# Patient Record
Sex: MALE | Race: WHITE | NOT HISPANIC OR LATINO | Employment: OTHER | URBAN - METROPOLITAN AREA
[De-identification: names, ages, dates, MRNs, and addresses within clinical notes are randomized per-mention and may not be internally consistent; named-entity substitution may affect disease eponyms.]

---

## 2019-01-17 ENCOUNTER — HOSPITAL ENCOUNTER (OUTPATIENT)
Dept: SURGERY | Facility: HOSPITAL | Age: 74
Setting detail: HOSPITAL OUTPATIENT SURGERY
Discharge: HOME | End: 2019-01-17
Attending: OPHTHALMOLOGY

## 2019-01-24 ENCOUNTER — HOSPITAL ENCOUNTER (OUTPATIENT)
Dept: SURGERY | Facility: HOSPITAL | Age: 74
Setting detail: HOSPITAL OUTPATIENT SURGERY
Discharge: HOME | End: 2019-01-24
Attending: OPHTHALMOLOGY

## 2019-03-13 ENCOUNTER — HOSPITAL ENCOUNTER (OUTPATIENT)
Dept: OTHER | Facility: HOSPITAL | Age: 74
Discharge: HOME | End: 2019-03-13

## 2019-03-14 ENCOUNTER — HOSPITAL ENCOUNTER (OUTPATIENT)
Dept: OTHER | Facility: HOSPITAL | Age: 74
Discharge: HOME | End: 2019-03-14
Attending: FAMILY MEDICINE

## 2019-03-14 LAB
CREAT BLD-MCNC: 1.2 MG/DL (ref 0.6–1.4)
GFR SERPLBLD BASED ON 1.73 SQ M-ARVRAT: >60 ML/MIN/{1.73_M2}

## 2019-03-20 ENCOUNTER — OFFICE VISIT CONVERTED (OUTPATIENT)
Dept: UROLOGY | Facility: CLINIC | Age: 74
End: 2019-03-20
Attending: UROLOGY

## 2019-03-22 ENCOUNTER — HOSPITAL ENCOUNTER (OUTPATIENT)
Dept: PERIOP | Facility: HOSPITAL | Age: 74
Setting detail: HOSPITAL OUTPATIENT SURGERY
Discharge: HOME | End: 2019-03-22
Attending: UROLOGY

## 2019-03-22 LAB
ANION GAP SERPL CALC-SCNC: 14 MMOL/L (ref 8–19)
BASOPHILS # BLD AUTO: 0.02 10*3/UL (ref 0–0.2)
BASOPHILS NFR BLD AUTO: 0.2 % (ref 0–3)
BUN SERPL-MCNC: 23 MG/DL (ref 5–25)
BUN/CREAT SERPL: 23 {RATIO} (ref 6–20)
CALCIUM SERPL-MCNC: 9.3 MG/DL (ref 8.7–10.4)
CHLORIDE SERPL-SCNC: 104 MMOL/L (ref 99–111)
CONV ABS IMM GRAN: 0.08 10*3/UL (ref 0–0.2)
CONV CO2: 26 MMOL/L (ref 22–32)
CONV IMMATURE GRAN: 0.6 % (ref 0–1.8)
CREAT UR-MCNC: 1 MG/DL (ref 0.7–1.2)
DEPRECATED RDW RBC AUTO: 42.9 FL (ref 35.1–43.9)
EOSINOPHIL # BLD AUTO: 0.21 10*3/UL (ref 0–0.7)
EOSINOPHIL # BLD AUTO: 1.6 % (ref 0–7)
ERYTHROCYTE [DISTWIDTH] IN BLOOD BY AUTOMATED COUNT: 13.7 % (ref 11.6–14.4)
GFR SERPLBLD BASED ON 1.73 SQ M-ARVRAT: >60 ML/MIN/{1.73_M2}
GLUCOSE SERPL-MCNC: 89 MG/DL (ref 70–99)
HBA1C MFR BLD: 13.8 G/DL (ref 14–18)
HCT VFR BLD AUTO: 43.6 % (ref 42–52)
LYMPHOCYTES # BLD AUTO: 2.47 10*3/UL (ref 1–5)
MCH RBC QN AUTO: 27.5 PG (ref 27–31)
MCHC RBC AUTO-ENTMCNC: 31.7 G/DL (ref 33–37)
MCV RBC AUTO: 86.9 FL (ref 80–96)
MONOCYTES # BLD AUTO: 1.14 10*3/UL (ref 0.2–1.2)
MONOCYTES NFR BLD AUTO: 8.6 % (ref 3–10)
NEUTROPHILS # BLD AUTO: 9.26 10*3/UL (ref 2–8)
NEUTROPHILS NFR BLD AUTO: 70.3 % (ref 30–85)
NRBC CBCN: 0 % (ref 0–0.7)
OSMOLALITY SERPL CALC.SUM OF ELEC: 293 MOSM/KG (ref 273–304)
PLATELET # BLD AUTO: 302 10*3/UL (ref 130–400)
PMV BLD AUTO: 10.6 FL (ref 9.4–12.4)
POTASSIUM SERPL-SCNC: 4 MMOL/L (ref 3.5–5.3)
RBC # BLD AUTO: 5.02 10*6/UL (ref 4.7–6.1)
SODIUM SERPL-SCNC: 140 MMOL/L (ref 135–147)
VARIANT LYMPHS NFR BLD MANUAL: 18.7 % (ref 20–45)
WBC # BLD AUTO: 13.18 10*3/UL (ref 4.8–10.8)

## 2019-03-24 LAB — BACTERIA UR CULT: NORMAL

## 2019-03-28 ENCOUNTER — OFFICE VISIT CONVERTED (OUTPATIENT)
Dept: SURGERY | Facility: CLINIC | Age: 74
End: 2019-03-28
Attending: PHYSICIAN ASSISTANT

## 2019-04-03 ENCOUNTER — HOSPITAL ENCOUNTER (OUTPATIENT)
Dept: PREADMISSION TESTING | Facility: HOSPITAL | Age: 74
Discharge: HOME | End: 2019-04-03
Attending: UROLOGY

## 2019-04-03 LAB
ANION GAP SERPL CALC-SCNC: 16 MMOL/L (ref 8–19)
BASOPHILS # BLD AUTO: 0.03 10*3/UL (ref 0–0.2)
BASOPHILS NFR BLD AUTO: 0.4 % (ref 0–3)
BUN SERPL-MCNC: 18 MG/DL (ref 5–25)
BUN/CREAT SERPL: 13 {RATIO} (ref 6–20)
CALCIUM SERPL-MCNC: 9.1 MG/DL (ref 8.7–10.4)
CHLORIDE SERPL-SCNC: 105 MMOL/L (ref 99–111)
CONV ABS IMM GRAN: 0.01 10*3/UL (ref 0–0.2)
CONV CO2: 25 MMOL/L (ref 22–32)
CONV IMMATURE GRAN: 0.1 % (ref 0–1.8)
CREAT UR-MCNC: 1.42 MG/DL (ref 0.7–1.2)
DEPRECATED RDW RBC AUTO: 44.1 FL (ref 35.1–43.9)
EOSINOPHIL # BLD AUTO: 0.35 10*3/UL (ref 0–0.7)
EOSINOPHIL # BLD AUTO: 4.8 % (ref 0–7)
ERYTHROCYTE [DISTWIDTH] IN BLOOD BY AUTOMATED COUNT: 13.5 % (ref 11.6–14.4)
GFR SERPLBLD BASED ON 1.73 SQ M-ARVRAT: 49 ML/MIN/{1.73_M2}
GLUCOSE SERPL-MCNC: 102 MG/DL (ref 70–99)
HBA1C MFR BLD: 12.4 G/DL (ref 14–18)
HCT VFR BLD AUTO: 39.9 % (ref 42–52)
INR PPP: 0.92 (ref 2–3)
LYMPHOCYTES # BLD AUTO: 1.35 10*3/UL (ref 1–5)
MCH RBC QN AUTO: 27.5 PG (ref 27–31)
MCHC RBC AUTO-ENTMCNC: 31.1 G/DL (ref 33–37)
MCV RBC AUTO: 88.5 FL (ref 80–96)
MONOCYTES # BLD AUTO: 0.76 10*3/UL (ref 0.2–1.2)
MONOCYTES NFR BLD AUTO: 10.5 % (ref 3–10)
NEUTROPHILS # BLD AUTO: 4.72 10*3/UL (ref 2–8)
NEUTROPHILS NFR BLD AUTO: 65.5 % (ref 30–85)
NRBC CBCN: 0 % (ref 0–0.7)
OSMOLALITY SERPL CALC.SUM OF ELEC: 296 MOSM/KG (ref 273–304)
PLATELET # BLD AUTO: 203 10*3/UL (ref 130–400)
PMV BLD AUTO: 11 FL (ref 9.4–12.4)
POTASSIUM SERPL-SCNC: 4.1 MMOL/L (ref 3.5–5.3)
PROTHROMBIN TIME: 9.8 S (ref 9.4–12)
RBC # BLD AUTO: 4.51 10*6/UL (ref 4.7–6.1)
SODIUM SERPL-SCNC: 142 MMOL/L (ref 135–147)
VARIANT LYMPHS NFR BLD MANUAL: 18.7 % (ref 20–45)
WBC # BLD AUTO: 7.22 10*3/UL (ref 4.8–10.8)

## 2019-04-17 ENCOUNTER — HOSPITAL ENCOUNTER (OUTPATIENT)
Dept: GENERAL RADIOLOGY | Facility: HOSPITAL | Age: 74
Discharge: HOME | End: 2019-04-17
Attending: UROLOGY

## 2019-04-17 ENCOUNTER — OFFICE VISIT CONVERTED (OUTPATIENT)
Dept: UROLOGY | Facility: CLINIC | Age: 74
End: 2019-04-17
Attending: UROLOGY

## 2019-04-17 ENCOUNTER — CONVERSION ENCOUNTER (OUTPATIENT)
Dept: SURGERY | Facility: CLINIC | Age: 74
End: 2019-04-17

## 2019-04-29 ENCOUNTER — CONVERSION ENCOUNTER (OUTPATIENT)
Dept: SURGERY | Facility: CLINIC | Age: 74
End: 2019-04-29

## 2019-04-29 ENCOUNTER — OFFICE VISIT CONVERTED (OUTPATIENT)
Dept: UROLOGY | Facility: CLINIC | Age: 74
End: 2019-04-29
Attending: UROLOGY

## 2019-06-24 ENCOUNTER — CONVERSION ENCOUNTER (OUTPATIENT)
Dept: SURGERY | Facility: CLINIC | Age: 74
End: 2019-06-24

## 2019-06-24 ENCOUNTER — PROCEDURE VISIT CONVERTED (OUTPATIENT)
Dept: UROLOGY | Facility: CLINIC | Age: 74
End: 2019-06-24
Attending: UROLOGY

## 2019-10-01 ENCOUNTER — HOSPITAL ENCOUNTER (OUTPATIENT)
Dept: PERIOP | Facility: HOSPITAL | Age: 74
Setting detail: HOSPITAL OUTPATIENT SURGERY
Discharge: HOME | End: 2019-10-01
Attending: UROLOGY

## 2019-10-03 ENCOUNTER — CONVERSION ENCOUNTER (OUTPATIENT)
Dept: SURGERY | Facility: CLINIC | Age: 74
End: 2019-10-03

## 2019-10-03 ENCOUNTER — OFFICE VISIT CONVERTED (OUTPATIENT)
Dept: UROLOGY | Facility: CLINIC | Age: 74
End: 2019-10-03
Attending: UROLOGY

## 2020-01-06 ENCOUNTER — PROCEDURE VISIT CONVERTED (OUTPATIENT)
Dept: UROLOGY | Facility: CLINIC | Age: 75
End: 2020-01-06
Attending: UROLOGY

## 2020-01-06 ENCOUNTER — CONVERSION ENCOUNTER (OUTPATIENT)
Dept: SURGERY | Facility: CLINIC | Age: 75
End: 2020-01-06

## 2020-01-21 ENCOUNTER — HOSPITAL ENCOUNTER (OUTPATIENT)
Dept: PERIOP | Facility: HOSPITAL | Age: 75
Setting detail: HOSPITAL OUTPATIENT SURGERY
Discharge: HOME | End: 2020-01-21
Attending: UROLOGY

## 2020-01-21 LAB
ANION GAP SERPL CALC-SCNC: 15 MMOL/L (ref 8–19)
BUN SERPL-MCNC: 22 MG/DL (ref 5–25)
BUN/CREAT SERPL: 19 {RATIO} (ref 6–20)
CALCIUM SERPL-MCNC: 9.6 MG/DL (ref 8.7–10.4)
CHLORIDE SERPL-SCNC: 102 MMOL/L (ref 99–111)
CONV CO2: 26 MMOL/L (ref 22–32)
CREAT UR-MCNC: 1.17 MG/DL (ref 0.7–1.2)
GFR SERPLBLD BASED ON 1.73 SQ M-ARVRAT: >60 ML/MIN/{1.73_M2}
GLUCOSE SERPL-MCNC: 99 MG/DL (ref 70–99)
OSMOLALITY SERPL CALC.SUM OF ELEC: 291 MOSM/KG (ref 273–304)
POTASSIUM SERPL-SCNC: 4.2 MMOL/L (ref 3.5–5.3)
SODIUM SERPL-SCNC: 139 MMOL/L (ref 135–147)

## 2020-01-22 ENCOUNTER — HOSPITAL ENCOUNTER (OUTPATIENT)
Dept: OTHER | Facility: HOSPITAL | Age: 75
Discharge: HOME | End: 2020-01-22
Attending: PHYSICIAN ASSISTANT

## 2020-01-22 LAB — PSA SERPL-MCNC: 0.88 NG/ML (ref 0–4)

## 2020-01-23 ENCOUNTER — OFFICE VISIT CONVERTED (OUTPATIENT)
Dept: SURGERY | Facility: CLINIC | Age: 75
End: 2020-01-23
Attending: PHYSICIAN ASSISTANT

## 2020-01-23 ENCOUNTER — CONVERSION ENCOUNTER (OUTPATIENT)
Dept: SURGERY | Facility: CLINIC | Age: 75
End: 2020-01-23

## 2020-01-29 ENCOUNTER — OFFICE VISIT CONVERTED (OUTPATIENT)
Dept: UROLOGY | Facility: CLINIC | Age: 75
End: 2020-01-29
Attending: UROLOGY

## 2020-02-07 ENCOUNTER — HOSPITAL ENCOUNTER (OUTPATIENT)
Dept: OTHER | Facility: HOSPITAL | Age: 75
Discharge: HOME | End: 2020-02-07
Attending: NURSE PRACTITIONER

## 2020-02-11 ENCOUNTER — OFFICE VISIT CONVERTED (OUTPATIENT)
Dept: SURGERY | Facility: CLINIC | Age: 75
End: 2020-02-11
Attending: NURSE PRACTITIONER

## 2020-03-02 ENCOUNTER — CONVERSION ENCOUNTER (OUTPATIENT)
Dept: SURGERY | Facility: CLINIC | Age: 75
End: 2020-03-02

## 2020-03-02 ENCOUNTER — OFFICE VISIT CONVERTED (OUTPATIENT)
Dept: UROLOGY | Facility: CLINIC | Age: 75
End: 2020-03-02
Attending: UROLOGY

## 2020-05-15 ENCOUNTER — PROCEDURE VISIT CONVERTED (OUTPATIENT)
Dept: UROLOGY | Facility: CLINIC | Age: 75
End: 2020-05-15
Attending: UROLOGY

## 2020-06-09 ENCOUNTER — HOSPITAL ENCOUNTER (OUTPATIENT)
Dept: PERIOP | Facility: HOSPITAL | Age: 75
Setting detail: HOSPITAL OUTPATIENT SURGERY
Discharge: HOME | End: 2020-06-09
Attending: UROLOGY

## 2020-06-17 ENCOUNTER — OFFICE VISIT CONVERTED (OUTPATIENT)
Dept: UROLOGY | Facility: CLINIC | Age: 75
End: 2020-06-17
Attending: UROLOGY

## 2020-06-17 ENCOUNTER — CONVERSION ENCOUNTER (OUTPATIENT)
Dept: SURGERY | Facility: CLINIC | Age: 75
End: 2020-06-17

## 2020-07-15 ENCOUNTER — OFFICE VISIT CONVERTED (OUTPATIENT)
Dept: UROLOGY | Facility: CLINIC | Age: 75
End: 2020-07-15
Attending: UROLOGY

## 2020-07-15 ENCOUNTER — CONVERSION ENCOUNTER (OUTPATIENT)
Dept: SURGERY | Facility: CLINIC | Age: 75
End: 2020-07-15

## 2020-07-22 ENCOUNTER — CONVERSION ENCOUNTER (OUTPATIENT)
Dept: SURGERY | Facility: CLINIC | Age: 75
End: 2020-07-22

## 2020-07-22 ENCOUNTER — OFFICE VISIT CONVERTED (OUTPATIENT)
Dept: UROLOGY | Facility: CLINIC | Age: 75
End: 2020-07-22
Attending: UROLOGY

## 2020-07-29 ENCOUNTER — OFFICE VISIT CONVERTED (OUTPATIENT)
Dept: UROLOGY | Facility: CLINIC | Age: 75
End: 2020-07-29
Attending: UROLOGY

## 2020-07-29 ENCOUNTER — CONVERSION ENCOUNTER (OUTPATIENT)
Dept: SURGERY | Facility: CLINIC | Age: 75
End: 2020-07-29

## 2020-08-05 ENCOUNTER — OFFICE VISIT CONVERTED (OUTPATIENT)
Dept: UROLOGY | Facility: CLINIC | Age: 75
End: 2020-08-05
Attending: UROLOGY

## 2020-08-12 ENCOUNTER — OFFICE VISIT CONVERTED (OUTPATIENT)
Dept: UROLOGY | Facility: CLINIC | Age: 75
End: 2020-08-12
Attending: UROLOGY

## 2020-08-12 ENCOUNTER — CONVERSION ENCOUNTER (OUTPATIENT)
Dept: SURGERY | Facility: CLINIC | Age: 75
End: 2020-08-12

## 2020-08-19 ENCOUNTER — CONVERSION ENCOUNTER (OUTPATIENT)
Dept: SURGERY | Facility: CLINIC | Age: 75
End: 2020-08-19

## 2020-08-19 ENCOUNTER — OFFICE VISIT CONVERTED (OUTPATIENT)
Dept: UROLOGY | Facility: CLINIC | Age: 75
End: 2020-08-19
Attending: UROLOGY

## 2020-09-16 ENCOUNTER — HOSPITAL ENCOUNTER (OUTPATIENT)
Dept: FAMILY MEDICINE CLINIC | Facility: CLINIC | Age: 75
Discharge: HOME | End: 2020-09-16
Attending: UROLOGY

## 2020-09-18 LAB — SARS-COV-2 RNA SPEC QL NAA+PROBE: NOT DETECTED

## 2020-09-22 ENCOUNTER — HOSPITAL ENCOUNTER (OUTPATIENT)
Dept: PERIOP | Facility: HOSPITAL | Age: 75
Setting detail: HOSPITAL OUTPATIENT SURGERY
Discharge: HOME | End: 2020-09-22
Attending: UROLOGY

## 2020-09-30 ENCOUNTER — TELEPHONE CONVERTED (OUTPATIENT)
Dept: UROLOGY | Facility: CLINIC | Age: 75
End: 2020-09-30
Attending: UROLOGY

## 2021-01-14 ENCOUNTER — PROCEDURE VISIT CONVERTED (OUTPATIENT)
Dept: UROLOGY | Facility: CLINIC | Age: 76
End: 2021-01-14
Attending: UROLOGY

## 2021-01-29 ENCOUNTER — HOSPITAL ENCOUNTER (OUTPATIENT)
Dept: FAMILY MEDICINE CLINIC | Facility: CLINIC | Age: 76
Discharge: HOME | End: 2021-01-29
Attending: UROLOGY

## 2021-01-31 LAB — SARS-COV-2 RNA SPEC QL NAA+PROBE: NOT DETECTED

## 2021-02-04 ENCOUNTER — HOSPITAL ENCOUNTER (OUTPATIENT)
Dept: PERIOP | Facility: HOSPITAL | Age: 76
Setting detail: HOSPITAL OUTPATIENT SURGERY
Discharge: HOME | End: 2021-02-04
Attending: UROLOGY

## 2021-02-26 ENCOUNTER — OFFICE VISIT CONVERTED (OUTPATIENT)
Dept: NEUROLOGY | Facility: CLINIC | Age: 76
End: 2021-02-26
Attending: PSYCHIATRY & NEUROLOGY

## 2021-03-01 ENCOUNTER — CONVERSION ENCOUNTER (OUTPATIENT)
Dept: SURGERY | Facility: CLINIC | Age: 76
End: 2021-03-01

## 2021-03-01 ENCOUNTER — OFFICE VISIT CONVERTED (OUTPATIENT)
Dept: UROLOGY | Facility: CLINIC | Age: 76
End: 2021-03-01
Attending: UROLOGY

## 2021-03-23 NOTE — PROGRESS NOTES
"Subjective   Patient ID: Curtis Richmond is a 75 y.o. male is being seen for consultation today at the request of Jeffrey Deal,Maisha for Cerebral aneurysm.    Today the patient reports he is doing well. He denies any headaches, vision changes or dizziness.    MRI Brain done at [facility] 2/9/2021 and CT Head done at [facility] 2/9/2021.    Patient, provider and MA are all wearing a mask in our office today.    75-year-old male who is a former smoker but quit 40 years ago with no family history of subarachnoid hemorrhage or intracranial aneurysms who had an acute stroke in February 2021 with imaging performed including an MRI of the brain and CTA of the head & neck demonstrating a large anterior communicating artery aneurysm plus acute infarcts in the right cerebral hemisphere including the right frontal and right parietal lobes.  Patient does have hypertension which is controlled with medication and is on Lipitor and Plavix as well.  Patient was having headaches prior to his CVA, but these have improved.  Patient still has some residual weakness in his left side, but this is improving over time.      The following portions of the patient's history were reviewed and updated as appropriate: allergies, current medications, past family history, past medical history, past social history, past surgical history and problem list.    Review of Systems   HENT: Negative.    Eyes: Negative for visual disturbance.   Respiratory: Negative.    Cardiovascular: Negative.    Gastrointestinal: Negative for nausea.   Endocrine: Negative.    Genitourinary: Negative.    Musculoskeletal: Negative.    Skin: Negative.    Allergic/Immunologic: Negative.    Neurological: Negative for dizziness and headaches.   Hematological: Negative.    Psychiatric/Behavioral: Negative.        Objective     Vitals:    03/30/21 0920   BP: 138/76   Pulse: 80   Temp: 98.1 °F (36.7 °C)   Weight: 109 kg (240 lb)   Height: 165.1 cm (65\")     Body mass index is " 39.94 kg/m².      Physical Exam  Vitals and nursing note reviewed.   Constitutional:       General: He is not in acute distress.     Appearance: He is obese.   HENT:      Head: Normocephalic and atraumatic.      Nose: Nose normal.      Mouth/Throat:      Mouth: Mucous membranes are moist.   Eyes:      Extraocular Movements: Extraocular movements intact.      Conjunctiva/sclera: Conjunctivae normal.      Pupils: Pupils are equal, round, and reactive to light.   Cardiovascular:      Rate and Rhythm: Normal rate.      Pulses: Normal pulses.   Pulmonary:      Effort: Pulmonary effort is normal.   Musculoskeletal:         General: Normal range of motion.      Cervical back: Normal range of motion.   Skin:     General: Skin is warm and dry.   Neurological:      Mental Status: He is alert and oriented to person, place, and time.      Cranial Nerves: No cranial nerve deficit.      Sensory: Sensory deficit present.      Motor: Weakness present.      Coordination: Coordination normal.   Psychiatric:         Mood and Affect: Mood normal.         Behavior: Behavior normal.         Thought Content: Thought content normal.         Judgment: Judgment normal.       Neurologic Exam     Mental Status   Oriented to person, place, and time.     Cranial Nerves     CN III, IV, VI   Pupils are equal, round, and reactive to light.          Assessment/Plan   Independent Review of Radiographic Studies:      I personally reviewed the images from the following studies.    The CTA of the head neck dated 2021+ the MRI of the brain dated 2021 were reviewed and show the anterior communicating artery aneurysm measuring 15 millimeters in size with partial thrombosis.    Medical Decision Makin-year-old male with previous CVA and work-up demonstrating a large anterior communicating artery aneurysm.  Patient now for angiographic evaluation and possible endovascular treatment under general anesthesia.  The risks, alternatives and  procedure were explained to the patient who expresses understanding and wishes to proceed.  Diagnoses and all orders for this visit:    1. Cerebral aneurysm, nonruptured (Primary)  -     Case Request; Standing  -     CBC & Differential; Future  -     Basic Metabolic Panel; Future  -     P2Y12 Platelet Inhibition; Future  -     Case Request  -     aspirin chewable tablet 81 mg    2. Essential hypertension    Other orders  -     Outpatient In A Bed; Standing  -     Follow Anesthesia Guidelines / Protocol; Future  -     Obtain informed consent  -     Provide NPO Instructions to Patient; Future  -     Chlorhexidine Skin Prep; Future  -     Follow Anesthesia Guidelines / Protocol; Standing  -     Verify / Perform Chlorhexidine Skin Prep; Standing      Return in about 2 weeks (around 4/13/2021) for Cerebral embolization under general anesthesia.

## 2021-03-30 ENCOUNTER — OFFICE VISIT (OUTPATIENT)
Dept: NEUROSURGERY | Facility: CLINIC | Age: 76
End: 2021-03-30

## 2021-03-30 VITALS
HEIGHT: 65 IN | WEIGHT: 240 LBS | BODY MASS INDEX: 39.99 KG/M2 | TEMPERATURE: 98.1 F | DIASTOLIC BLOOD PRESSURE: 76 MMHG | HEART RATE: 80 BPM | SYSTOLIC BLOOD PRESSURE: 138 MMHG

## 2021-03-30 DIAGNOSIS — I67.1 CEREBRAL ANEURYSM, NONRUPTURED: Primary | ICD-10-CM

## 2021-03-30 DIAGNOSIS — I10 ESSENTIAL HYPERTENSION: ICD-10-CM

## 2021-03-30 PROCEDURE — 99204 OFFICE O/P NEW MOD 45 MIN: CPT | Performed by: RADIOLOGY

## 2021-03-30 RX ORDER — CLOPIDOGREL BISULFATE 75 MG/1
75 TABLET ORAL EVERY EVENING
COMMUNITY
Start: 2021-03-26 | End: 2021-10-26

## 2021-03-30 RX ORDER — ASPIRIN 81 MG/1
81 TABLET, CHEWABLE ORAL DAILY
Status: DISCONTINUED | OUTPATIENT
Start: 2021-03-30 | End: 2021-04-12

## 2021-03-30 RX ORDER — ATORVASTATIN CALCIUM 40 MG/1
40 TABLET, FILM COATED ORAL NIGHTLY
COMMUNITY
Start: 2021-02-20

## 2021-03-30 RX ORDER — GABAPENTIN 400 MG/1
400 CAPSULE ORAL EVERY EVENING
COMMUNITY

## 2021-03-30 RX ORDER — SIMVASTATIN 20 MG
TABLET ORAL
COMMUNITY
End: 2021-03-30 | Stop reason: ALTCHOICE

## 2021-03-30 RX ORDER — CARVEDILOL 6.25 MG/1
6.25 TABLET ORAL 2 TIMES DAILY
COMMUNITY
Start: 2021-02-11

## 2021-03-30 RX ORDER — AMLODIPINE BESYLATE 2.5 MG/1
2.5 TABLET ORAL EVERY EVENING
COMMUNITY

## 2021-03-30 RX ORDER — LOSARTAN POTASSIUM AND HYDROCHLOROTHIAZIDE 12.5; 1 MG/1; MG/1
1 TABLET ORAL EVERY EVENING
COMMUNITY
End: 2022-03-18 | Stop reason: ALTCHOICE

## 2021-04-12 ENCOUNTER — PRE-ADMISSION TESTING (OUTPATIENT)
Dept: PREADMISSION TESTING | Facility: HOSPITAL | Age: 76
End: 2021-04-12

## 2021-04-12 VITALS
WEIGHT: 220 LBS | OXYGEN SATURATION: 97 % | BODY MASS INDEX: 37.56 KG/M2 | DIASTOLIC BLOOD PRESSURE: 76 MMHG | RESPIRATION RATE: 16 BRPM | HEART RATE: 52 BPM | SYSTOLIC BLOOD PRESSURE: 139 MMHG | TEMPERATURE: 98.9 F | HEIGHT: 64 IN

## 2021-04-12 DIAGNOSIS — I67.1 CEREBRAL ANEURYSM, NONRUPTURED: ICD-10-CM

## 2021-04-12 LAB
ANION GAP SERPL CALCULATED.3IONS-SCNC: 10.3 MMOL/L (ref 5–15)
BASOPHILS # BLD AUTO: 0.05 10*3/MM3 (ref 0–0.2)
BASOPHILS NFR BLD AUTO: 0.6 % (ref 0–1.5)
BUN SERPL-MCNC: 16 MG/DL (ref 8–23)
BUN/CREAT SERPL: 16.2 (ref 7–25)
CALCIUM SPEC-SCNC: 9 MG/DL (ref 8.6–10.5)
CHLORIDE SERPL-SCNC: 101 MMOL/L (ref 98–107)
CO2 SERPL-SCNC: 24.7 MMOL/L (ref 22–29)
CREAT SERPL-MCNC: 0.99 MG/DL (ref 0.76–1.27)
DEPRECATED RDW RBC AUTO: 40.3 FL (ref 37–54)
EOSINOPHIL # BLD AUTO: 0.5 10*3/MM3 (ref 0–0.4)
EOSINOPHIL NFR BLD AUTO: 6.3 % (ref 0.3–6.2)
ERYTHROCYTE [DISTWIDTH] IN BLOOD BY AUTOMATED COUNT: 13.2 % (ref 12.3–15.4)
GFR SERPL CREATININE-BSD FRML MDRD: 74 ML/MIN/1.73
GLUCOSE SERPL-MCNC: 90 MG/DL (ref 65–99)
HCT VFR BLD AUTO: 37.6 % (ref 37.5–51)
HGB BLD-MCNC: 11.8 G/DL (ref 13–17.7)
IMM GRANULOCYTES # BLD AUTO: 0.02 10*3/MM3 (ref 0–0.05)
IMM GRANULOCYTES NFR BLD AUTO: 0.3 % (ref 0–0.5)
LYMPHOCYTES # BLD AUTO: 1.51 10*3/MM3 (ref 0.7–3.1)
LYMPHOCYTES NFR BLD AUTO: 18.9 % (ref 19.6–45.3)
MCH RBC QN AUTO: 26.3 PG (ref 26.6–33)
MCHC RBC AUTO-ENTMCNC: 31.4 G/DL (ref 31.5–35.7)
MCV RBC AUTO: 83.9 FL (ref 79–97)
MONOCYTES # BLD AUTO: 0.88 10*3/MM3 (ref 0.1–0.9)
MONOCYTES NFR BLD AUTO: 11 % (ref 5–12)
NEUTROPHILS NFR BLD AUTO: 5.04 10*3/MM3 (ref 1.7–7)
NEUTROPHILS NFR BLD AUTO: 62.9 % (ref 42.7–76)
NRBC BLD AUTO-RTO: 0 /100 WBC (ref 0–0.2)
PA ADP PRP-ACNC: 144 PRU (ref 194–418)
PLATELET # BLD AUTO: 280 10*3/MM3 (ref 140–450)
PMV BLD AUTO: 10.7 FL (ref 6–12)
POTASSIUM SERPL-SCNC: 4.2 MMOL/L (ref 3.5–5.2)
QT INTERVAL: 360 MS
RBC # BLD AUTO: 4.48 10*6/MM3 (ref 4.14–5.8)
SODIUM SERPL-SCNC: 136 MMOL/L (ref 136–145)
WBC # BLD AUTO: 8 10*3/MM3 (ref 3.4–10.8)

## 2021-04-12 PROCEDURE — 85025 COMPLETE CBC W/AUTO DIFF WBC: CPT

## 2021-04-12 PROCEDURE — 93005 ELECTROCARDIOGRAM TRACING: CPT

## 2021-04-12 PROCEDURE — 93010 ELECTROCARDIOGRAM REPORT: CPT | Performed by: INTERNAL MEDICINE

## 2021-04-12 PROCEDURE — C9803 HOPD COVID-19 SPEC COLLECT: HCPCS | Performed by: NURSE PRACTITIONER

## 2021-04-12 PROCEDURE — 85576 BLOOD PLATELET AGGREGATION: CPT

## 2021-04-12 PROCEDURE — U0005 INFEC AGEN DETEC AMPLI PROBE: HCPCS | Performed by: NURSE PRACTITIONER

## 2021-04-12 PROCEDURE — U0004 COV-19 TEST NON-CDC HGH THRU: HCPCS | Performed by: NURSE PRACTITIONER

## 2021-04-12 PROCEDURE — 80048 BASIC METABOLIC PNL TOTAL CA: CPT

## 2021-04-12 PROCEDURE — 36415 COLL VENOUS BLD VENIPUNCTURE: CPT

## 2021-04-12 RX ORDER — ASPIRIN 81 MG/1
81 TABLET, CHEWABLE ORAL DAILY
COMMUNITY
End: 2021-05-04

## 2021-04-12 NOTE — DISCHARGE INSTRUCTIONS
Take the following medications the morning of surgery with a small sip of water: NONE      If you are on prescription narcotic pain medication to control your pain you may also take that medication the morning of surgery.    General Instructions:  • Do not eat or drink anything after midnight the night before surgery.   • Bring any papers given to you in the doctor’s office.  • Wear clean comfortable clothes.  • Do not wear contact lenses, false eyelashes or make-up.  Bring a case for your glasses.   • Remove all piercings.  Leave jewelry and any other valuables at home.  • The Pre-Admission Testing nurse will instruct you to bring medications if unable to obtain an accurate list in Pre-Admission Testing.            Preventing a Surgical Site Infection:  • For 2 to 3 days before surgery, avoid shaving with a razor because the razor can irritate skin and make it easier to develop an infection.    • Any areas of open skin can increase the risk of a post-operative wound infection by allowing bacteria to enter and travel throughout the body.  Notify your surgeon if you have any skin wounds / rashes even if it is not near the expected surgical site.  The area will need assessed to determine if surgery should be delayed until it is healed.  • The night prior to surgery shower using a fresh bar of anti-bacterial soap (such as Dial) and clean washcloth.  Sleep in a clean bed with clean clothing.  Do not allow pets to sleep with you.  • Shower on the morning of surgery using a fresh bar of anti-bacterial soap (such as Dial) and clean washcloth.  Dry with a clean towel and dress in clean clothing.  • Ask your surgeon if you will be receiving antibiotics prior to surgery.  • Make sure you, your family, and all healthcare providers clean their hands with soap and water or an alcohol based hand  before caring for you or your wound.    Day of surgery: 4/14/2021. MAIN OR. ARRIVAL TIME 6 AM  Your arrival time is  approximately two hours before your scheduled surgery time.  Upon arrival, a Pre-op nurse and Anesthesiologist will review your health history, obtain vital signs, and answer questions you may have.  The only belongings needed at this time will be your home medications and if applicable your C-PAP/BI-PAP machine.  A Pre-op nurse will start an IV and you may receive medication in preparation for surgery, including something to help you relax.      Please be aware that surgery does come with discomfort.  We want to make every effort to control your discomfort so please discuss any uncontrolled symptoms with your nurse.   Your doctor will most likely have prescribed pain medications.          If you are staying overnight following surgery, you will be transported to your hospital room following the recovery period.  [facility] has all private rooms.    If you have any questions please call Pre-Admission Testing at [phone].  Deductibles and co-payments are collected on the day of service. Please be prepared to pay the required co-pay, deductible or deposit on the day of service as defined by your plan.    CHLORHEXIDINE CLOTH INSTRUCTIONS  The morning of surgery follow these instructions using the Chlorhexidine cloths you've been given.  These steps reduce bacteria on the body.  Do not use the cloths near your eyes, ears mouth, genitalia or on open wounds.  Throw the cloths away after use but do not try to flush them down a toilet.      • Open and remove one cloth at a time from the package.    • Leave the cloth unfolded and begin the bathing.  • Massage the skin with the cloths using gentle pressure to remove bacteria.  Do not scrub harshly.   • Follow the steps below with one 2% CHG cloth per area (6 total cloths).  • One cloth for neck, shoulders and chest.  • One cloth for both arms, hands, fingers and underarms (do underarms last).  • One cloth for the abdomen followed by groin.  • One cloth for  right leg and foot including between the toes.  • One cloth for left leg and foot including between the toes.  • The last cloth is to be used for the back of the neck, back and buttocks.    Allow the CHG to air dry 3 minutes on the skin which will give it time to work and decrease the chance of irritation.  The skin may feel sticky until it is dry.  Do not rinse with water or any other liquid or you will lose the beneficial effects of the CHG.  If mild skin irritation occurs, do rinse the skin to remove the CHG.  Report this to the nurse at time of admission.  Do not apply lotions, creams, ointments, deodorants or perfumes after using the clothes. Dress in clean clothes before coming to the hospital.    Patient Education for Self-Quarantine Process    Following your COVID testing, we strongly recommend that you do not leave your home after you have been tested for COVID except to get medical care. This includes not going to work, school or to public areas.  If this is not possible for you to do please limit your activities to only required outings.  Be sure to wear a mask when you are with other people, practice social distancing and wash your hands frequently.      The following items provide additional details to keep you safe.  • Wash your hands with soap and water frequently for at least 20 seconds.   • Avoid touching your eyes, nose and mouth with unwashed hands.  • Do not share anything - utensils, towels, food from the same bowl.   • Have your own utensils, drinking glass, dishes, towels and bedding.   • Do not have visitors.   • Do use FaceTime to stay in touch with family and friends.  • You should stay in a specific room away from others if possible.   • Stay at least 6 feet away from others in the home if you cannot have a dedicated room to yourself.   • Do not snuggle with your pet. While the CDC says there is no evidence that pets can spread COVID-19 or be infected from humans, it is probably best to  avoid “petting, snuggling, being kissed or licked and sharing food (during self-quarantine)”, according to the CDC.   • Sanitize household surfaces daily. Include all high touch areas (door handles, light switches, phones, countertops, etc.)  • Do not share a bathroom with others, if possible.   • Wear a mask around others in your home if you are unable to stay in a separate room or 6 feet apart. If  you are unable to wear a mask, have your family member wear a mask if they must be within 6 feet of you.   Call your surgeon immediately if you experience any of the following symptoms:  • Sore Throat  • Shortness of Breath or difficulty breathing  • Cough  • Chills  • Body soreness or muscle pain  • Headache  • Fever  • New loss of taste or smell  • Do not arrive for your surgery ill.  Your procedure will need to be rescheduled to another time.  You will need to call your physician before the day of surgery to avoid any unnecessary exposure to hospital staff as well as other patients.

## 2021-04-13 LAB — SARS-COV-2 RNA RESP QL NAA+PROBE: NOT DETECTED

## 2021-04-14 ENCOUNTER — APPOINTMENT (OUTPATIENT)
Dept: GENERAL RADIOLOGY | Facility: HOSPITAL | Age: 76
End: 2021-04-14

## 2021-04-14 ENCOUNTER — HOSPITAL ENCOUNTER (OUTPATIENT)
Facility: HOSPITAL | Age: 76
Discharge: HOME | End: 2021-04-14
Attending: RADIOLOGY | Admitting: RADIOLOGY

## 2021-04-14 ENCOUNTER — ANESTHESIA EVENT (OUTPATIENT)
Dept: PERIOP | Facility: HOSPITAL | Age: 76
End: 2021-04-14

## 2021-04-14 ENCOUNTER — ANESTHESIA (OUTPATIENT)
Dept: PERIOP | Facility: HOSPITAL | Age: 76
End: 2021-04-14

## 2021-04-14 VITALS
OXYGEN SATURATION: 95 % | TEMPERATURE: 99.2 F | DIASTOLIC BLOOD PRESSURE: 73 MMHG | HEART RATE: 90 BPM | RESPIRATION RATE: 16 BRPM | SYSTOLIC BLOOD PRESSURE: 110 MMHG

## 2021-04-14 PROCEDURE — 25010000002 HEPARIN (PORCINE) PER 1000 UNITS: Performed by: RADIOLOGY

## 2021-04-14 PROCEDURE — C1769 GUIDE WIRE: HCPCS | Performed by: RADIOLOGY

## 2021-04-14 PROCEDURE — 0 IODIXANOL PER 1 ML: Performed by: RADIOLOGY

## 2021-04-14 PROCEDURE — 36226 PLACE CATH VERTEBRAL ART: CPT | Performed by: RADIOLOGY

## 2021-04-14 PROCEDURE — 36224 PLACE CATH CAROTD ART: CPT | Performed by: RADIOLOGY

## 2021-04-14 PROCEDURE — 25010000002 DEXAMETHASONE PER 1 MG: Performed by: NURSE ANESTHETIST, CERTIFIED REGISTERED

## 2021-04-14 PROCEDURE — 25010000002 FENTANYL CITRATE (PF) 100 MCG/2ML SOLUTION: Performed by: NURSE ANESTHETIST, CERTIFIED REGISTERED

## 2021-04-14 PROCEDURE — C1894 INTRO/SHEATH, NON-LASER: HCPCS | Performed by: RADIOLOGY

## 2021-04-14 PROCEDURE — 25010000002 PHENYLEPHRINE PER 1 ML: Performed by: NURSE ANESTHETIST, CERTIFIED REGISTERED

## 2021-04-14 PROCEDURE — 25010000002 ONDANSETRON PER 1 MG: Performed by: NURSE ANESTHETIST, CERTIFIED REGISTERED

## 2021-04-14 PROCEDURE — 25010000002 NEOSTIGMINE 5 MG/10ML SOLUTION: Performed by: NURSE ANESTHETIST, CERTIFIED REGISTERED

## 2021-04-14 PROCEDURE — 85347 COAGULATION TIME ACTIVATED: CPT

## 2021-04-14 PROCEDURE — 25010000002 PROPOFOL 10 MG/ML EMULSION: Performed by: NURSE ANESTHETIST, CERTIFIED REGISTERED

## 2021-04-14 PROCEDURE — C1760 CLOSURE DEV, VASC: HCPCS | Performed by: RADIOLOGY

## 2021-04-14 PROCEDURE — 25010000002 FENTANYL CITRATE (PF) 100 MCG/2ML SOLUTION: Performed by: ANESTHESIOLOGY

## 2021-04-14 RX ORDER — PROMETHAZINE HYDROCHLORIDE 25 MG/1
25 SUPPOSITORY RECTAL ONCE AS NEEDED
Status: DISCONTINUED | OUTPATIENT
Start: 2021-04-14 | End: 2021-04-14 | Stop reason: HOSPADM

## 2021-04-14 RX ORDER — FAMOTIDINE 10 MG/ML
20 INJECTION, SOLUTION INTRAVENOUS ONCE
Status: COMPLETED | OUTPATIENT
Start: 2021-04-14 | End: 2021-04-14

## 2021-04-14 RX ORDER — DEXAMETHASONE SODIUM PHOSPHATE 10 MG/ML
INJECTION INTRAMUSCULAR; INTRAVENOUS AS NEEDED
Status: DISCONTINUED | OUTPATIENT
Start: 2021-04-14 | End: 2021-04-14 | Stop reason: SURG

## 2021-04-14 RX ORDER — GLYCOPYRROLATE 0.2 MG/ML
INJECTION INTRAMUSCULAR; INTRAVENOUS AS NEEDED
Status: DISCONTINUED | OUTPATIENT
Start: 2021-04-14 | End: 2021-04-14 | Stop reason: SURG

## 2021-04-14 RX ORDER — DIPHENHYDRAMINE HYDROCHLORIDE 50 MG/ML
12.5 INJECTION INTRAMUSCULAR; INTRAVENOUS
Status: DISCONTINUED | OUTPATIENT
Start: 2021-04-14 | End: 2021-04-14 | Stop reason: HOSPADM

## 2021-04-14 RX ORDER — PROMETHAZINE HYDROCHLORIDE 25 MG/1
25 TABLET ORAL ONCE AS NEEDED
Status: DISCONTINUED | OUTPATIENT
Start: 2021-04-14 | End: 2021-04-14 | Stop reason: HOSPADM

## 2021-04-14 RX ORDER — SODIUM CHLORIDE 0.9 % (FLUSH) 0.9 %
3-10 SYRINGE (ML) INJECTION AS NEEDED
Status: DISCONTINUED | OUTPATIENT
Start: 2021-04-14 | End: 2021-04-14 | Stop reason: HOSPADM

## 2021-04-14 RX ORDER — MIDAZOLAM HYDROCHLORIDE 1 MG/ML
0.5 INJECTION INTRAMUSCULAR; INTRAVENOUS
Status: DISCONTINUED | OUTPATIENT
Start: 2021-04-14 | End: 2021-04-14 | Stop reason: HOSPADM

## 2021-04-14 RX ORDER — ROCURONIUM BROMIDE 10 MG/ML
INJECTION, SOLUTION INTRAVENOUS AS NEEDED
Status: DISCONTINUED | OUTPATIENT
Start: 2021-04-14 | End: 2021-04-14 | Stop reason: SURG

## 2021-04-14 RX ORDER — FENTANYL CITRATE 50 UG/ML
INJECTION, SOLUTION INTRAMUSCULAR; INTRAVENOUS AS NEEDED
Status: DISCONTINUED | OUTPATIENT
Start: 2021-04-14 | End: 2021-04-14 | Stop reason: SURG

## 2021-04-14 RX ORDER — SODIUM CHLORIDE 0.9 % (FLUSH) 0.9 %
3 SYRINGE (ML) INJECTION EVERY 12 HOURS SCHEDULED
Status: DISCONTINUED | OUTPATIENT
Start: 2021-04-14 | End: 2021-04-14 | Stop reason: HOSPADM

## 2021-04-14 RX ORDER — SODIUM CHLORIDE, SODIUM LACTATE, POTASSIUM CHLORIDE, CALCIUM CHLORIDE 600; 310; 30; 20 MG/100ML; MG/100ML; MG/100ML; MG/100ML
9 INJECTION, SOLUTION INTRAVENOUS CONTINUOUS
Status: DISCONTINUED | OUTPATIENT
Start: 2021-04-14 | End: 2021-04-14 | Stop reason: HOSPADM

## 2021-04-14 RX ORDER — EPHEDRINE SULFATE 50 MG/ML
INJECTION, SOLUTION INTRAVENOUS AS NEEDED
Status: DISCONTINUED | OUTPATIENT
Start: 2021-04-14 | End: 2021-04-14 | Stop reason: SURG

## 2021-04-14 RX ORDER — NALOXONE HCL 0.4 MG/ML
0.2 VIAL (ML) INJECTION AS NEEDED
Status: DISCONTINUED | OUTPATIENT
Start: 2021-04-14 | End: 2021-04-14 | Stop reason: HOSPADM

## 2021-04-14 RX ORDER — ONDANSETRON 2 MG/ML
4 INJECTION INTRAMUSCULAR; INTRAVENOUS ONCE AS NEEDED
Status: DISCONTINUED | OUTPATIENT
Start: 2021-04-14 | End: 2021-04-14 | Stop reason: HOSPADM

## 2021-04-14 RX ORDER — FENTANYL CITRATE 50 UG/ML
50 INJECTION, SOLUTION INTRAMUSCULAR; INTRAVENOUS
Status: DISCONTINUED | OUTPATIENT
Start: 2021-04-14 | End: 2021-04-14 | Stop reason: HOSPADM

## 2021-04-14 RX ORDER — MIDAZOLAM HYDROCHLORIDE 1 MG/ML
1 INJECTION INTRAMUSCULAR; INTRAVENOUS
Status: DISCONTINUED | OUTPATIENT
Start: 2021-04-14 | End: 2021-04-14 | Stop reason: HOSPADM

## 2021-04-14 RX ORDER — ONDANSETRON 2 MG/ML
INJECTION INTRAMUSCULAR; INTRAVENOUS AS NEEDED
Status: DISCONTINUED | OUTPATIENT
Start: 2021-04-14 | End: 2021-04-14 | Stop reason: SURG

## 2021-04-14 RX ORDER — HYDROCODONE BITARTRATE AND ACETAMINOPHEN 7.5; 325 MG/1; MG/1
1 TABLET ORAL ONCE AS NEEDED
Status: DISCONTINUED | OUTPATIENT
Start: 2021-04-14 | End: 2021-04-14 | Stop reason: HOSPADM

## 2021-04-14 RX ORDER — FLUMAZENIL 0.1 MG/ML
0.2 INJECTION INTRAVENOUS AS NEEDED
Status: DISCONTINUED | OUTPATIENT
Start: 2021-04-14 | End: 2021-04-14 | Stop reason: HOSPADM

## 2021-04-14 RX ORDER — OXYCODONE AND ACETAMINOPHEN 7.5; 325 MG/1; MG/1
1 TABLET ORAL ONCE AS NEEDED
Status: DISCONTINUED | OUTPATIENT
Start: 2021-04-14 | End: 2021-04-14 | Stop reason: HOSPADM

## 2021-04-14 RX ORDER — HYDROMORPHONE HYDROCHLORIDE 1 MG/ML
0.5 INJECTION, SOLUTION INTRAMUSCULAR; INTRAVENOUS; SUBCUTANEOUS
Status: DISCONTINUED | OUTPATIENT
Start: 2021-04-14 | End: 2021-04-14 | Stop reason: HOSPADM

## 2021-04-14 RX ORDER — DIPHENHYDRAMINE HCL 25 MG
25 CAPSULE ORAL
Status: DISCONTINUED | OUTPATIENT
Start: 2021-04-14 | End: 2021-04-14 | Stop reason: HOSPADM

## 2021-04-14 RX ORDER — SODIUM CHLORIDE 0.9 % (FLUSH) 0.9 %
10 SYRINGE (ML) INJECTION AS NEEDED
Status: DISCONTINUED | OUTPATIENT
Start: 2021-04-14 | End: 2021-04-14 | Stop reason: HOSPADM

## 2021-04-14 RX ORDER — HYDRALAZINE HYDROCHLORIDE 20 MG/ML
5 INJECTION INTRAMUSCULAR; INTRAVENOUS
Status: DISCONTINUED | OUTPATIENT
Start: 2021-04-14 | End: 2021-04-14 | Stop reason: HOSPADM

## 2021-04-14 RX ORDER — LIDOCAINE HYDROCHLORIDE 20 MG/ML
INJECTION, SOLUTION INFILTRATION; PERINEURAL AS NEEDED
Status: DISCONTINUED | OUTPATIENT
Start: 2021-04-14 | End: 2021-04-14 | Stop reason: SURG

## 2021-04-14 RX ORDER — SODIUM CHLORIDE 9 MG/ML
75 INJECTION, SOLUTION INTRAVENOUS CONTINUOUS
Status: DISCONTINUED | OUTPATIENT
Start: 2021-04-14 | End: 2021-04-14 | Stop reason: HOSPADM

## 2021-04-14 RX ORDER — KETAMINE HYDROCHLORIDE 10 MG/ML
INJECTION INTRAMUSCULAR; INTRAVENOUS AS NEEDED
Status: DISCONTINUED | OUTPATIENT
Start: 2021-04-14 | End: 2021-04-14 | Stop reason: SURG

## 2021-04-14 RX ORDER — LABETALOL HYDROCHLORIDE 5 MG/ML
5 INJECTION, SOLUTION INTRAVENOUS
Status: DISCONTINUED | OUTPATIENT
Start: 2021-04-14 | End: 2021-04-14 | Stop reason: HOSPADM

## 2021-04-14 RX ORDER — IODIXANOL 320 MG/ML
200 INJECTION, SOLUTION INTRAVASCULAR
Status: COMPLETED | OUTPATIENT
Start: 2021-04-14 | End: 2021-04-14

## 2021-04-14 RX ORDER — NEOSTIGMINE METHYLSULFATE 0.5 MG/ML
INJECTION, SOLUTION INTRAVENOUS AS NEEDED
Status: DISCONTINUED | OUTPATIENT
Start: 2021-04-14 | End: 2021-04-14 | Stop reason: SURG

## 2021-04-14 RX ORDER — EPHEDRINE SULFATE 50 MG/ML
5 INJECTION, SOLUTION INTRAVENOUS ONCE AS NEEDED
Status: DISCONTINUED | OUTPATIENT
Start: 2021-04-14 | End: 2021-04-14 | Stop reason: HOSPADM

## 2021-04-14 RX ORDER — PROPOFOL 10 MG/ML
VIAL (ML) INTRAVENOUS AS NEEDED
Status: DISCONTINUED | OUTPATIENT
Start: 2021-04-14 | End: 2021-04-14 | Stop reason: SURG

## 2021-04-14 RX ORDER — LIDOCAINE HYDROCHLORIDE 10 MG/ML
0.5 INJECTION, SOLUTION EPIDURAL; INFILTRATION; INTRACAUDAL; PERINEURAL ONCE AS NEEDED
Status: DISCONTINUED | OUTPATIENT
Start: 2021-04-14 | End: 2021-04-14 | Stop reason: HOSPADM

## 2021-04-14 RX ADMIN — SODIUM CHLORIDE, POTASSIUM CHLORIDE, SODIUM LACTATE AND CALCIUM CHLORIDE 9 ML/HR: 600; 310; 30; 20 INJECTION, SOLUTION INTRAVENOUS at 06:35

## 2021-04-14 RX ADMIN — PHENYLEPHRINE HYDROCHLORIDE 200 MCG: 10 INJECTION INTRAVENOUS at 08:24

## 2021-04-14 RX ADMIN — NEOSTIGMINE METHYLSULFATE 3 MG: 0.5 INJECTION INTRAVENOUS at 09:48

## 2021-04-14 RX ADMIN — KETAMINE HYDROCHLORIDE 30 MG: 10 INJECTION INTRAMUSCULAR; INTRAVENOUS at 08:12

## 2021-04-14 RX ADMIN — FAMOTIDINE 20 MG: 10 INJECTION INTRAVENOUS at 06:40

## 2021-04-14 RX ADMIN — PHENYLEPHRINE HYDROCHLORIDE 100 MCG: 10 INJECTION INTRAVENOUS at 08:39

## 2021-04-14 RX ADMIN — PROPOFOL 150 MG: 10 INJECTION, EMULSION INTRAVENOUS at 08:12

## 2021-04-14 RX ADMIN — ONDANSETRON 4 MG: 2 INJECTION INTRAMUSCULAR; INTRAVENOUS at 09:48

## 2021-04-14 RX ADMIN — LIDOCAINE HYDROCHLORIDE 75 MG: 20 INJECTION, SOLUTION INFILTRATION; PERINEURAL at 08:12

## 2021-04-14 RX ADMIN — FENTANYL CITRATE 25 MCG: 50 INJECTION, SOLUTION INTRAMUSCULAR; INTRAVENOUS at 07:24

## 2021-04-14 RX ADMIN — PHENYLEPHRINE HYDROCHLORIDE 0.5 MCG/KG/MIN: 10 INJECTION, SOLUTION INTRAMUSCULAR; INTRAVENOUS; SUBCUTANEOUS at 08:31

## 2021-04-14 RX ADMIN — EPHEDRINE SULFATE 10 MG: 50 INJECTION INTRAVENOUS at 08:53

## 2021-04-14 RX ADMIN — DEXAMETHASONE SODIUM PHOSPHATE 8 MG: 10 INJECTION INTRAMUSCULAR; INTRAVENOUS at 08:12

## 2021-04-14 RX ADMIN — PHENYLEPHRINE HYDROCHLORIDE 200 MCG: 10 INJECTION INTRAVENOUS at 08:29

## 2021-04-14 RX ADMIN — IODIXANOL 75 ML: 320 INJECTION, SOLUTION INTRAVASCULAR at 10:03

## 2021-04-14 RX ADMIN — GLYCOPYRROLATE 0.6 MG: 0.2 INJECTION INTRAMUSCULAR; INTRAVENOUS at 09:48

## 2021-04-14 RX ADMIN — PHENYLEPHRINE HYDROCHLORIDE 100 MCG: 10 INJECTION INTRAVENOUS at 08:18

## 2021-04-14 RX ADMIN — ROCURONIUM BROMIDE 50 MG: 50 INJECTION INTRAVENOUS at 08:12

## 2021-04-14 RX ADMIN — FENTANYL CITRATE 50 MCG: 50 INJECTION INTRAMUSCULAR; INTRAVENOUS at 08:12

## 2021-04-14 NOTE — DISCHARGE INSTRUCTIONS
[facility]  [address] [place] [address]    Discharge Instructions for Cerebral Angiogram (Femoral/Groin Approach)    Refer to this sheet in the next few weeks. These instructions provide you with information on caring for yourself after your procedure. Your health care provider may also give you more specific instructions. Your treatment has been planned according to current medical practices, but problems sometimes occur. Call your health care provider if you have any problems or questions after your procedure.    WHAT TO EXPECT AFTER THE PROCEDURE    After your procedure, it is typical to have the following:    · A mild headache  · Minor discomfort or tenderness or stinging and a small bump at the catheter insertion site.  The bump should usually decrease in size and tenderness within 1-2 weeks.  You may use an ice pack to groin if desired.  Ice should be applied for approximately 20 minutes and then removed for 30 minutes.   · Any bruising will usually fade within 2-4 weeks.     HOME CARE INSTRUCTIONS  · Rest at home for the first day after your procedure.   · You may remove your dressing tomorrow. Make sure you have washed your hands before removing the dressing.  Use alcohol based hand  or soap and water to clean your hands.    · You may shower after removing your dressing. Gently wash the site with soap and water and pat the site dry.  You may apply a Band-Aid if desired, but it is not necessary.     · Do not apply lotion or powder to the site.  · Do not take tub baths, swim, or use a hot tub until the site is completely healed.   · Check daily for signs of infection at the insertion site, such as redness, swelling, or discharge.  · Do not bend, squat or lift anything over 10 pounds for 1 week or as directed by your doctor.  As a reference, a gallon of milk weighs 8 pounds.  · Limit your activity for the first 48 hours and avoid strenuous activity for 1 week.    · Hold direct  pressure over the site when you laugh, cough, sneeze or change positions.  Do this for the next 2 days.   · Drink enough water to keep your urine clear or pale yellow. This will help flush out the contrast dye.  ·  Metformin or any medication containing Metformin should not be taken for 48 hours after your procedure.   · Keep all follow-up visits as directed by your health care provider. This is important.  Call your doctor if:  · You have chills and/or fever > 101 degrees.  · You have drainage other than a small amount of blood on the dressing.   · You are dizzy, develop a rash or are itchy  · You have difficulty urinating.  Call 911 if:  · You have weakness in the face, arms, or legs.    · You have difficulty talking or swallowing.    · You have vision problems.    · You have the worst headache of your life.  · You have sudden swelling or pain at the insertion site.  · You have difficulty using the arm or leg in which the catheter was inserted.    · You have chest pain or difficulty breathing.   · You have heavy bleeding from the site.  If this happens, lie down, hold direct pressure on the site and call 911.

## 2021-04-14 NOTE — ANESTHESIA PREPROCEDURE EVALUATION
Anesthesia Evaluation     NPO Solid Status: > 8 hours             Airway   Mallampati: II  Dental      Pulmonary    (+) a smoker Former,     ROS comment: Positive MONICA screen/Positive MONICA diagnosis and 2 or more mitigating factors used (recovery in non-supine position and multimodal analgesia)    Cardiovascular     (+) hypertension, hyperlipidemia,       Neuro/Psych  (+) CVA,     GI/Hepatic/Renal/Endo    (+) obesity,       Musculoskeletal     Abdominal    Substance History      OB/GYN          Other                        Anesthesia Plan    ASA 3     general       Anesthetic plan, all risks, benefits, and alternatives have been provided, discussed and informed consent has been obtained with: patient.

## 2021-04-14 NOTE — BRIEF OP NOTE
EMBOLIZATION CEREBRAL  Progress Note    Curtis Richmond  4/14/2021    Pre-op Diagnosis:   Cerebral aneurysm, nonruptured [I67.1]       Post-Op Diagnosis Codes:     * Cerebral aneurysm, nonruptured [I67.1]    Procedure/CPT® Codes:        Procedure(s):  CEREBRAL ANGIOGRAM    Surgeon(s):  Noah Bruno MD    Anesthesia: General    Staff:   Circulator: Liv Parker RN  Scrub Person: Honey Fitch  Orientee: Joe Emerson RN  Vascular Radiology Technician: Davi Lockhart         Estimated Blood Loss: minimal    Urine Voided: * No values recorded between 4/14/2021  7:58 AM and 4/14/2021  9:47 AM *    Specimens:                None          Drains:   Urethral Catheter Silicone 16 Fr. (Active)       Findings: Broad neck fusiform aneurysm at the Acom location with involvement of both A1 and A2 segments bilaterally. Aneurysm measures 4.5 x 2.5 mm. No embolization performed.    Complications: None encountered.          Noah Bruno MD     Date: 4/14/2021  Time: 09:50 EDT

## 2021-04-14 NOTE — ANESTHESIA PROCEDURE NOTES
Arterial Line      Patient reassessed immediately prior to procedure    Start time: 4/14/2021 7:22 AM  Stop Time:4/14/2021 7:29 AM       Line placed for hemodynamic monitoring.  Performed By   Anesthesiologist: Eros Pulido MD  Preanesthetic Checklist  Completed: patient identified and risks and benefits discussed  Arterial Line Prep   Sterile Tech: gloves  Prep: ChloraPrep  Patient monitoring: blood pressure monitoring, continuous pulse oximetry and EKG  Arterial Line Procedure   Laterality:left  Location:  radial artery  Catheter size: 20 G   Guidance: ultrasound guided  PROCEDURE NOTE/ULTRASOUND INTERPRETATION.  Using ultrasound guidance the potential vascular sites for insertion of the catheter were visualized to determine the patency of the vessel to be used for vascular access.  After selecting the appropriate site for insertion, the needle was visualized under ultrasound being inserted into the radial artery, followed by ultrasound confirmation of wire and catheter placement. There were no abnormalities seen on ultrasound; an image was taken; and the patient tolerated the procedure with no complications.   Successful placement: yes  Post Assessment   Dressing Type: occlusive dressing applied and secured with tape.   Complications no  Patient Tolerance: patient tolerated the procedure well with no apparent complications  Additional Notes  Using ultrasound guidance the potential vascular sites for insertion of the catheter were visualized to determine the patency of the vessel to be used for vascular access.  After selecting the appropriate site for insertion, the needle was visualized under ultrasound being inserted into the radial artery, followed by ultrasound confirmation of wire and catheter placement.  There were no abnormalities seen on ultrasound; an image was taken/ and the patient tolerated the procedure with no complications.

## 2021-04-14 NOTE — ANESTHESIA POSTPROCEDURE EVALUATION
Patient: Curtis Richmond    Procedure Summary     Date: 04/14/21 Room / Location: [facility]    Anesthesia Start: 0757 Anesthesia Stop: 1010    Procedure: CEREBRAL ANGIOGRAM (N/A ) Diagnosis:       Cerebral aneurysm, nonruptured      (Cerebral aneurysm, nonruptured [I67.1])    Surgeons: Noah Bruno MD Provider: Eros Pulido MD    Anesthesia Type: general ASA Status: 3          Anesthesia Type: general    Vitals  Vitals Value Taken Time   /68 04/14/21 1130   Temp 37.3 °C (99.2 °F) 04/14/21 1008   Pulse 88 04/14/21 1135   Resp 14 04/14/21 1130   SpO2 92 % 04/14/21 1135   Vitals shown include unvalidated device data.        Post Anesthesia Care and Evaluation    Patient location during evaluation: bedside  Pain management: adequate  Airway patency: patent  Anesthetic complications: No anesthetic complications    Cardiovascular status: acceptable  Respiratory status: acceptable  Hydration status: acceptable    Comments: /68 (BP Location: Right arm, Patient Position: Lying)   Pulse 88   Temp 37.3 °C (99.2 °F) (Oral)   Resp 14   SpO2 96%

## 2021-04-14 NOTE — ANESTHESIA PROCEDURE NOTES
Airway  Urgency: elective    Date/Time: 4/14/2021 8:16 AM  Airway not difficult    General Information and Staff    Patient location during procedure: OR  Anesthesiologist: Eros Pulido MD  CRNA: Garima Luke CRNA    Indications and Patient Condition  Indications for airway management: airway protection    Preoxygenated: yes  MILS maintained throughout  Mask difficulty assessment: 1 - vent by mask    Final Airway Details  Final airway type: endotracheal airway      Successful airway: ETT  Cuffed: yes   Successful intubation technique: direct laryngoscopy  Facilitating devices/methods: intubating stylet  Endotracheal tube insertion site: oral  Blade: Crystal  Blade size: 2  ETT size (mm): 8.0  Cormack-Lehane Classification: grade IIa - partial view of glottis  Placement verified by: chest auscultation and capnometry   Measured from: lips  ETT/EBT  to lips (cm): 21  Number of attempts at approach: 1  Assessment: lips, teeth, and gum same as pre-op and atraumatic intubation    Additional Comments  Atraumatic, Secured after verification of placement

## 2021-04-15 LAB — ACT BLD: 120 SECONDS (ref 82–152)

## 2021-05-04 ENCOUNTER — OFFICE VISIT (OUTPATIENT)
Dept: NEUROSURGERY | Facility: CLINIC | Age: 76
End: 2021-05-04

## 2021-05-04 VITALS
SYSTOLIC BLOOD PRESSURE: 136 MMHG | BODY MASS INDEX: 37.76 KG/M2 | DIASTOLIC BLOOD PRESSURE: 72 MMHG | TEMPERATURE: 98.1 F | HEIGHT: 64 IN

## 2021-05-04 DIAGNOSIS — I67.1 CEREBRAL ANEURYSM, NONRUPTURED: Primary | ICD-10-CM

## 2021-05-04 DIAGNOSIS — I10 ESSENTIAL HYPERTENSION: ICD-10-CM

## 2021-05-04 PROCEDURE — 99214 OFFICE O/P EST MOD 30 MIN: CPT | Performed by: RADIOLOGY

## 2021-05-04 RX ORDER — ASPIRIN 81 MG/1
81 TABLET ORAL DAILY
COMMUNITY
End: 2021-12-22 | Stop reason: ALTCHOICE

## 2021-05-10 NOTE — PROCEDURES
Procedure Note      Patient Name: Curtis Richmond   Patient ID: [id number]   Sex: Male   YOB: 1945    Primary Care Provider: Patricia CHACON   Referring Provider: Kat Donahue    Visit Date: January 14, 2021    Provider: Kaelyn Alvarado MD   Location: [facility]   Location Address: [address] [place] [address]  [id number]   Location Phone: [phone]          The patient presents for follow-up of left renal pelvis urothelial cancer.   The patient was last seen three months ago . The patient is scheduled for repeat cysto and u/a . Cystoscopy on the last visit showed recurrent tumor.   Pathology:  The original tumor pathology was papillary transitional cell carcinoma, Grade III/III, Stage I: T1 N0 M0 diagnosed in April 2019. The most recent tumor pathology was papillary transitional cell carcinoma, Grade I/III, Stage 0 is: Tis N0 M0 on 09/22/2020. He completed a course of BCG in late 2020.   Imaging:  Prior imaging studies have been done. These were done at diagnosis and include a CT scan of the abdomen and pelvis and significant findings were a left renal filling defect. He had a left robotic nephroureterectomy in April 2019.   Cystoscopy Procedure:  PROCEDURE: Flexible cystoscope was passed per urethra into the bladder without difficulty after proper consent. The bladder was inspected in a systematic meridian fashion. There were approximately 10 small papillary bladder tumors on the posterior wall, right latera and left lateral wall and a few near the dome. The right orifice was identified and were normal in appearance. The flexible cystoscope was removed. The patient tolerated the procedure well.           Assessment  · Cancer of overlapping sites of bladder     188.8/C67.8  · Urothelial carcinoma of kidney, left     189.0/C64.2      Plan  · Orders  o Cystoscopy (13022) - 189.0/C64.2 - 01/14/2021  · Instructions  o TIME OUT PROCEDURE: Correct patient and birth date;  Correct procedure; Correct Physician; Consent signed  o He will need TURBT in the operating room. I will do gemcitabine afterwards.             Electronically Signed by: Kaelyn Alvarado MD -Author on January 14, 2021 04:11:38 PM

## 2021-05-10 NOTE — H&P
History and Physical      Patient Name: Curtis Richmond   Patient ID: [id number]   Sex: Male   YOB: 1945    Primary Care Provider: Patricia CHACON   Referring Provider: Kat Donahue    Visit Date: February 26, 2021    Provider: Jeffrey Deal MD   Location: [facility]   Location Address: [address] [place] [address]  [id number]   Location Phone: [phone]          Chief Complaint     In patient follow up presented to us today for weakness       History Of Present Illness  Curtis Richmond is a 75 year old /White male who presents today to [facility] today referred from Kat Donahue.      75-year-old man here for follow-up for stroke.  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  He had left arm weakness at that time.  He had the patient had an MRI of the brain showing acute infarcts in the right cerebral hemisphere especially in the right frontal, right parietal lobes and to the least extent right occipital lobe.  There are predominantly cortical and subcortical in location and may be related to an embolic phenomenon.  A border zone watershed infarct is thought to be less likely.  He had a loop recorder placed.  He was discharged on aspirin 81 mg and clopidogrel 75 mg as well as atorvastatin 40 mg for stroke prevention.  The patient developed hematuria on February 21 requiring his Plavix as well as his aspirin to be discontinued.  He has an appointment to see his urologist.  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  There is ventriculomegaly suggestive of communicating hydrocephalus possibly related to intracranial hemorrhage in the past.  Normal pressure hydrocephalus is  possible but thought to be less likely.  CT angiogram of the head was read as an anterior communicating artery aneurysm which may be partially thrombosed according to the difference in size seen on the noncontrast CT and MRI versus the CTA head exam.  The brain MRI and the noncontrast CT suggest a larger aneurysm perhaps measuring 1.5 x 1 x 1 cm on the MRI.  The aneurysm appeared smaller on the CTA head exam.  This may be due to partial thrombosis.    According to the wife who is with the patient today the patient has had progressive gait abnormalities for the last 2 to 3 years.  This was blamed on his back.  1 day could not walk anymore.  Prior to that he was shuffling his steps and not picking up his steps.  He used a cane and then 2 canes and then a wheelchair.  He could not use a walker since he was unsteady.    He has had urinary incontinence for the last year.  His neurologic problem started 2 years ago.  He has also had progressive memory loss and easily gets confused according to the wife.       Past Medical History  Arthritis; Bladder cancer; Cancer of overlapping sites of bladder; Emphysema; Gross hematuria; Hypertension; Knee pain; Osteoarthritis; Renal mass; Tubular adenoma of colon; Urothelial carcinoma of kidney, left         Past Surgical History  Cardioversion; Colonoscopy; Cystoscopy; Gallbladder; Knee replacement, right; Nephroureterectomy, laparoscopic, using da Omero Si system; TURBT         Medication List  amlodipine oral; amlodipine 2.5 mg oral tablet; atorvastatin 40 mg oral tablet; carvedilol 6.25 mg oral tablet; clopidogrel 75 mg oral tablet; Eliquis 5 mg oral tablet; gabapentin 300 mg oral capsule; gabapentin 400 mg oral capsule; losartan-hydrochlorothiazide 100-12.5 mg oral tablet         Allergy List  NO KNOWN DRUG ALLERGIES         Family Medical History  *Non Contributory         Social History  Alcohol (Never); Caffeine (Unknown); Second hand smoke exposure (Never); Tobacco (Former)  "        Review of Systems  · Constitutional  o Denies  o : chills, excessive sweating, fatigue, fever, sycope/passing out, weight gain, weight loss  · Eyes  o Denies  o : changes in vision, blurred vision, double vision  · HENT  o Denies  o : hearing loss, ringing in the ears, ear aches, sore throat, nasal congestion, sinus pain, nose bleeds, seasonal allergies  · Cardiovascular  o Denies  o : blood clots, swollen legs, anemia, easy burising or bleeding, transfusions  · Respiratory  o Denies  o : shortness of breath, dry cough, productive cough, pneumonia, COPD  · Gastrointestinal  o Denies  o : dysphagia, reflux  · Genitourinary  o Denies  o : incontinence  · Neurologic  o Admits  o : headache, loss of balance, falls, difficulty with coordination, weakness  o Denies  o : seizure, stroke, tremor, dizziness/vertigo, difficulty with sleep, numbness/tingling/paresthesia , difficulty with dexterity  · Musculoskeletal  o Admits  o : weakness  o Denies  o : neck stiffness/pain, swollen lymph nodes, muscle aches, joint pain, spasms, sciatica, pain radiating in arm, pain radiating in leg, low back pain  · Endocrine  o Denies  o : diabetes, thyroid disorder  · Psychiatric  o Denies  o : anxiety, depression      Vitals  Date Time BP Position Site L\R Cuff Size HR RR TEMP (F) WT  HT  BMI kg/m2 BSA m2 O2 Sat FR L/min FiO2 HC       02/26/2021 10:31 /53 Sitting    59 - R   235lbs 0oz 5'  4\" 40.34 2.19             Physical Examination     He is alert, fluent, phasic, follows commands well.  His Mini-Mental Status score is 23 out of 30.  He missed 2 out of 3 recent memory, 3 out of 5 spelling world backwards, clock drawing was significantly impaired and that he put the numbers only on one side of the clock, he could not tell me the month the date.  There is mild left pronator drift.  Fine finger movements slightly impaired in the left hand.  There is no weakness of the lower extremity on the left leg on visual muscle testing. "  He is in a wheelchair.  He is unable to ambulate.           Assessment  · Normal pressure hydrocephalus     331.5/G91.2  I discussed with them that I believe he has normal pressure hydrocephalus producing gait abnormalities, his urinary incontinence, mental status deterioration. I will refer him to neurosurgery in [place] to evaluate for programmable shunt. I discussed with him that if they did not do the programmable shunt he will be unable to walk the rest of his life. I also discussed with him that even if he has the shunt it may not significant improve his condition since this is has been ongoing for over 2 years.    Total time spent with patient and coordinating patient care was 45 minutes.  · Stroke     434.91/I63.9  I discussed with him that I will leave treatment of his stroke prophylaxis to the cardiologist and the urologist since the urologist took him off antiplatelet agents because of bleeding. He has a follow-up appointment with a cardiologist to see if he has atrial fibrillation that would require anticoagulation instead of antiplatelet agents.  · Cerebral aneurysm     437.3/I67.1  I also discussed with them regarding cerebral aneurysm that needs to be followed up by neurosurgery and possibly embolization procedure will be performed.      Plan  · Orders  o NEUROSURGEON CONSULTATION ([facility]) - 331.5/G91.2, 434.91/I63.9, 437.3/I67.1 - 02/26/2021   [facility] neurosurgery  · Medications  o Medications have been Reconciled  o Transition of Care or Provider Policy  · Instructions  o Encouraged to follow-up with Primary Care Provider for preventative care.            Electronically Signed by: Jeffrey Deal MD -Author on February 26, 2021 11:14:46 AM

## 2021-05-10 NOTE — PROCEDURES
Procedure Note      Patient Name: Curtis Richmond   Patient ID: [id number]   Sex: Male   YOB: 1945    Primary Care Provider: Patricia CHACON   Referring Provider: Kat Donahue    Visit Date: July 22, 2020    Provider: Kaelyn Alvarado MD   Location[facility]   Location Address: [address] [place] [address]  [id number]   Location Phone: [phone]          Bladder Instillation (BCG)    Urethral catheterization was performed without difficulty, showing clear urine. 1/2 vial of Eric BCG was instilled via gravity into the bladder and the catheter was removed. There was no bleeding. This was treatment number 2. The patient was given instructions on side effects and instructed to call for any problems.   The patient tolerated the procedure well.           Assessment  · Bladder Carcinoma     188.9/C67.9  · Carcinoma in situ of Bladder     233.7/D09.0      Plan  · Orders  o Instillation Bladder Anticarcinogenic Agent (including retention time) (35157) - 188.9/C67.9 - 07/22/2020  o 25 - INJ: BCG Live Per Installation, 1 mg () - 188.9/C67.9 - 07/22/2020  · Medications  o Medications have been Reconciled  o Transition of Care or Provider Policy  · Instructions  o Patient was instructed to turn every 15 min. for two hours as instructed, after which patient is to void completely. Call for fever over 101 degrees or severe bladder symptoms.   o FOLLOW-UP:  o In 1 week  o TIME OUT PROCEDURE: Correct patient and birth date; Correct procedure; Correct Physician; Consent signed            Electronically Signed by: Kaelyn Alvarado MD -Author on July 22, 2020 12:47:01 PM

## 2021-05-10 NOTE — PROCEDURES
Procedure Note      Patient Name: Curtis Richmond   Patient ID: [id number]   Sex: Male   YOB: 1945    Primary Care Provider: Patricia CHACON   Referring Provider: Kat Donahue    Visit Date: May 15, 2020    Provider: Kaelyn Alvarado MD   Location[facility]   Location Address: [address] [place] [address]  [id number]   Location Phone: [phone]          The patient presents for follow-up of left renal pelvis urothelial cancer.   The patient was last seen three months ago . The patient is scheduled for repeat cysto and u/a . Cystoscopy on the last visit showed recurrent tumor.   Pathology:  The original tumor pathology was papillary transitional cell carcinoma, Grade III/III, Stage I: T1 N0 M0 diagnosed in April 2019. The most recent tumor pathology was papillary transitional cell carcinoma, Grade I/III, Stage 0 is: Tis N0 M0 on 10/01/2019.   Imaging:  Prior imaging studies have been done. These were done at diagnosis and include a CT scan of the abdomen and pelvis and significant findings were a left renal filling defect. He had a left robotic nephroureterectomy in April 2019.   Cystoscopy Procedure:  PROCEDURE: Flexible cystoscope was passed per urethra into the bladder without difficulty after proper consent. The bladder was inspected in a systematic meridian fashion. There were approximately 7-8 small papillary bladder tumors on the posterior wall, right latera and left lateral wall and a few near the dome. The right orifice was identified and were normal in appearance. The flexible cystoscope was removed. The patient tolerated the procedure well.           Assessment  · Cancer of overlapping sites of bladder     188.8/C67.8  · Urothelial carcinoma of kidney, left     189.0/C64.2      Plan  · Orders  o Cystoscopy (34731) - 189.0/C64.2 - 05/15/2020  · Medications  o Medications have been Reconciled  o Transition of Care or Provider Policy  · Instructions  o TIME OUT  PROCEDURE: Correct patient and birth date; Correct procedure; Correct Physician; Consent signed  o He will need TURBT in the operating room. I will do gemcitabine afterwards.             Electronically Signed by: Kaelyn Alvarado MD -Author on May 15, 2020 09:56:21 AM

## 2021-05-10 NOTE — PROCEDURES
Procedure Note      Patient Name: Curtis Richmond   Patient ID: [id number]   Sex: Male   YOB: 1945    Primary Care Provider: Patricia CHACON   Referring Provider: Kat Donahue    Visit Date: July 15, 2020    Provider: Kaelyn Alvarado MD   Location: [facility]   Location Address: [address] [place] [address]  [id number]   Location Phone: [phone]          Bladder Instillation (BCG)    Urethral catheterization was performed without difficulty, showing clear urine. 1/2 vial of Eric BCG was instilled via gravity into the bladder and the catheter was removed. There was no bleeding. This was treatment number WHICH TREATMENT. The patient was given instructions on side effects and instructed to call for any problems.   The patient tolerated the procedure well.   The patients urine was viewed under a microscope during his clinical visit: no RBC present, no WBC present, no Bacteria present.           Assessment  · Bladder Carcinoma     188.9/C67.9      Plan  · Orders  o Instillation Bladder Anticarcinogenic Agent (including retention time) (97315) - 188.9/C67.9 - 07/15/2020  o 25 - INJ: BCG Live Per Installation, 1 mg () - 188.9/C67.9 - 07/15/2020  · Medications  o Medications have been Reconciled  o Transition of Care or Provider Policy  · Instructions  o Patient was instructed to turn every 15 min. for two hours as instructed, after which patient is to void completely. Call for fever over 101 degrees or severe bladder symptoms.   o FOLLOW-UP:  o In 1 week  o TIME OUT PROCEDURE: Correct patient and birth date; Correct procedure; Correct Physician; Consent signed            Electronically Signed by: Kaelyn Alvarado MD -Author on July 15, 2020 01:54:31 PM

## 2021-05-10 NOTE — PROCEDURES
Procedure Note      Patient Name: Curtis Richmond   Patient ID: [id number]   Sex: Male   YOB: 1945    Primary Care Provider: Patricia CHACON   Referring Provider: Kat Donahue    Visit Date: August 5, 2020    Provider: Kaelyn Alvarado MD   Location: [facility]   Location Address: [address] [place] [address]  [id number]   Location Phone: [phone]          Bladder Instillation (BCG)    Urethral catheterization was performed without difficulty, showing clear urine. 1/2 vial of Eric BCG was instilled via gravity into the bladder and the catheter was removed. There was no bleeding. This was treatment number 4. The patient was given instructions on side effects and instructed to call for any problems.   The patient tolerated the procedure well.           Assessment  · Bladder Carcinoma     188.9/C67.9  · Carcinoma in situ of Bladder     233.7/D09.0      Plan  · Orders  o Instillation Bladder Anticarcinogenic Agent (including retention time) (17335) - 188.9/C67.9 - 08/05/2020  o 25 - INJ: BCG Live Per Installation, 1 mg () - 188.9/C67.9, 233.7/D09.0 - 08/05/2020   Injection - BCG 1mg; Dose: 25 mL; Site: Not Entered; Route: intravesical; Date: 08/05/2020 12:15:36; Exp: 12/08/2020; Lot: [id number]; Mfg: MERCK,SHARP,AME; TradeName: BCG live; Location: [facility]; Administered By: Kaelyn Alvarado MD; Comment:   · Instructions  o Patient was instructed to turn every 15 min. for two hours as instructed, after which patient is to void completely. Call for fever over 101 degrees or severe bladder symptoms.   o FOLLOW-UP:  o In 1 week  o TIME OUT PROCEDURE: Correct patient and birth date; Correct procedure; Correct Physician; Consent signed            Electronically Signed by: Kaelyn Alvarado MD -Author on August 5, 2020 12:19:37 PM

## 2021-05-10 NOTE — PROCEDURES
Procedure Note      Patient Name: Curtis Richmond   Patient ID: [id number]   Sex: Male   YOB: 1945    Primary Care Provider: Patricia CHACON   Referring Provider: Kat Donahue    Visit Date: July 29, 2020    Provider: Kaelyn Alvarado MD   Location[facility]   Location Address: [address] [place] [address]  [id number]   Location Phone: [phone]          Bladder Instillation (BCG)    Urethral catheterization was performed without difficulty, showing clear urine. 1/2 vial of Eric BCG was instilled via gravity into the bladder and the catheter was removed. There was no bleeding. This was treatment number 3. The patient was given instructions on side effects and instructed to call for any problems.   The patient tolerated the procedure well.           Assessment  · Bladder Carcinoma     188.9/C67.9  · Carcinoma in situ of Bladder     233.7/D09.0      Plan  · Orders  o Instillation Bladder Anticarcinogenic Agent (including retention time) (06280) - 188.9/C67.9 - 07/29/2020  o 25 - INJ: BCG Live Per Installation, 1 mg () - 188.9/C67.9 - 07/29/2020  · Medications  o Medications have been Reconciled  o Transition of Care or Provider Policy  · Instructions  o Patient was instructed to turn every 15 min. for two hours as instructed, after which patient is to void completely. Call for fever over 101 degrees or severe bladder symptoms.   o FOLLOW-UP:  o In 1 week  o TIME OUT PROCEDURE: Correct patient and birth date; Correct procedure; Correct Physician; Consent signed            Electronically Signed by: Kaelyn Alvarado MD -Author on July 29, 2020 01:00:01 PM

## 2021-05-10 NOTE — PROCEDURES
Procedure Note      Patient Name: Curtis Richmond   Patient ID: [id number]   Sex: Male   YOB: 1945    Primary Care Provider: Patricia CHACON   Referring Provider: Kat Donahue    Visit Date: August 12, 2020    Provider: Kaelyn Alvarado MD   Location[facility]   Location Address: [address] [place] [address]  [id number]   Location Phone: [phone]          Bladder Instillation (BCG)    Urethral catheterization was performed without difficulty, showing clear urine. 1/2 vial of [place] BCG was instilled via gravity into the bladder and the catheter was removed. There was no bleeding. This was treatment number 5. The patient was given instructions on side effects and instructed to call for any problems.   The patient tolerated the procedure well.           Assessment  · Bladder Carcinoma     188.9/C67.9  · Carcinoma in situ of Bladder     233.7/D09.0      Plan  · Orders  o Instillation Bladder Anticarcinogenic Agent (including retention time) (86483) - 188.9/C67.9 - 08/12/2020  o 25 - INJ: BCG Live Per Installation, 1 mg () - 188.9/C67.9 - 08/12/2020   Injection - BCG 1mg; Dose: 25 mL; Site: Not Entered; Route: intravesical; Date: 08/12/2020 13:23:21; Exp: 12/08/2020; Lot: [id number]; Mfg: MERCK,SHARP,AME; TradeName: BCG live; Location: [facility]; Administered By: Kaelyn Alvarado MD; Comment:   · Medications  o Medications have been Reconciled  o Transition of Care or Provider Policy  · Instructions  o Patient was instructed to turn every 15 min. for two hours as instructed, after which patient is to void completely. Call for fever over 101 degrees or severe bladder symptoms.   o FOLLOW-UP:  o In 1 week            Electronically Signed by: Kaelyn Alvarado MD -Author on August 12, 2020 03:24:38 PM

## 2021-05-10 NOTE — PROCEDURES
Procedure Note      Patient Name: Curtis Richmond   Patient ID: [id number]   Sex: Male   YOB: 1945    Primary Care Provider: Patricia CHACON   Referring Provider: Kat Donahue    Visit Date: August 19, 2020    Provider: Kaelyn Alvarado MD   Location[facility]   Location Address: [address] [place] [address]  [id number]   Location Phone: [phone]          Bladder Instillation (BCG)    Urethral catheterization was performed without difficulty, showing clear urine. 1/2 vial of [place] BCG was instilled via gravity into the bladder and the catheter was removed. There was no bleeding. This was treatment number 6. The patient was given instructions on side effects and instructed to call for any problems.   The patient tolerated the procedure well.           Assessment  · Bladder Carcinoma     188.9/C67.9  · Carcinoma in situ of Bladder     233.7/D09.0      Plan  · Orders  o Instillation Bladder Anticarcinogenic Agent (including retention time) (02678) - 188.9/C67.9 - 08/19/2020  o INJ: BCG Live Per Installation, 1 mg () - 188.9/C67.9, 233.7/D09.0 - 08/19/2020   Injection - BCG 1mg; Dose: 25 mL; Site: Not Entered; Route: intravesical; Date: 08/19/2020 13:47:58; Exp: 12/08/2020; Lot: [id number]; Mfg: MERCK,SHARP,AME; TradeName: BCG live; Location: [facility]; Administered By: Kaelyn Alvarado MD; Comment:   · Medications  o Medications have been Reconciled  o Transition of Care or Provider Policy  · Instructions  o Patient was instructed to turn every 15 min. for two hours as instructed, after which patient is to void completely. Call for fever over 101 degrees or severe bladder symptoms.             Electronically Signed by: Kaelyn Alvarado MD -Author on August 19, 2020 02:55:26 PM

## 2021-05-13 NOTE — PROGRESS NOTES
Progress Note      Patient Name: Curtis Richmond   Patient ID: [id number]   Sex: Male   YOB: 1945    Primary Care Provider: Patricia CHACON   Referring Provider: Kat Donahue    Visit Date: June 17, 2020    Provider: Kaelyn Alvarado MD   Location[facility]   Location Address: [address] [place] [address]  [id number]   Location Phone: [phone]          Chief Complaint  · pt here for urologic issues      History Of Present Illness  The patient presents for follow-up of left renal pelvis urothelial cancer.   The patient was last seen one week ago . The patient is scheduled to discuss treatment options . Cystoscopy on the last visit showed recurrent tumor.   Pathology:  The original tumor pathology was papillary transitional cell carcinoma, Grade III/III, Stage I: T1 N0 M0 diagnosed in April 2019. This was of the left renal pelvis and he had a left nephroureterectomy in April 2019. He had low grade superficial UC in the bladder in Oct 2019. The most recent tumor pathology was papillary transitional cell carcinoma, Grade I/III, Stage 0 is: Tis N0 M0 on 06/09/2020.   Imaging:  Prior imaging studies have been done. These were done at diagnosis and include a CT scan of the abdomen and pelvis and significant findings were a left renal filling defect. He had a left robotic nephroureterectomy in April 2019.       Past Medical History  Arthritis; Bladder Cancer; Bladder cancer; Cancer of overlapping sites of bladder; Emphysema; Gross hematuria; Hypertension; Knee pain; Osteoarthritis; Renal mass; Tubular adenoma of colon; Urothelial cancer; Urothelial carcinoma of kidney, left         Past Surgical History  Cardioversion; Colonoscopy; Cystoscopy; Gallbladder; Knee replacement, right; Nephroureterectomy, laparoscopic, using da Omero Si system         Medication List  amlodipine 2.5 mg oral tablet; gabapentin 300 mg oral capsule; losartan-hydrochlorothiazide 100-12.5 mg oral tablet;  "simvastatin 20 mg oral tablet         Allergy List  NO KNOWN DRUG ALLERGIES         Family Medical History  *Non Contributory         Social History  Alcohol (Never); Caffeine (Unknown); Second hand smoke exposure (Never); Tobacco (Former)         Review of Systems  · Constitutional  o Denies  o : chills, fever  · Gastrointestinal  o Denies  o : nausea, vomiting      Vitals  Date Time BP Position Site L\R Cuff Size HR RR TEMP (F) WT  HT  BMI kg/m2 BSA m2 O2 Sat        06/17/2020 10:13 /79 Sitting       241lbs 16oz 5'  4\" 41.54 2.23           Physical Examination  · Constitutional  o Appearance  o : well-nourished, well developed, alert, in no acute distress  · Respiratory  o Respiratory Effort  o : breathing unlabored  · Gastrointestinal  o Abdominal Examination  o : abdomen nontender to palpation, tone normal without rigidity or guarding, no masses present, abdominal obesity present   · Neurologic  o Gait and Station  o : normal gait, able to stand without difficulty  · Psychiatric  o Judgement and Insight  o : judgment and insight intact, judgement for everyday activities and social situations within normal limits, insight intact  o Mood and Affect  o : mood normal, affect appropriate              Assessment  · Bladder cancer     188.9/C67.9  · Cancer of overlapping sites of bladder     188.8/C67.8  · Urothelial carcinoma of kidney, left     189.0/C64.2      Plan  · Medications  o Medications have been Reconciled  o Transition of Care or Provider Policy  · Instructions  o Make a 6 week follow-up for a BCG in the office. We did discuss BCG therapy for his recurrent low grade UC.   o Electronically Identified Patient Education Materials Provided Electronically            Electronically Signed by: Kaelyn Alvarado MD -Author on June 17, 2020 10:44:34 AM  "

## 2021-05-13 NOTE — PROGRESS NOTES
"   Progress Note      Patient Name: Curtis Richmond   Patient ID: [id number]   Sex: Male   YOB: 1945    Primary Care Provider: Patricia CHACON   Referring Provider: Kat Donahue    Visit Date: May 15, 2020    Provider: Kaelyn Alvarado MD   [facility]   Location Address: [address] [place] [address]  [id number]   Location Phone: [phone]          Chief Complaint  · Outpatient History & Physical / Surgical Orders      History Of Present Illness  [facility]  Outpatient History and Physical Surgical Orders  Preadmission Location: Phone Preadmission Time: 12:30 PM   Which Facility: [facility] Surgery Date: 05/29/2020 Preadmission Testing Date: 05/27/2020   Patient's Name: Curtis Richmond YOB: 1945   Chief complaint/history present illness: Bladder Cancer, Bladder Tumors   Current Medication List: amlodipine 2.5 mg oral tablet, gabapentin 300 mg oral capsule, losartan-hydrochlorothiazide 100-12.5 mg oral tablet, and simvastatin 20 mg oral tablet   Allergies: NO KNOWN DRUG ALLERGIES   Significant past medical: Arthritis, Bladder Cancer, Bladder cancer, Cancer of overlapping sites of bladder, Emphysema, Gross hematuria, Hypertension, Knee pain, Osteoarthritis, Renal mass, Tubular adenoma of colon, Urothelial cancer, and Urothelial carcinoma of kidney, left   Past Surgical History: Cardioversion, Colonoscopy, Cystoscopy, Gallbladder, Knee replacement, right, and Nephroureterectomy, laparoscopic, using da Omero Si system   Examination of heart and lungs: Regular rate, rhythm, no murmur, gallop, rub, Breath sounds normal, no distress, and Abdomen soft, non-tender, BSx4 are positive         Vitals  Date Time BP Position Site L\R Cuff Size HR RR TEMP (F) WT  HT  BMI kg/m2 BSA m2 O2 Sat HC       05/15/2020 09:30 /75 Sitting       235lbs 0oz 5'  4\" 40.34 2.19               Assessment  · Encounter for preoperative examination for " general surgical procedure     V72.84/Z01.818  · Bladder cancer     188.9/C67.9  · Urothelial cancer     189.8/C68.9  · Lesion of bladder     596.9/N32.9      Plan  · Orders  o General Urology Surgery Order ([facility]) - V72.84/Z01.818, 188.9/C67.9, 596.9/N32.9, 189.8/C68.9 - 05/29/2020  · Medications  o Medications have been Reconciled  o Transition of Care or Provider Policy  · Instructions  o *****Surgical Orders******  o Pre-Operative Orders: Sign permit for Cystoscopy, TURBT, Instillation of Gemcitabine  o ****Patient Status****  o Outpatient   o ********************  o General Sedation  o IV Fluids: LR @ 100 cc/hour  o IV Antibiotics (on call to OR):  o Levaquin 500 mg IV OCTOR.  o RISK AND BENEFITS:  o Possible risks/complications, benefits and alternatives to surgical or invasive procedure have been explained to patient and/or legal guardian.  o Electronically Identified Patient Education Materials Provided Electronically            Electronically Signed by: Bhakti Walker, -Author on May 15, 2020 10:11:34 AM  Electronically Co-signed by: Kaelyn Alvarado MD -Reviewer on May 15, 2020 10:14:14 AM

## 2021-05-13 NOTE — PROGRESS NOTES
Progress Note      Patient Name: Curtis Richmond   Patient ID: [id number]   Sex: Male   YOB: 1945    Primary Care Provider: Patricia CHACON   Referring Provider: Kat Donahue    Visit Date: September 30, 2020    Provider: Kaelyn Alvarado MD   Location: [facility]   Location Address: [address] [place] [address]  [id number]   Location Phone: [phone]          History Of Present Illness  The patient presents for follow-up of left renal pelvis urothelial cancer.   The patient was last seen one week ago . The patient is scheduled to discuss treatment options . Cystoscopy on the last visit showed recurrent tumor.   Pathology:  The original tumor pathology was papillary transitional cell carcinoma, Grade III/III, Stage I: T1 N0 M0 diagnosed in April 2019. This was of the left renal pelvis and he had a left nephroureterectomy in April 2019. He had low grade superficial UC in the bladder in Oct 2019, January 2020 and June 2020. The most recent tumor pathology was papillary transitional cell carcinoma, Grade I/III, Stage 0 is: Tis N0 M0 on 09/22/2020.   Imaging:  Prior imaging studies have been done. These were done at diagnosis and include a CT scan of the abdomen and pelvis and significant findings were a left renal filling defect. He had a left robotic nephroureterectomy in April 2019. Right retrograde pyelogram was done in Jan 2020 and it was negative.   TELEHEALTH TELEPHONE VISIT  Curtis Richmond is a 75 year old /White male who is presenting for evaluation via telehealth telephone visit. Verbal consent obtained before beginning visit.   Provider spent 5 minutes with the patient during the telehealth visit.   The following staff were present during this visit: Bhakti Walker and Kaelyn Alvarado MD       Past Medical History  Arthritis; Bladder cancer; Cancer of overlapping sites of bladder; Emphysema; Gross hematuria; Hypertension; Knee pain; Osteoarthritis;  Renal Mass; Tubular adenoma of colon; Urothelial carcinoma of kidney, left         Past Surgical History  Cardioversion; Colonoscopy; Cystoscopy; Gallbladder; Knee replacement, right; Nephroureterectomy, laparoscopic, using da Omero Si system; TURBT         Medication List  amlodipine 2.5 mg oral tablet; gabapentin 300 mg oral capsule; losartan-hydrochlorothiazide 100-12.5 mg oral tablet; simvastatin 20 mg oral tablet         Allergy List  NO KNOWN DRUG ALLERGIES       Allergies Reconciled  Family Medical History  *Non Contributory         Social History  Alcohol (Never); Caffeine (Unknown); Second hand smoke exposure (Never); Tobacco (Former)                 Assessment  · Bladder cancer     188.9/C67.9  · Cancer of overlapping sites of bladder     188.8/C67.8  · Urothelial carcinoma of kidney, left     189.0/C64.2      Plan  · Orders  o Physican Telephone evaluation, 5-10 min ([id number]) - 189.0/C64.2, 188.8/C67.8, 188.9/C67.9 - 09/30/2020  · Medications  o Medications have been Reconciled  o Transition of Care or Provider Policy  · Instructions  o He will have a repeat cystoscopy in the office in 3 months and maintenance BCG in the office in 6 months.            Electronically Signed by: Martha Rivas-, -Author on October 12, 2020 09:59:03 AM  Electronically Co-signed by: Kaelyn Alvarado MD -Reviewer on October 12, 2020 06:14:20 PM

## 2021-05-14 VITALS
DIASTOLIC BLOOD PRESSURE: 53 MMHG | WEIGHT: 235 LBS | SYSTOLIC BLOOD PRESSURE: 118 MMHG | BODY MASS INDEX: 40.12 KG/M2 | HEIGHT: 64 IN | HEART RATE: 59 BPM

## 2021-05-14 VITALS
SYSTOLIC BLOOD PRESSURE: 127 MMHG | BODY MASS INDEX: 39.95 KG/M2 | WEIGHT: 234 LBS | HEIGHT: 64 IN | DIASTOLIC BLOOD PRESSURE: 67 MMHG

## 2021-05-14 NOTE — PROGRESS NOTES
Progress Note      Patient Name: Curtis Richmond   Patient ID: [id number]   Sex: Male   YOB: 1945    Primary Care Provider: Patricia CHACON   Referring Provider: Kat Donahue    Visit Date: March 1, 2021    Provider: Kaelyn Alvarado MD   Location: [facility]   Location Address: [address] [place] [address]  [id number]   Location Phone: [phone]          Chief Complaint  · pt is here for urological concerns      History Of Present Illness  The patient presents for follow-up of left renal pelvis urothelial cancer.   The patient was last seen one week ago . The patient is scheduled to discuss treatment options . Cystoscopy on the last visit showed recurrent tumor.   Pathology:  The original tumor pathology was papillary transitional cell carcinoma, Grade III/III, Stage I: T1 N0 M0 diagnosed in April 2019. This was of the left renal pelvis and he had a left nephroureterectomy in April 2019. He had low grade superficial UC in the bladder in Oct 2019, January 2020, June 2020, and Sept 2020. The most recent tumor pathology was papillary transitional cell carcinoma, Grade I/III, Stage 0 is: Tis N0 M0 on 02/04/2021.   Imaging:  Prior imaging studies have been done. These were done at diagnosis and include a CT scan of the abdomen and pelvis and significant findings were a left renal filling defect. He had a left robotic nephroureterectomy in April 2019. Right retrograde pyelogram was done in Jan 2020 and it was negative.      He had one small tumor at the dome of his bladder that I was unable to reach due to location and body habitus.  I have requested a long resectoscope to go back and remove that tumor in 6 weeks or so.      He did have some bleeding after his last TURBT after having a stroke and being placed on Plavix.  That has resolved.             Past Medical History  Aneurysm, cerebral, nonruptured; Arthritis; Bladder cancer; Cancer of overlapping sites of bladder;  "Emphysema; Gross hematuria; Hydrocephalus; Hypertension; Knee pain; Osteoarthritis; Renal mass; Tubular adenoma of colon; Urothelial carcinoma of kidney, left         Past Surgical History  Cardioversion; Colonoscopy; Cystoscopy; Gallbladder; Knee replacement, right; Nephroureterectomy, laparoscopic, using da Omero Si system; TURBT         Medication List  amlodipine 2.5 mg oral tablet; atorvastatin 40 mg oral tablet; carvedilol 6.25 mg oral tablet; gabapentin 400 mg oral capsule; losartan-hydrochlorothiazide 100-12.5 mg oral tablet         Allergy List  NO KNOWN DRUG ALLERGIES       Allergies Reconciled  Family Medical History  *Non Contributory         Social History  Alcohol (Never); Caffeine (Unknown); Second hand smoke exposure (Never); Tobacco (Former)         Review of Systems  · Constitutional  o Denies  o : chills, fever  · Gastrointestinal  o Denies  o : nausea, vomiting      Vitals  Date Time BP Position Site L\R Cuff Size HR RR TEMP (F) WT  HT  BMI kg/m2 BSA m2 O2 Sat FR L/min FiO2 HC       03/01/2021 11:06 /67 Sitting       233lbs 16oz 5'  4\" 40.17 2.19             Physical Examination  · Constitutional  o Appearance  o : well-nourished, well developed, alert, in no acute distress  · Psychiatric  o Judgement and Insight  o : judgment and insight intact, judgement for everyday activities and social situations within normal limits, insight intact  o Mood and Affect  o : mood normal, affect appropriate              Assessment  · Bladder cancer     188.9/C67.9  · Cancer of overlapping sites of bladder     188.8/C67.8  · Urothelial carcinoma of kidney, left     189.0/C64.2      Plan  · Medications  o Medications have been Reconciled  o Transition of Care or Provider Policy  · Instructions  o I will call him next week to check in. He will need to be scheduled for a repeat TURBT in about 5 weeks to follow up on his remaining tumor but I will need the long resectoscope. If he bleeds in the meantime, he " may need it done sooner.   o Electronically Identified Patient Education Materials Provided Electronically            Electronically Signed by: Kaelyn Alvarado MD -Author on March 1, 2021 11:46:59 AM

## 2021-05-15 VITALS
BODY MASS INDEX: 38.76 KG/M2 | HEIGHT: 64 IN | WEIGHT: 227 LBS | SYSTOLIC BLOOD PRESSURE: 134 MMHG | DIASTOLIC BLOOD PRESSURE: 86 MMHG

## 2021-05-15 VITALS
BODY MASS INDEX: 39.95 KG/M2 | HEIGHT: 64 IN | DIASTOLIC BLOOD PRESSURE: 106 MMHG | SYSTOLIC BLOOD PRESSURE: 151 MMHG | WEIGHT: 234 LBS

## 2021-05-15 VITALS
HEIGHT: 64 IN | DIASTOLIC BLOOD PRESSURE: 77 MMHG | BODY MASS INDEX: 40.12 KG/M2 | WEIGHT: 235 LBS | SYSTOLIC BLOOD PRESSURE: 155 MMHG

## 2021-05-15 VITALS
SYSTOLIC BLOOD PRESSURE: 150 MMHG | HEIGHT: 64 IN | DIASTOLIC BLOOD PRESSURE: 83 MMHG | BODY MASS INDEX: 39.95 KG/M2 | WEIGHT: 234 LBS

## 2021-05-15 VITALS — WEIGHT: 234.25 LBS | HEIGHT: 64 IN | RESPIRATION RATE: 18 BRPM | BODY MASS INDEX: 39.99 KG/M2

## 2021-05-15 VITALS
WEIGHT: 234.25 LBS | BODY MASS INDEX: 39.99 KG/M2 | SYSTOLIC BLOOD PRESSURE: 134 MMHG | DIASTOLIC BLOOD PRESSURE: 90 MMHG | HEIGHT: 64 IN

## 2021-05-15 VITALS
SYSTOLIC BLOOD PRESSURE: 136 MMHG | HEIGHT: 64 IN | DIASTOLIC BLOOD PRESSURE: 70 MMHG | BODY MASS INDEX: 39.16 KG/M2 | WEIGHT: 229.37 LBS

## 2021-05-15 VITALS
BODY MASS INDEX: 40.12 KG/M2 | SYSTOLIC BLOOD PRESSURE: 160 MMHG | HEIGHT: 64 IN | WEIGHT: 235 LBS | DIASTOLIC BLOOD PRESSURE: 75 MMHG

## 2021-05-15 VITALS
SYSTOLIC BLOOD PRESSURE: 142 MMHG | HEIGHT: 64 IN | DIASTOLIC BLOOD PRESSURE: 59 MMHG | BODY MASS INDEX: 41.32 KG/M2 | WEIGHT: 242 LBS

## 2021-05-15 VITALS — RESPIRATION RATE: 12 BRPM | OXYGEN SATURATION: 97 % | HEART RATE: 57 BPM

## 2021-05-15 VITALS
HEIGHT: 64 IN | BODY MASS INDEX: 39.97 KG/M2 | WEIGHT: 234.12 LBS | SYSTOLIC BLOOD PRESSURE: 160 MMHG | DIASTOLIC BLOOD PRESSURE: 76 MMHG

## 2021-05-15 VITALS
HEIGHT: 64 IN | DIASTOLIC BLOOD PRESSURE: 77 MMHG | BODY MASS INDEX: 40.12 KG/M2 | WEIGHT: 235 LBS | SYSTOLIC BLOOD PRESSURE: 150 MMHG

## 2021-05-15 VITALS
HEIGHT: 64 IN | BODY MASS INDEX: 41.32 KG/M2 | SYSTOLIC BLOOD PRESSURE: 143 MMHG | WEIGHT: 242 LBS | DIASTOLIC BLOOD PRESSURE: 83 MMHG

## 2021-05-15 VITALS
DIASTOLIC BLOOD PRESSURE: 83 MMHG | WEIGHT: 241 LBS | BODY MASS INDEX: 41.15 KG/M2 | SYSTOLIC BLOOD PRESSURE: 173 MMHG | HEIGHT: 64 IN

## 2021-05-15 VITALS
HEIGHT: 64 IN | BODY MASS INDEX: 37.9 KG/M2 | WEIGHT: 222 LBS | DIASTOLIC BLOOD PRESSURE: 72 MMHG | SYSTOLIC BLOOD PRESSURE: 140 MMHG

## 2021-05-15 VITALS — RESPIRATION RATE: 15 BRPM | BODY MASS INDEX: 40.31 KG/M2 | WEIGHT: 236.12 LBS | HEIGHT: 64 IN

## 2021-05-15 VITALS
SYSTOLIC BLOOD PRESSURE: 132 MMHG | BODY MASS INDEX: 39.09 KG/M2 | HEIGHT: 64 IN | WEIGHT: 229 LBS | DIASTOLIC BLOOD PRESSURE: 80 MMHG

## 2021-05-15 VITALS
DIASTOLIC BLOOD PRESSURE: 79 MMHG | WEIGHT: 242 LBS | BODY MASS INDEX: 41.32 KG/M2 | SYSTOLIC BLOOD PRESSURE: 139 MMHG | HEIGHT: 64 IN

## 2021-05-15 VITALS
SYSTOLIC BLOOD PRESSURE: 163 MMHG | DIASTOLIC BLOOD PRESSURE: 80 MMHG | BODY MASS INDEX: 40.29 KG/M2 | HEIGHT: 64 IN | WEIGHT: 236 LBS

## 2021-05-18 ENCOUNTER — HOSPITAL ENCOUNTER (OUTPATIENT)
Dept: PERIOP | Facility: HOSPITAL | Age: 76
Setting detail: HOSPITAL OUTPATIENT SURGERY
Discharge: HOME | End: 2021-05-18
Attending: UROLOGY

## 2021-06-03 NOTE — PROCEDURES
Patient: JUDY MAYA     Acct: [id number]     Report: #[id number]  MR #:  [id number]     DOS: 2021 1510     : 1945  DICTATING: Kaelyn Alvarado  ***Signed***  --------------------------------------------------------------------------------------------------------------------  Urology Post Procedure/Op Note      Date       21            Pre-Operative Diagnosis:      Bladder tumor            Post-Operative Diagnosis:      Same as pre-op diagnosis            Surgeon/Assistants      Surgeon:  Kaelyn Alvarado            Anesthesia      MAC            Procedure Performed/Technique      TURBT small            Specimen/Tissue Removed:            Bladder tumor            Findings:      None            Complications:      No            Estimated Blood Loss:      None            Procedure:      After proper consent was obtained, patient was taken to operating room and     general anesthesia was performed.  The patient was placed in the dorsal     lithotomy position and prepped and draped in the normal sterile fashion for     cystoscopy.              A 22 [place] rigid cystoscope was inserted into the bladder.  The bladder was     inspected in a systemic meridian fashion using a 30 degree lens.  There was a     small papillary area on the dome of the bladder.  It was papillary in nature.  A    gyrus resectoscope was inserted into the bladder with a medium loop.  The loop     was then used to resect the tumor in its entirety.  There was no perforation of     the bladder noted.  The area of resection was then fulgurated to stop any     bleeding.  Good muscle was sent with the specimen.  The entire area of resection    was approximately 1.5 cm in size.  Again, no peroration of the bladder was     noted.              The pieces of bladder tumor were then removed from the bladder and sent to     pathology labeled as bladder tumor.  There was no remaining bleeding.              The patient tolerated the  procedure well and was transferred to the PACU in     stable condition.            Kaelyn Alvarado                 May 18, 2021 15:10      Electronically signed by Kaelyn Alvarado  05/18/2021 15:10     Disclaimer: Converted hospital document may not contain table formatting or lab diagrams. Please see [employer] for authenticated document.

## 2021-07-06 ENCOUNTER — TELEPHONE (OUTPATIENT)
Dept: CARDIOLOGY | Facility: CLINIC | Age: 76
End: 2021-07-06

## 2021-07-06 NOTE — TELEPHONE ENCOUNTER
[facility] for holding Plavix for 7 days for epidural injection.     Faxed clearance back advising that patient is on plavix for stroke and clearance will need to come from neurology.

## 2021-07-23 ENCOUNTER — TELEPHONE (OUTPATIENT)
Dept: NEUROSURGERY | Facility: CLINIC | Age: 76
End: 2021-07-23

## 2021-07-23 NOTE — TELEPHONE ENCOUNTER
Sandy with [facility] called because they were referred by Dr. Carlos (cardiology) for clearance to hold his plavix 7 days prior to epidural. Can he do that from your perspective? Her number is [phone]. Their fax is [phone].

## 2021-08-13 ENCOUNTER — PREP FOR SURGERY (OUTPATIENT)
Dept: OTHER | Facility: HOSPITAL | Age: 76
End: 2021-08-13

## 2021-08-13 ENCOUNTER — OFFICE VISIT (OUTPATIENT)
Dept: UROLOGY | Facility: CLINIC | Age: 76
End: 2021-08-13

## 2021-08-13 VITALS
WEIGHT: 235.8 LBS | DIASTOLIC BLOOD PRESSURE: 80 MMHG | SYSTOLIC BLOOD PRESSURE: 159 MMHG | BODY MASS INDEX: 39.29 KG/M2 | HEIGHT: 65 IN

## 2021-08-13 DIAGNOSIS — Z85.53 HISTORY OF RENAL PELVIS CANCER: Primary | ICD-10-CM

## 2021-08-13 DIAGNOSIS — C67.9 BLADDER CANCER (HCC): ICD-10-CM

## 2021-08-13 LAB
BILIRUB BLD-MCNC: NEGATIVE MG/DL
CLARITY, POC: CLEAR
COLOR UR: YELLOW
GLUCOSE UR STRIP-MCNC: NEGATIVE MG/DL
KETONES UR QL: NEGATIVE
LEUKOCYTE EST, POC: NEGATIVE
NITRITE UR-MCNC: NEGATIVE MG/ML
PH UR: 6 [PH] (ref 5–8)
PROT UR STRIP-MCNC: NEGATIVE MG/DL
RBC # UR STRIP: NEGATIVE /UL
SP GR UR: 1.02 (ref 1–1.03)
UROBILINOGEN UR QL: NORMAL

## 2021-08-13 PROCEDURE — 52000 CYSTOURETHROSCOPY: CPT | Performed by: UROLOGY

## 2021-08-13 PROCEDURE — 81003 URINALYSIS AUTO W/O SCOPE: CPT | Performed by: UROLOGY

## 2021-08-13 RX ORDER — SODIUM CHLORIDE 9 MG/ML
100 INJECTION, SOLUTION INTRAVENOUS CONTINUOUS
Status: CANCELLED | OUTPATIENT
Start: 2021-08-13

## 2021-08-13 RX ORDER — LISINOPRIL 5 MG/1
TABLET ORAL
COMMUNITY
End: 2021-09-29 | Stop reason: SDUPTHER

## 2021-08-13 RX ORDER — LEVOFLOXACIN 5 MG/ML
500 INJECTION, SOLUTION INTRAVENOUS ONCE
Status: CANCELLED | OUTPATIENT
Start: 2021-08-13 | End: 2021-08-13

## 2021-08-13 RX ORDER — SODIUM CHLORIDE 0.9 % (FLUSH) 0.9 %
10 SYRINGE (ML) INJECTION EVERY 12 HOURS SCHEDULED
Status: CANCELLED | OUTPATIENT
Start: 2021-08-13

## 2021-08-13 RX ORDER — SODIUM CHLORIDE 0.9 % (FLUSH) 0.9 %
10 SYRINGE (ML) INJECTION AS NEEDED
Status: CANCELLED | OUTPATIENT
Start: 2021-08-13

## 2021-08-13 NOTE — PROGRESS NOTES
Cystoscopy    Date/Time: 8/13/2021 12:27 PM  Performed by: Kaelyn Alvarado MD  Authorized by: Kaelyn Avlarado MD   Preparation: Patient was prepped and draped in the usual sterile fashion.  Local anesthesia used: no    Anesthesia:  Local anesthesia used: no    Sedation:  Patient sedated: no    Patient tolerance: patient tolerated the procedure well with no immediate complications  Comments: Pathology:  The original tumor pathology was papillary transitional cell carcinoma, Grade III/III, Stage I: T1 N0 M0 diagnosed in April 2019. This was of the left renal pelvis and he had a left nephroureterectomy in April 2019. He had low grade superficial UC in the bladder in Oct 2019, January 2020, June 2020, and Sept 2020. The most recent tumor pathology was papillary transitional cell carcinoma, Grade I/III, Stage 0 is: Tis N0 M0 on 02/04/2021.   Imaging:  Prior imaging studies have been done. These were done at diagnosis and include a CT scan of the abdomen and pelvis and significant findings were a left renal filling defect. He had a left robotic nephroureterectomy in April 2019. Right retrograde pyelogram was done in Jan 2020 and it was negative.     Cytoscopy Procedure:     Procedure: Flexible cytoscope was passed per urethra into the bladder without difficulty after proper consent. The bladder was inspected in a systematic meridian fashion. He had a small papillary tumor on the anterior bladder neck at the twelve o'clock position.  Of note, there was no increased vascularity as well. Both ureteral orifices were identified and were normal in appearance. The flexible cytoscope was removed. The patient tolerated the procedure well.

## 2021-08-13 NOTE — H&P (VIEW-ONLY)
[facility]   Urology HISTORY AND PHYSICAL    Patient Name: Curtis Richmond  : 1945  MRN: [id number]  Primary Care Physician:  Kat Donahue APRN  Date of admission: (Not on file)    Subjective   Subjective     Chief Complaint: History of bladder cancer    Patient has a papillary tumor on the anterior bladder neck at the 12 o'clock position. He presents for CBF.      Personal History     Past Medical History:   Diagnosis Date   • Aneurysm, cerebral, nonruptured    • Arthritis     BACK.   • At risk for central sleep apnea     STOP BANG 6   • Balance problem     JUST UNABLE TO WALK. IN WHEELCHAIR.   • Bladder cancer (CMS/[facility])     DR DAGMAR GARCIA. HISTORY OF. BLADDER WASH, TURBP   • Cancer of overlapping sites of bladder (CMS/[facility]) 2020   • Cerebral aneurysm    • Chronic back pain    • Gross hematuria    • History of hematuria    • History of kidney cancer     LEFT NEPHRECTOMY   • Hx of emphysema    • Hydrocephalus (CMS/[facility])    • Hypertension    • Hypertension    • Knee pain 2014    RIGHT KNEE OSTEOARTHRITIS    • Osteoarthritis 2014    RIGHT KNEE   • Renal mass    • Stroke (CMS/HCC) 2021    NO RESIDUAL PROBLEMS   • Tubular adenoma of colon 2015   • Urothelial carcinoma of kidney, left (CMS/[facility]) 2019       Past Surgical History:   Procedure Laterality Date   • BACK SURGERY  1980   • CARDIAC CATHETERIZATION  2001   • CARDIOVERSION      AT [facility]   • CATARACT EXTRACTION WITH INTRAOCULAR LENS IMPLANT Bilateral    • CHOLECYSTECTOMY  2002   • COLONOSCOPY     • CYSTOSCOPY      MULTIPLE   • EMBOLIZATION CEREBRAL N/A 2021    Procedure: CEREBRAL ANGIOGRAM;  Surgeon: Noah Bruno MD;  Location: [facility] ;  Service: Interventional Radiology;  Laterality: N/A;   • EPIDURAL      LUMBAR   • GALLBLADDER SURGERY     • JOINT REPLACEMENT     • KNEE ARTHROPLASTY Right    • NEPHROURETERECTOMY Left 2019   • TRANSURETHRAL RESECTION OF  BLADDER TUMOR         Family History: family history includes Heart attack in his father; No Known Problems in an other family member. Otherwise pertinent FHx was reviewed and not pertinent to current issue.    Social History:  reports that he has quit smoking. He has never used smokeless tobacco. He reports previous alcohol use. He reports that he does not use drugs.    Home Medications:  Aspirin, amLODIPine, aspirin, atorvastatin, carvedilol, clopidogrel, gabapentin, lisinopril, and losartan-hydrochlorothiazide    Allergies:  No Known Allergies    Objective    Objective     Vitals:   BP: (159)/(80) 159/80    Physical Exam  Vitals and nursing note reviewed.   Constitutional:       Appearance: Normal appearance. He is well-developed.   Cardiovascular:      Rate and Rhythm: Normal rate and regular rhythm.   Pulmonary:      Effort: Pulmonary effort is normal.      Breath sounds: Normal breath sounds and air entry.   Neurological:      Mental Status: He is alert and oriented to person, place, and time. Mental status is at baseline.      Motor: Motor function is intact.   Psychiatric:         Mood and Affect: Mood and affect normal.         Speech: Speech normal.         Behavior: Behavior normal.         Judgment: Judgment normal.         Result Review    Result Review:  I have personally reviewed the results from the time of this admission to 8/13/2021 13:07 EDT and agree with these findings:  []  Laboratory  []  Microbiology  []  Radiology  []  EKG/Telemetry   []  Cardiology/Vascular   []  Pathology  []  Old records  []  Other:      Assessment/Plan   Assessment / Plan       Active Hospital Problems:  There are no active hospital problems to display for this patient.      Plan: Cystoscopy, bladder biopsy fulguration.    Risks and benefits discussed with patient and they are agreeable to proceed.    DVT prophylaxis:  No DVT prophylaxis order currently exists.    CODE STATUS:           Electronically signed by Kaelyn  CABRERA Alvarado MD, 08/13/21, 1:07 PM EDT.

## 2021-08-13 NOTE — H&P
[facility]   Urology HISTORY AND PHYSICAL    Patient Name: Curtis Richmond  : 1945  MRN: [id number]  Primary Care Physician:  Kat Donahue APRN  Date of admission: (Not on file)    Subjective   Subjective     Chief Complaint: History of bladder cancer    Patient has a papillary tumor on the anterior bladder neck at the 12 o'clock position. He presents for CBF.      Personal History     Past Medical History:   Diagnosis Date   • Aneurysm, cerebral, nonruptured    • Arthritis     BACK.   • At risk for central sleep apnea     STOP BANG 6   • Balance problem     JUST UNABLE TO WALK. IN WHEELCHAIR.   • Bladder cancer (CMS/[facility])     DR DAGMAR GARCIA. HISTORY OF. BLADDER WASH, TURBP   • Cancer of overlapping sites of bladder (CMS/[facility]) 2020   • Cerebral aneurysm    • Chronic back pain    • Gross hematuria    • History of hematuria    • History of kidney cancer     LEFT NEPHRECTOMY   • Hx of emphysema    • Hydrocephalus (CMS/[facility])    • Hypertension    • Hypertension    • Knee pain 2014    RIGHT KNEE OSTEOARTHRITIS    • Osteoarthritis 2014    RIGHT KNEE   • Renal mass    • Stroke (CMS/HCC) 2021    NO RESIDUAL PROBLEMS   • Tubular adenoma of colon 2015   • Urothelial carcinoma of kidney, left (CMS/[facility]) 2019       Past Surgical History:   Procedure Laterality Date   • BACK SURGERY  1980   • CARDIAC CATHETERIZATION  2001   • CARDIOVERSION      AT [facility]   • CATARACT EXTRACTION WITH INTRAOCULAR LENS IMPLANT Bilateral    • CHOLECYSTECTOMY  2002   • COLONOSCOPY     • CYSTOSCOPY      MULTIPLE   • EMBOLIZATION CEREBRAL N/A 2021    Procedure: CEREBRAL ANGIOGRAM;  Surgeon: Noah Bruno MD;  Location: [facility] ;  Service: Interventional Radiology;  Laterality: N/A;   • EPIDURAL      LUMBAR   • GALLBLADDER SURGERY     • JOINT REPLACEMENT     • KNEE ARTHROPLASTY Right    • NEPHROURETERECTOMY Left 2019   • TRANSURETHRAL RESECTION OF  BLADDER TUMOR         Family History: family history includes Heart attack in his father; No Known Problems in an other family member. Otherwise pertinent FHx was reviewed and not pertinent to current issue.    Social History:  reports that he has quit smoking. He has never used smokeless tobacco. He reports previous alcohol use. He reports that he does not use drugs.    Home Medications:  Aspirin, amLODIPine, aspirin, atorvastatin, carvedilol, clopidogrel, gabapentin, lisinopril, and losartan-hydrochlorothiazide    Allergies:  No Known Allergies    Objective    Objective     Vitals:   BP: (159)/(80) 159/80    Physical Exam  Vitals and nursing note reviewed.   Constitutional:       Appearance: Normal appearance. He is well-developed.   Cardiovascular:      Rate and Rhythm: Normal rate and regular rhythm.   Pulmonary:      Effort: Pulmonary effort is normal.      Breath sounds: Normal breath sounds and air entry.   Neurological:      Mental Status: He is alert and oriented to person, place, and time. Mental status is at baseline.      Motor: Motor function is intact.   Psychiatric:         Mood and Affect: Mood and affect normal.         Speech: Speech normal.         Behavior: Behavior normal.         Judgment: Judgment normal.         Result Review    Result Review:  I have personally reviewed the results from the time of this admission to 8/13/2021 13:07 EDT and agree with these findings:  []  Laboratory  []  Microbiology  []  Radiology  []  EKG/Telemetry   []  Cardiology/Vascular   []  Pathology  []  Old records  []  Other:      Assessment/Plan   Assessment / Plan       Active Hospital Problems:  There are no active hospital problems to display for this patient.      Plan: Cystoscopy, bladder biopsy fulguration.    Risks and benefits discussed with patient and they are agreeable to proceed.    DVT prophylaxis:  No DVT prophylaxis order currently exists.    CODE STATUS:           Electronically signed by Kaelyn  CABRERA Alvarado MD, 08/13/21, 1:07 PM EDT.

## 2021-08-18 NOTE — PRE-PROCEDURE INSTRUCTIONS
PATIENT INSTRUCTED TO BE:    - NPO AFTER MIDNIGHT EXCEPT CAN HAVE CLEAR LIQUIDS 2 HOURS PRIOR TO SURGERY ARRIVAL TIME     - TO HOLD ALL VITAMINS, SUPPLEMENTS, NSAIDS FOR ONE WEEK PRIOR TO THEIR SURGICAL PROCEDURE    - INSTRUCTED TO TAKE THE FOLLOWING MEDICATIONS THE DAY OF SURGERY:     COREG     PATIENT VERBALIZED UNDERSTANDING

## 2021-08-19 ENCOUNTER — ANESTHESIA EVENT (OUTPATIENT)
Dept: PERIOP | Facility: HOSPITAL | Age: 76
End: 2021-08-19

## 2021-08-24 ENCOUNTER — HOSPITAL ENCOUNTER (OUTPATIENT)
Facility: HOSPITAL | Age: 76
Setting detail: HOSPITAL OUTPATIENT SURGERY
Discharge: HOME | End: 2021-08-24
Attending: UROLOGY | Admitting: UROLOGY

## 2021-08-24 ENCOUNTER — ANESTHESIA (OUTPATIENT)
Dept: PERIOP | Facility: HOSPITAL | Age: 76
End: 2021-08-24

## 2021-08-24 VITALS
BODY MASS INDEX: 40.57 KG/M2 | WEIGHT: 237.66 LBS | TEMPERATURE: 97.3 F | SYSTOLIC BLOOD PRESSURE: 154 MMHG | RESPIRATION RATE: 22 BRPM | OXYGEN SATURATION: 98 % | HEIGHT: 64 IN | HEART RATE: 77 BPM | DIASTOLIC BLOOD PRESSURE: 73 MMHG

## 2021-08-24 DIAGNOSIS — C67.9 BLADDER CANCER (HCC): ICD-10-CM

## 2021-08-24 DIAGNOSIS — Z85.53 HISTORY OF RENAL PELVIS CANCER: ICD-10-CM

## 2021-08-24 PROCEDURE — 25010000002 MIDAZOLAM PER 1MG: Performed by: ANESTHESIOLOGY

## 2021-08-24 PROCEDURE — 25010000002 LEVOFLOXACIN PER 250 MG: Performed by: UROLOGY

## 2021-08-24 PROCEDURE — 88307 TISSUE EXAM BY PATHOLOGIST: CPT | Performed by: UROLOGY

## 2021-08-24 PROCEDURE — 52234 CYSTOSCOPY AND TREATMENT: CPT | Performed by: UROLOGY

## 2021-08-24 PROCEDURE — 25010000002 PROPOFOL 10 MG/ML EMULSION: Performed by: NURSE ANESTHETIST, CERTIFIED REGISTERED

## 2021-08-24 RX ORDER — SODIUM CHLORIDE 9 MG/ML
100 INJECTION, SOLUTION INTRAVENOUS CONTINUOUS
Status: DISCONTINUED | OUTPATIENT
Start: 2021-08-24 | End: 2021-08-24 | Stop reason: HOSPADM

## 2021-08-24 RX ORDER — SODIUM CHLORIDE, SODIUM LACTATE, POTASSIUM CHLORIDE, CALCIUM CHLORIDE 600; 310; 30; 20 MG/100ML; MG/100ML; MG/100ML; MG/100ML
9 INJECTION, SOLUTION INTRAVENOUS CONTINUOUS PRN
Status: DISCONTINUED | OUTPATIENT
Start: 2021-08-24 | End: 2021-08-24 | Stop reason: HOSPADM

## 2021-08-24 RX ORDER — IBUPROFEN 600 MG/1
600 TABLET ORAL EVERY 6 HOURS PRN
Status: DISCONTINUED | OUTPATIENT
Start: 2021-08-24 | End: 2021-08-24 | Stop reason: HOSPADM

## 2021-08-24 RX ORDER — SODIUM CHLORIDE 0.9 % (FLUSH) 0.9 %
10 SYRINGE (ML) INJECTION AS NEEDED
Status: DISCONTINUED | OUTPATIENT
Start: 2021-08-24 | End: 2021-08-24 | Stop reason: HOSPADM

## 2021-08-24 RX ORDER — LEVOFLOXACIN 5 MG/ML
500 INJECTION, SOLUTION INTRAVENOUS ONCE
Status: COMPLETED | OUTPATIENT
Start: 2021-08-24 | End: 2021-08-24

## 2021-08-24 RX ORDER — PROMETHAZINE HYDROCHLORIDE 12.5 MG/1
25 TABLET ORAL ONCE AS NEEDED
Status: DISCONTINUED | OUTPATIENT
Start: 2021-08-24 | End: 2021-08-24 | Stop reason: HOSPADM

## 2021-08-24 RX ORDER — ONDANSETRON 2 MG/ML
4 INJECTION INTRAMUSCULAR; INTRAVENOUS ONCE AS NEEDED
Status: DISCONTINUED | OUTPATIENT
Start: 2021-08-24 | End: 2021-08-24 | Stop reason: HOSPADM

## 2021-08-24 RX ORDER — ACETAMINOPHEN 500 MG
1000 TABLET ORAL ONCE
Status: COMPLETED | OUTPATIENT
Start: 2021-08-24 | End: 2021-08-24

## 2021-08-24 RX ORDER — ACETAMINOPHEN 325 MG/1
650 TABLET ORAL ONCE
Status: DISCONTINUED | OUTPATIENT
Start: 2021-08-24 | End: 2021-08-24 | Stop reason: HOSPADM

## 2021-08-24 RX ORDER — OXYCODONE HYDROCHLORIDE 5 MG/1
5 TABLET ORAL
Status: DISCONTINUED | OUTPATIENT
Start: 2021-08-24 | End: 2021-08-24 | Stop reason: HOSPADM

## 2021-08-24 RX ORDER — PROMETHAZINE HYDROCHLORIDE 12.5 MG/1
12.5 TABLET ORAL ONCE AS NEEDED
Status: DISCONTINUED | OUTPATIENT
Start: 2021-08-24 | End: 2021-08-24 | Stop reason: HOSPADM

## 2021-08-24 RX ORDER — LIDOCAINE HYDROCHLORIDE 20 MG/ML
INJECTION, SOLUTION INFILTRATION; PERINEURAL AS NEEDED
Status: DISCONTINUED | OUTPATIENT
Start: 2021-08-24 | End: 2021-08-24 | Stop reason: SURG

## 2021-08-24 RX ORDER — MEPERIDINE HYDROCHLORIDE 25 MG/ML
12.5 INJECTION INTRAMUSCULAR; INTRAVENOUS; SUBCUTANEOUS
Status: DISCONTINUED | OUTPATIENT
Start: 2021-08-24 | End: 2021-08-24 | Stop reason: HOSPADM

## 2021-08-24 RX ORDER — PROMETHAZINE HYDROCHLORIDE 25 MG/1
25 SUPPOSITORY RECTAL ONCE AS NEEDED
Status: DISCONTINUED | OUTPATIENT
Start: 2021-08-24 | End: 2021-08-24 | Stop reason: HOSPADM

## 2021-08-24 RX ORDER — MAGNESIUM HYDROXIDE 1200 MG/15ML
LIQUID ORAL AS NEEDED
Status: DISCONTINUED | OUTPATIENT
Start: 2021-08-24 | End: 2021-08-24 | Stop reason: HOSPADM

## 2021-08-24 RX ORDER — MIDAZOLAM HYDROCHLORIDE 2 MG/2ML
2 INJECTION, SOLUTION INTRAMUSCULAR; INTRAVENOUS ONCE
Status: COMPLETED | OUTPATIENT
Start: 2021-08-24 | End: 2021-08-24

## 2021-08-24 RX ORDER — ONDANSETRON 4 MG/1
4 TABLET, FILM COATED ORAL ONCE AS NEEDED
Status: DISCONTINUED | OUTPATIENT
Start: 2021-08-24 | End: 2021-08-24 | Stop reason: HOSPADM

## 2021-08-24 RX ORDER — SODIUM CHLORIDE 0.9 % (FLUSH) 0.9 %
10 SYRINGE (ML) INJECTION EVERY 12 HOURS SCHEDULED
Status: DISCONTINUED | OUTPATIENT
Start: 2021-08-24 | End: 2021-08-24 | Stop reason: HOSPADM

## 2021-08-24 RX ADMIN — SODIUM CHLORIDE, POTASSIUM CHLORIDE, SODIUM LACTATE AND CALCIUM CHLORIDE 9 ML/HR: 600; 310; 30; 20 INJECTION, SOLUTION INTRAVENOUS at 07:46

## 2021-08-24 RX ADMIN — PROPOFOL 50 MCG/KG/MIN: 10 INJECTION, EMULSION INTRAVENOUS at 08:36

## 2021-08-24 RX ADMIN — LIDOCAINE HYDROCHLORIDE 33 MG: 20 INJECTION, SOLUTION INFILTRATION; PERINEURAL at 08:37

## 2021-08-24 RX ADMIN — ACETAMINOPHEN 1000 MG: 500 TABLET ORAL at 07:47

## 2021-08-24 RX ADMIN — LEVOFLOXACIN 500 MG: 5 INJECTION, SOLUTION INTRAVENOUS at 08:31

## 2021-08-24 RX ADMIN — MIDAZOLAM HYDROCHLORIDE 2 MG: 1 INJECTION, SOLUTION INTRAMUSCULAR; INTRAVENOUS at 08:00

## 2021-08-24 RX ADMIN — SODIUM CHLORIDE, POTASSIUM CHLORIDE, SODIUM LACTATE AND CALCIUM CHLORIDE 9 ML/HR: 600; 310; 30; 20 INJECTION, SOLUTION INTRAVENOUS at 09:20

## 2021-08-24 NOTE — OP NOTE
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    A Urinary Bladder Tissue · TISSUE PATHOLOGY EXAM   Kaelyn Alvarado MD 8/24/21 0902 No    Description: BLADDER TUMOR                  Complications: None    Description of Procedure: After proper consent was obtained, patient was taken to operating room and general anesthesia was performed.  The patient was placed in the dorsal lithotomy position and prepped and draped in the normal sterile fashion for cystoscopy.      A 22 Cuban rigid cystoscope was inserted into the bladder.  The bladder was inspected in a systemic meridian fashion using a 30 degree lens.  There was a small bladder tumor at the 12 o'clock position on the anterior bladder wall at the bladder neck. The tumor was papillary in nature.  A gyrus resectoscope was inserted into the bladder with a medium loop.  The loop was then used to resect the tumor in its entirety.  There was no perforation of the bladder noted.  The area of resection was then fulgurated to stop any bleeding.  Good muscle was sent with the specimen.  The entire area of resection was approximately 1.5 cm in size.  Again, no peroration of the bladder was noted.      The pieces of bladder tumor were then removed from the bladder and sent to pathology labeled as bladder tumor.   There was no remaining bleeding.      The patient tolerated the procedure well and was transferred to the PACU in stable condition.      Kaelyn Alvarado MD     Date: 8/24/2021  Time: 09:19 EDT

## 2021-08-24 NOTE — ANESTHESIA PREPROCEDURE EVALUATION
Anesthesia Evaluation     Patient summary reviewed and Nursing notes reviewed   no history of anesthetic complications:  NPO Solid Status: > 8 hours  NPO Liquid Status: > 2 hours           Airway   Mallampati: II  TM distance: >3 FB  Neck ROM: full  No difficulty expected  Dental      Pulmonary - normal exam    breath sounds clear to auscultation  (+) COPD,   Cardiovascular - normal exam  Exercise tolerance: poor (<4 METS)    Patient on routine beta blocker and Beta blocker given within 24 hours of surgery  Rhythm: regular    (+) hypertension,       Neuro/Psych  (+) CVA,     GI/Hepatic/Renal/Endo    (+)   renal disease,     Musculoskeletal     Abdominal    Substance History - negative use     OB/GYN negative ob/gyn ROS         Other   arthritis,    history of cancer    ROS/Med Hx Other: Pt has loop recorder s/p stroke       Results for JUDY MAYA (MRN [id number]) as of 8/24/2021 07:29   Ref. Range 4/12/2021 12:54   Glucose Latest Ref Range: 65 - 99 mg/dL 90   Sodium Latest Ref Range: 136 - 145 mmol/L 136   Potassium Latest Ref Range: 3.5 - 5.2 mmol/L 4.2   CO2 Latest Ref Range: 22.0 - 29.0 mmol/L 24.7   Chloride Latest Ref Range: 98 - 107 mmol/L 101   Anion Gap Latest Ref Range: 5.0 - 15.0 mmol/L 10.3   Creatinine Latest Ref Range: 0.76 - 1.27 mg/dL 0.99   BUN Latest Ref Range: 8 - 23 mg/dL 16   BUN/Creatinine Ratio Latest Ref Range: 7.0 - 25.0  16.2   Calcium Latest Ref Range: 8.6 - 10.5 mg/dL 9.0   eGFR Non  Am Latest Ref Range: >60 mL/min/1.73 74     RESULTS:    1.  Technically difficult study.    2.  Normal left ventricular systolic function. Estimated ejection fraction is        60%.    3.  Normal right ventricular systolic function.    4.  Normal chamber sizes.    5.  Calcified aortic root and valve.    6.  Mild mitral annular calcification.    7.  Tricuspid valve is structurally normal.    8.  Pulmonic valve is not well seen.    9.  There is no evidence of pericardial effusion.      ekg nsr rate  72         Anesthesia Plan    ASA 4     general   (Patient understands anesthesia not responsible for dental damage.)  intravenous induction     Anesthetic plan, all risks, benefits, and alternatives have been provided, discussed and informed consent has been obtained with: patient.  Use of blood products discussed with patient .   Plan discussed with CRNA.

## 2021-08-24 NOTE — ANESTHESIA POSTPROCEDURE EVALUATION
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])      (Bladder cancer (CMS/HCC) [C67.9])    Surgeons: Kaelyn Alvarado MD Provider: Kory Winston MD    Anesthesia Type: general ASA Status: 4          Anesthesia Type: general    Vitals  Vitals Value Taken Time   /62 08/24/21 0958   Temp 36.6 °C (97.9 °F) 08/24/21 0958   Pulse 74 08/24/21 0958   Resp 16 08/24/21 0958   SpO2 96 % 08/24/21 0958           Post Anesthesia Care and Evaluation    Patient location during evaluation: bedside  Patient participation: complete - patient participated  Level of consciousness: awake  Pain score: 0  Pain management: adequate  Airway patency: patent  Anesthetic complications: No anesthetic complications  PONV Status: none  Cardiovascular status: acceptable and stable  Respiratory status: acceptable and room air  Hydration status: acceptable    Comments: An Anesthesiologist personally participated in the most demanding procedures (including induction and emergence if applicable) in the anesthesia plan, monitored the course of anesthesia administration at frequent intervals and remained physically present and available for immediate diagnosis and treatment of emergencies.

## 2021-08-25 LAB
CYTO UR: NORMAL
LAB AP CASE REPORT: NORMAL
LAB AP CLINICAL INFORMATION: NORMAL
PATH REPORT.FINAL DX SPEC: NORMAL
PATH REPORT.GROSS SPEC: NORMAL

## 2021-09-01 ENCOUNTER — OFFICE VISIT (OUTPATIENT)
Dept: UROLOGY | Facility: CLINIC | Age: 76
End: 2021-09-01

## 2021-09-01 VITALS
HEIGHT: 64 IN | WEIGHT: 238 LBS | SYSTOLIC BLOOD PRESSURE: 150 MMHG | BODY MASS INDEX: 40.63 KG/M2 | DIASTOLIC BLOOD PRESSURE: 56 MMHG

## 2021-09-01 DIAGNOSIS — C67.5 MALIGNANT NEOPLASM OF URINARY BLADDER NECK (HCC): ICD-10-CM

## 2021-09-01 DIAGNOSIS — Z85.53 HISTORY OF RENAL PELVIS CANCER: Primary | ICD-10-CM

## 2021-09-01 PROCEDURE — 99212 OFFICE O/P EST SF 10 MIN: CPT | Performed by: UROLOGY

## 2021-09-01 NOTE — PROGRESS NOTES
"Chief Complaint  Post-op Follow-up (TURBT)    Subjective     {Problem List  Visit Diagnosis   Encounters  Notes  Medications  Labs  Result Review Imaging  Media :23}     Curtis Richmond presents to [facility] UROLOGY  He is here for one week follow up from surgery.  He has had no issues.     The original tumor pathology was papillary transitional cell carcinoma, Grade III/III, Stage I: T1 N0 M0 diagnosed in April 2019. This was of the left renal pelvis and he had a left nephroureterectomy in April 2019. He had low grade superficial UC in the bladder in Oct 2019, January 2020, June 2020, and Sept 2020. The most recent tumor pathology was papillary transitional cell carcinoma, Grade I/III, Stage 0 is: Tis N0 M0 on 02/04/2021.  He had a small papillary tumor and I resected this in August 2021.  It was chronic cystitis.      Imaging:  Prior imaging studies have been done. These were done at diagnosis and include a CT scan of the abdomen and pelvis and significant findings were a left renal filling defect. He had a left robotic nephroureterectomy in April 2019. Right retrograde pyelogram was done in Jan 2020 and it was negative.      Objective   Vital Signs:   /56   Ht 162.6 cm (64\")   Wt 108 kg (238 lb)   BMI 40.85 kg/m²     Physical Exam  Vitals and nursing note reviewed.   Constitutional:       Appearance: Normal appearance. He is well-developed.   Pulmonary:      Effort: Pulmonary effort is normal.      Breath sounds: Normal air entry.   Neurological:      Mental Status: He is alert and oriented to person, place, and time.      Motor: Motor function is intact.   Psychiatric:         Mood and Affect: Mood normal.         Behavior: Behavior normal.        Result Review :                  Results for orders placed or performed during the hospital encounter of 08/24/21   Tissue Pathology Exam    Specimen: Urinary Bladder; Tissue   Result Value Ref Range    Case Report       Surgical " Pathology Report                         Case: [id number]                                  Authorizing Provider:  Kaelyn Alvarado MD      Collected:           08/24/2021 09:02 AM          Ordering Location:     [facility] MAIN Received:            08/24/2021 10:32 AM                                 OR                                                                           Pathologist:           Emely Aburto DO                                                       Specimen:    Urinary Bladder, BLADDER TUMOR                                                             Clinical Information      Final Diagnosis       Urinary bladder, transurethral resection:   - Chronic cystitis   - Cautery artifact          Gross Description       Bladder tumor: Received in formalin are 3 fragments of white-tan friable soft tissue filtered measuring 0.5 cm in greatest aggregate dimension.  1A.  CRE      Microscopic Description         Assessment and Plan    Diagnoses and all orders for this visit:    1. History of renal pelvis cancer (Primary)  Overview:  Added automatically from request for surgery [id number]    Assessment & Plan:  Yearly retrograde pyelograms.       2. Malignant neoplasm of urinary bladder neck (CMS/HCC)  Overview:  Added automatically from request for surgery [id number]    Assessment & Plan:  Cysto in office in 3 months.         Follow Up   No follow-ups on file.  Patient was given instructions and counseling regarding his condition or for health maintenance advice. Please see specific information pulled into the AVS if appropriate.

## 2021-09-29 ENCOUNTER — OFFICE VISIT (OUTPATIENT)
Dept: CARDIOLOGY | Facility: CLINIC | Age: 76
End: 2021-09-29

## 2021-09-29 VITALS
HEART RATE: 74 BPM | SYSTOLIC BLOOD PRESSURE: 144 MMHG | HEIGHT: 65 IN | BODY MASS INDEX: 37.15 KG/M2 | DIASTOLIC BLOOD PRESSURE: 70 MMHG | WEIGHT: 223 LBS

## 2021-09-29 DIAGNOSIS — I10 ESSENTIAL HYPERTENSION: ICD-10-CM

## 2021-09-29 DIAGNOSIS — I63.9 CRYPTOGENIC STROKE (HCC): Primary | ICD-10-CM

## 2021-09-29 DIAGNOSIS — I48.0 PAROXYSMAL ATRIAL FIBRILLATION (HCC): ICD-10-CM

## 2021-09-29 PROCEDURE — 99214 OFFICE O/P EST MOD 30 MIN: CPT | Performed by: INTERNAL MEDICINE

## 2021-10-25 PROCEDURE — 93000 ELECTROCARDIOGRAM COMPLETE: CPT | Performed by: INTERNAL MEDICINE

## 2021-10-25 NOTE — PROGRESS NOTES
Cardiac Electrophysiology Outpatient Consult Note            [facility]    Consult Note     Curtis Richmond  [id number]  10/26/2021  [phone]     Primary Care Physician: Kat Donahue APRN    Referred By: STEFANO Carlos MD    Subjective     Chief Complaint:   Diagnoses and all orders for this visit:    1. PAF (paroxysmal atrial fibrillation) (HCC) (Primary)    2. Primary hypertension    3. History of renal pelvis cancer    4. Malignant neoplasm of trigone of urinary bladder (HCC)    5. Cerebrovascular accident (CVA) due to embolism of left anterior cerebral artery (HCC)    6. History of loop recorder    7. Centrilobular emphysema (HCC)    Other orders  -     ECG 12 Lead  -     apixaban (ELIQUIS) 5 MG tablet tablet; Take 1 tablet by mouth Every 12 (Twelve) Hours for 90 days.  Dispense: 180 tablet; Refill: 0      Chief Complaint   Patient presents with   • Dizziness   • Irregular Heart Beat       History of Present Illness:   Curtis Richmond is a 76 y.o. male who presents to my electrophysiology clinic for evaluation for possible left atrial appendage occlusion device.  He had a cryptogenic CVA earlier this year and has residual left-sided weakness.  He underwent loop recorder implantation which returned showing paroxysmal atrial fibrillation.  He also has a small cerebral aneurysm which is being monitored by neurosurgery.  At present he is not a candidate for coiling.  He is on aspirin and Plavix since his CVA earlier this year.  He is not on warfarin or novel anticoagulant.  He is completely unaware of his paroxysmal afibrillation.  He further has no complaint of chest pain, shortness of breath, orthopnea, PND, claudication, lower extremity edema.  He has had no dizziness or syncope.  His blood pressures at home are well managed on his current medical regimen.  He gives a history of cardioversion and cardiac catheterization in 2001 at [facility] in [place].  Further details from that admission are not currently available for review.  The patient and his wife both state that he has had \"no problems\" since that evaluation.  He has had no adverse bleeding with the exception of hematuria shortly after starting aspirin and Plavix this was later attributed to bladder surgery just prior to his CVA.  He is at high risk for obstructive sleep apnea.  His wife states that he snores and naps in the daytime frequently but he has never had a formal sleep study.    Past Medical History:   Patient Active Problem List    Diagnosis Date Noted   • History of loop recorder      Priority: High     Note Last Updated: 10/25/2021     CURRENTLY HAS [employer] LOOP RECORDER S/P STROKE, MONITORED BY DR GONG      • PAF (paroxysmal atrial fibrillation) ([facility]) 01/01/2014     Priority: High     Note Last Updated: 10/25/2021     · Echocardiogram, 2/10/2021: EF 60%.  Normal LV systolic function diastolic dysfunction calcified aortic root and valve with normal aortic velocities and otherwise normal valvular morphology.  Left atrium 3.9 cm     • Stroke ([facility])      Priority: Medium   • At risk for central sleep apnea      Priority: Low     Note Last Updated: 10/25/2021     STOP BANG 6     • Hypertension      Priority: Low   • COPD (chronic obstructive pulmonary disease) ([facility])      Note Last Updated: 10/25/2021     EMPYSEMA- NO INHALERS     • History of renal pelvis cancer 08/13/2021   • Bladder cancer ([facility]) 08/13/2021   • Cerebral aneurysm, nonruptured    • Arthritis 01/01/2014       Past Surgical History:   Past Surgical History:   Procedure Laterality Date   • BACK SURGERY  06/1980   • CARDIAC CATHETERIZATION  02/2001   • CARDIOVERSION      AT [facility] YEARS AGO (40 YEARS AGO)   • CATARACT EXTRACTION WITH INTRAOCULAR LENS IMPLANT Bilateral 2019   • CHOLECYSTECTOMY  06/2002   • COLONOSCOPY     • CYSTOSCOPY      MULTIPLE   • CYSTOSCOPY BLADDER BIOPSY N/A 8/24/2021 "    Procedure: CYSTOSCOPY, TRANSURETHRAL RESECTION OF BLADDER TUMOR;  Surgeon: Kaelyn Alvarado MD;  Location: [facility] MAIN OR;  Service: Urology;  Laterality: N/A;   • EMBOLIZATION CEREBRAL N/A 4/14/2021    Procedure: CEREBRAL ANGIOGRAM;  Surgeon: Noah Bruno MD;  Location: [facility] HYBRID OR 18/19;  Service: Interventional Radiology;  Laterality: N/A;   • EPIDURAL      LUMBAR   • GALLBLADDER SURGERY     • JOINT REPLACEMENT     • KNEE ARTHROPLASTY Right 2013   • NEPHROURETERECTOMY Left 04/2019   • TRANSURETHRAL RESECTION OF BLADDER TUMOR         Social History:   Social History     Socioeconomic History   • Marital status:    Tobacco Use   • Smoking status: Former Smoker   • Smokeless tobacco: Never Used   • Tobacco comment: QUIT 50 YRS AGO. SMOKED 3 YEARS IN [employer]   Vaping Use   • Vaping Use: Never used   Substance and Sexual Activity   • Alcohol use: Not Currently   • Drug use: Never   • Sexual activity: Defer       Medications:     Current Outpatient Medications:   •  amLODIPine (NORVASC) 2.5 MG tablet, Take 2.5 mg by mouth Every Evening., Disp: , Rfl:   •  aspirin (aspirin) 81 MG EC tablet, Take 81 mg by mouth Daily. PER PT DR ALVARADO INSTRUCTIONS, OK TO CONTINUE ASA, Disp: , Rfl:   •  atorvastatin (LIPITOR) 40 MG tablet, Take 40 mg by mouth Every Night., Disp: , Rfl:   •  carvedilol (COREG) 6.25 MG tablet, Take 6.25 mg by mouth 2 (Two) Times a Day., Disp: , Rfl:   •  clopidogrel (PLAVIX) 75 MG tablet, Take 75 mg by mouth Every Evening. PER PATIENT AND WIFE PER DR ALVARADO'S INSTRUCTIONS ....OK TO CONTINUE FOR SURGERY, Disp: , Rfl:   •  gabapentin (NEURONTIN) 400 MG capsule, Take 400 mg by mouth Every Evening., Disp: , Rfl:   •  losartan-hydrochlorothiazide (HYZAAR) 100-12.5 MG per tablet, Take 1 tablet by mouth Every Evening., Disp: , Rfl:     Allergies:   No Known Allergies    Objective   Vital Signs:   Vitals:    10/26/21 1007   BP: 132/77   BP Location: Left arm   Patient Position: Sitting   Pulse:  "77   Weight: 109 kg (240 lb)   Height: 165.1 cm (65\")       PHYSICAL EXAM  General appearance: Awake, alert, cooperative  Head: Normocephalic, without obvious abnormality, atraumatic  Eyes: Conjunctivae/corneas clear, EOMs intact  Neck: no adenopathy, no carotid bruit, no JVD and thyroid: not enlarged  Lungs: clear to auscultation bilaterally and no rhonchi or crackles\", ' symmetric  Heart: regular rate and rhythm, S1, S2 normal, no murmur, click, rub or gallop normal PMI  Abdomen: Soft, non-tender, bowel sounds normal,  no organomegaly  Extremities: extremities normal, atraumatic, no cyanosis or edema  Skin: Skin color, turgor normal, no rashes or lesions  Neurologic: Grossly normal     Lab Results   Component Value Date    GLUCOSE 90 04/12/2021    CALCIUM 9.0 04/12/2021     04/12/2021    K 4.2 04/12/2021    CO2 24.7 04/12/2021     04/12/2021    BUN 16 04/12/2021    CREATININE 0.99 04/12/2021    EGFRIFNONA 74 04/12/2021    BCR 16.2 04/12/2021    ANIONGAP 10.3 04/12/2021     Lab Results   Component Value Date    WBC 8.00 04/12/2021    HGB 11.8 (L) 04/12/2021    HCT 37.6 04/12/2021    MCV 83.9 04/12/2021     04/12/2021     Lab Results   Component Value Date    INR 0.89 (L) 02/21/2021    INR 0.92 (L) 02/18/2021    INR 0.88 (L) 02/09/2021    PROTIME 9.9 02/21/2021    PROTIME 10.3 02/18/2021    PROTIME 9.9 02/09/2021     Lab Results   Component Value Date    TSH 3.510 02/10/2021       Cardiac Testing:                                      HAS-Bled Score for Major Bleeding Risk       Question  Yes  No    1. Hypertension History - BP Systolic >160 mmHg, no treatment 1    2.   Renal Disease - Creatinine higher than 2.6 mg/dL (200mmol/L) 1    3.   Liver Disease - Bilirubin higher 2x or AST/ALT/AP higher 3x normal  1   4.   Stroke History  1    5.   History of major bleeding or predisposition   1   6.   Labile INR - unstable/high INRs, time in Therapeutic Range <60%  1   7.   Age > 65  1    8.   Under " medication that predisposes to bleeding  1    9.   Alcohol or drug usage history - more than 7 drinks per week  1            Score Major bleeding risk   0 1.10%   1 3.40%   2 4.10%   3 5.80%   4 8.90%   5 9.10%   >6 higher %     CHADS-VASc Risk Assessment            4 Total Score    1 Hypertension    1 Age 65-74                  2   PRIOR STROKE/TIA/THROMBO    Criteria that do not apply:    CHF    Age >/= 75    DM        Vascular Disease    Sex: Female        I personally viewed and interpreted the patient's EKG/Telemetry/lab data      ECG 12 Lead    Date/Time: 10/25/2021 6:39 PM  Performed by: Primo Tobias DO  Authorized by: Primo Tobias DO   Previous ECG: no previous ECG available  Rhythm: sinus rhythm  BPM: 77  Conduction: conduction normal  ST Segments: ST segments normal  T Waves: T waves normal  QRS axis: normal  Other: no other findings    Clinical impression: normal ECG            Tobacco Cessation: N/A  Obstructive Sleep Apnea Screening: Completed    Assessment & Plan    Diagnoses and all orders for this visit:    1. PAF (paroxysmal atrial fibrillation) (HCC) (Primary)    2. Primary hypertension    3. History of renal pelvis cancer    4. Malignant neoplasm of trigone of urinary bladder (HCC)    5. Cerebrovascular accident (CVA) due to embolism of left anterior cerebral artery (HCC)    6. History of loop recorder    7. Centrilobular emphysema (HCC)    Other orders  -     ECG 12 Lead  -     apixaban (ELIQUIS) 5 MG tablet tablet; Take 1 tablet by mouth Every 12 (Twelve) Hours for 90 days.  Dispense: 180 tablet; Refill: 0         Diagnosis Plan   1. PAF (paroxysmal atrial fibrillation) (HCC)   totally asymptomatic from atrial fibrillation.    [id number] = 4  HASBLED = 5    This patient is at high risk for recurrent stroke.  He is not at this time anticoagulated.  He has significant risk of bleeding.  He has hematuria from bladder cancer requires also repeat bladder interventions every 3 months.  If you  are anticoagulated he would need to stop anticoagulation for this latter procedure.  He is at high risk also for intracranial hemorrhage because of intracranial aneurysm.  He is a poor candidate for long-term anticoagulation for all of these reasons.    He is a reasonable candidate however for short to medium term anticoagulation and I think he would tolerate it well.    We discussed with him the option of a left atrial appendage closure procedure percutaneously with the FDA approved watchman system or the Abbott amulet system.  We discussed that this procedure has a risk of complication less than 1%.  There is a very high chance of being able to completely occlude his left atrial appendage which would protect him satisfactorily from stroke obviating the need for long-term anticoagulation.  We discussed the procedure in detail.  More than 30 minutes were spent.  His referring physician is Dr. Carlos    The patient and I made a mutually shared decision ( shared decision making model ) that the patient would be best served by proceeding with the above invasive heart procedure.  This is a high risk invasive cardiac procedure which comes with significant risk of morbidity and mortality.  This patient has significant underlying  heart disease.  Curtis Richmond understands these risks and   has made an informed decision to proceed.    We will stop this epidural today.  We will institute Eliquis 5 mg twice daily.  This medication is exceedingly expensive for him and we did provide him with samples.  We discussed that anticoagulation would be required 45 days after watchman implantation.  At that point we will transition him very likely to dual antiplatelet therapy for an additional 45 days if we are able to document endothelialization of the device with either KIMI or CT scan.  Following this he would transition to aspirin monotherapy.    Plan to proceed with a general anesthesia for this watchman.     2. Primary  hypertension   blood pressure reasonably well controlled today.  Continue carvedilol        3. History of renal pelvis cancer   status post nephrectomy.  Further increases his risk for significant bleeding with long-term anticoagulation.   4. Malignant neoplasm of trigone of urinary bladder (HCC)   ongoing treatment.  Bladder irrigation and excision of cancerous lesions every 3 months.  This is plan to go on for the indeterminate future.  Further increases his risk for recurrent thromboembolic sequelae of atrial fibrillation and also bleeding complications with long-term anticoagulation   5. Cerebrovascular accident (CVA) due to embolism of left anterior cerebral artery (HCC)   due to age fibrillation.  See above.   6. History of loop recorder   IActionable system.  Not interrogated today.  Managed by Dr. Carlos for   7. Centrilobular emphysema (HCC)   this patient will be best served by a general anesthetic for his watchman procedure.     Body mass index is 39.94 kg/m².    I spent 49 minutes in consultation with this patient which included more than 65% of this time in direct face-to-face counseling, physical examination and discussion of my assessment and findings and shared decision making with the patient.  The remainder of the time not spent face to face was performing one, some or all of the following actions:  preparing to see this patient ( eg. Review of tests),  ordering medications, tests or procedures ), care coordination, discussion of the plan with other healthcare providers, documenting clinical information in Epic well as independently interpreting results and communicating results to patient, family and or caregiver.  All time noted occurred on the date of service.    Follow Up:       Thank you for allowing me to participate in the care of your patient. Please to not hesitate to contact me with additional questions or concerns.      Primo Tobias, DO, FACC, RS  Cardiac  Electrophysiologist  [facility]    Scribed for Primo Tobias DO by Electronically signed by MARIA ANTONIA Joseph, 10/25/21, 5:28 PM EDT.

## 2021-10-26 ENCOUNTER — OFFICE VISIT (OUTPATIENT)
Dept: CARDIOLOGY | Facility: CLINIC | Age: 76
End: 2021-10-26

## 2021-10-26 ENCOUNTER — TELEPHONE (OUTPATIENT)
Dept: CARDIOLOGY | Facility: CLINIC | Age: 76
End: 2021-10-26

## 2021-10-26 VITALS
DIASTOLIC BLOOD PRESSURE: 77 MMHG | HEIGHT: 65 IN | HEART RATE: 77 BPM | BODY MASS INDEX: 39.99 KG/M2 | SYSTOLIC BLOOD PRESSURE: 132 MMHG | WEIGHT: 240 LBS

## 2021-10-26 DIAGNOSIS — I48.0 PAF (PAROXYSMAL ATRIAL FIBRILLATION) (HCC): Primary | ICD-10-CM

## 2021-10-26 DIAGNOSIS — I67.1 CEREBRAL ANEURYSM, NONRUPTURED: ICD-10-CM

## 2021-10-26 DIAGNOSIS — C67.0 MALIGNANT NEOPLASM OF TRIGONE OF URINARY BLADDER (HCC): ICD-10-CM

## 2021-10-26 DIAGNOSIS — J43.2 CENTRILOBULAR EMPHYSEMA (HCC): ICD-10-CM

## 2021-10-26 DIAGNOSIS — I63.422 CEREBROVASCULAR ACCIDENT (CVA) DUE TO EMBOLISM OF LEFT ANTERIOR CEREBRAL ARTERY (HCC): ICD-10-CM

## 2021-10-26 DIAGNOSIS — I10 PRIMARY HYPERTENSION: ICD-10-CM

## 2021-10-26 DIAGNOSIS — Z85.53 HISTORY OF RENAL PELVIS CANCER: ICD-10-CM

## 2021-10-26 DIAGNOSIS — Z98.890 HISTORY OF LOOP RECORDER: ICD-10-CM

## 2021-10-26 DIAGNOSIS — R31.9 HEMATURIA, UNSPECIFIED TYPE: ICD-10-CM

## 2021-10-26 PROCEDURE — 99204 OFFICE O/P NEW MOD 45 MIN: CPT | Performed by: INTERNAL MEDICINE

## 2021-10-26 NOTE — TELEPHONE ENCOUNTER
I received a message from Zenia (a-fib coordinator) that he was having trouble affording his Eliquis. I called the patients wife and we spoke. I mailed her a patient assistance application and explained the process and also called the [facility] pharmacy to give them a free 30-day trial offer.

## 2021-10-28 ENCOUNTER — PREP FOR SURGERY (OUTPATIENT)
Dept: OTHER | Facility: HOSPITAL | Age: 76
End: 2021-10-28

## 2021-10-28 DIAGNOSIS — I48.0 PAF (PAROXYSMAL ATRIAL FIBRILLATION) (HCC): Primary | ICD-10-CM

## 2021-10-28 PROBLEM — R31.9 HEMATURIA: Status: ACTIVE | Noted: 2021-10-28

## 2021-10-28 RX ORDER — CEFAZOLIN SODIUM 2 G/100ML
2 INJECTION, SOLUTION INTRAVENOUS
Status: CANCELLED | OUTPATIENT
Start: 2021-10-28

## 2021-10-28 RX ORDER — SODIUM CHLORIDE 0.9 % (FLUSH) 0.9 %
3 SYRINGE (ML) INJECTION EVERY 12 HOURS SCHEDULED
Status: CANCELLED | OUTPATIENT
Start: 2021-10-28

## 2021-10-28 RX ORDER — NITROGLYCERIN 0.4 MG/1
0.4 TABLET SUBLINGUAL
Status: CANCELLED | OUTPATIENT
Start: 2021-10-28

## 2021-10-28 RX ORDER — SODIUM CHLORIDE 0.9 % (FLUSH) 0.9 %
10 SYRINGE (ML) INJECTION AS NEEDED
Status: CANCELLED | OUTPATIENT
Start: 2021-10-28

## 2021-10-28 RX ORDER — ONDANSETRON 2 MG/ML
4 INJECTION INTRAMUSCULAR; INTRAVENOUS EVERY 6 HOURS PRN
Status: CANCELLED | OUTPATIENT
Start: 2021-10-28

## 2021-10-28 RX ORDER — ACETAMINOPHEN 325 MG/1
650 TABLET ORAL EVERY 4 HOURS PRN
Status: CANCELLED | OUTPATIENT
Start: 2021-10-28

## 2021-11-05 NOTE — NURSING NOTE
PRE-WATCHMAN HISTORY  Curtis Richmond 1945   [address] [place] [address]   [phone] (home)     Information obtained from: [x] Medical record review  [x] Patients wife report  Scheduled for: Watchman implant on 11/9/21 with Dr Tobias  [facility] Visit: Dr Carlos    History & Risk Factors (prior to Watchman implant)  [id number] Risk Factors:  Congestive HF     [x] No   [] Yes  LV Dysfunction   [x] No   [] Yes  Hypertension      [] No   [x] Yes  Diabetes    [x] No   [] Yes  Stroke       [] No   [x] Yes  TIA       [x] No   [] Yes  Thromboembolism    [x] No   [] Yes  Vascular Disease   [x] No   [] Yes       If Yes, Type   [] Prior MI  [] PAD      [] Aortic Plaque      HAS-BLED Risk Factors:  HTN (uncontrolled) [x] No  [] Yes  Abnormal Renal Fxn  [] No  [x] Yes H/O renal CA   Abnormal Liver Fxn   [x] No  [] Yes  Stroke            [] No  [x] Yes  Bleeding event [] No  [x] Yes  Labile INR      [x] No  [] Yes  Alcohol  [x] No  [] Yes  Antiplatelets      [x] No  [] Yes  NSAIDS  [x] No  [] Yes    Additional Stroke & Bleeding Risk Factors:  Increased Fall Risk   [] No  [x] Yes balance issue  Bleeding Event         [] No  [x] Yes      If Yes, Type      [] Intracranial [] Epistaxis   [] GI   [x] hematuria, intracranial aneurysm    Rhythm History:  AFIBTYPE: paroxysmal    Valvular AFib        [x] No  [] Yes       If Yes, Rheum Valve Disease []  No  [] Yes       If Yes, Mitral Valve Replacement [] No  [] Yes            If Yes, Mechanical?   [] No  [] Yes       If Yes, Mitral Valve Repair  [] No  [] Yes    Attempt at AFib Termination:  [] No  [x] Yes       If Yes, pharmacologic CV    [] No  [] Yes       If Yes, DC cardioversion  [] No  [x] Yes       If Yes, catheter ablation  [] No  [] Yes            If Yes, Date of most recent: ________              If Yes, surgical ablation  [] No  [] Yes           If Yes, Date of most recent: ________                 MIMI Intervention    [x] No  [] Yes    Additional History & Risk  Factors:  Cardiomyopathy   [x] No  [] Yes  Chronic Lung Disease  [] No  [x] Yes  Coronary Artery Disease  [x] No  [] Yes  Sleep Apnea    [x] No  [] Yes       If Yes, compliant with treatment [] No  [] Yes    Epicardial Approach Considered [x] No  [] Yes    Diagnostic Studies:  Last Echo:  [x] TTE Date: 2/11/21                 EF: 60 %                VHD mild TR        Pre-procedure blood thinner medications:  Eliquis 5 mg bid,  ASA 81 mg    Procedure information:    MIMI Orifice Maximal Width: 15  mm  Cumulative Air Kerma:  94 mGy  Contrast Volume:  30 mL  Dose Area Product:  404.10 ?Gy-M2    Zenia Bar RN

## 2021-11-08 ENCOUNTER — PRE-ADMISSION TESTING (OUTPATIENT)
Dept: PREADMISSION TESTING | Facility: HOSPITAL | Age: 76
End: 2021-11-08

## 2021-11-08 ENCOUNTER — HOSPITAL ENCOUNTER (OUTPATIENT)
Dept: CT IMAGING | Facility: HOSPITAL | Age: 76
Discharge: HOME | End: 2021-11-08

## 2021-11-08 DIAGNOSIS — R31.9 HEMATURIA, UNSPECIFIED TYPE: ICD-10-CM

## 2021-11-08 DIAGNOSIS — I48.0 PAF (PAROXYSMAL ATRIAL FIBRILLATION) (HCC): ICD-10-CM

## 2021-11-08 DIAGNOSIS — I67.1 CEREBRAL ANEURYSM, NONRUPTURED: ICD-10-CM

## 2021-11-08 LAB
ANION GAP SERPL CALCULATED.3IONS-SCNC: 12 MMOL/L (ref 5–15)
BUN SERPL-MCNC: 18 MG/DL (ref 8–23)
BUN/CREAT SERPL: 14.9 (ref 7–25)
CALCIUM SPEC-SCNC: 9.3 MG/DL (ref 8.6–10.5)
CHLORIDE SERPL-SCNC: 101 MMOL/L (ref 98–107)
CO2 SERPL-SCNC: 26 MMOL/L (ref 22–29)
CREAT SERPL-MCNC: 1.21 MG/DL (ref 0.76–1.27)
DEPRECATED RDW RBC AUTO: 44.9 FL (ref 37–54)
ERYTHROCYTE [DISTWIDTH] IN BLOOD BY AUTOMATED COUNT: 14.4 % (ref 12.3–15.4)
FLUAV SUBTYP SPEC NAA+PROBE: NOT DETECTED
FLUBV RNA ISLT QL NAA+PROBE: NOT DETECTED
GFR SERPL CREATININE-BSD FRML MDRD: 59 ML/MIN/1.73
GLUCOSE SERPL-MCNC: 96 MG/DL (ref 65–99)
HCT VFR BLD AUTO: 42.8 % (ref 37.5–51)
HGB BLD-MCNC: 13 G/DL (ref 13–17.7)
MCH RBC QN AUTO: 26.1 PG (ref 26.6–33)
MCHC RBC AUTO-ENTMCNC: 30.4 G/DL (ref 31.5–35.7)
MCV RBC AUTO: 85.9 FL (ref 79–97)
PLATELET # BLD AUTO: 225 10*3/MM3 (ref 140–450)
PMV BLD AUTO: 11 FL (ref 6–12)
POTASSIUM SERPL-SCNC: 4.2 MMOL/L (ref 3.5–5.2)
RBC # BLD AUTO: 4.98 10*6/MM3 (ref 4.14–5.8)
SARS-COV-2 RNA PNL SPEC NAA+PROBE: NOT DETECTED
SODIUM SERPL-SCNC: 139 MMOL/L (ref 136–145)
WBC # BLD AUTO: 8.43 10*3/MM3 (ref 3.4–10.8)

## 2021-11-08 PROCEDURE — 71275 CT ANGIOGRAPHY CHEST: CPT

## 2021-11-08 PROCEDURE — 85027 COMPLETE CBC AUTOMATED: CPT

## 2021-11-08 PROCEDURE — 87636 SARSCOV2 & INF A&B AMP PRB: CPT

## 2021-11-08 PROCEDURE — C9803 HOPD COVID-19 SPEC COLLECT: HCPCS

## 2021-11-08 PROCEDURE — 80048 BASIC METABOLIC PNL TOTAL CA: CPT

## 2021-11-08 PROCEDURE — 36415 COLL VENOUS BLD VENIPUNCTURE: CPT

## 2021-11-08 PROCEDURE — 0 IOPAMIDOL PER 1 ML: Performed by: INTERNAL MEDICINE

## 2021-11-08 RX ADMIN — IOPAMIDOL 80 ML: 755 INJECTION, SOLUTION INTRAVENOUS at 15:02

## 2021-11-08 NOTE — PAT
An arrival time for procedure was not given during PAT visit. If patient had any questions or concerns about their arrival time, they were instructed to contact their surgeon/physician.  Additionally, if the patient referred to an arrival time that was acquired from their my chart account, patient was encouraged to verify that time with their surgeon/physician.  NO arrival times given in Pre Admission Testing Department.    COVID test done in PAT.

## 2021-11-08 NOTE — DISCHARGE INSTRUCTIONS
"Dear Patient,    Do NOT eat, drink, or smoke after midnight the night before your procedure.   Take your medications as instructed by your doctor.    Glasses and jewelry may be worn, but dentures must be removed prior to your procedure.    Leave any items you consider valuable at home.      MORNING of your Procedure, please bring the following:     -Photo ID and insurance card(s)    -ALL medications in their ORIGINAL CONTAINERS    -Co-pay and/or deductible required by your insurance   -Copy of living will or power of  document (if not brought to    Pre-Admission Testing department)   -CPAP mask and tubing, not your machine (if applicable)    -Relaxation aids (music, books, magazines)   -Skin Prep Instruction Sheet (if applicable)   -Relaxation Aids    Check in on the 2nd floor in the [address] [facility].  Your procedure will be performed in the cath lab or EP lab.  During your procedure, your family will wait in the cath lab waiting area where you checked in.      Need to make arrangements for transportation prior to discharge.    A handout regarding \"Heart Healthy Eating\" was provided today to encourage healthy eating habits.    Booklet published by [employer] was given in Pre-Admission testing.  This booklet is for informational purposes only.  If you have any questions about your procedure, please speak with your physician.      Please note:  If you are scheduled to have one of the following procedures: Pulmonary Vein Ablation, Lead Extraction, MitraClip, Cerebral Coilings or Embolization, please let your family know that after your procedure you will be going to recovery unit on the 2nd floor of the [facility].  When the physician is finished speaking with your family after your procedure is completed, your family will be directed or escorted to the surgery waiting area in the [facility].  This is where your family will wait until you are given a room assignment and then your family will be directed to the " appropriate unit.

## 2021-11-09 ENCOUNTER — ANESTHESIA EVENT (OUTPATIENT)
Dept: CARDIOLOGY | Facility: HOSPITAL | Age: 76
End: 2021-11-09

## 2021-11-09 ENCOUNTER — HOSPITAL ENCOUNTER (INPATIENT)
Facility: HOSPITAL | Age: 76
LOS: 1 days | Discharge: HOME | End: 2021-11-10
Attending: INTERNAL MEDICINE | Admitting: INTERNAL MEDICINE

## 2021-11-09 ENCOUNTER — APPOINTMENT (OUTPATIENT)
Dept: CARDIOLOGY | Facility: HOSPITAL | Age: 76
End: 2021-11-09

## 2021-11-09 ENCOUNTER — ANESTHESIA (OUTPATIENT)
Dept: CARDIOLOGY | Facility: HOSPITAL | Age: 76
End: 2021-11-09

## 2021-11-09 DIAGNOSIS — I48.0 PAF (PAROXYSMAL ATRIAL FIBRILLATION) (HCC): ICD-10-CM

## 2021-11-09 DIAGNOSIS — I67.1 CEREBRAL ANEURYSM, NONRUPTURED: ICD-10-CM

## 2021-11-09 DIAGNOSIS — R31.9 HEMATURIA, UNSPECIFIED TYPE: ICD-10-CM

## 2021-11-09 LAB
ACT BLD: 329 SECONDS (ref 82–152)
ASCENDING AORTA: 2.9 CM
BH CV ECHO MEAS - BSA(HAYCOCK): 2.3 M^2
BH CV ECHO MEAS - BSA: 2.1 M^2
BH CV ECHO MEAS - BZI_BMI: 40 KILOGRAMS/M^2
BH CV ECHO MEAS - BZI_METRIC_HEIGHT: 165 CM
BH CV ECHO MEAS - BZI_METRIC_WEIGHT: 108.9 KG
BH CV ECHO MEAS - RAP SYSTOLE: 3 MMHG
BH CV ECHO MEAS - RVSP: 26 MMHG
BH CV ECHO MEAS - TR MAX PG: 23 MMHG
BH CV ECHO MEAS - TR MAX VEL: 242.5 CM/SEC

## 2021-11-09 PROCEDURE — 25010000002 DIGOXIN PER 500 MCG: Performed by: NURSE PRACTITIONER

## 2021-11-09 PROCEDURE — 93355 ECHO TRANSESOPHAGEAL (TEE): CPT | Performed by: INTERNAL MEDICINE

## 2021-11-09 PROCEDURE — 0 IOPAMIDOL PER 1 ML: Performed by: INTERNAL MEDICINE

## 2021-11-09 PROCEDURE — 33340 PERQ CLSR TCAT L ATR APNDGE: CPT | Performed by: INTERNAL MEDICINE

## 2021-11-09 PROCEDURE — 25010000002 NEOSTIGMINE 10 MG/10ML SOLUTION: Performed by: NURSE ANESTHETIST, CERTIFIED REGISTERED

## 2021-11-09 PROCEDURE — 25010000002 PHENYLEPHRINE 10 MG/ML SOLUTION: Performed by: NURSE ANESTHETIST, CERTIFIED REGISTERED

## 2021-11-09 PROCEDURE — C1769 GUIDE WIRE: HCPCS | Performed by: INTERNAL MEDICINE

## 2021-11-09 PROCEDURE — 25010000002 PHENYLEPHRINE 10 MG/ML SOLUTION 1 ML VIAL: Performed by: NURSE ANESTHETIST, CERTIFIED REGISTERED

## 2021-11-09 PROCEDURE — C1889 IMPLANT/INSERT DEVICE, NOC: HCPCS

## 2021-11-09 PROCEDURE — 0 CEFAZOLIN IN DEXTROSE 2-4 GM/100ML-% SOLUTION: Performed by: PHYSICIAN ASSISTANT

## 2021-11-09 PROCEDURE — 93010 ELECTROCARDIOGRAM REPORT: CPT | Performed by: INTERNAL MEDICINE

## 2021-11-09 PROCEDURE — C1889 IMPLANT/INSERT DEVICE, NOC: HCPCS | Performed by: INTERNAL MEDICINE

## 2021-11-09 PROCEDURE — 25010000002 HEPARIN (PORCINE) PER 1000 UNITS: Performed by: INTERNAL MEDICINE

## 2021-11-09 PROCEDURE — 25010000002 ONDANSETRON PER 1 MG: Performed by: NURSE ANESTHETIST, CERTIFIED REGISTERED

## 2021-11-09 PROCEDURE — 93799 UNLISTED CV SVC/PROCEDURE: CPT | Performed by: INTERNAL MEDICINE

## 2021-11-09 PROCEDURE — 93005 ELECTROCARDIOGRAM TRACING: CPT | Performed by: INTERNAL MEDICINE

## 2021-11-09 PROCEDURE — C1893 INTRO/SHEATH, FIXED,NON-PEEL: HCPCS | Performed by: INTERNAL MEDICINE

## 2021-11-09 PROCEDURE — 25010000002 DEXAMETHASONE PER 1 MG: Performed by: NURSE ANESTHETIST, CERTIFIED REGISTERED

## 2021-11-09 PROCEDURE — 02L73DK OCCLUSION OF LEFT ATRIAL APPENDAGE WITH INTRALUMINAL DEVICE, PERCUTANEOUS APPROACH: ICD-10-PCS | Performed by: INTERNAL MEDICINE

## 2021-11-09 PROCEDURE — 0 LIDOCAINE 1 % SOLUTION: Performed by: INTERNAL MEDICINE

## 2021-11-09 PROCEDURE — 93355 ECHO TRANSESOPHAGEAL (TEE): CPT

## 2021-11-09 PROCEDURE — 85347 COAGULATION TIME ACTIVATED: CPT

## 2021-11-09 PROCEDURE — 25010000002 PROTAMINE SULFATE PER 10 MG: Performed by: INTERNAL MEDICINE

## 2021-11-09 PROCEDURE — B24BZZ4 ULTRASONOGRAPHY OF HEART WITH AORTA, TRANSESOPHAGEAL: ICD-10-PCS | Performed by: INTERNAL MEDICINE

## 2021-11-09 PROCEDURE — S0260 H&P FOR SURGERY: HCPCS | Performed by: INTERNAL MEDICINE

## 2021-11-09 PROCEDURE — C1760 CLOSURE DEV, VASC: HCPCS | Performed by: INTERNAL MEDICINE

## 2021-11-09 PROCEDURE — 25010000002 PROPOFOL 10 MG/ML EMULSION: Performed by: NURSE ANESTHETIST, CERTIFIED REGISTERED

## 2021-11-09 DEVICE — LEFT ATRIAL APPENDAGE CLOSURE DEVICE WITH DELIVERY SYSTEM
Type: IMPLANTABLE DEVICE | Status: FUNCTIONAL
Brand: WATCHMAN FLX™

## 2021-11-09 RX ORDER — CARVEDILOL 6.25 MG/1
6.25 TABLET ORAL 2 TIMES DAILY
Status: DISCONTINUED | OUTPATIENT
Start: 2021-11-09 | End: 2021-11-10 | Stop reason: HOSPADM

## 2021-11-09 RX ORDER — HEPARIN SODIUM 1000 [USP'U]/ML
INJECTION, SOLUTION INTRAVENOUS; SUBCUTANEOUS AS NEEDED
Status: DISCONTINUED | OUTPATIENT
Start: 2021-11-09 | End: 2021-11-09 | Stop reason: HOSPADM

## 2021-11-09 RX ORDER — DIGOXIN 0.25 MG/ML
250 INJECTION INTRAMUSCULAR; INTRAVENOUS ONCE AS NEEDED
Status: DISCONTINUED | OUTPATIENT
Start: 2021-11-10 | End: 2021-11-10 | Stop reason: HOSPADM

## 2021-11-09 RX ORDER — NEOSTIGMINE METHYLSULFATE 1 MG/ML
INJECTION, SOLUTION INTRAVENOUS AS NEEDED
Status: DISCONTINUED | OUTPATIENT
Start: 2021-11-09 | End: 2021-11-09 | Stop reason: SURG

## 2021-11-09 RX ORDER — LIDOCAINE HYDROCHLORIDE 10 MG/ML
INJECTION, SOLUTION INFILTRATION; PERINEURAL AS NEEDED
Status: DISCONTINUED | OUTPATIENT
Start: 2021-11-09 | End: 2021-11-09 | Stop reason: HOSPADM

## 2021-11-09 RX ORDER — SODIUM CHLORIDE 9 MG/ML
INJECTION, SOLUTION INTRAVENOUS CONTINUOUS PRN
Status: DISCONTINUED | OUTPATIENT
Start: 2021-11-09 | End: 2021-11-09 | Stop reason: SURG

## 2021-11-09 RX ORDER — DIGOXIN 0.25 MG/ML
250 INJECTION INTRAMUSCULAR; INTRAVENOUS ONCE
Status: DISCONTINUED | OUTPATIENT
Start: 2021-11-10 | End: 2021-11-09

## 2021-11-09 RX ORDER — ONDANSETRON 2 MG/ML
4 INJECTION INTRAMUSCULAR; INTRAVENOUS EVERY 6 HOURS PRN
Status: DISCONTINUED | OUTPATIENT
Start: 2021-11-09 | End: 2021-11-09 | Stop reason: HOSPADM

## 2021-11-09 RX ORDER — ACETAMINOPHEN 500 MG
500 TABLET ORAL EVERY 6 HOURS PRN
COMMUNITY
End: 2022-09-18 | Stop reason: HOSPADM

## 2021-11-09 RX ORDER — FENTANYL CITRATE 50 UG/ML
50 INJECTION, SOLUTION INTRAMUSCULAR; INTRAVENOUS
Status: DISCONTINUED | OUTPATIENT
Start: 2021-11-09 | End: 2021-11-09 | Stop reason: HOSPADM

## 2021-11-09 RX ORDER — DEXAMETHASONE SODIUM PHOSPHATE 4 MG/ML
INJECTION, SOLUTION INTRA-ARTICULAR; INTRALESIONAL; INTRAMUSCULAR; INTRAVENOUS; SOFT TISSUE AS NEEDED
Status: DISCONTINUED | OUTPATIENT
Start: 2021-11-09 | End: 2021-11-09 | Stop reason: SURG

## 2021-11-09 RX ORDER — DIGOXIN 0.25 MG/ML
250 INJECTION INTRAMUSCULAR; INTRAVENOUS ONCE
Status: COMPLETED | OUTPATIENT
Start: 2021-11-09 | End: 2021-11-09

## 2021-11-09 RX ORDER — GLYCOPYRROLATE 0.2 MG/ML
INJECTION INTRAMUSCULAR; INTRAVENOUS AS NEEDED
Status: DISCONTINUED | OUTPATIENT
Start: 2021-11-09 | End: 2021-11-09 | Stop reason: SURG

## 2021-11-09 RX ORDER — NITROGLYCERIN 0.4 MG/1
0.4 TABLET SUBLINGUAL
Status: DISCONTINUED | OUTPATIENT
Start: 2021-11-09 | End: 2021-11-09 | Stop reason: HOSPADM

## 2021-11-09 RX ORDER — SODIUM CHLORIDE 0.9 % (FLUSH) 0.9 %
3 SYRINGE (ML) INJECTION EVERY 12 HOURS SCHEDULED
Status: DISCONTINUED | OUTPATIENT
Start: 2021-11-09 | End: 2021-11-09 | Stop reason: HOSPADM

## 2021-11-09 RX ORDER — PROTAMINE SULFATE 10 MG/ML
INJECTION, SOLUTION INTRAVENOUS AS NEEDED
Status: DISCONTINUED | OUTPATIENT
Start: 2021-11-09 | End: 2021-11-09 | Stop reason: HOSPADM

## 2021-11-09 RX ORDER — ASPIRIN 81 MG/1
81 TABLET ORAL DAILY
Status: DISCONTINUED | OUTPATIENT
Start: 2021-11-10 | End: 2021-11-10 | Stop reason: HOSPADM

## 2021-11-09 RX ORDER — ONDANSETRON 2 MG/ML
INJECTION INTRAMUSCULAR; INTRAVENOUS AS NEEDED
Status: DISCONTINUED | OUTPATIENT
Start: 2021-11-09 | End: 2021-11-09 | Stop reason: SURG

## 2021-11-09 RX ORDER — DILTIAZEM HCL IN NACL,ISO-OSM 125 MG/125
5-15 PLASTIC BAG, INJECTION (ML) INTRAVENOUS
Status: DISCONTINUED | OUTPATIENT
Start: 2021-11-09 | End: 2021-11-10 | Stop reason: HOSPADM

## 2021-11-09 RX ORDER — DROPERIDOL 2.5 MG/ML
0.62 INJECTION, SOLUTION INTRAMUSCULAR; INTRAVENOUS ONCE AS NEEDED
Status: DISCONTINUED | OUTPATIENT
Start: 2021-11-09 | End: 2021-11-09 | Stop reason: HOSPADM

## 2021-11-09 RX ORDER — PHENYLEPHRINE HYDROCHLORIDE 10 MG/ML
INJECTION INTRAVENOUS AS NEEDED
Status: DISCONTINUED | OUTPATIENT
Start: 2021-11-09 | End: 2021-11-09 | Stop reason: SURG

## 2021-11-09 RX ORDER — BUPIVACAINE HYDROCHLORIDE 5 MG/ML
INJECTION, SOLUTION PERINEURAL AS NEEDED
Status: DISCONTINUED | OUTPATIENT
Start: 2021-11-09 | End: 2021-11-09 | Stop reason: HOSPADM

## 2021-11-09 RX ORDER — ACETAMINOPHEN 325 MG/1
650 TABLET ORAL EVERY 4 HOURS PRN
Status: DISCONTINUED | OUTPATIENT
Start: 2021-11-09 | End: 2021-11-09 | Stop reason: HOSPADM

## 2021-11-09 RX ORDER — SODIUM CHLORIDE 0.9 % (FLUSH) 0.9 %
10 SYRINGE (ML) INJECTION AS NEEDED
Status: DISCONTINUED | OUTPATIENT
Start: 2021-11-09 | End: 2021-11-09 | Stop reason: HOSPADM

## 2021-11-09 RX ORDER — ROCURONIUM BROMIDE 10 MG/ML
INJECTION, SOLUTION INTRAVENOUS AS NEEDED
Status: DISCONTINUED | OUTPATIENT
Start: 2021-11-09 | End: 2021-11-09 | Stop reason: SURG

## 2021-11-09 RX ORDER — PROPOFOL 10 MG/ML
VIAL (ML) INTRAVENOUS AS NEEDED
Status: DISCONTINUED | OUTPATIENT
Start: 2021-11-09 | End: 2021-11-09 | Stop reason: SURG

## 2021-11-09 RX ORDER — LIDOCAINE HYDROCHLORIDE 10 MG/ML
INJECTION, SOLUTION EPIDURAL; INFILTRATION; INTRACAUDAL; PERINEURAL AS NEEDED
Status: DISCONTINUED | OUTPATIENT
Start: 2021-11-09 | End: 2021-11-09 | Stop reason: SURG

## 2021-11-09 RX ORDER — LABETALOL HYDROCHLORIDE 5 MG/ML
5 INJECTION, SOLUTION INTRAVENOUS
Status: DISCONTINUED | OUTPATIENT
Start: 2021-11-09 | End: 2021-11-09 | Stop reason: HOSPADM

## 2021-11-09 RX ORDER — CEFAZOLIN SODIUM 2 G/100ML
2 INJECTION, SOLUTION INTRAVENOUS
Status: COMPLETED | OUTPATIENT
Start: 2021-11-09 | End: 2021-11-09

## 2021-11-09 RX ORDER — HYDROMORPHONE HYDROCHLORIDE 1 MG/ML
0.5 INJECTION, SOLUTION INTRAMUSCULAR; INTRAVENOUS; SUBCUTANEOUS
Status: DISCONTINUED | OUTPATIENT
Start: 2021-11-09 | End: 2021-11-09 | Stop reason: HOSPADM

## 2021-11-09 RX ORDER — LORATADINE 10 MG/1
10 TABLET ORAL DAILY
COMMUNITY

## 2021-11-09 RX ADMIN — PROPOFOL 200 MG: 10 INJECTION, EMULSION INTRAVENOUS at 15:13

## 2021-11-09 RX ADMIN — DIGOXIN 250 MCG: 0.25 INJECTION INTRAMUSCULAR; INTRAVENOUS at 22:04

## 2021-11-09 RX ADMIN — LIDOCAINE HYDROCHLORIDE 100 MG: 10 INJECTION, SOLUTION EPIDURAL; INFILTRATION; INTRACAUDAL; PERINEURAL at 15:13

## 2021-11-09 RX ADMIN — CEFAZOLIN SODIUM 2 G: 2 INJECTION, SOLUTION INTRAVENOUS at 15:09

## 2021-11-09 RX ADMIN — ROCURONIUM BROMIDE 30 MG: 10 INJECTION INTRAVENOUS at 15:13

## 2021-11-09 RX ADMIN — ROCURONIUM BROMIDE 10 MG: 10 INJECTION INTRAVENOUS at 15:33

## 2021-11-09 RX ADMIN — PHENYLEPHRINE HYDROCHLORIDE 100 MCG: 10 INJECTION INTRAVENOUS at 15:21

## 2021-11-09 RX ADMIN — PHENYLEPHRINE HYDROCHLORIDE 100 MCG: 10 INJECTION INTRAVENOUS at 15:39

## 2021-11-09 RX ADMIN — CARVEDILOL 6.25 MG: 6.25 TABLET, FILM COATED ORAL at 20:10

## 2021-11-09 RX ADMIN — GLYCOPYRROLATE 0.4 MG: 0.2 INJECTION INTRAMUSCULAR; INTRAVENOUS at 16:00

## 2021-11-09 RX ADMIN — NEOSTIGMINE METHYLSULFATE 3 MG: 1 INJECTION, SOLUTION INTRAVENOUS at 16:00

## 2021-11-09 RX ADMIN — APIXABAN 5 MG: 5 TABLET, FILM COATED ORAL at 20:10

## 2021-11-09 RX ADMIN — PHENYLEPHRINE HYDROCHLORIDE 0.3 MCG/KG/MIN: 10 INJECTION INTRAVENOUS at 15:22

## 2021-11-09 RX ADMIN — PHENYLEPHRINE HYDROCHLORIDE 100 MCG: 10 INJECTION INTRAVENOUS at 15:17

## 2021-11-09 RX ADMIN — DEXAMETHASONE SODIUM PHOSPHATE 8 MG: 4 INJECTION, SOLUTION INTRAMUSCULAR; INTRAVENOUS at 15:29

## 2021-11-09 RX ADMIN — SODIUM CHLORIDE: 9 INJECTION, SOLUTION INTRAVENOUS at 15:03

## 2021-11-09 RX ADMIN — ONDANSETRON 4 MG: 2 INJECTION INTRAMUSCULAR; INTRAVENOUS at 16:01

## 2021-11-09 NOTE — ANESTHESIA PREPROCEDURE EVALUATION
Anesthesia Evaluation     Patient summary reviewed and Nursing notes reviewed   NPO Solid Status: > 8 hours  NPO Liquid Status: > 8 hours           Airway   Mallampati: II  TM distance: >3 FB  Neck ROM: full  No difficulty expected  Dental    (+) partials        Pulmonary    (+) a smoker (remote) Former, COPD (no mdi) mild, sleep apnea (probable),   (-) shortness of breath, recent URI, no home oxygen  Cardiovascular   Exercise tolerance: poor (<4 METS)    ECG reviewed    (+) hypertension, dysrhythmias Atrial Fib, hyperlipidemia,   (-) past MI, angina    ROS comment: ECG NSR Ventricular bigeminy    ECHO EF 60%.   Calcified AV and MV    No pericardial effusion.             Neuro/Psych  (+) TIA, CVA (L weak no residual ),     (-) seizures    ROS Comment: Cerebral aneurysm -non ruptured  GI/Hepatic/Renal/Endo    (+)   liver disease, renal disease (Ca creat 1.2),   (-) diabetes, no thyroid disorder    Musculoskeletal     Abdominal    Substance History      OB/GYN          Other   arthritis,    history of cancer (L kidney bladder) remission    ROS/Med Hx Other: Eliquis  Loop recorder       Phys Exam Other: Removable partials upper lower               Anesthesia Plan    ASA 3     general   (Clearsight)  intravenous induction     Anesthetic plan, all risks, benefits, and alternatives have been provided, discussed and informed consent has been obtained with: patient.    Plan discussed with CRNA.

## 2021-11-09 NOTE — ANESTHESIA POSTPROCEDURE EVALUATION
Patient: Curtis Richmond    Procedure Summary     Date: 11/09/21 Room / Location: [facility] CATH/EP LAB E / [facility] EP INVASIVE LOCATION    Anesthesia Start: 1458 Anesthesia Stop:     Procedure: 11/9/21 Atrial Appendage Occlusion on eliquis hold 2 doses prior (N/A ) Diagnosis:       PAF (paroxysmal atrial fibrillation) (HCC)      Cerebral aneurysm, nonruptured      Hematuria, unspecified type      (atrial fibrillation)    Providers: Primo Tobias DO Provider: Calderon Whittaker MD    Anesthesia Type: general ASA Status: 3          Anesthesia Type: general    Vitals  Vitals Value Taken Time   /77 11/09/21 1617   Temp 97.9 °F (36.6 °C) 11/09/21 1617   Pulse 94 11/09/21 1619   Resp 18 11/09/21 1617   SpO2 90 % 11/09/21 1619   Vitals shown include unvalidated device data.        Post Anesthesia Care and Evaluation    Patient location during evaluation: PACU  Patient participation: complete - patient participated  Level of consciousness: responsive to verbal stimuli  Pain score: 0  Pain management: adequate  Airway patency: patent  Anesthetic complications: No anesthetic complications  PONV Status: none  Cardiovascular status: hemodynamically stable and acceptable  Respiratory status: nonlabored ventilation, acceptable and nasal cannula  Hydration status: acceptable    Comments: Pt transferred to PACU with O2. Vital signs stable. Report to PACU RN and care accepted.

## 2021-11-09 NOTE — PLAN OF CARE
Goal Outcome Evaluation:  Plan of Care Reviewed With: patient, spouse        Progress: no change  Outcome Summary: VSS, A+Ox4, denies pain or SOA on 2l/min of Oxygen.  Admitted to room 609 at aprox 1745, s/p Watchman Procedure.  Right groin dressing dry and intact.  Excoriation noted to right groin, pts bottom red and blanchable.  Patients wife at the bedside and informed of the pts skin condition.  Will continue with POC.

## 2021-11-09 NOTE — ANESTHESIA PROCEDURE NOTES
Airway  Urgency: elective    Date/Time: 11/9/2021 3:16 PM  Airway not difficult    General Information and Staff    Patient location during procedure: OR  CRNA: Eliana Posey CRNA    Indications and Patient Condition  Indications for airway management: airway protection    Preoxygenated: yes  MILS maintained throughout  Mask difficulty assessment: 2 - vent by mask + OA or adjuvant +/- NMBA    Final Airway Details  Final airway type: endotracheal airway      Successful airway: ETT  Cuffed: yes   Successful intubation technique: direct laryngoscopy  Endotracheal tube insertion site: oral  Blade: Crystal  Blade size: 2  ETT size (mm): 7.5  Cormack-Lehane Classification: grade I - full view of glottis  Placement verified by: chest auscultation and capnometry   Cuff volume (mL): 6  Measured from: lips  ETT/EBT  to lips (cm): 21  Number of attempts at approach: 1  Assessment: lips, teeth, and gum same as pre-op and atraumatic intubation    Additional Comments  Pt to EP Lab F.  Pt was moved to OR table with sliding board and EP lab staff. ASA monitors placed. Pre-O2 with 100% Oxygen. SIVI. Atraumatic intubation. +ETCO2, +BBS.

## 2021-11-09 NOTE — OP NOTE
Cardiac Electrophysiology Procedure Note          [facility]                LEFT ATRIAL APPENDAGE CLOSURE (LAAC)                                 Watchman Implant    PROCEDURES PERFORMED:    Left atrial appendage closure with 20 mm watchman FLX device.  Patient was admitted to the hospital for this procedure.  This is an inpatient procedure.    PREPROCEDURAL DIAGNOSES:    1.  Persistent atrial fibrillation  2. [id number] score of 4  3. HASBLED score of 5    REFERRING PHYSICIAN:    Ray Carlos MD    POST PROCEDURE DIANGOSES:  As above.    INDICATION FOR PROCEDURE:  Briefly, Curtis Richmond is a 76 y.o. year old male with a history of paroxysmal recurrent genitourinary bleeding with a history of renal pelvis cancer as well as malignant neoplasm of the bladder, prior ischemic cardioembolic stroke, severe emphysema often requiring steroids and significant risk of recurrent cardioembolic stroke as well as significant risk of bleeding if anticoagulated.  Presents today for elective watchman implantation.    1. PAF (paroxysmal atrial fibrillation) (HCC) (Primary)     2. Primary hypertension     3. History of renal pelvis cancer     4. Malignant neoplasm of trigone of urinary bladder (HCC)     5. Cerebrovascular accident (CVA) due to embolism of left anterior cerebral artery (HCC)     6. History of loop recorder     7. Centrilobular emphysema (HCC    ANTICOAGULATION STRATEGY PRIOR TO AND POST PROCEDURE: Apixaban 5 mg orally twice daily.  The last dose of anticoagulant was confirmed to have been taken this morning.    PT/INR:  No results found for: LABPROT, INR  PTT:  No results found for: APTT    CBC:   WBC   Date Value Ref Range Status   11/08/2021 8.43 3.40 - 10.80 10*3/mm3 Final     RBC   Date Value Ref Range Status   11/08/2021 4.98 4.14 - 5.80 10*6/mm3 Final     Hemoglobin   Date Value Ref Range Status   11/08/2021 13.0 13.0 - 17.7 g/dL Final     Hematocrit   Date Value  "Ref Range Status   11/08/2021 42.8 37.5 - 51.0 % Final     MCV   Date Value Ref Range Status   11/08/2021 85.9 79.0 - 97.0 fL Final     RDW   Date Value Ref Range Status   11/08/2021 14.4 12.3 - 15.4 % Final     Platelets   Date Value Ref Range Status   11/08/2021 225 140 - 450 10*3/mm3 Final     BMP:     Sodium   Date Value Ref Range Status   11/08/2021 139 136 - 145 mmol/L Final     Potassium   Date Value Ref Range Status   11/08/2021 4.2 3.5 - 5.2 mmol/L Final     Comment:     Slight hemolysis detected by analyzer. Results may be affected.     Chloride   Date Value Ref Range Status   11/08/2021 101 98 - 107 mmol/L Final     CO2   Date Value Ref Range Status   11/08/2021 26.0 22.0 - 29.0 mmol/L Final     BUN   Date Value Ref Range Status   11/08/2021 18 8 - 23 mg/dL Final     Creatinine   Date Value Ref Range Status   11/08/2021 1.21 0.76 - 1.27 mg/dL Final       Vital Signs: /84 (BP Location: Left arm)   Pulse 82   Temp 97.4 °F (36.3 °C)   Ht 162.6 cm (64\")   Wt 108 kg (238 lb 3.2 oz)   SpO2 95%   BMI 40.89 kg/m²      Admit Weight:  108 kg (238 lb 3.2 oz)  BMI: Body mass index is 40.89 kg/m².    A general anesthesia was selected for this patient due to comorbidities.    PROCEDURE NARRATIVE:    The patient was able to give written informed consent after revisiting the key portions of the risk versus benefit profile of the procedure.  This discussion was framed by our lengthy conversations  (please see our detailed notes).  Patient verbalized strong understanding of this discussion and a strong desire to proceed with the procedure.  Please note that this detailed informed consent process utilized mutual and shared decision making process between all parties involved, principally the physician and patient, but also potentially with input from the patient's selected family and friends.    Please also note that the patient was seen and examined prior to this procedure by a non implanting physician who " agreed that this patient would benefit from left atrial appendage closure as an alternative to long-term anticoagulation.  Please see this physician separate attestation.    The patient was brought to the EP Lab in the post absorptive state.   A KIMI probe was placed by my cardiology colleague physician.  KIMI was used continuously throughout the procedure for monitoring of the pericardial space, trans-septal puncture and also measurement of left atrial appendage and ultimately delivery of the left atrial appendage occlusion device.  Please see the separate and detailed KIMI report.     The patient was then prepared and draped in a routing sterile fashion.  Seldinger access was obtained at the right common femoral vein with a single venipuncture utilizing ultrasound probe guided vascular access.  A J tip wire was advanced into the vascular space.  A 16 Khmer long sheath was placed into the inferior vena cava.  The patient was anticoagulated with unfractionated heparin in bolus and then continuous infusion to maintain an ACT of between 200 and 300 seconds. An SL0 sheath and a BRK needle along with safe Sept septal were used to perform transseptal puncture with a combination of fluoroscopic and echocardiographic guidance.    The Watchman delivery sheath was then placed in the left atrium under echocardiographic and fluoroscopic guidance safely.  Left atrial appendage was then cannulated with a 10 mm angled pigtail catheter.  This was again performed with a combination of echocardiographic and fluoroscopic guidance.  Contrast injection was also performed.  The anterior curve Watchman delivery sheath was then placed into optimal position within left atrial appendage.  The left atrial appendage diameter was viewed in multiple projections on echocardiography as well as fluoroscopy with contrast injection into the left atrial appendage.  A 20 mm device was selected and delivered with the supplied delivery tools to the left  atrial appendage safely.      PASS criteria were then evaluated and confirmed.      Compression of the device varied 15% and 18%    The device was then safely released.    Catheters and sheaths were withdrawn from the left atrium and then the body.  Hemostasis at the site of venous access was achieved after withdrawing the sheath from the body with a figure-of-eight stitch and manual pressure.  Transesophageal echocardiogram demonstrated no pericardial effusion.    Protamine was given to reverse anticoagulation.    The patient was transferred to recovery in stable condition.    COMPLICATIONS: None    EBL: minimal    RADIATION EXPOSURE: 60 mGy over 3.9 minutes    LA PRESSURE: prior to placement of the left atrial appendage closure device.:  29/13 by mmHg, with a mean of 19 mmHg    KEY PROCEDURAL FINDINGS:    · Successful 20 mm watchman FL X implantation with all trabeculae covered and no peridevice leak    POST PROCEDURAL PLAN:    · Uninterrupted anticoagulation for not less than 45 days unless specially instructed otherwise by myself or another member of our EP physician team.  Please note that the patient and the patient’s family have been extensively counseled about this critical requirement and have agreed to comply.   During his 45 day.  The patient will also be taking 81 mg of aspirin.  · After 45 days of full anticoagulation,  a transesophageal echo will be obtained to assess for endothelialization of the left atrial appendage occlusion device.  If this is documented then the patient will be transition to dual antiplatelet therapy.  This will be clopidogrel 75 mg daily and aspirin 81 mg daily. After course of 6 months from the date of implant of dual antiplatelet therapy patient will then be transition to aspirin monotherapy at a dose of 325 mg daily.  · Anticipate discharge tomorrow.  Please note that this patient was admitted specifically for this elective procedure.  This is an inpatient procedure.  · The  patient will be seen at our office per protocol for this procedure.  A 45 day post implant KIMI will be scheduled.    Primo Tobias DO, FACC, [facility]  Cardiac Electrophysiologist  [facility] / [facility]

## 2021-11-09 NOTE — INTERVAL H&P NOTE
H&P reviewed. The patient was examined and there are no changes to the H&P.       EP Pre-Procedure Report  Cardiovascular Laboratory  [facility]    Patient:  Curtis Richmond  :  1945  PCP:  Kat Donahue APRN  PHONE:  [phone]    DATE: 2021      MEDICATIONS:  Prior to Admission medications    Medication Sig Start Date End Date Taking? Authorizing Provider   amLODIPine (NORVASC) 2.5 MG tablet Take 2.5 mg by mouth Every Evening.    Anu Gaviria MD   apixaban (ELIQUIS) 5 MG tablet tablet Take 1 tablet by mouth Every 12 (Twelve) Hours for 90 days. 10/26/21 1/24/22  Primo Tobias DO   aspirin (aspirin) 81 MG EC tablet Take 81 mg by mouth Daily. PER PT DR MENA INSTRUCTIONS, OK TO CONTINUE ASA    Anu Gaviria MD   atorvastatin (LIPITOR) 40 MG tablet Take 40 mg by mouth Every Night. 21   Anu Gaviria MD   carvedilol (COREG) 6.25 MG tablet Take 6.25 mg by mouth 2 (Two) Times a Day. 21   Anu Gaviria MD   gabapentin (NEURONTIN) 400 MG capsule Take 400 mg by mouth Every Evening.    Anu Gaviria MD   losartan-hydrochlorothiazide (HYZAAR) 100-12.5 MG per tablet Take 1 tablet by mouth Every Evening.    Anu Gaviria MD       Past medical & surgical history, social and family history reviewed in the electronic medical record.    Physical Exam:    Vitals: There were no vitals filed for this visit. There were no vitals filed for this visit.There is no height or weight on file to calculate BMI.    GENERAL: No apparent distress.  No significant changes since last exam.  CHEST: Clear to auscultation bilaterally no stridor no wheeze.  CV: S1, S2, Regular without Murmurs, Rubs or Gallops  EXTREMITIES: No edema.        Results from last 7 days   Lab Units 21  1226   SODIUM mmol/L 139   POTASSIUM mmol/L 4.2   CHLORIDE mmol/L 101   CO2 mmol/L 26.0   BUN mg/dL 18   CREATININE mg/dL 1.21   GLUCOSE mg/dL 96   CALCIUM mg/dL  9.3     Results from last 7 days   Lab Units 11/08/21  1226   WBC 10*3/mm3 8.43   HEMOGLOBIN g/dL 13.0   HEMATOCRIT % 42.8   PLATELETS 10*3/mm3 225     Estimated Creatinine Clearance: 61 mL/min (by C-G formula based on SCr of 1.21 mg/dL).    IMPRESSION:PAF, H/O CVA  High HAS-BLED score    PLAN:  Procedure to perform: LAAO        Electronically signed by MARIA ANTONIA Joseph, 11/09/21, 10:47 AM EST.

## 2021-11-10 ENCOUNTER — READMISSION MANAGEMENT (OUTPATIENT)
Dept: CALL CENTER | Facility: HOSPITAL | Age: 76
End: 2021-11-10

## 2021-11-10 VITALS
HEIGHT: 64 IN | WEIGHT: 238.1 LBS | DIASTOLIC BLOOD PRESSURE: 67 MMHG | RESPIRATION RATE: 20 BRPM | HEART RATE: 102 BPM | OXYGEN SATURATION: 92 % | TEMPERATURE: 98.4 F | SYSTOLIC BLOOD PRESSURE: 135 MMHG | BODY MASS INDEX: 40.65 KG/M2

## 2021-11-10 DIAGNOSIS — Z95.818 PRESENCE OF WATCHMAN LEFT ATRIAL APPENDAGE CLOSURE DEVICE: Primary | ICD-10-CM

## 2021-11-10 PROCEDURE — 99232 SBSQ HOSP IP/OBS MODERATE 35: CPT | Performed by: PHYSICIAN ASSISTANT

## 2021-11-10 RX ADMIN — CARVEDILOL 6.25 MG: 6.25 TABLET, FILM COATED ORAL at 08:39

## 2021-11-10 RX ADMIN — ASPIRIN 81 MG: 81 TABLET, COATED ORAL at 08:37

## 2021-11-10 RX ADMIN — APIXABAN 5 MG: 5 TABLET, FILM COATED ORAL at 08:39

## 2021-11-10 NOTE — CASE MANAGEMENT/SOCIAL WORK
Case Management Discharge Note      Final Note: Pt's plan is to dc to home. Spoke with pt's spouse and she will provide transport. Denies any needs at this time. Pt. is independent with ADL's.         Selected Continued Care - Admitted Since 11/9/2021     Destination    No services have been selected for the patient.              Durable Medical Equipment    No services have been selected for the patient.              Dialysis/Infusion    No services have been selected for the patient.              Home Medical Care    No services have been selected for the patient.              Therapy    No services have been selected for the patient.              Community Resources    No services have been selected for the patient.              Community & DME    No services have been selected for the patient.                       Final Discharge Disposition Code: 01 - home or self-care

## 2021-11-10 NOTE — PLAN OF CARE
Goal Outcome Evaluation:   VS currently stable. Patient had an episode of a fib RVR sustaining 130s-160s but otherwise asymptomatic. Provider notified, one dose digoxin given as ordered. Patient converted back to SR/ST. Remains on 2L nasal cannula. No complaints of pain or discomfort. Right groin site C/D/I/S.  No further complaints at this time.

## 2021-11-10 NOTE — PROGRESS NOTES
"[facility] Cardiology   IP Progress Note   LOS: 1 day   Patient Care Team:  Kat Donahue APRN as PCP - General (Nurse Practitioner)    Chief Complaint: Follow up for Atrial Fibrillation    Subjective Denies Chest Pain, Denies Dyspnea, Eating OK, Ambulating. Had a run of Afib last night that was terminated with IV digoxin           Active Hospital Problems    Diagnosis    • PAF (paroxysmal atrial fibrillation) ([facility])      · Echocardiogram, 2/10/2021: EF 60%.  Normal LV systolic function diastolic dysfunction calcified aortic root and valve with normal aortic velocities and otherwise normal valvular morphology.  Left atrium 3.9 cm  · Left atrial appendage closure with 20 mm watchman FLX device, 11-9-21     • Stroke ([facility])    • Hypertension    • Hematuria    • COPD (chronic obstructive pulmonary disease) ([facility])      EMPYSEMA- NO INHALERS     • Cerebral aneurysm, nonruptured        Tele: Sinus Rythym    Vitals:  /79 (BP Location: Left arm, Patient Position: Lying)   Pulse 102   Temp 98.4 °F (36.9 °C) (Oral)   Resp 20   Ht 162.6 cm (64.02\")   Wt 108 kg (238 lb 1.6 oz)   SpO2 92%   BMI 40.85 kg/m²    Body mass index is 40.85 kg/m².    Intake/Output Summary (Last 24 hours) at 11/10/2021 0821  Last data filed at 11/10/2021 0547  Gross per 24 hour   Intake 300 ml   Output 250 ml   Net 50 ml       Physical Exam:      General: alert, no acute distress, acyanotic, well developed, well nourished   Chest: Clear Auscultation and No Wheezes   CV: Heart sounds are normal.  Regular rate and rhythm without murmur, gallop or rub.   Extremities: negative   -R groin stable, no stitch    Results Review:         Labs:  Results from last 7 days   Lab Units 11/08/21  1226   WBC 10*3/mm3 8.43   HEMOGLOBIN g/dL 13.0   HEMATOCRIT % 42.8   PLATELETS 10*3/mm3 225     Results from last 7 days   Lab Units 11/08/21  1226   SODIUM mmol/L 139   POTASSIUM mmol/L 4.2   CHLORIDE mmol/L 101   CO2 mmol/L 26.0   BUN " mg/dL 18   CREATININE mg/dL 1.21   GLUCOSE mg/dL 96   CALCIUM mg/dL 9.3     Estimated Creatinine Clearance: 59.6 mL/min (by C-G formula based on SCr of 1.21 mg/dL).  No results found for: HGBA1C          Lab Results   Component Value Date    CHLPL 147 02/10/2021    TRIG 152 (H) 02/10/2021    HDL 49 02/10/2021    LDL 68 (L) 02/10/2021       COVID19   Date Value Ref Range Status   11/08/2021 Not Detected Not Detected - Ref. Range Final         Scheduled Meds:  apixaban, 5 mg, Oral, Q12H  aspirin, 81 mg, Oral, Daily  carvedilol, 6.25 mg, Oral, BID      Continuous Infusions:dilTIAZem, 5-15 mg/hr, Last Rate: Stopped (11/09/21 2206)          Assessment: Plan:    1. High bleeding risk (hematuria)   -s/p watchman LAAO    2. PAF   -Had a run of Afib w RVR last night, terminated with digoxin   -apixaban for 45 days   -continue current meds    3. H/O CVA   S/p watchman LAAO   -ASA    4. HTN   -well managed on current Rx      · Uninterrupted anticoagulation for not less than 45 days unless specially instructed otherwise by myself or another member of our EP physician team.  Please note that the patient and the patient’s family have been extensively counseled about this critical requirement and have agreed to comply.   During his 45 day.  The patient will also be taking 81 mg of aspirin.  · After 45 days of full anticoagulation,  a transesophageal echo will be obtained to assess for endothelialization of the left atrial appendage occlusion device.  If this is documented then the patient will be transition to dual antiplatelet therapy.  This will be clopidogrel 75 mg daily and aspirin 81 mg daily. After course of 6 months from the date of implant of dual antiplatelet therapy patient will then be transition to aspirin monotherapy at a dose of 325 mg daily.  · WINSOME to lola  · Follow up with Dr. Tobias in 3 months  · Condition stable        Electronically signed by MARIA ANTONIA Joseph, 11/10/21, 9:56 AM EST.

## 2021-11-11 NOTE — OUTREACH NOTE
Prep Survey      Responses   [facility] facility patient discharged from? [place]   Is LACE score < 7 ? No   Emergency Room discharge w/ pulse ox? No   Eligibility Readm Mgmt   Discharge diagnosis PAF   Does the patient have one of the following disease processes/diagnoses(primary or secondary)? Other   Does the patient have Home health ordered? No   Is there a DME ordered? No   Prep survey completed? Yes          Zuly Lebron RN

## 2021-11-12 ENCOUNTER — READMISSION MANAGEMENT (OUTPATIENT)
Dept: CALL CENTER | Facility: HOSPITAL | Age: 76
End: 2021-11-12

## 2021-11-12 NOTE — OUTREACH NOTE
Medical Week 1 Survey      Responses   [facility] patient discharged from? [place]   Does the patient have one of the following disease processes/diagnoses(primary or secondary)? Other   Week 1 attempt successful? Yes   Call start time 0939   Call end time 0940   Discharge diagnosis PAF   Is patient permission given to speak with other caregiver? Yes   List who call center can speak with spouse- Alix   Person spoke with today (if not patient) and relationship spouse   Meds reviewed with patient/caregiver? N/A   Does the patient have all medications ordered at discharge? N/A   Is the patient taking all medications as directed (includes completed medication regime)? N/A   Does the patient have a primary care provider?  Yes   Does the patient have an appointment with their PCP within 7 days of discharge? Yes   Comments regarding PCP f/u with PCP on 11/17   Has the patient kept scheduled appointments due by today? N/A   Has home health visited the patient within 72 hours of discharge? N/A   Psychosocial issues? No   Did the patient receive a copy of their discharge instructions? Yes   Nursing interventions Reviewed instructions with patient   What is the patient's perception of their health status since discharge? Improving   Is the patient/caregiver able to teach back the hierarchy of who to call/visit for symptoms/problems? PCP, Specialist, Home health nurse, Urgent Care, ED, 911 Yes   Week 1 call completed? Yes   Wrap up additional comments Per spouse, patient is doing well, no questions or concerns at this time, has a f/u with PCP next week.          Catia Walsh RN

## 2021-11-22 ENCOUNTER — READMISSION MANAGEMENT (OUTPATIENT)
Dept: CALL CENTER | Facility: HOSPITAL | Age: 76
End: 2021-11-22

## 2021-11-22 NOTE — OUTREACH NOTE
Medical Week 2 Survey      Responses   [facility] patient discharged from? [place]   Does the patient have one of the following disease processes/diagnoses(primary or secondary)? Other   Week 2 attempt successful? Yes   Call start time 1121   Discharge diagnosis PAF   Call end time 1122   Person spoke with today (if not patient) and relationship spouse, Alix Gill reviewed with patient/caregiver? Yes   Is the patient having any side effects they believe may be caused by any medication additions or changes? No   Does the patient have all medications ordered at discharge? N/A   Is the patient taking all medications as directed (includes completed medication regime)? Yes   Does the patient have a primary care provider?  Yes   Does the patient have an appointment with their PCP within 7 days of discharge? Yes   Has the patient kept scheduled appointments due by today? Yes   Has home health visited the patient within 72 hours of discharge? N/A   Psychosocial issues? No   Did the patient receive a copy of their discharge instructions? Yes   Nursing interventions Reviewed instructions with patient   What is the patient's perception of their health status since discharge? Improving   Is the patient/caregiver able to teach back signs and symptoms related to disease process for when to call PCP? Yes   Is the patient/caregiver able to teach back signs and symptoms related to disease process for when to call 911? Yes   Is the patient/caregiver able to teach back the hierarchy of who to call/visit for symptoms/problems? PCP, Specialist, Home health nurse, Urgent Care, ED, 911 Yes   Additional teach back comments wife states pt is fine, has no questions   Week 2 Call Completed? Yes          Florinda Matthew RN

## 2021-12-01 ENCOUNTER — READMISSION MANAGEMENT (OUTPATIENT)
Dept: CALL CENTER | Facility: HOSPITAL | Age: 76
End: 2021-12-01

## 2021-12-01 NOTE — OUTREACH NOTE
Medical Week 3 Survey      Responses   [facility] patient discharged from? [place]   Does the patient have one of the following disease processes/diagnoses(primary or secondary)? Other   Week 3 attempt successful? Yes   Call start time 1050   Call end time 1051   Meds reviewed with patient/caregiver? Yes   Is the patient taking all medications as directed (includes completed medication regime)? Yes   Does the patient have a primary care provider?  Yes   Has the patient kept scheduled appointments due by today? Yes   Comments has seen pcp with no changes   What is the patient's perception of their health status since discharge? Improving   Week 3 Call Completed? Yes   Wrap up additional comments wife says he is doing great          Jennifer Cooney RN

## 2021-12-02 ENCOUNTER — PREP FOR SURGERY (OUTPATIENT)
Dept: OTHER | Facility: HOSPITAL | Age: 76
End: 2021-12-02

## 2021-12-02 RX ORDER — SODIUM CHLORIDE 0.9 % (FLUSH) 0.9 %
3 SYRINGE (ML) INJECTION EVERY 12 HOURS SCHEDULED
Status: CANCELLED | OUTPATIENT
Start: 2021-12-02

## 2021-12-02 RX ORDER — SODIUM CHLORIDE 0.9 % (FLUSH) 0.9 %
10 SYRINGE (ML) INJECTION AS NEEDED
Status: CANCELLED | OUTPATIENT
Start: 2021-12-02

## 2021-12-03 ENCOUNTER — OFFICE VISIT (OUTPATIENT)
Dept: UROLOGY | Facility: CLINIC | Age: 76
End: 2021-12-03

## 2021-12-03 VITALS
WEIGHT: 238 LBS | SYSTOLIC BLOOD PRESSURE: 138 MMHG | DIASTOLIC BLOOD PRESSURE: 71 MMHG | BODY MASS INDEX: 40.63 KG/M2 | HEIGHT: 64 IN

## 2021-12-03 DIAGNOSIS — Z85.53 HISTORY OF RENAL PELVIS CANCER: Primary | ICD-10-CM

## 2021-12-03 DIAGNOSIS — C67.0 MALIGNANT NEOPLASM OF TRIGONE OF URINARY BLADDER (HCC): ICD-10-CM

## 2021-12-03 PROCEDURE — 52000 CYSTOURETHROSCOPY: CPT | Performed by: UROLOGY

## 2021-12-03 NOTE — PROGRESS NOTES
Cystoscopy    Date/Time: 12/3/2021 9:58 AM  Performed by: Kaelyn Alvarado MD  Authorized by: Kaelyn Alvarado MD   Preparation: Patient was prepped and draped in the usual sterile fashion.  Local anesthesia used: no    Anesthesia:  Local anesthesia used: no    Sedation:  Patient sedated: no    Patient tolerance: patient tolerated the procedure well with no immediate complications  Comments: The original tumor pathology was papillary transitional cell carcinoma, Grade III/III, Stage I: T1 N0 M0 diagnosed in April 2019. This was of the left renal pelvis and he had a left nephroureterectomy in April 2019. He had low grade superficial UC in the bladder in Oct 2019, January 2020, June 2020, and Sept 2020. The most recent tumor pathology was papillary transitional cell carcinoma, Grade I/III, Stage 0 is: Tis N0 M0 on 02/04/2021. He had a TURBT in summer of 2021 that was negative.     Imaging:  Prior imaging studies have been done. These were done at diagnosis and include a CT scan of the abdomen and pelvis and significant findings were a left renal filling defect. He had a left robotic nephroureterectomy in April 2019. Right retrograde pyelogram was done in Jan 2020 and it was negative.      Cytoscopy Procedure:     Procedure: Flexible cytoscope was passed per urethra into the bladder without difficulty after proper consent. The bladder was inspected in a systematic meridian fashion. There were no tumors, lesions, stones, or other abnormalities noted within the bladder. Of note, there was no increased vascularity as well. Both ureteral orifices were identified and were normal in appearance. The flexible cytoscope was removed. The patient tolerated the procedure well.

## 2021-12-10 ENCOUNTER — READMISSION MANAGEMENT (OUTPATIENT)
Dept: CALL CENTER | Facility: HOSPITAL | Age: 76
End: 2021-12-10

## 2021-12-10 NOTE — OUTREACH NOTE
Medical Week 4 Survey      Responses   [facility] patient discharged from? [place]   Does the patient have one of the following disease processes/diagnoses(primary or secondary)? Other   Week 4 attempt successful? Yes   Call start time 1355   Call end time 1356   Person spoke with today (if not patient) and relationship spouse, Alix Gill reviewed with patient/caregiver? Yes   Is the patient taking all medications as directed (includes completed medication regime)? Yes   Has the patient kept scheduled appointments due by today? Yes   Is the patient still receiving Home Health Services? N/A   What is the patient's perception of their health status since discharge? Improving   Week 4 Call Completed? Yes   Would the patient like one additional call? No   Graduated Yes   Is the patient interested in additional calls from an ambulatory ?  NOTE:  applies to high risk patients requiring additional follow-up. No   Did the patient feel the follow up calls were helpful during their recovery period? Yes   Was the number of calls appropriate? Yes   Wrap up additional comments Wife states hhe is doing well, no new concerns or questions,          Kellee Pacheco RN

## 2021-12-22 ENCOUNTER — PREP FOR SURGERY (OUTPATIENT)
Dept: OTHER | Facility: HOSPITAL | Age: 76
End: 2021-12-22

## 2021-12-22 ENCOUNTER — HOSPITAL ENCOUNTER (OUTPATIENT)
Dept: CARDIOLOGY | Facility: HOSPITAL | Age: 76
Discharge: HOME | End: 2021-12-22

## 2021-12-22 VITALS
BODY MASS INDEX: 40.63 KG/M2 | DIASTOLIC BLOOD PRESSURE: 76 MMHG | HEART RATE: 91 BPM | RESPIRATION RATE: 18 BRPM | WEIGHT: 238 LBS | OXYGEN SATURATION: 93 % | SYSTOLIC BLOOD PRESSURE: 127 MMHG | HEIGHT: 64 IN

## 2021-12-22 DIAGNOSIS — I48.0 PAROXYSMAL ATRIAL FIBRILLATION (HCC): ICD-10-CM

## 2021-12-22 LAB
MAXIMAL PREDICTED HEART RATE: 146 BPM
STRESS TARGET HR: 124 BPM

## 2021-12-22 PROCEDURE — 25010000002 MIDAZOLAM PER 1 MG: Performed by: INTERNAL MEDICINE

## 2021-12-22 PROCEDURE — 93325 DOPPLER ECHO COLOR FLOW MAPG: CPT

## 2021-12-22 PROCEDURE — 25010000002 FENTANYL CITRATE (PF) 50 MCG/ML SOLUTION: Performed by: INTERNAL MEDICINE

## 2021-12-22 PROCEDURE — 93312 ECHO TRANSESOPHAGEAL: CPT | Performed by: INTERNAL MEDICINE

## 2021-12-22 PROCEDURE — 93321 DOPPLER ECHO F-UP/LMTD STD: CPT | Performed by: INTERNAL MEDICINE

## 2021-12-22 PROCEDURE — 93312 ECHO TRANSESOPHAGEAL: CPT

## 2021-12-22 PROCEDURE — 93321 DOPPLER ECHO F-UP/LMTD STD: CPT

## 2021-12-22 PROCEDURE — S0260 H&P FOR SURGERY: HCPCS | Performed by: PHYSICIAN ASSISTANT

## 2021-12-22 PROCEDURE — 93325 DOPPLER ECHO COLOR FLOW MAPG: CPT | Performed by: INTERNAL MEDICINE

## 2021-12-22 RX ORDER — ASPIRIN 325 MG
325 TABLET, DELAYED RELEASE (ENTERIC COATED) ORAL DAILY
Qty: 30 TABLET | Refills: 6 | Status: SHIPPED | OUTPATIENT
Start: 2021-12-22 | End: 2022-03-18 | Stop reason: ALTCHOICE

## 2021-12-22 RX ORDER — CLOPIDOGREL BISULFATE 75 MG/1
75 TABLET ORAL DAILY
Qty: 30 TABLET | Refills: 4 | Status: SHIPPED | OUTPATIENT
Start: 2021-12-22

## 2021-12-22 RX ORDER — MIDAZOLAM HYDROCHLORIDE 1 MG/ML
INJECTION INTRAMUSCULAR; INTRAVENOUS
Status: DISCONTINUED | OUTPATIENT
Start: 2021-12-22 | End: 2021-12-23 | Stop reason: HOSPADM

## 2021-12-22 RX ORDER — FENTANYL CITRATE 50 UG/ML
INJECTION, SOLUTION INTRAMUSCULAR; INTRAVENOUS
Status: DISCONTINUED | OUTPATIENT
Start: 2021-12-22 | End: 2021-12-23 | Stop reason: HOSPADM

## 2021-12-22 RX ADMIN — MIDAZOLAM HYDROCHLORIDE 2 MG: 1 INJECTION, SOLUTION INTRAMUSCULAR; INTRAVENOUS at 14:25

## 2021-12-22 RX ADMIN — FENTANYL CITRATE 50 MCG: 50 INJECTION, SOLUTION INTRAMUSCULAR; INTRAVENOUS at 14:31

## 2022-01-04 ENCOUNTER — TELEPHONE (OUTPATIENT)
Dept: CARDIOLOGY | Facility: CLINIC | Age: 77
End: 2022-01-04

## 2022-01-04 NOTE — TELEPHONE ENCOUNTER
I received an alert via home remote to see if he was symptomatic during a Sinus Abdiel episode, he was not.

## 2022-01-13 LAB
QT INTERVAL: 332 MS
QTC INTERVAL: 512 MS

## 2022-02-22 ENCOUNTER — OFFICE VISIT (OUTPATIENT)
Dept: CARDIOLOGY | Facility: CLINIC | Age: 77
End: 2022-02-22

## 2022-02-22 VITALS
BODY MASS INDEX: 40.97 KG/M2 | WEIGHT: 240 LBS | HEART RATE: 73 BPM | OXYGEN SATURATION: 100 % | SYSTOLIC BLOOD PRESSURE: 132 MMHG | DIASTOLIC BLOOD PRESSURE: 76 MMHG | HEIGHT: 64 IN

## 2022-02-22 DIAGNOSIS — I10 PRIMARY HYPERTENSION: ICD-10-CM

## 2022-02-22 DIAGNOSIS — I48.0 PAF (PAROXYSMAL ATRIAL FIBRILLATION): Primary | ICD-10-CM

## 2022-02-22 DIAGNOSIS — Z95.818 PRESENCE OF WATCHMAN LEFT ATRIAL APPENDAGE CLOSURE DEVICE: ICD-10-CM

## 2022-02-22 PROCEDURE — 99213 OFFICE O/P EST LOW 20 MIN: CPT | Performed by: INTERNAL MEDICINE

## 2022-02-22 NOTE — PROGRESS NOTES
Encounter Date:02/22/2022      Patient ID: Curtis Richmond is a 76 y.o. male.    Kat Donahue APRN    Chief Complaint: PAF (paroxysmal atrial fibrillation) ([facility])      PROBLEM LIST:  Patient Active Problem List    Diagnosis Date Noted   • Presence of Watchman left atrial appendage closure device 02/22/2022     Priority: High     Note Last Updated: 2/22/2022     · KIMI 12-22-21: There is a 20 mm watchman FLX left atrial appendage occlusion device in place. The device appears well-seated. There is no significant peridevice flow.     • History of loop recorder      Priority: High     Note Last Updated: 10/25/2021     CURRENTLY HAS IMRSV LOOP RECORDER S/P STROKE, MONITORED BY DR GONG      • PAF (paroxysmal atrial fibrillation) ([facility]) 01/01/2014     Priority: High     Note Last Updated: 11/10/2021     · Echocardiogram, 2/10/2021: EF 60%.  Normal LV systolic function diastolic dysfunction calcified aortic root and valve with normal aortic velocities and otherwise normal valvular morphology.  Left atrium 3.9 cm  · Left atrial appendage closure with 20 mm watchman FLX device, 11-9-21     • Stroke ([facility])      Priority: Medium   • At risk for central sleep apnea      Priority: Low     Note Last Updated: 10/25/2021     STOP BANG 6     • Hypertension      Priority: Low   • Hematuria 10/28/2021   • COPD (chronic obstructive pulmonary disease) ([facility])      Note Last Updated: 10/25/2021     EMPYSEMA- NO INHALERS     • History of renal pelvis cancer 08/13/2021   • Bladder cancer ([facility]) 08/13/2021   • Cerebral aneurysm, nonruptured    • Arthritis 01/01/2014               History of Present Illness  Patient presents today for follow-up with a history of paroxysmal atrial fibrillation status post left atrial appendage occlusion.  Since insertion he has undergone follow-up transesophageal echocardiogram showing excellent placement and no peridevice flow.  He remains on aspirin and Plavix.  He has no  "current complaint of chest pain or dyspnea, he denies orthopnea, PND, claudication, lower extremity edema.  He has had no awareness of tachyarrhythmias.  He states compliance with current medical regimen reports no significant adverse side effects.    No Known Allergies    Current Outpatient Medications   Medication Instructions   • acetaminophen (TYLENOL) 500 mg, Oral, Every 6 Hours PRN   • amLODIPine (NORVASC) 2.5 mg, Oral, Every Evening   • aspirin  mg, Oral, Daily   • atorvastatin (LIPITOR) 40 mg, Oral, Nightly   • carvedilol (COREG) 6.25 mg, Oral, 2 Times Daily   • clopidogrel (PLAVIX) 75 mg, Oral, Daily   • gabapentin (NEURONTIN) 400 mg, Oral, Every Evening   • loratadine (CLARITIN) 10 mg, Oral, Daily   • losartan-hydrochlorothiazide (HYZAAR) 100-12.5 MG per tablet 1 tablet, Oral, Every Evening       .    Objective:     /76 (BP Location: Right arm, Patient Position: Sitting)   Pulse 73   Ht 162.6 cm (64\")   Wt 109 kg (240 lb)   SpO2 100%   BMI 41.20 kg/m²    Body mass index is 41.2 kg/m².     Constitutional:       Appearance: Well-developed.   Pulmonary:      Effort: Pulmonary effort is normal. No respiratory distress.      Breath sounds: Normal breath sounds. No wheezing. No rales.      Comments: Bases clear  Chest:      Chest wall: Not tender to palpatation.   Cardiovascular:      Normal rate. Regular rhythm.      Murmurs: There is no murmur.      No gallop. No click. No rub.   Pulses:     Intact distal pulses.   Musculoskeletal: Normal range of motion.       Lab Review:         Procedures               Assessment:      Diagnosis Plan   1. PAF (paroxysmal atrial fibrillation) ([facility])   no known recurrence of atrial fibrillation, continue beta-blockade   2. Primary hypertension   overall well managed, continue current medical regimen   3. Presence of Watchman left atrial appendage closure device   for now continue aspirin and Plavix until instructed to discontinue by A. fib coordinator. "     Plan:     Stable cardiac status.  Continue current medications.   in 6 months, sooner as needed.  Thank you for allowing us to participate in the care of your patient.     Electronically signed by MARIA ANTONIA Joseph, 02/22/22, 12:58 PM EST.

## 2022-03-18 ENCOUNTER — OFFICE VISIT (OUTPATIENT)
Dept: UROLOGY | Facility: CLINIC | Age: 77
End: 2022-03-18

## 2022-03-18 VITALS
SYSTOLIC BLOOD PRESSURE: 139 MMHG | DIASTOLIC BLOOD PRESSURE: 73 MMHG | HEIGHT: 64 IN | BODY MASS INDEX: 41.96 KG/M2 | WEIGHT: 245.8 LBS

## 2022-03-18 DIAGNOSIS — C67.8 MALIGNANT NEOPLASM OF OVERLAPPING SITES OF BLADDER: ICD-10-CM

## 2022-03-18 PROCEDURE — 52000 CYSTOURETHROSCOPY: CPT | Performed by: UROLOGY

## 2022-03-18 RX ORDER — HYDROCHLOROTHIAZIDE 12.5 MG/1
12.5 TABLET ORAL DAILY
COMMUNITY
Start: 2022-02-26 | End: 2022-08-23 | Stop reason: ALTCHOICE

## 2022-03-18 RX ORDER — LOSARTAN POTASSIUM 100 MG/1
100 TABLET ORAL DAILY
COMMUNITY
Start: 2022-02-26 | End: 2022-08-23 | Stop reason: ALTCHOICE

## 2022-03-18 RX ORDER — ASPIRIN 325 MG
325 TABLET ORAL DAILY
COMMUNITY
Start: 2022-02-26 | End: 2022-08-23

## 2022-03-18 NOTE — PROGRESS NOTES
Cystoscopy    Date/Time: 3/18/2022 9:33 AM  Performed by: Kaelyn Alvarado MD  Authorized by: Kaelyn Alvarado MD   Preparation: Patient was prepped and draped in the usual sterile fashion.  Local anesthesia used: no    Anesthesia:  Local anesthesia used: no    Sedation:  Patient sedated: no    Patient tolerance: patient tolerated the procedure well with no immediate complications  Comments: The original tumor pathology was papillary transitional cell carcinoma, Grade III/III, Stage I: T1 N0 M0 diagnosed in April 2019. This was of the left renal pelvis and he had a left nephroureterectomy in April 2019. He had low grade superficial UC in the bladder in Oct 2019, January 2020, June 2020, and Sept 2020. The most recent tumor pathology was papillary transitional cell carcinoma, Grade I/III, Stage 0 is: Tis N0 M0 on 02/04/2021. He had a TURBT in summer of 2021 that was negative.     Imaging:  Prior imaging studies have been done. These were done at diagnosis and include a CT scan of the abdomen and pelvis and significant findings were a left renal filling defect. He had a left robotic nephroureterectomy in April 2019. Right retrograde pyelogram was done in Jan 2020 and it was negative.      CT scan in Feb 2021 showed no evidence of recurrence at the left nephrectomy bed.  Right kidney and ureter were normal.       Cytoscopy Procedure:     Procedure: Flexible cytoscope was passed per urethra into the bladder without difficulty after proper consent. The bladder was inspected in a systematic meridian fashion. There were no tumors, lesions, stones, or other abnormalities noted within the bladder. Of note, there was no increased vascularity as well. The right ureteral orifice was identified and was normal in appearance. The flexible cytoscope was removed. The patient tolerated the procedure well.

## 2022-04-15 ENCOUNTER — OFFICE VISIT (OUTPATIENT)
Dept: CARDIOLOGY | Facility: CLINIC | Age: 77
End: 2022-04-15

## 2022-04-15 VITALS
SYSTOLIC BLOOD PRESSURE: 143 MMHG | HEIGHT: 64 IN | DIASTOLIC BLOOD PRESSURE: 91 MMHG | WEIGHT: 245 LBS | HEART RATE: 78 BPM | BODY MASS INDEX: 41.83 KG/M2

## 2022-04-15 DIAGNOSIS — Z95.818 PRESENCE OF WATCHMAN LEFT ATRIAL APPENDAGE CLOSURE DEVICE: ICD-10-CM

## 2022-04-15 DIAGNOSIS — I10 PRIMARY HYPERTENSION: ICD-10-CM

## 2022-04-15 DIAGNOSIS — I48.0 PAF (PAROXYSMAL ATRIAL FIBRILLATION): Primary | ICD-10-CM

## 2022-04-15 DIAGNOSIS — I63.9 CRYPTOGENIC STROKE: ICD-10-CM

## 2022-04-15 PROCEDURE — 99214 OFFICE O/P EST MOD 30 MIN: CPT | Performed by: INTERNAL MEDICINE

## 2022-04-15 NOTE — PROGRESS NOTES
Chief Complaint  Atrial Fibrillation    Subjective            Curtis Richmond presents to [facility] CARDIOLOGY  History of Present Illness    Curtis is here for follow-up evaluation management of paroxysmal atrial fibrillation, cryptogenic stroke, implanted loop recorder, watchman atrial appendage closure device, hypertension.  Since last visit he is doing well.  His blood pressures at home are controlled.  He has not had any bleeding through his bladder.  He has gotten a good report from urology recently.  He underwent a watchman procedure in February with no complication.  He denies chest pain or excessive shortness of breath.    PMH  Past Medical History:   Diagnosis Date   • Arthritis     BACK.   • At risk for central sleep apnea     STOP BANG 6   • Atrial fibrillation (HCC)    • Bladder cancer ([facility])     DR DAGMAR GARCIA. HISTORY OF. BLADDER WASH, TURBP   • Cancer of overlapping sites of bladder ([facility]) 01/06/2020   • Chronic back pain    • COPD (chronic obstructive pulmonary disease) ([facility])     EMPYSEMA- NO INHALERS   • History of hematuria    • History of loop recorder     CURRENTLY HAS [employer] LOOP RECORDER S/P STROKE, MONITORED BY DR GONG    • Hydrocephalus ([facility])     NO CURRENT PROBLEMS    • Hyperlipidemia    • Hypertension    • TIA (transient ischemic attack) 02/2021   • Tubular adenoma of colon 02/05/2015   • Urothelial carcinoma of kidney, 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   • Wears glasses    • Wheelchair bound          SURGICALHX  Past Surgical History:   Procedure Laterality Date   • ATRIAL APPENDAGE EXCLUSION LEFT WITH TRANSESOPHAGEAL ECHOCARDIOGRAM N/A 11/9/2021    Procedure: 11/9/21 Atrial Appendage Occlusion on eliquis hold 2 doses prior;  Surgeon: Primo Tobias DO;  Location: [facility] INVASIVE LOCATION;  Service: Cardiology;  Laterality: N/A;   • BACK SURGERY  06/1980   • CARDIAC CATHETERIZATION  02/2001   • CARDIOVERSION      AT [facility] YEARS AGO (40 YEARS AGO)   •  CATARACT EXTRACTION WITH INTRAOCULAR LENS IMPLANT Bilateral 2019   • CHOLECYSTECTOMY  2002   • COLONOSCOPY     • CYSTOSCOPY      MULTIPLE   • CYSTOSCOPY BLADDER BIOPSY N/A 2021    Procedure: CYSTOSCOPY, TRANSURETHRAL RESECTION OF BLADDER TUMOR;  Surgeon: Kaelyn Alvarado MD;  Location: [facility] MAIN OR;  Service: Urology;  Laterality: N/A;   • EMBOLIZATION CEREBRAL N/A 2021    Procedure: CEREBRAL ANGIOGRAM;  Surgeon: Noah Bruno MD;  Location: [facility] HYBRID OR ;  Service: Interventional Radiology;  Laterality: N/A;   • EPIDURAL      LUMBAR   • GALLBLADDER SURGERY     • JOINT REPLACEMENT Right     Knee   • KNEE ARTHROPLASTY Right    • NEPHROURETERECTOMY Left 2019   • TRANSURETHRAL RESECTION OF BLADDER TUMOR          SOC  Social History     Socioeconomic History   • Marital status:    Tobacco Use   • Smoking status: Former Smoker     Quit date:      Years since quittin.3   • Smokeless tobacco: Never Used   • Tobacco comment: QUIT 50 YRS AGO. SMOKED 3 YEARS IN [employer]   Vaping Use   • Vaping Use: Never used   Substance and Sexual Activity   • Alcohol use: Not Currently   • Drug use: Never   • Sexual activity: Defer         FAMHX  Family History   Problem Relation Age of Onset   • Heart attack Father    • No Known Problems Other    • Malig Hyperthermia Neg Hx           ALLERGY  No Known Allergies     MEDSCURRENT    Current Outpatient Medications:   •  acetaminophen (TYLENOL) 500 MG tablet, Take 500 mg by mouth Every 6 (Six) Hours As Needed for Mild Pain ., Disp: , Rfl:   •  amLODIPine (NORVASC) 2.5 MG tablet, Take 2.5 mg by mouth Every Evening., Disp: , Rfl:   •  aspirin 325 MG tablet, Take 325 mg by mouth Daily., Disp: , Rfl:   •  atorvastatin (LIPITOR) 40 MG tablet, Take 40 mg by mouth Every Night., Disp: , Rfl:   •  carvedilol (COREG) 6.25 MG tablet, Take 6.25 mg by mouth 2 (Two) Times a Day., Disp: , Rfl:   •  clopidogrel (PLAVIX) 75 MG tablet, Take 1 tablet by mouth  "Daily., Disp: 30 tablet, Rfl: 4  •  gabapentin (NEURONTIN) 400 MG capsule, Take 400 mg by mouth Every Evening., Disp: , Rfl:   •  hydroCHLOROthiazide (HYDRODIURIL) 12.5 MG tablet, Take 12.5 mg by mouth Daily., Disp: , Rfl:   •  loratadine (CLARITIN) 10 MG tablet, Take 10 mg by mouth Daily., Disp: , Rfl:   •  losartan (COZAAR) 100 MG tablet, Take 100 mg by mouth Daily., Disp: , Rfl:       Review of Systems   Cardiovascular: Negative for chest pain, near-syncope and palpitations.   Hematologic/Lymphatic: Does not bruise/bleed easily.        Objective     /91   Pulse 78   Ht 162.6 cm (64\")   Wt 111 kg (245 lb) Comment: pt stated  BMI 42.05 kg/m²       General Appearance:   · well developed  · well nourished  HENT:   · oropharynx moist  · lips not cyanotic  Neck:  · thyroid not enlarged  · supple  Respiratory:  · no respiratory distress  · normal breath sounds  · no rales  Cardiovascular:  · no jugular venous distention  · regular rhythm  · apical impulse normal  · S1 normal, S2 normal  · no S3, no S4   · no murmur  · no rub, no thrill  · carotid pulses normal; no bruit  · pedal pulses normal  · lower extremity edema: none    Musculoskeletal:  · no clubbing of fingers.   · normocephalic, head atraumatic  Skin:   · warm, dry  Psychiatric:  · judgement and insight appropriate  · normal mood and affect      Result Review :     The following data was reviewed by: Lion Carlos MD on 04/15/2022:    CMP    CMP 11/8/21   Glucose 96   BUN 18   Creatinine 1.21   eGFR Non African Am 59 (A)   Sodium 139   Potassium 4.2   Chloride 101   Calcium 9.3   (A) Abnormal value       Comments are available for some flowsheets but are not being displayed.           CBC    CBC 11/8/21   WBC 8.43   RBC 4.98   Hemoglobin 13.0   Hematocrit 42.8   MCV 85.9   MCH 26.1 (A)   MCHC 30.4 (A)   RDW 14.4   Platelets 225   (A) Abnormal value                      Data reviewed: Loop recorder data reviewed, 3% atrial fibrillation " since the last remote.     Procedures             Assessment and Plan        ASSESSMENT:  Encounter Diagnoses   Name Primary?   • PAF (paroxysmal atrial fibrillation) (HCC) Yes   • Primary hypertension    • Presence of Watchman left atrial appendage closure device    • Cryptogenic stroke (HCC)          PLAN:    1.  Paroxysmal atrial fibrillation stable, relatively low arrhythmia burden.  Continue current medical therapy.  2.  Hypertension stable based on home readings, continue current medical therapy.  3.  Watchman device procedure went well, we will touch base with EP to see if we can stop Plavix or aspirin at this point.  4.  Cryptogenic stroke with no evidence of recurrence.  Likely embolic from atrial fibrillation.    He will follow-up with me annually, see EP as scheduled          Patient was given instructions and counseling regarding his condition or for health maintenance advice. Please see specific information pulled into the AVS if appropriate.             STEFANO Carlos MD  4/15/2022    10:10 EDT

## 2022-06-21 ENCOUNTER — TELEPHONE (OUTPATIENT)
Dept: UROLOGY | Facility: CLINIC | Age: 77
End: 2022-06-21

## 2022-06-21 NOTE — TELEPHONE ENCOUNTER
Attempted to reach the patient to reschedule his Cystoscopy that is currently on 6/24/22. We are trying to move him to Monday morning 6/27/22 at 9:15 am. I left the patient a detailed message with instructions to call us back as soon as possible.

## 2022-07-01 ENCOUNTER — PROCEDURE VISIT (OUTPATIENT)
Dept: UROLOGY | Facility: CLINIC | Age: 77
End: 2022-07-01

## 2022-07-01 ENCOUNTER — PREP FOR SURGERY (OUTPATIENT)
Dept: OTHER | Facility: HOSPITAL | Age: 77
End: 2022-07-01

## 2022-07-01 VITALS — WEIGHT: 240 LBS | HEIGHT: 65 IN | BODY MASS INDEX: 39.99 KG/M2

## 2022-07-01 DIAGNOSIS — C67.8 MALIGNANT NEOPLASM OF OVERLAPPING SITES OF BLADDER: Primary | ICD-10-CM

## 2022-07-01 DIAGNOSIS — Z85.53 HISTORY OF RENAL PELVIS CANCER: ICD-10-CM

## 2022-07-01 PROCEDURE — 52000 CYSTOURETHROSCOPY: CPT | Performed by: UROLOGY

## 2022-07-01 RX ORDER — SODIUM CHLORIDE 9 MG/ML
100 INJECTION, SOLUTION INTRAVENOUS CONTINUOUS
Status: CANCELLED | OUTPATIENT
Start: 2022-07-01

## 2022-07-01 RX ORDER — SODIUM CHLORIDE 0.9 % (FLUSH) 0.9 %
10 SYRINGE (ML) INJECTION AS NEEDED
Status: CANCELLED | OUTPATIENT
Start: 2022-07-01

## 2022-07-01 RX ORDER — LEVOFLOXACIN 5 MG/ML
500 INJECTION, SOLUTION INTRAVENOUS ONCE
Status: CANCELLED | OUTPATIENT
Start: 2022-07-01 | End: 2022-07-01

## 2022-07-01 RX ORDER — SODIUM CHLORIDE 0.9 % (FLUSH) 0.9 %
10 SYRINGE (ML) INJECTION EVERY 12 HOURS SCHEDULED
Status: CANCELLED | OUTPATIENT
Start: 2022-07-01

## 2022-07-01 NOTE — H&P
[facility]   Urology HISTORY AND PHYSICAL    Patient Name: Curtis Richmond  : 1945  MRN: [id number]  Primary Care Physician:  Kat Donahue APRN  Date of admission: (Not on file)    Subjective   Subjective       Patient has a history of bladder cancer and presents for cystoscopy and right retrograde pyelogram.      Personal History     Past Medical History:   Diagnosis Date   • Arthritis     BACK.   • At risk for central sleep apnea     STOP BANG 6   • Atrial fibrillation (HCC)    • Bladder cancer ([facility])     DR KAELYN GARCIA. HISTORY OF. BLADDER WASH, TURBP   • Cancer of overlapping sites of bladder ([facility]) 2020   • Chronic back pain    • COPD (chronic obstructive pulmonary disease) ([facility])     EMPYSEMA- NO INHALERS   • History of hematuria    • History of loop recorder     CURRENTLY HAS [employer] LOOP RECORDER S/P STROKE, MONITORED BY DR GONG    • Hydrocephalus ([facility])     NO CURRENT PROBLEMS    • Hyperlipidemia    • Hypertension    • TIA (transient ischemic attack) 2021   • Tubular adenoma of colon 2015   • Urothelial carcinoma of kidney, left (HCC) 2019   • Wears glasses    • Wheelchair bound        Past Surgical History:   Procedure Laterality Date   • ATRIAL APPENDAGE EXCLUSION LEFT WITH TRANSESOPHAGEAL ECHOCARDIOGRAM N/A 2021    Procedure: 21 Atrial Appendage Occlusion on eliquis hold 2 doses prior;  Surgeon: Primo Tobias DO;  Location: [facility] INVASIVE LOCATION;  Service: Cardiology;  Laterality: N/A;   • BACK SURGERY  1980   • CARDIAC CATHETERIZATION  2001   • CARDIOVERSION      AT [facility] YEARS AGO (40 YEARS AGO)   • CATARACT EXTRACTION WITH INTRAOCULAR LENS IMPLANT Bilateral    • CHOLECYSTECTOMY  2002   • COLONOSCOPY     • CYSTOSCOPY      MULTIPLE   • CYSTOSCOPY BLADDER BIOPSY N/A 2021    Procedure: CYSTOSCOPY, TRANSURETHRAL RESECTION OF BLADDER TUMOR;  Surgeon: Kaelyn Alvarado MD;  Location: [facility] MAIN OR;   Service: Urology;  Laterality: N/A;   • EMBOLIZATION CEREBRAL N/A 4/14/2021    Procedure: CEREBRAL ANGIOGRAM;  Surgeon: Noah Bruno MD;  Location: [facility] 18/19;  Service: Interventional Radiology;  Laterality: N/A;   • EPIDURAL      LUMBAR   • GALLBLADDER SURGERY     • JOINT REPLACEMENT Right     Knee   • KNEE ARTHROPLASTY Right 2013   • NEPHROURETERECTOMY Left 04/2019   • TRANSURETHRAL RESECTION OF BLADDER TUMOR         Family History: family history includes Heart attack in his father; No Known Problems in an other family member. Otherwise pertinent FHx was reviewed and not pertinent to current issue.    Social History:  reports that he quit smoking about 50 years ago. He has never used smokeless tobacco. He reports previous alcohol use. He reports that he does not use drugs.    Home Medications:  acetaminophen, amLODIPine, aspirin, atorvastatin, carvedilol, clopidogrel, gabapentin, hydroCHLOROthiazide, loratadine, and losartan    Allergies:  No Known Allergies    Objective    Objective     Vitals:        Physical Exam  Constitutional:       Appearance: Normal appearance.   Cardiovascular:      Rate and Rhythm: Normal rate and regular rhythm.   Pulmonary:      Effort: Pulmonary effort is normal.      Breath sounds: Normal breath sounds.   Neurological:      Mental Status: He is alert. Mental status is at baseline.   Psychiatric:         Mood and Affect: Mood and affect normal.         Speech: Speech normal.         Judgment: Judgment normal.         Result Review    Result Review:  I have personally reviewed the results from the time of this admission to 7/1/2022 09:45 EDT and agree with these findings:  [x]  Laboratory  []  Microbiology  [x]  Radiology  []  EKG/Telemetry   []  Cardiology/Vascular   []  Pathology  [x]  Old records  []  Other:      Assessment & Plan   Assessment / Plan       Active Hospital Problems:  There are no active hospital problems to display for this patient.      Plan:  Cystoscopy and right retrograde pyelogram.  Risks and benefits discussed with patient and they are agreeable to proceed.    DVT prophylaxis:  No DVT prophylaxis order currently exists.    CODE STATUS:           Electronically signed by Kaelyn Alvarado MD, 07/01/22, 9:45 AM EDT.

## 2022-07-01 NOTE — PROGRESS NOTES
Cystoscopy    Date/Time: 7/1/2022 9:25 AM  Performed by: Kaelyn Alvarado MD  Authorized by: Kaelyn Alvarado MD   Preparation: Patient was prepped and draped in the usual sterile fashion.  Local anesthesia used: no    Anesthesia:  Local anesthesia used: no    Sedation:  Patient sedated: no    Patient tolerance: patient tolerated the procedure well with no immediate complications  Comments: The original tumor pathology was papillary transitional cell carcinoma, Grade III/III, Stage I: T1 N0 M0 diagnosed in April 2019. This was of the left renal pelvis and he had a left nephroureterectomy in April 2019. He had low grade superficial UC in the bladder in Oct 2019, January 2020, June 2020, and Sept 2020. The most recent tumor pathology was papillary transitional cell carcinoma, Grade I/III, Stage 0 is: Tis N0 M0 on 02/04/2021. He had a TURBT in summer of 2021 that was negative.     Imaging:  Prior imaging studies have been done. These were done at diagnosis and include a CT scan of the abdomen and pelvis and significant findings were a left renal filling defect. He had a left robotic nephroureterectomy in April 2019. Right retrograde pyelogram was done in Jan 2020 and it was negative.      CT scan in Feb 2021 showed no evidence of recurrence at the left nephrectomy bed.  Right kidney and ureter were normal.     Cysto March 2022 normal.       Cytoscopy Procedure:     Procedure: Flexible cytoscope was passed per urethra into the bladder without difficulty after proper consent. The bladder was inspected in a systematic meridian fashion. There were no tumors, lesions, stones, or other abnormalities noted within the bladder. Of note, there was no increased vascularity as well. Right ureteral orifice was identified and were normal in appearance. The flexible cytoscope was removed. The patient tolerated the procedure well.

## 2022-08-23 ENCOUNTER — OFFICE VISIT (OUTPATIENT)
Dept: CARDIOLOGY | Facility: CLINIC | Age: 77
End: 2022-08-23

## 2022-08-23 VITALS
SYSTOLIC BLOOD PRESSURE: 110 MMHG | HEART RATE: 74 BPM | DIASTOLIC BLOOD PRESSURE: 72 MMHG | BODY MASS INDEX: 39.99 KG/M2 | HEIGHT: 65 IN | OXYGEN SATURATION: 96 % | WEIGHT: 240 LBS

## 2022-08-23 DIAGNOSIS — Z95.818 PRESENCE OF WATCHMAN LEFT ATRIAL APPENDAGE CLOSURE DEVICE: ICD-10-CM

## 2022-08-23 DIAGNOSIS — I10 PRIMARY HYPERTENSION: ICD-10-CM

## 2022-08-23 DIAGNOSIS — I48.0 PAF (PAROXYSMAL ATRIAL FIBRILLATION): Primary | ICD-10-CM

## 2022-08-23 PROBLEM — R31.9 HEMATURIA: Status: RESOLVED | Noted: 2021-10-28 | Resolved: 2022-08-23

## 2022-08-23 PROCEDURE — 99213 OFFICE O/P EST LOW 20 MIN: CPT | Performed by: INTERNAL MEDICINE

## 2022-08-23 RX ORDER — LOSARTAN POTASSIUM AND HYDROCHLOROTHIAZIDE 12.5; 1 MG/1; MG/1
1 TABLET ORAL DAILY
COMMUNITY
Start: 2022-08-04 | End: 2022-09-18 | Stop reason: HOSPADM

## 2022-08-23 RX ORDER — ASPIRIN 81 MG/1
81 TABLET ORAL DAILY
Start: 2022-08-23

## 2022-08-23 NOTE — PROGRESS NOTES
Encounter Date:08/23/2022      Patient ID: Curtis Richmond is a 76 y.o. male.    Kat Donahue APRN    Chief Complaint: Atrial Fibrillation      PROBLEM LIST:  Patient Active Problem List    Diagnosis Date Noted   • History of loop recorder      Priority: High     Note Last Updated: 10/25/2021     CURRENTLY HAS [employer] LOOP RECORDER S/P STROKE, MONITORED BY DR GONG      • PAF (paroxysmal atrial fibrillation) ([facility]) 01/01/2014     Priority: High     Note Last Updated: 11/10/2021     · Echocardiogram, 2/10/2021: EF 60%.  Normal LV systolic function diastolic dysfunction calcified aortic root and valve with normal aortic velocities and otherwise normal valvular morphology.  Left atrium 3.9 cm  · Left atrial appendage closure with 20 mm watchman FLX device, 11-9-21     • Presence of Watchman left atrial appendage closure device 02/22/2022     Priority: Medium     Note Last Updated: 2/22/2022     · KIMI 12-22-21: There is a 20 mm watchman FLX left atrial appendage occlusion device in place. The device appears well-seated. There is no significant peridevice flow.     • Stroke ([facility])      Priority: Medium   • Hyperlipidemia      Priority: Low   • At risk for central sleep apnea      Priority: Low     Note Last Updated: 10/25/2021     STOP BANG 6     • Hypertension      Priority: Low   • Wheelchair bound    • COPD (chronic obstructive pulmonary disease) ([facility])      Note Last Updated: 10/25/2021     EMPYSEMA- NO INHALERS     • History of renal pelvis cancer 08/13/2021   • Bladder cancer ([facility]) 08/13/2021   • Cerebral aneurysm, nonruptured    • Arthritis 01/01/2014               History of Present Illness  Patient presents today for follow-up with a history of paroxysmal atrial fibrillation status post left atrial appendage occlusion.  He returns today for scheduled follow-up.  He has had no awareness of palpitations no dizziness or syncope.  He has not been checking his blood pressure regularly  "at home.  He has no orthopnea no PND no lower extremity edema.  He said no dizziness or syncope.  He remains on Plavix 75 mg daily and full dose aspirin.  On reviewing his records he was on Plavix prior to full anticoagulation with Eliquis due to history of CVA.    No Known Allergies    Current Outpatient Medications   Medication Instructions   • acetaminophen (TYLENOL) 500 mg, Oral, Every 6 Hours PRN   • amLODIPine (NORVASC) 2.5 mg, Oral, Every Evening   • aspirin 325 mg, Oral, Daily   • atorvastatin (LIPITOR) 40 mg, Oral, Nightly   • carvedilol (COREG) 6.25 mg, Oral, 2 Times Daily   • clopidogrel (PLAVIX) 75 mg, Oral, Daily   • gabapentin (NEURONTIN) 400 mg, Oral, Every Evening        • loratadine (CLARITIN) 10 mg, Oral, Daily        • losartan-hydrochlorothiazide (HYZAAR) 100-12.5 MG per tablet 1 tablet, Oral, Daily       .    Objective:     /72 (BP Location: Right arm, Patient Position: Sitting)   Pulse 74   Ht 165.1 cm (65\")   Wt 109 kg (240 lb)   SpO2 96%   BMI 39.94 kg/m²    Body mass index is 39.94 kg/m².     Vitals reviewed.   Constitutional:       Appearance: Well-developed.   Pulmonary:      Effort: Pulmonary effort is normal. No respiratory distress.      Breath sounds: Normal breath sounds. No wheezing. No rales.      Comments: Bases clear  Chest:      Chest wall: Not tender to palpatation.   Cardiovascular:      Normal rate. Regular rhythm.      Murmurs: There is no murmur.      No gallop. No click. No rub.   Pulses:     Intact distal pulses.   Edema:     Peripheral edema absent.   Musculoskeletal: Normal range of motion.       Lab Review:                           Procedures               Assessment:      Diagnosis Plan   1. PAF (paroxysmal atrial fibrillation) ([facility])  Maintaining sinus rhythm, continue beta-blockade   2. Presence of Watchman left atrial appendage closure device   well-positioned device without peridevice leakage.  No current indication for chronic anticoagulation such as " Eliquis, Xarelto, Pradaxa or warfarin.  I reduced his aspirin to 81 mg daily.  As far as his watchman device is concerned he has no current indication for Plavix however, he was on Plavix due to history of CVA prior to diagnosis of A. fib.  He has no current issues with bleeding or significant bruising.  I would defer to neurology regarding further Plavix indication.   3. Primary hypertension   appears well managed, continue current medical regimen     Plan:     Stable cardiac status.  Continue current medications.   in 1 year, sooner as needed.  Thank you for allowing us to participate in the care of your patient.     Electronically signed by MARIA ANTONIA Joseph, 08/23/22, 11:07 AM EDT.

## 2022-09-14 ENCOUNTER — APPOINTMENT (OUTPATIENT)
Dept: GENERAL RADIOLOGY | Facility: HOSPITAL | Age: 77
End: 2022-09-14

## 2022-09-14 ENCOUNTER — HOSPITAL ENCOUNTER (INPATIENT)
Facility: HOSPITAL | Age: 77
LOS: 3 days | Discharge: HOME | End: 2022-09-18
Attending: EMERGENCY MEDICINE | Admitting: FAMILY MEDICINE

## 2022-09-14 DIAGNOSIS — R53.1 WEAKNESS: ICD-10-CM

## 2022-09-14 DIAGNOSIS — J18.9 MULTIFOCAL PNEUMONIA: ICD-10-CM

## 2022-09-14 DIAGNOSIS — R41.82 ALTERED MENTAL STATUS, UNSPECIFIED ALTERED MENTAL STATUS TYPE: ICD-10-CM

## 2022-09-14 DIAGNOSIS — E87.1 HYPONATREMIA: Primary | ICD-10-CM

## 2022-09-14 LAB
ALBUMIN SERPL-MCNC: 3.2 G/DL (ref 3.5–5.2)
ALBUMIN/GLOB SERPL: 1 G/DL
ALP SERPL-CCNC: 93 U/L (ref 39–117)
ALT SERPL W P-5'-P-CCNC: 18 U/L (ref 1–41)
ANION GAP SERPL CALCULATED.3IONS-SCNC: 10.4 MMOL/L (ref 5–15)
ANISOCYTOSIS BLD QL: NORMAL
AST SERPL-CCNC: 25 U/L (ref 1–40)
BACTERIA UR QL AUTO: ABNORMAL /HPF
BASE EXCESS BLDV CALC-SCNC: 4.6 MMOL/L (ref -2–2)
BASOPHILS # BLD AUTO: 0.02 10*3/MM3 (ref 0–0.2)
BASOPHILS NFR BLD AUTO: 0.2 % (ref 0–1.5)
BDY SITE: ABNORMAL
BILIRUB SERPL-MCNC: 0.6 MG/DL (ref 0–1.2)
BILIRUB UR QL STRIP: NEGATIVE
BUN SERPL-MCNC: 10 MG/DL (ref 8–23)
BUN/CREAT SERPL: 12.5 (ref 7–25)
CA-I BLDA-SCNC: 1.06 MMOL/L (ref 1.13–1.32)
CALCIUM SPEC-SCNC: 8.4 MG/DL (ref 8.6–10.5)
CHLORIDE BLDV-SCNC: 78 MMOL/L (ref 98–106)
CHLORIDE SERPL-SCNC: 78 MMOL/L (ref 98–107)
CLARITY UR: CLEAR
CO2 SERPL-SCNC: 23.6 MMOL/L (ref 22–29)
COHGB MFR BLD: 1.7 % (ref 0–1.5)
COLOR UR: YELLOW
CREAT SERPL-MCNC: 0.8 MG/DL (ref 0.76–1.27)
DEPRECATED RDW RBC AUTO: 33.3 FL (ref 37–54)
EGFRCR SERPLBLD CKD-EPI 2021: 92.3 ML/MIN/1.73
EOSINOPHIL # BLD AUTO: 0.23 10*3/MM3 (ref 0–0.4)
EOSINOPHIL NFR BLD AUTO: 2.6 % (ref 0.3–6.2)
ERYTHROCYTE [DISTWIDTH] IN BLOOD BY AUTOMATED COUNT: 12.4 % (ref 12.3–15.4)
FHHB: 12.4 % (ref 0–5)
FLUAV AG NPH QL: NEGATIVE
FLUBV AG NPH QL IA: NEGATIVE
GAS FLOW AIRWAY: ABNORMAL L/MIN
GLOBULIN UR ELPH-MCNC: 3.2 GM/DL
GLUCOSE BLDA-MCNC: 106 MMOL/L (ref 70–99)
GLUCOSE BLDC GLUCOMTR-MCNC: 142 MG/DL (ref 70–99)
GLUCOSE SERPL-MCNC: 149 MG/DL (ref 65–99)
GLUCOSE UR STRIP-MCNC: NEGATIVE MG/DL
HCO3 BLDV-SCNC: 30 MMOL/L (ref 22–26)
HCT VFR BLD AUTO: 34.9 % (ref 37.5–51)
HGB BLD-MCNC: 12.5 G/DL (ref 13–17.7)
HGB BLDA-MCNC: 14.2 G/DL (ref 13.8–16.4)
HGB UR QL STRIP.AUTO: NEGATIVE
HOLD SPECIMEN: NORMAL
HOLD SPECIMEN: NORMAL
HYALINE CASTS UR QL AUTO: ABNORMAL /LPF
IMM GRANULOCYTES # BLD AUTO: 0.04 10*3/MM3 (ref 0–0.05)
IMM GRANULOCYTES NFR BLD AUTO: 0.5 % (ref 0–0.5)
INHALED O2 CONCENTRATION: 21 %
KETONES UR QL STRIP: ABNORMAL
LACTATE BLDA-SCNC: 0.96 MMOL/L (ref 0.5–2)
LARGE PLATELETS: NORMAL
LEUKOCYTE ESTERASE UR QL STRIP.AUTO: NEGATIVE
LYMPHOCYTES # BLD AUTO: 1.22 10*3/MM3 (ref 0.7–3.1)
LYMPHOCYTES NFR BLD AUTO: 13.8 % (ref 19.6–45.3)
MAGNESIUM SERPL-MCNC: 1.9 MG/DL (ref 1.6–2.4)
MCH RBC QN AUTO: 26.8 PG (ref 26.6–33)
MCHC RBC AUTO-ENTMCNC: 35.8 G/DL (ref 31.5–35.7)
MCV RBC AUTO: 74.9 FL (ref 79–97)
METHGB BLD QL: 0.2 % (ref 0–1.5)
MICROCYTES BLD QL: NORMAL
MODALITY: ABNORMAL
MONOCYTES # BLD AUTO: 0.74 10*3/MM3 (ref 0.1–0.9)
MONOCYTES NFR BLD AUTO: 8.4 % (ref 5–12)
NEUTROPHILS NFR BLD AUTO: 6.56 10*3/MM3 (ref 1.7–7)
NEUTROPHILS NFR BLD AUTO: 74.5 % (ref 42.7–76)
NITRITE UR QL STRIP: NEGATIVE
NOTE: ABNORMAL
NRBC BLD AUTO-RTO: 0 /100 WBC (ref 0–0.2)
NT-PROBNP SERPL-MCNC: 921 PG/ML (ref 0–1800)
OXYHGB MFR BLDV: 85.7 % (ref 94–99)
PCO2 BLDV: 47.7 MM HG (ref 41–51)
PH BLDV: 7.42 PH UNITS (ref 7.31–7.41)
PH UR STRIP.AUTO: 7 [PH] (ref 5–8)
PLATELET # BLD AUTO: 363 10*3/MM3 (ref 140–450)
PMV BLD AUTO: 9.8 FL (ref 6–12)
PO2 BLDV: 52.5 MM HG (ref 35–42)
POTASSIUM BLDV-SCNC: 3.9 MMOL/L (ref 3.5–5)
POTASSIUM SERPL-SCNC: 3.8 MMOL/L (ref 3.5–5.2)
PROT SERPL-MCNC: 6.4 G/DL (ref 6–8.5)
PROT UR QL STRIP: ABNORMAL
RBC # BLD AUTO: 4.66 10*6/MM3 (ref 4.14–5.8)
RBC # UR STRIP: ABNORMAL /HPF
REF LAB TEST METHOD: ABNORMAL
SAO2 % BLDCOV: 87.4 % (ref 45–75)
SODIUM BLDV-SCNC: 110.3 MMOL/L (ref 136–146)
SODIUM SERPL-SCNC: 112 MMOL/L (ref 136–145)
SP GR UR STRIP: 1.01 (ref 1–1.03)
SQUAMOUS #/AREA URNS HPF: ABNORMAL /HPF
TROPONIN T SERPL-MCNC: <0.01 NG/ML (ref 0–0.03)
UROBILINOGEN UR QL STRIP: ABNORMAL
WBC # UR STRIP: ABNORMAL /HPF
WBC MORPH BLD: NORMAL
WBC NRBC COR # BLD: 8.81 10*3/MM3 (ref 3.4–10.8)
WHOLE BLOOD HOLD SPECIMEN: NORMAL

## 2022-09-14 PROCEDURE — 25010000002 CEFTRIAXONE PER 250 MG: Performed by: EMERGENCY MEDICINE

## 2022-09-14 PROCEDURE — 99285 EMERGENCY DEPT VISIT HI MDM: CPT

## 2022-09-14 PROCEDURE — U0004 COV-19 TEST NON-CDC HGH THRU: HCPCS | Performed by: EMERGENCY MEDICINE

## 2022-09-14 PROCEDURE — 93005 ELECTROCARDIOGRAM TRACING: CPT

## 2022-09-14 PROCEDURE — 83735 ASSAY OF MAGNESIUM: CPT | Performed by: EMERGENCY MEDICINE

## 2022-09-14 PROCEDURE — 93010 ELECTROCARDIOGRAM REPORT: CPT | Performed by: INTERNAL MEDICINE

## 2022-09-14 PROCEDURE — 83880 ASSAY OF NATRIURETIC PEPTIDE: CPT | Performed by: EMERGENCY MEDICINE

## 2022-09-14 PROCEDURE — 84484 ASSAY OF TROPONIN QUANT: CPT | Performed by: EMERGENCY MEDICINE

## 2022-09-14 PROCEDURE — 83930 ASSAY OF BLOOD OSMOLALITY: CPT | Performed by: EMERGENCY MEDICINE

## 2022-09-14 PROCEDURE — 85007 BL SMEAR W/DIFF WBC COUNT: CPT | Performed by: EMERGENCY MEDICINE

## 2022-09-14 PROCEDURE — 87040 BLOOD CULTURE FOR BACTERIA: CPT | Performed by: FAMILY MEDICINE

## 2022-09-14 PROCEDURE — 87449 NOS EACH ORGANISM AG IA: CPT | Performed by: PHYSICIAN ASSISTANT

## 2022-09-14 PROCEDURE — 87040 BLOOD CULTURE FOR BACTERIA: CPT | Performed by: EMERGENCY MEDICINE

## 2022-09-14 PROCEDURE — 71045 X-RAY EXAM CHEST 1 VIEW: CPT

## 2022-09-14 PROCEDURE — 85025 COMPLETE CBC W/AUTO DIFF WBC: CPT | Performed by: EMERGENCY MEDICINE

## 2022-09-14 PROCEDURE — 82805 BLOOD GASES W/O2 SATURATION: CPT | Performed by: EMERGENCY MEDICINE

## 2022-09-14 PROCEDURE — 87804 INFLUENZA ASSAY W/OPTIC: CPT | Performed by: EMERGENCY MEDICINE

## 2022-09-14 PROCEDURE — 25010000002 AZITHROMYCIN PER 500 MG: Performed by: EMERGENCY MEDICINE

## 2022-09-14 PROCEDURE — 80053 COMPREHEN METABOLIC PANEL: CPT | Performed by: EMERGENCY MEDICINE

## 2022-09-14 PROCEDURE — 81001 URINALYSIS AUTO W/SCOPE: CPT | Performed by: EMERGENCY MEDICINE

## 2022-09-14 PROCEDURE — 87899 AGENT NOS ASSAY W/OPTIC: CPT | Performed by: PHYSICIAN ASSISTANT

## 2022-09-14 PROCEDURE — 82962 GLUCOSE BLOOD TEST: CPT

## 2022-09-14 PROCEDURE — 93005 ELECTROCARDIOGRAM TRACING: CPT | Performed by: EMERGENCY MEDICINE

## 2022-09-14 PROCEDURE — 84300 ASSAY OF URINE SODIUM: CPT | Performed by: EMERGENCY MEDICINE

## 2022-09-14 PROCEDURE — 82820 HEMOGLOBIN-OXYGEN AFFINITY: CPT | Performed by: EMERGENCY MEDICINE

## 2022-09-14 PROCEDURE — 83605 ASSAY OF LACTIC ACID: CPT | Performed by: EMERGENCY MEDICINE

## 2022-09-14 RX ORDER — SODIUM CHLORIDE 0.9 % (FLUSH) 0.9 %
10 SYRINGE (ML) INJECTION AS NEEDED
Status: DISCONTINUED | OUTPATIENT
Start: 2022-09-14 | End: 2022-09-18 | Stop reason: HOSPADM

## 2022-09-14 RX ORDER — CEFTRIAXONE SODIUM 1 G/50ML
1 INJECTION, SOLUTION INTRAVENOUS ONCE
Status: COMPLETED | OUTPATIENT
Start: 2022-09-14 | End: 2022-09-14

## 2022-09-14 RX ADMIN — CEFTRIAXONE SODIUM 1 G: 1 INJECTION, SOLUTION INTRAVENOUS at 23:38

## 2022-09-14 RX ADMIN — SODIUM CHLORIDE 1000 ML: 9 INJECTION, SOLUTION INTRAVENOUS at 23:18

## 2022-09-14 RX ADMIN — AZITHROMYCIN 500 MG: 500 INJECTION, POWDER, LYOPHILIZED, FOR SOLUTION INTRAVENOUS at 23:58

## 2022-09-15 ENCOUNTER — APPOINTMENT (OUTPATIENT)
Dept: CT IMAGING | Facility: HOSPITAL | Age: 77
End: 2022-09-15

## 2022-09-15 PROBLEM — G93.41 METABOLIC ENCEPHALOPATHY: Status: ACTIVE | Noted: 2022-09-15

## 2022-09-15 PROBLEM — J41.0 SIMPLE CHRONIC BRONCHITIS: Status: ACTIVE | Noted: 2022-09-15

## 2022-09-15 PROBLEM — E66.01 MORBID OBESITY: Status: ACTIVE | Noted: 2022-09-15

## 2022-09-15 PROBLEM — J96.01 ACUTE RESPIRATORY FAILURE WITH HYPOXIA: Status: ACTIVE | Noted: 2022-09-15

## 2022-09-15 PROBLEM — J18.9 CAP (COMMUNITY ACQUIRED PNEUMONIA): Status: ACTIVE | Noted: 2022-09-15

## 2022-09-15 PROBLEM — E87.1 HYPONATREMIA: Status: ACTIVE | Noted: 2022-09-15

## 2022-09-15 LAB
ALBUMIN SERPL-MCNC: 3.4 G/DL (ref 3.5–5.2)
ALBUMIN/GLOB SERPL: 1.1 G/DL
ALP SERPL-CCNC: 93 U/L (ref 39–117)
ALT SERPL W P-5'-P-CCNC: 20 U/L (ref 1–41)
ANION GAP SERPL CALCULATED.3IONS-SCNC: 7 MMOL/L (ref 5–15)
ANION GAP SERPL CALCULATED.3IONS-SCNC: 7.3 MMOL/L (ref 5–15)
ANION GAP SERPL CALCULATED.3IONS-SCNC: 8.2 MMOL/L (ref 5–15)
ANION GAP SERPL CALCULATED.3IONS-SCNC: 8.6 MMOL/L (ref 5–15)
AST SERPL-CCNC: 20 U/L (ref 1–40)
BASOPHILS # BLD AUTO: 0.02 10*3/MM3 (ref 0–0.2)
BASOPHILS NFR BLD AUTO: 0.2 % (ref 0–1.5)
BILIRUB SERPL-MCNC: 0.5 MG/DL (ref 0–1.2)
BUN SERPL-MCNC: 10 MG/DL (ref 8–23)
BUN SERPL-MCNC: 9 MG/DL (ref 8–23)
BUN/CREAT SERPL: 10.3 (ref 7–25)
BUN/CREAT SERPL: 10.5 (ref 7–25)
BUN/CREAT SERPL: 10.6 (ref 7–25)
BUN/CREAT SERPL: 11.3 (ref 7–25)
CALCIUM SPEC-SCNC: 8.4 MG/DL (ref 8.6–10.5)
CALCIUM SPEC-SCNC: 8.5 MG/DL (ref 8.6–10.5)
CHLORIDE SERPL-SCNC: 80 MMOL/L (ref 98–107)
CHLORIDE SERPL-SCNC: 81 MMOL/L (ref 98–107)
CHLORIDE SERPL-SCNC: 81 MMOL/L (ref 98–107)
CHLORIDE SERPL-SCNC: 82 MMOL/L (ref 98–107)
CO2 SERPL-SCNC: 27.4 MMOL/L (ref 22–29)
CO2 SERPL-SCNC: 28 MMOL/L (ref 22–29)
CO2 SERPL-SCNC: 28.7 MMOL/L (ref 22–29)
CO2 SERPL-SCNC: 28.8 MMOL/L (ref 22–29)
CREAT SERPL-MCNC: 0.8 MG/DL (ref 0.76–1.27)
CREAT SERPL-MCNC: 0.85 MG/DL (ref 0.76–1.27)
CREAT SERPL-MCNC: 0.86 MG/DL (ref 0.76–1.27)
CREAT SERPL-MCNC: 0.97 MG/DL (ref 0.76–1.27)
CRP SERPL-MCNC: 0.46 MG/DL (ref 0–0.5)
D DIMER PPP FEU-MCNC: 1.4 MCGFEU/ML (ref 0–0.57)
D-LACTATE SERPL-SCNC: 0.8 MMOL/L (ref 0.5–2)
DEPRECATED RDW RBC AUTO: 33.2 FL (ref 37–54)
EGFRCR SERPLBLD CKD-EPI 2021: 81.4 ML/MIN/1.73
EGFRCR SERPLBLD CKD-EPI 2021: 90.3 ML/MIN/1.73
EGFRCR SERPLBLD CKD-EPI 2021: 90.6 ML/MIN/1.73
EGFRCR SERPLBLD CKD-EPI 2021: 92.3 ML/MIN/1.73
EOSINOPHIL # BLD AUTO: 0.22 10*3/MM3 (ref 0–0.4)
EOSINOPHIL NFR BLD AUTO: 2.1 % (ref 0.3–6.2)
ERYTHROCYTE [DISTWIDTH] IN BLOOD BY AUTOMATED COUNT: 12.3 % (ref 12.3–15.4)
FERRITIN SERPL-MCNC: 166.6 NG/ML (ref 30–400)
GLOBULIN UR ELPH-MCNC: 3 GM/DL
GLUCOSE SERPL-MCNC: 105 MG/DL (ref 65–99)
GLUCOSE SERPL-MCNC: 112 MG/DL (ref 65–99)
GLUCOSE SERPL-MCNC: 134 MG/DL (ref 65–99)
GLUCOSE SERPL-MCNC: 87 MG/DL (ref 65–99)
HCT VFR BLD AUTO: 34.8 % (ref 37.5–51)
HGB BLD-MCNC: 12.5 G/DL (ref 13–17.7)
IMM GRANULOCYTES # BLD AUTO: 0.03 10*3/MM3 (ref 0–0.05)
IMM GRANULOCYTES NFR BLD AUTO: 0.3 % (ref 0–0.5)
L PNEUMO1 AG UR QL IA: NEGATIVE
LYMPHOCYTES # BLD AUTO: 1.47 10*3/MM3 (ref 0.7–3.1)
LYMPHOCYTES NFR BLD AUTO: 14.4 % (ref 19.6–45.3)
MAGNESIUM SERPL-MCNC: 1.9 MG/DL (ref 1.6–2.4)
MCH RBC QN AUTO: 26.8 PG (ref 26.6–33)
MCHC RBC AUTO-ENTMCNC: 35.9 G/DL (ref 31.5–35.7)
MCV RBC AUTO: 74.5 FL (ref 79–97)
MONOCYTES # BLD AUTO: 1.06 10*3/MM3 (ref 0.1–0.9)
MONOCYTES NFR BLD AUTO: 10.4 % (ref 5–12)
NEUTROPHILS NFR BLD AUTO: 7.44 10*3/MM3 (ref 1.7–7)
NEUTROPHILS NFR BLD AUTO: 72.6 % (ref 42.7–76)
NRBC BLD AUTO-RTO: 0 /100 WBC (ref 0–0.2)
OSMOLALITY SERPL: 243 MOSM/KG (ref 280–301)
OSMOLALITY UR: 318 MOSM/KG (ref 50–1400)
PHOSPHATE SERPL-MCNC: 2.9 MG/DL (ref 2.5–4.5)
PLATELET # BLD AUTO: 348 10*3/MM3 (ref 140–450)
PMV BLD AUTO: 9.2 FL (ref 6–12)
POTASSIUM SERPL-SCNC: 3.6 MMOL/L (ref 3.5–5.2)
POTASSIUM SERPL-SCNC: 3.8 MMOL/L (ref 3.5–5.2)
PROCALCITONIN SERPL-MCNC: 0.15 NG/ML (ref 0–0.25)
PROT ?TM UR-MCNC: 9.8 MG/DL
PROT SERPL-MCNC: 6.4 G/DL (ref 6–8.5)
RBC # BLD AUTO: 4.67 10*6/MM3 (ref 4.14–5.8)
S PNEUM AG SPEC QL LA: NEGATIVE
SARS-COV-2 RNA PNL SPEC NAA+PROBE: NOT DETECTED
SODIUM SERPL-SCNC: 114 MMOL/L (ref 136–145)
SODIUM SERPL-SCNC: 115 MMOL/L (ref 136–145)
SODIUM SERPL-SCNC: 117 MMOL/L (ref 136–145)
SODIUM SERPL-SCNC: 118 MMOL/L (ref 136–145)
SODIUM SERPL-SCNC: 118 MMOL/L (ref 136–145)
SODIUM UR-SCNC: 62 MMOL/L
SODIUM UR-SCNC: 77 MMOL/L
TSH SERPL DL<=0.05 MIU/L-ACNC: 2.92 UIU/ML (ref 0.27–4.2)
UUN 24H UR-MCNC: 249 MG/DL
WBC NRBC COR # BLD: 10.24 10*3/MM3 (ref 3.4–10.8)

## 2022-09-15 PROCEDURE — 83735 ASSAY OF MAGNESIUM: CPT | Performed by: PHYSICIAN ASSISTANT

## 2022-09-15 PROCEDURE — 86140 C-REACTIVE PROTEIN: CPT | Performed by: FAMILY MEDICINE

## 2022-09-15 PROCEDURE — 25010000002 MAGNESIUM SULFATE IN D5W 1G/100ML (PREMIX) 1-5 GM/100ML-% SOLUTION: Performed by: PHYSICIAN ASSISTANT

## 2022-09-15 PROCEDURE — 99291 CRITICAL CARE FIRST HOUR: CPT | Performed by: INTERNAL MEDICINE

## 2022-09-15 PROCEDURE — 85025 COMPLETE CBC W/AUTO DIFF WBC: CPT | Performed by: FAMILY MEDICINE

## 2022-09-15 PROCEDURE — 99223 1ST HOSP IP/OBS HIGH 75: CPT | Performed by: FAMILY MEDICINE

## 2022-09-15 PROCEDURE — 0 IOPAMIDOL PER 1 ML: Performed by: FAMILY MEDICINE

## 2022-09-15 PROCEDURE — 80053 COMPREHEN METABOLIC PANEL: CPT | Performed by: FAMILY MEDICINE

## 2022-09-15 PROCEDURE — 71260 CT THORAX DX C+: CPT

## 2022-09-15 PROCEDURE — 84540 ASSAY OF URINE/UREA-N: CPT | Performed by: PHYSICIAN ASSISTANT

## 2022-09-15 PROCEDURE — 84132 ASSAY OF SERUM POTASSIUM: CPT | Performed by: FAMILY MEDICINE

## 2022-09-15 PROCEDURE — 84300 ASSAY OF URINE SODIUM: CPT | Performed by: INTERNAL MEDICINE

## 2022-09-15 PROCEDURE — 84145 PROCALCITONIN (PCT): CPT | Performed by: FAMILY MEDICINE

## 2022-09-15 PROCEDURE — 84295 ASSAY OF SERUM SODIUM: CPT | Performed by: FAMILY MEDICINE

## 2022-09-15 PROCEDURE — 70450 CT HEAD/BRAIN W/O DYE: CPT

## 2022-09-15 PROCEDURE — 85379 FIBRIN DEGRADATION QUANT: CPT | Performed by: FAMILY MEDICINE

## 2022-09-15 PROCEDURE — 84443 ASSAY THYROID STIM HORMONE: CPT | Performed by: INTERNAL MEDICINE

## 2022-09-15 PROCEDURE — 84100 ASSAY OF PHOSPHORUS: CPT | Performed by: PHYSICIAN ASSISTANT

## 2022-09-15 PROCEDURE — 25010000002 FUROSEMIDE PER 20 MG: Performed by: FAMILY MEDICINE

## 2022-09-15 PROCEDURE — 25010000002 ENOXAPARIN PER 10 MG: Performed by: FAMILY MEDICINE

## 2022-09-15 PROCEDURE — 36415 COLL VENOUS BLD VENIPUNCTURE: CPT | Performed by: FAMILY MEDICINE

## 2022-09-15 PROCEDURE — 82728 ASSAY OF FERRITIN: CPT | Performed by: FAMILY MEDICINE

## 2022-09-15 PROCEDURE — 84156 ASSAY OF PROTEIN URINE: CPT | Performed by: PHYSICIAN ASSISTANT

## 2022-09-15 PROCEDURE — 94799 UNLISTED PULMONARY SVC/PX: CPT

## 2022-09-15 PROCEDURE — 83935 ASSAY OF URINE OSMOLALITY: CPT | Performed by: INTERNAL MEDICINE

## 2022-09-15 RX ORDER — CARVEDILOL 6.25 MG/1
6.25 TABLET ORAL 2 TIMES DAILY
Status: DISCONTINUED | OUTPATIENT
Start: 2022-09-15 | End: 2022-09-18 | Stop reason: HOSPADM

## 2022-09-15 RX ORDER — ATORVASTATIN CALCIUM 40 MG/1
40 TABLET, FILM COATED ORAL NIGHTLY
Status: DISCONTINUED | OUTPATIENT
Start: 2022-09-15 | End: 2022-09-18 | Stop reason: HOSPADM

## 2022-09-15 RX ORDER — CHOLECALCIFEROL (VITAMIN D3) 125 MCG
5 CAPSULE ORAL NIGHTLY PRN
Status: DISCONTINUED | OUTPATIENT
Start: 2022-09-15 | End: 2022-09-18 | Stop reason: HOSPADM

## 2022-09-15 RX ORDER — SODIUM CHLORIDE 9 MG/ML
40 INJECTION, SOLUTION INTRAVENOUS AS NEEDED
Status: DISCONTINUED | OUTPATIENT
Start: 2022-09-15 | End: 2022-09-18 | Stop reason: HOSPADM

## 2022-09-15 RX ORDER — ACETAMINOPHEN 650 MG/1
650 SUPPOSITORY RECTAL EVERY 4 HOURS PRN
Status: DISCONTINUED | OUTPATIENT
Start: 2022-09-15 | End: 2022-09-18 | Stop reason: HOSPADM

## 2022-09-15 RX ORDER — CETIRIZINE HYDROCHLORIDE 10 MG/1
10 TABLET ORAL DAILY
Status: DISCONTINUED | OUTPATIENT
Start: 2022-09-15 | End: 2022-09-18 | Stop reason: HOSPADM

## 2022-09-15 RX ORDER — POTASSIUM CHLORIDE 750 MG/1
40 CAPSULE, EXTENDED RELEASE ORAL ONCE
Status: COMPLETED | OUTPATIENT
Start: 2022-09-15 | End: 2022-09-15

## 2022-09-15 RX ORDER — ACETAMINOPHEN 160 MG/5ML
650 SOLUTION ORAL EVERY 4 HOURS PRN
Status: DISCONTINUED | OUTPATIENT
Start: 2022-09-15 | End: 2022-09-18 | Stop reason: HOSPADM

## 2022-09-15 RX ORDER — POLYETHYLENE GLYCOL 3350 17 G/17G
17 POWDER, FOR SOLUTION ORAL DAILY PRN
Status: DISCONTINUED | OUTPATIENT
Start: 2022-09-15 | End: 2022-09-18 | Stop reason: HOSPADM

## 2022-09-15 RX ORDER — AMLODIPINE BESYLATE 2.5 MG/1
2.5 TABLET ORAL EVERY EVENING
Status: DISCONTINUED | OUTPATIENT
Start: 2022-09-15 | End: 2022-09-17

## 2022-09-15 RX ORDER — AMOXICILLIN 250 MG
2 CAPSULE ORAL 2 TIMES DAILY
Status: DISCONTINUED | OUTPATIENT
Start: 2022-09-15 | End: 2022-09-18 | Stop reason: HOSPADM

## 2022-09-15 RX ORDER — SODIUM CHLORIDE 0.9 % (FLUSH) 0.9 %
10 SYRINGE (ML) INJECTION EVERY 12 HOURS SCHEDULED
Status: DISCONTINUED | OUTPATIENT
Start: 2022-09-15 | End: 2022-09-18 | Stop reason: HOSPADM

## 2022-09-15 RX ORDER — ACETAMINOPHEN 325 MG/1
650 TABLET ORAL EVERY 4 HOURS PRN
Status: DISCONTINUED | OUTPATIENT
Start: 2022-09-15 | End: 2022-09-18 | Stop reason: HOSPADM

## 2022-09-15 RX ORDER — DEXTROSE MONOHYDRATE 50 MG/ML
6 INJECTION, SOLUTION INTRAVENOUS CONTINUOUS PRN
Status: DISCONTINUED | OUTPATIENT
Start: 2022-09-15 | End: 2022-09-16

## 2022-09-15 RX ORDER — CEFTRIAXONE SODIUM 2 G/50ML
2 INJECTION, SOLUTION INTRAVENOUS EVERY 24 HOURS
Status: DISCONTINUED | OUTPATIENT
Start: 2022-09-15 | End: 2022-09-15

## 2022-09-15 RX ORDER — ASPIRIN 81 MG/1
81 TABLET ORAL DAILY
Status: DISCONTINUED | OUTPATIENT
Start: 2022-09-15 | End: 2022-09-18 | Stop reason: HOSPADM

## 2022-09-15 RX ORDER — BISACODYL 5 MG/1
5 TABLET, DELAYED RELEASE ORAL DAILY PRN
Status: DISCONTINUED | OUTPATIENT
Start: 2022-09-15 | End: 2022-09-18 | Stop reason: HOSPADM

## 2022-09-15 RX ORDER — GABAPENTIN 400 MG/1
400 CAPSULE ORAL EVERY EVENING
Status: DISCONTINUED | OUTPATIENT
Start: 2022-09-15 | End: 2022-09-18 | Stop reason: HOSPADM

## 2022-09-15 RX ORDER — FUROSEMIDE 10 MG/ML
20 INJECTION INTRAMUSCULAR; INTRAVENOUS ONCE
Status: COMPLETED | OUTPATIENT
Start: 2022-09-15 | End: 2022-09-15

## 2022-09-15 RX ORDER — ONDANSETRON 2 MG/ML
4 INJECTION INTRAMUSCULAR; INTRAVENOUS EVERY 6 HOURS PRN
Status: DISCONTINUED | OUTPATIENT
Start: 2022-09-15 | End: 2022-09-18 | Stop reason: HOSPADM

## 2022-09-15 RX ORDER — SODIUM CHLORIDE 0.9 % (FLUSH) 0.9 %
10 SYRINGE (ML) INJECTION AS NEEDED
Status: DISCONTINUED | OUTPATIENT
Start: 2022-09-15 | End: 2022-09-18 | Stop reason: HOSPADM

## 2022-09-15 RX ORDER — MAGNESIUM SULFATE 1 G/100ML
1 INJECTION INTRAVENOUS ONCE
Status: COMPLETED | OUTPATIENT
Start: 2022-09-15 | End: 2022-09-15

## 2022-09-15 RX ORDER — BISACODYL 10 MG
10 SUPPOSITORY, RECTAL RECTAL DAILY PRN
Status: DISCONTINUED | OUTPATIENT
Start: 2022-09-15 | End: 2022-09-18 | Stop reason: HOSPADM

## 2022-09-15 RX ORDER — ENOXAPARIN SODIUM 100 MG/ML
40 INJECTION SUBCUTANEOUS EVERY 24 HOURS
Status: DISCONTINUED | OUTPATIENT
Start: 2022-09-15 | End: 2022-09-18 | Stop reason: HOSPADM

## 2022-09-15 RX ORDER — DESMOPRESSIN ACETATE 4 UG/ML
2 INJECTION, SOLUTION INTRAVENOUS; SUBCUTANEOUS EVERY 6 HOURS PRN
Status: DISCONTINUED | OUTPATIENT
Start: 2022-09-15 | End: 2022-09-16

## 2022-09-15 RX ORDER — CLOPIDOGREL BISULFATE 75 MG/1
75 TABLET ORAL DAILY
Status: DISCONTINUED | OUTPATIENT
Start: 2022-09-15 | End: 2022-09-18 | Stop reason: HOSPADM

## 2022-09-15 RX ORDER — IPRATROPIUM BROMIDE AND ALBUTEROL SULFATE 2.5; .5 MG/3ML; MG/3ML
3 SOLUTION RESPIRATORY (INHALATION) EVERY 4 HOURS PRN
Status: DISCONTINUED | OUTPATIENT
Start: 2022-09-15 | End: 2022-09-18 | Stop reason: HOSPADM

## 2022-09-15 RX ORDER — 3% SODIUM CHLORIDE 3 G/100ML
25 INJECTION, SOLUTION INTRAVENOUS CONTINUOUS
Status: DISCONTINUED | OUTPATIENT
Start: 2022-09-15 | End: 2022-09-15

## 2022-09-15 RX ORDER — ONDANSETRON 4 MG/1
4 TABLET, FILM COATED ORAL EVERY 6 HOURS PRN
Status: DISCONTINUED | OUTPATIENT
Start: 2022-09-15 | End: 2022-09-18 | Stop reason: HOSPADM

## 2022-09-15 RX ADMIN — POTASSIUM CHLORIDE 40 MEQ: 10 CAPSULE, COATED, EXTENDED RELEASE ORAL at 18:23

## 2022-09-15 RX ADMIN — CLOPIDOGREL BISULFATE 75 MG: 75 TABLET ORAL at 08:56

## 2022-09-15 RX ADMIN — Medication 10 ML: at 08:56

## 2022-09-15 RX ADMIN — ASPIRIN 81 MG: 81 TABLET, COATED ORAL at 08:56

## 2022-09-15 RX ADMIN — ATORVASTATIN CALCIUM 40 MG: 40 TABLET, FILM COATED ORAL at 21:18

## 2022-09-15 RX ADMIN — SODIUM CHLORIDE 25 ML/HR: 3 INJECTION, SOLUTION INTRAVENOUS at 01:14

## 2022-09-15 RX ADMIN — Medication 10 ML: at 21:18

## 2022-09-15 RX ADMIN — MAGNESIUM SULFATE HEPTAHYDRATE 1 G: 10 INJECTION, SOLUTION INTRAVENOUS at 10:45

## 2022-09-15 RX ADMIN — GABAPENTIN 400 MG: 400 CAPSULE ORAL at 16:18

## 2022-09-15 RX ADMIN — IOPAMIDOL 100 ML: 755 INJECTION, SOLUTION INTRAVENOUS at 04:41

## 2022-09-15 RX ADMIN — CARVEDILOL 6.25 MG: 6.25 TABLET, FILM COATED ORAL at 08:56

## 2022-09-15 RX ADMIN — FUROSEMIDE 20 MG: 10 INJECTION, SOLUTION INTRAMUSCULAR; INTRAVENOUS at 01:10

## 2022-09-15 RX ADMIN — SENNOSIDES AND DOCUSATE SODIUM 2 TABLET: 8.6; 5 TABLET ORAL at 21:18

## 2022-09-15 RX ADMIN — ENOXAPARIN SODIUM 40 MG: 100 INJECTION SUBCUTANEOUS at 08:55

## 2022-09-15 RX ADMIN — CARVEDILOL 6.25 MG: 6.25 TABLET, FILM COATED ORAL at 21:18

## 2022-09-15 RX ADMIN — CETIRIZINE HYDROCHLORIDE 10 MG: 10 TABLET, FILM COATED ORAL at 08:56

## 2022-09-15 RX ADMIN — AMLODIPINE BESYLATE 2.5 MG: 2.5 TABLET ORAL at 16:18

## 2022-09-15 NOTE — PAYOR COMM NOTE
"Curtis Richmond (77 y.o. Male)             Date of Birth   1945    Social Security Number       Address   [address] [address]    Home Phone   [phone]    MRN   [id number]       [employer]   [facility] [place]    Marital Status                               Admission Date   9/14/22    Admission Type   Emergency    Admitting Provider   Nitesh Briggs MD    Attending Provider   Geoff Leonard DO    Department, Room/Bed   [facility] CORONARY CARE UNIT, C07/1       Discharge Date       Discharge Disposition       Discharge Destination                               Attending Provider: Geoff Leonard DO    Allergies: No Known Allergies    Isolation: None   Infection: None   Code Status: CPR   Advance Care Planning Activity    Ht: 165.1 cm (65\")   Wt: 112 kg (246 lb 11.1 oz)    Admission Cmt: None   Principal Problem: Hyponatremia [E87.1]                 Active Insurance as of 9/14/2022     Primary Coverage     Payor Plan Insurance Group Employer/Plan Group    ANTHEM MEDICARE REPLACEMENT ANTHEM MEDICARE ADVANTAGE [id number]     Payor Plan Address Payor Plan Phone Number Payor Plan Fax Number Effective Dates    [address] [phone]  1/1/2022 - None Entered    [place] [address]       Subscriber Name Subscriber Birth Date Member ID       CURTIS RICHMOND 1945 [id number]                 Emergency Contacts      (Rel.) Home Phone Work Phone Mobile Phone    FRANCINE RICHMOND (Spouse) [phone] -- [phone]            {Documentation Categories:[id number]}  "

## 2022-09-15 NOTE — PAYOR COMM NOTE
"Curtis Richmond (77 y.o. Male)             Date of Birth   1945    Social Security Number       Address   [address] [address]    Home Phone   [phone]    MRN   [id number]       [facility]    Marital Status                               Admission Date   9/14/22    Admission Type   Emergency    Admitting Provider   Nitesh Briggs MD    Attending Provider   Geoff Leonard DO    Department, Room/Bed   [facility] CORONARY CARE UNIT, C07/1       Discharge Date       Discharge Disposition       Discharge Destination                               Attending Provider: Geoff Leonard DO    Allergies: No Known Allergies    Isolation: None   Infection: None   Code Status: CPR   Advance Care Planning Activity    Ht: 165.1 cm (65\")   Wt: 112 kg (246 lb 11.1 oz)    Admission Cmt: None   Principal Problem: Hyponatremia [E87.1]                 Active Insurance as of 9/14/2022     Primary Coverage     Payor Plan Insurance Group Employer/Plan Group    ANTHEM MEDICARE REPLACEMENT ANTHEM MEDICARE ADVANTAGE [id number]     Payor Plan Address Payor Plan Phone Number Payor Plan Fax Number Effective Dates    [address] [phone]  1/1/2022 - None Entered    [facility] [address]       Subscriber Name Subscriber Birth Date Member ID       CURTIS RICHMOND 1945 [id number]                 Emergency Contacts      (Rel.) Home Phone Work Phone Mobile Phone    FRANCINE RICHMOND (Spouse) [phone] -- [phone]             Electrolyte Disorder: Common Complications and Conditions - Clinical Indications for Inpatient Care by June Miles, RN         Met: Reviewed on 9/15/2022 by June Miles, RN       Created Using Review Status Review Entered   [facility] Completed 9/15/2022 11:11       Criteria Set Name - Subset   Electrolyte Disorder: Common Complications and Conditions - Clinical Indications for Inpatient Care      Criteria Review    "   Clinical Indications for Inpatient Care    Most Recent : June Miles Most Recent Date: 9/15/2022 11:11:51 EDST    (X) Ongoing inpatient care is indicated for  1 or more  of the following  [B] (2) (3) (37) (38):       (X) Sodium [C] less than 130 mEq/L (mmol/L) (new)       9/15/2022 11:11:51 EDST by June Miles         Na+ 112       (X) Sodium [C] less than 135 mEq/L (mmol/L) with severe finding (eg, Altered mental status, seizures)    Notes:    9/15/2022 11:11:51 EDST by June Miles    Subject: Admission    Presented to ED w/weakness, confusion and cough. Spouse reports cough x i week with scheduled MD appointment on 15th. Confusion x ii days per spouse. Na+ 112. CXR w/bilateral infiltrates consistent w/Community Acquired Pneumonia. Hypertonic Saline ordered and Azithromycin to start. CT Head, sputum cultures. Nephro consult and 1000ml Fluid Restriction.              ICU Inpatient            [place]: NPI [id number] Tax ID [id number]

## 2022-09-15 NOTE — CONSULTS
Pulmonary / Critical Care Consult Note      Patient Name: Curtis Richmond  : 1945  MRN: [id number]  Primary Care Physician:  Kat Donahue APRN  Referring Physician: Geoff Leonard DO  Date of admission: 2022    Subjective   Subjective     Reason for Consult/ Chief Complaint:   Hyponatremia    HPI:  Curtis Richmond is a 77 y.o. male with past medical history significant for wheelchair bound secondary to arthritis, sleep apnea, COPD, HLD, HTN, CVA, bladder cancer who presented to the ED via EMS on 2022 complaining of altered mental status and weakness with associated cough ongoing for the past week with poor appetite and fatigue.  Also reports some loose stools as well as significant weakness and fatigue. Upon arrival to the ED, /72, heart rate 74.  Work-up in the ED revealed sodium 112, chloride 78, UA positive for trace ketones.  Chest x-ray reveals new bilateral infiltrates.  CT chest with contrast read negative for PE.  Patient received IV fluids while in the ED along with ceftriaxone azithromycin due to concern for pneumonia.  Received DDAVP, Lasix, hypertonic saline.  Hospital service contacted admission.  Patient was admitted to CCU and our service was consulted for further evaluation of critical care management.    Review of Systems  Constitutional symptoms:   Fatigue, otherwise denied complaints   Cardiovascular:  Denied complaints  Respiratory: Cough, otherwise denied complaints  Gastrointestinal: Diarrhea, otherwise denied complaints  Musculoskeletal: Generalized weakness, otherwise denied complaints  Genitourinary: Denied complaints  Allergy / Immunology: Denied complaints  Hematologic: Denied complaints  Neurologic: Denied complaints  Skin: Denied complaints      Personal History     Past Medical History:   Diagnosis Date   • Arthritis     BACK.   • At risk for central sleep apnea     STOP BANG 6   • Bladder cancer (HCC)     DR DAGMAR GARCIA. HISTORY OF. BLADDER  WASH, TURBP   • Chronic back pain    • COPD (chronic obstructive pulmonary disease) ([facility])     EMPYSEMA- NO INHALERS   • History of loop recorder     CURRENTLY HAS [employer] LOOP RECORDER S/P STROKE, MONITORED BY DR GONG    • Hydrocephalus ([facility])     NO CURRENT PROBLEMS    • Hyperlipidemia    • Hypertension    • TIA (transient ischemic attack) 02/2021   • Tubular adenoma of colon 02/05/2015   • Urothelial carcinoma of kidney, left ([facility]) 06/24/2019   • Wears glasses    • Wheelchair bound        Past Surgical History:   Procedure Laterality Date   • ATRIAL APPENDAGE EXCLUSION LEFT WITH TRANSESOPHAGEAL ECHOCARDIOGRAM N/A 11/9/2021    Procedure: 11/9/21 Atrial Appendage Occlusion on eliquis hold 2 doses prior;  Surgeon: Primo Tobias DO;  Location: [facility] INVASIVE LOCATION;  Service: Cardiology;  Laterality: N/A;   • BACK SURGERY  06/1980   • CARDIAC CATHETERIZATION  02/2001   • CARDIOVERSION      AT [facility] YEARS AGO (40 YEARS AGO)   • CATARACT EXTRACTION WITH INTRAOCULAR LENS IMPLANT Bilateral 2019   • CHOLECYSTECTOMY  06/2002   • COLONOSCOPY     • CYSTOSCOPY      MULTIPLE   • CYSTOSCOPY BLADDER BIOPSY N/A 8/24/2021    Procedure: CYSTOSCOPY, TRANSURETHRAL RESECTION OF BLADDER TUMOR;  Surgeon: Kaelyn Alvarado MD;  Location: [facility] OR;  Service: Urology;  Laterality: N/A;   • EMBOLIZATION CEREBRAL N/A 4/14/2021    Procedure: CEREBRAL ANGIOGRAM;  Surgeon: Noah Bruno MD;  Location: [facility] OR 18/19;  Service: Interventional Radiology;  Laterality: N/A;   • EPIDURAL      LUMBAR   • GALLBLADDER SURGERY     • JOINT REPLACEMENT Right     Knee   • KNEE ARTHROPLASTY Right 2013   • NEPHROURETERECTOMY Left 04/2019   • TRANSURETHRAL RESECTION OF BLADDER TUMOR         Family History: family history includes Heart attack in his father; No Known Problems in an other family member. Otherwise pertinent FHx was reviewed and not pertinent to current issue.    Social History:  reports that he  quit smoking about 50 years ago. He has never used smokeless tobacco. He reports previous alcohol use. He reports that he does not use drugs.    Home Medications:  acetaminophen, amLODIPine, aspirin, atorvastatin, carvedilol, clopidogrel, gabapentin, loratadine, and losartan-hydrochlorothiazide    Allergies:  No Known Allergies    Objective    Objective     Vitals:   Temp:  [97.5 °F (36.4 °C)-98.1 °F (36.7 °C)] 97.5 °F (36.4 °C)  Heart Rate:  [] 105  Resp:  [12-20] 14  BP: (113-152)/() 125/95    Physical Exam:  Vital Signs Reviewed   General: Chronically ill-appearing male, Alert, NAD.    HEENT:  PERRL, EOMI.  OP, nares clear  Neck:  Supple, no JVD, no thyromegaly  Chest:  good aeration, clear to auscultation bilaterally, tympanic to percussion bilaterally, no work of breathing noted  CV: Sinus tachycardia, no MGR, pulses 2+, equal.  Abd:  Soft, NT, ND, + BS, no HSM  EXT:  no clubbing, no cyanosis, no edema  Neuro:  A&Ox3, CN grossly intact, no focal deficits.  Skin: No rashes or lesions noted      Result Review    Result Review:  I have personally reviewed the results from the time of this admission to 9/15/2022 10:03 EDT and agree with these findings:  [x]  Laboratory  [x]  Microbiology  [x]  Radiology  [x]  EKG/Telemetry   [x]  Cardiology/Vascular   []  Pathology  [x]  Old records  []  Other:  Most notable findings include:     LAB RESULTS:      Lab 09/15/22  0553 09/15/22  0149 09/14/22  2337 09/14/22  2257 09/14/22  1957   WBC 10.24  --   --   --  8.81   HEMOGLOBIN 12.5*  --   --   --  12.5*   HEMATOCRIT 34.8*  --   --   --  34.9*   PLATELETS 348  --   --   --  363   NEUTROS ABS 7.44*  --   --   --  6.56   IMMATURE GRANS (ABS) 0.03  --   --   --  0.04   LYMPHS ABS 1.47  --   --   --  1.22   MONOS ABS 1.06*  --   --   --  0.74   EOS ABS 0.22  --   --   --  0.23   MCV 74.5*  --   --   --  74.9*   CRP  --  0.46  --   --   --    PROCALCITONIN  --  0.15  --   --   --    LACTATE  --   --  0.8  --   --     LACTATE, ARTERIAL  --   --   --  0.96  --    D DIMER QUANT  --  1.40*  --   --   --          Lab 09/15/22  1159 09/15/22  0553 09/15/22  0149 09/14/22 2257 09/14/22 1957   SODIUM 117* 115* 114*  --  112*   SODIUM, VENOUS  --   --   --  110.3*  --    POTASSIUM 3.6 3.6 3.8  --  3.8   POTASSIUM, VENOUS  --   --   --  3.9  --    CHLORIDE 81* 80*  --   --  78*   CHLORIDE, VENOUS  --   --   --  78*  --    CO2 27.4 28.0  --   --  23.6   ANION GAP 8.6 7.0  --   --  10.4   BUN 9 9  --   --  10   CREATININE 0.86 0.80  --   --  0.80   EGFR 90.3 92.3  --   --  92.3   GLUCOSE 112* 87  --   --  149*   GLUCOSE, ARTERIAL  --   --   --  106*  --    CALCIUM 8.4* 8.5*  --   --  8.4*   IONIZED CALCIUM  --   --   --  1.06*  --    MAGNESIUM  --  1.9  --   --  1.9   PHOSPHORUS  --  2.9  --   --   --    TSH 2.920  --   --   --   --          Lab 09/15/22  0553 09/14/22 1957   TOTAL PROTEIN 6.4 6.4   ALBUMIN 3.40* 3.20*   GLOBULIN 3.0 3.2   ALT (SGPT) 20 18   AST (SGOT) 20 25   BILIRUBIN 0.5 0.6   ALK PHOS 93 93         Lab 09/14/22 1957   PROBNP 921.0   TROPONIN T <0.010             Lab 09/15/22  0553   FERRITIN 166.60         Lab 09/14/22 2257   FIO2 21   CARBOXYHEMOGLOBIN 1.7*     Brief Urine Lab Results  (Last result in the past 365 days)      Color   Clarity   Blood   Leuk Est   Nitrite   Protein   CREAT   Urine HCG        09/14/22 2207 Yellow   Clear   Negative   Negative   Negative   30 mg/dL (1+)               Microbiology Results (last 10 days)     Procedure Component Value - Date/Time    COVID-19,APTIMA PANTHER(JAELYN),[facility] PADMINI/ POORNIMA, NP/OP SWAB IN UTM/VTM/SALINE TRANSPORT MEDIA,24 HR TAT - Swab, Nasal Cavity [[id number]]  (Normal) Collected: 09/14/22 2209    Lab Status: Final result Specimen: Swab from Nasal Cavity Updated: 09/15/22 0202     COVID19 Not Detected    Narrative:      Fact sheet for providers: https://www.fda.gov/media/359622/download     Fact sheet for patients: https://www.fda.gov/media/200249/download    Test  performed by RT PCR.    Influenza Antigen, Rapid - Swab, Nasopharynx [[id number]]  (Normal) Collected: 09/14/22 2209    Lab Status: Final result Specimen: Swab from Nasopharynx Updated: 09/14/22 2244     Influenza A Ag, EIA Negative     Influenza B Ag, EIA Negative    Legionella Antigen, Urine - Urine, Urine, Clean Catch [[id number]]  (Normal) Collected: 09/14/22 2207    Lab Status: Final result Specimen: Urine, Clean Catch Updated: 09/15/22 0826     LEGIONELLA ANTIGEN, URINE Negative    S. Pneumo Ag Urine or CSF - Urine, Urine, Clean Catch [[id number]]  (Normal) Collected: 09/14/22 2207    Lab Status: Final result Specimen: Urine, Clean Catch Updated: 09/15/22 0827     Strep Pneumo Ag Negative        CT Head Without Contrast    Result Date: 9/15/2022     1. No acute brain abnormality is appreciated.  No acute intracranial hemorrhage.  No acute infarction.   2. There is moderate-to-severe age-indeterminate sinus disease.   3. 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   4. No change in the ventriculomegaly, as discussed.  5. The study is motion-limited.   6. Please see above comments for further detail.    Please note that portions of this note were completed with a voice recognition program.  NESS ROBLEDO JR, MD       Electronically Signed and Approved By: NESS ROBLEDO JR, MD on 9/15/2022 at 3:25              CT Chest With Contrast Diagnostic    Result Date: 9/15/2022   No pulmonary embolism.      Please note that portions of this note were completed with a voice recognition program.  NESS ROBLEDO JR, MD       Electronically Signed and Approved By: NESS ROBLEDO JR, MD on 9/15/2022 at 5:21              XR Chest 1 View    Result Date: 9/14/2022   New bilateral infiltrates are seen.  The findings may represent infectious multifocal pneumonia.  Pulmonary edema is possible.    Please note that portions of this note were completed with a  voice recognition program.  NESS ROBLEDO JR, MD       Electronically Signed and Approved By: NESS ROBLEDO JR, MD on 9/14/2022 at 21:02                  Assessment & Plan   Assessment / Plan     Active Hospital Problems:  Active Hospital Problems    Diagnosis    • **Hyponatremia    • CAP (community acquired pneumonia)    • Morbid obesity (HCC)    • Simple chronic bronchitis (HCC)    • Metabolic encephalopathy    • Wheelchair bound    • Hyperlipidemia    • PAF (paroxysmal atrial fibrillation) ([facility])      Echocardiogram, 2/10/2021: EF 60%.  Normal LV systolic function diastolic dysfunction calcified aortic root and valve with normal aortic velocities and otherwise normal valvular morphology.  Left atrium 3.9 cm  Left atrial appendage closure with 20 mm watchman FLX device, 11-9-21     Hypomagnesemia  Hypokalemia    Plan:  Hyponatremia likely secondary to GI losses, poor oral intake plus hydrochlorothiazide use.    Did receive 3% saline overnight.  Okay to discontinue now as sodium is slowly up taking.    Receiving gentle IV fluids.  Monitor renal function and electrolytes closely  Continue supplemental O2 as needed to keep O2 sats greater than 90%  Strep pneumo and Legionella urine antigens negative.  Procalcitonin unremarkable.  CT more consistent with atelectasis than pneumonia. Discontinue antibiotics  Neurosurgery consulted and following.  Appreciate recommendations  Check thyroid panel  Nephrology consulted.  Appreciate commendations  Check urine urea, urine protein, urine sodium  Trend renal panel electrolytes.  Replace potassium orally and magnesium IV  Monitor sodium.  Try to avoid raising sodium by 10 mmol/L in > 24 hours.  Bowel regimen       DVT prophylaxis:  Medical and mechanical DVT prophylaxis orders are present.     Code Status and Medical Interventions:   Ordered at: 09/15/22 0046     Level Of Support Discussed With:    Patient     Code Status (Patient has no pulse and is not breathing):    CPR  (Attempt to Resuscitate)     Medical Interventions (Patient has pulse or is breathing):    Full        Electronically signed by MARIA ANTONIA Arenas, 09/15/22, 2:20 PM EDT.      The patient is critically ill in the ICU with acute hypotonic hyponatremia, multiple electrolyte disturbances. Multidisciplinary bedside critical care rounds were performed with nursing staff, respiratory therapy, pharmacy, nutritional services, social work. I have personally reviewed the chart, labs and any pertinent imaging available.  I have spent 30 minutes of critical care time, excluding procedures, in the care of this patient.    Electronically signed by Calderon Rodriguez MD, 09/15/22, 3:44 PM EDT.

## 2022-09-15 NOTE — H&P
History and Physical   Curtis Richmond , :  1945, MRN:  [id number]    Chief complaint: Weakness, confusion, cough    Assessment/Plan       Hyponatremia    PAF (paroxysmal atrial fibrillation) (HCC)    Wheelchair bound    Hyperlipidemia    CAP (community acquired pneumonia)    Morbid obesity (HCC)    Simple chronic bronchitis (HCC)    Metabolic encephalopathy      • Hyponatremia: Sodium 112, serum osmolality of 243.  Last sodium of 139 in 2021.  With associated altered mental status, started on hypertonic saline.  Admit to ICU.  Possible SIADH with pneumonia.  Hold hydrochlorothiazide.    • Community-acquired pneumonia: Patient has bibasilar infiltrates on chest x-ray.  Normal white blood cell count and lactate.  Obtain procalcitonin.  Started on Rocephin, azithromycin.  Cultures pending.    • Metabolic encephalopathy: Wife reports confusion over the past 2 days.  Will obtain CT of the head.  Likely secondary to hyponatremia.    • Paroxysmal atrial fibrillation: Rate controlled at 68, and A. fib.  Continue carvedilol.  History of watchman procedure, not on anticoagulation.    • Hyperlipidemia: Continue atorvastatin.    • Chronic debility wheelchair-bound: Secondary to arthritis.    • Morbid obesity: BMI of 41.05.  Recommend improve diet and activity resolution of acute illness.    • Chronic obstructive pulmonary disease: Duo nebs as needed.    • Clinical course will dictate further medical management.    DVT Prophylaxis: Lovenox  Code Status: Full    Reviewed patients labs and imaging, and discussed with patient and nurse at bedside.    Patient risk level:  Patient comorbidities and risk factors make patient a poor candidate for outpatient management due to concerns of deterioration.    Total time spent on admission: 70 minutes    History of Present illness     Curtis Richmond is a 77 y.o. old male patient who presents with reported 1 week of cough, increased work of breathing, shortness of  breath, confusion.  History of morbid obesity, stroke, hypertension, bladder cancer, COPD, paroxysmal atrial fibrillation, hyperlipidemia, debility.    Patient presents from home after inability to stand or bear weight today in addition to increasing confusion.  The patient's wife provides history due to altered mental status.  Reports has had a cough for the past 1 week.  No known fevers or chills.  The patient is typically in a wheelchair, on the night of presentation he was unable to stand to assist.  He reportedly has had confusion over the past 2 to 3 days.    ED course: Vital signs normal.  Sodium 112.  Serum osmolality 243.  Chest x-ray shows bilateral infiltrates.  Started on antibiotics.    Old records were reviewed which revealed last admission in February 2021 for gross hematuria.    Review of Systems     Unable to obtain due to altered mental status.    Past Medical/Surgical/Social/Family History/Allergies     Past Medical History:   Diagnosis Date   • Arthritis     BACK.   • At risk for central sleep apnea     STOP BANG 6   • Bladder cancer (HCC)     DR DAGMAR GARCIA. HISTORY OF. BLADDER WASH, TURBP   • Chronic back pain    • COPD (chronic obstructive pulmonary disease) ([facility])     EMPYSEMA- NO INHALERS   • History of loop recorder     CURRENTLY HAS [email or web address] LOOP RECORDER S/P STROKE, MONITORED BY DR GONG    • Hydrocephalus (HCC)     NO CURRENT PROBLEMS    • Hyperlipidemia    • Hypertension    • TIA (transient ischemic attack) 02/2021   • Tubular adenoma of colon 02/05/2015   • Urothelial carcinoma of kidney, left (HCC) 06/24/2019   • Wears glasses    • Wheelchair bound        Past Surgical History:   Procedure Laterality Date   • ATRIAL APPENDAGE EXCLUSION LEFT WITH TRANSESOPHAGEAL ECHOCARDIOGRAM N/A 11/9/2021    Procedure: 11/9/21 Atrial Appendage Occlusion on eliquis hold 2 doses prior;  Surgeon: Primo Tobias DO;  Location: [facility][1] INVASIVE LOCATION;  Service: Cardiology;  Laterality:  N/A;   • BACK SURGERY  1980   • CARDIAC CATHETERIZATION  2001   • CARDIOVERSION      AT [facility] YEARS AGO (40 YEARS AGO)   • CATARACT EXTRACTION WITH INTRAOCULAR LENS IMPLANT Bilateral    • CHOLECYSTECTOMY  2002   • COLONOSCOPY     • CYSTOSCOPY      MULTIPLE   • CYSTOSCOPY BLADDER BIOPSY N/A 2021    Procedure: CYSTOSCOPY, TRANSURETHRAL RESECTION OF BLADDER TUMOR;  Surgeon: Kaelyn Alvarado MD;  Location: [facility] MAIN OR;  Service: Urology;  Laterality: N/A;   • EMBOLIZATION CEREBRAL N/A 2021    Procedure: CEREBRAL ANGIOGRAM;  Surgeon: Noah Bruno MD;  Location: [facility] OR ;  Service: Interventional Radiology;  Laterality: N/A;   • EPIDURAL      LUMBAR   • GALLBLADDER SURGERY     • JOINT REPLACEMENT Right     Knee   • KNEE ARTHROPLASTY Right    • NEPHROURETERECTOMY Left 2019   • TRANSURETHRAL RESECTION OF BLADDER TUMOR         Social History     Socioeconomic History   • Marital status:    Tobacco Use   • Smoking status: Former Smoker     Quit date:      Years since quittin.7   • Smokeless tobacco: Never Used   • Tobacco comment: QUIT 50 YRS AGO. SMOKED 3 YEARS IN [employer]   Vaping Use   • Vaping Use: Never used   Substance and Sexual Activity   • Alcohol use: Not Currently   • Drug use: Never   • Sexual activity: Defer       Family History   Problem Relation Age of Onset   • Heart attack Father    • No Known Problems Other    • Malig Hyperthermia Neg Hx        No Known Allergies    The above past medical history, past surgical history, social history, family history allergies and home medications were personally reviewed by me today and corrected as needed  Home Medications   (Not in a hospital admission)    Physical Exam   Vital signs:  Temperature Blood Pressure Heart Rate Resp Rate SpO2   Temp: 98.1 °F (36.7 °C) BP: 144/85 Heart Rate: 76 Resp: 12 SpO2: 94 %     HEENT: Head is atraumatic, extraocular movements are intact. Oropharynx is  normal.  Neck: Supple, no cervical lymphadenopathy.  Cardiovascular: Regular rate and rhythm. No murmurs, gallops or rubs. 2+ bilateral lower extremity edema.  Lungs: Coarse basilar breath sounds with scattered expiratory wheezing.  Abdomen: Normal bowel sounds in all 4 quadrants. No rebound, guarding or tenderness.  Obese abdomen.  Chest: Normal inspection. Equal rise and fall. No retractions noted.  Constitutional: Chronically ill appearing, appears stated age.  Lymphatic: No cervical lymphadenopathy.  Extremities: 5/5 upper and lower extremity strength. Normal range of motion.  No clubbing, cyanosis.  Neurological: Alert and oriented x3. No numbness or tingling.  Psychological: Normal mood and affect. Well kempt. Normal eye contact.  Skin: No rash, cellulitis or bruising.    Labs     Results from last 7 days   Lab Units 09/14/22 1957   WBC 10*3/mm3 8.81   HEMOGLOBIN g/dL 12.5*   HEMATOCRIT % 34.9*   PLATELETS 10*3/mm3 363     Results from last 7 days   Lab Units 09/14/22 2257 09/14/22 1957   SODIUM mmol/L  --  112*   SODIUM, VENOUS mmol/L 110.3*  --    POTASSIUM mmol/L  --  3.8   POTASSIUM, VENOUS mmol/L 3.9  --    CHLORIDE mmol/L  --  78*   CHLORIDE, VENOUS mmol/L 78*  --    CO2 mmol/L  --  23.6   BUN mg/dL  --  10   CREATININE mg/dL  --  0.80   CALCIUM mg/dL  --  8.4*     Results from last 7 days   Lab Units 09/14/22 1957   ALT (SGPT) U/L 18   AST (SGOT) U/L 25   ALK PHOS U/L 93   BILIRUBIN mg/dL 0.6                   Invalid input(s): CPK, MASSCKMB        I reviewed patient's labs from today and also previous labs while reviewing old records   Imaging and Other procedures     Chest X ray: My independent assessment showed bibasilar infiltrates.  EKG: My independent evaluation showed heart rate 68, atrial fibrillation, no ST -T changes    I reviewed patient's imaging and other testing from today and also in the past including but not limited to imaging, previous imaging, EKG, previous EKGs, echocardiogram,  and other procedures if any and previously done by reviewing old records  Current Medications   Scheduled Meds:azithromycin, 500 mg, Intravenous, Once      Continuous Infusions:     Prior to Admission Medications     Prescriptions Last Dose Informant Patient Reported? Taking?    acetaminophen (TYLENOL) 500 MG tablet   Yes No    Take 500 mg by mouth Every 6 (Six) Hours As Needed for Mild Pain .    amLODIPine (NORVASC) 2.5 MG tablet   Yes No    Take 2.5 mg by mouth Every Evening.    aspirin 81 MG EC tablet   No No    Take 1 tablet by mouth Daily.    atorvastatin (LIPITOR) 40 MG tablet   Yes No    Take 40 mg by mouth Every Night.    carvedilol (COREG) 6.25 MG tablet   Yes No    Take 6.25 mg by mouth 2 (Two) Times a Day.    clopidogrel (PLAVIX) 75 MG tablet   No No    Take 1 tablet by mouth Daily.    gabapentin (NEURONTIN) 400 MG capsule   Yes No    Take 400 mg by mouth Every Evening.    loratadine (CLARITIN) 10 MG tablet   Yes No    Take 10 mg by mouth Daily.    losartan-hydrochlorothiazide (HYZAAR) 100-12.5 MG per tablet   Yes No    Take 1 tablet by mouth Daily.        09/15/22  00:39 EDT

## 2022-09-15 NOTE — CONSULTS
Patient Care Team:  Kat Donahue APRN as PCP - General (Nurse Practitioner)  Kaelyn Alvarado MD as Consulting Physician (Urology)    Chief complaint: Hyponatremia    Subjective     History of Present Illness  76 yo with h/o HTN presented to ER with increased cough and confusion over last several days.  His wife notes that this had worsened and yesterday slept on couch most of the day, didn't take much in.  In Er he was found to have sodium of 112 and had been started on 3% there.  No reported seizure activity.  He is awake and responding to questions at this time, though still somewhat confused.  His wife notes he looks much better now.  He had been on losartan/hctz for bp at home.  CXR with multifocal pna.    Review of Systems   Constitutional: Positive for appetite change and fatigue. Negative for chills and fever.   Respiratory: Positive for cough and shortness of breath.    Cardiovascular: Negative for chest pain.   Gastrointestinal: Negative for diarrhea, nausea and vomiting.   Endocrine: Negative for polyuria.   Genitourinary: Negative for dysuria.   Musculoskeletal: Positive for back pain.   Skin: Negative for rash.   Neurological: Negative for headaches.   Psychiatric/Behavioral: Positive for confusion.        Past Medical History:   Diagnosis Date   • Arthritis     BACK.   • At risk for central sleep apnea     STOP BANG 6   • Bladder cancer ([facility])     DR KAELYN GARCIA. HISTORY OF. 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   • Wears glasses    • Wheelchair bound    ,    Past Surgical History:   Procedure Laterality Date   • ATRIAL APPENDAGE EXCLUSION LEFT WITH TRANSESOPHAGEAL ECHOCARDIOGRAM N/A 2021    Procedure: 21 Atrial Appendage Occlusion on eliquis hold 2 doses prior;  Surgeon: Primo Tobias DO;  Location: [facility] EP INVASIVE LOCATION;  Service: Cardiology;  Laterality: N/A;   • BACK SURGERY  1980   • CARDIAC CATHETERIZATION  2001   • CARDIOVERSION      AT [facility] YEARS AGO (40 YEARS AGO)   • CATARACT EXTRACTION WITH INTRAOCULAR LENS IMPLANT Bilateral    • CHOLECYSTECTOMY  2002   • COLONOSCOPY     • CYSTOSCOPY      MULTIPLE   • CYSTOSCOPY BLADDER BIOPSY N/A 2021    Procedure: CYSTOSCOPY, TRANSURETHRAL RESECTION OF BLADDER TUMOR;  Surgeon: Kaelyn Alvarado MD;  Location: [facility] MAIN OR;  Service: Urology;  Laterality: N/A;   • EMBOLIZATION CEREBRAL N/A 2021    Procedure: CEREBRAL ANGIOGRAM;  Surgeon: Noah Bruno MD;  Location: [facility] OR ;  Service: Interventional Radiology;  Laterality: N/A;   • EPIDURAL      LUMBAR   • GALLBLADDER SURGERY     • JOINT REPLACEMENT Right     Knee   • KNEE ARTHROPLASTY Right    • NEPHROURETERECTOMY Left 2019   • TRANSURETHRAL RESECTION OF BLADDER TUMOR     ,   Family History   Problem Relation Age of Onset   • Heart attack Father    • No Known Problems Other    • Malig Hyperthermia Neg Hx    ,   Social History     Socioeconomic History   • Marital status:    Tobacco Use   • Smoking status: Former Smoker     Quit date:      Years since quittin.7   • Smokeless tobacco: Never Used   • Tobacco comment: QUIT 50 YRS AGO. SMOKED 3 YEARS IN [employer]   Vaping Use   • Vaping Use: Never used   Substance and Sexual Activity   • Alcohol use: Not Currently   • Drug use: Never   • Sexual activity: Defer     E-cigarette/Vaping   • E-cigarette/Vaping Use Never User      E-cigarette/Vaping Substances     E-cigarette/Vaping Devices       ,   Medications Prior to Admission    Medication Sig Dispense Refill Last Dose   • acetaminophen (TYLENOL) 500 MG tablet Take 500 mg by mouth Every 6 (Six) Hours As Needed for Mild Pain .   9/14/2022 at Unknown time   • amLODIPine (NORVASC) 2.5 MG tablet Take 2.5 mg by mouth Every Evening.   9/14/2022 at Unknown time   • aspirin 81 MG EC tablet Take 1 tablet by mouth Daily.   9/14/2022 at Unknown time   • atorvastatin (LIPITOR) 40 MG tablet Take 40 mg by mouth Every Night.   9/14/2022 at Unknown time   • carvedilol (COREG) 6.25 MG tablet Take 6.25 mg by mouth 2 (Two) Times a Day.   9/14/2022 at Unknown time   • clopidogrel (PLAVIX) 75 MG tablet Take 1 tablet by mouth Daily. 30 tablet 4 9/14/2022 at Unknown time   • gabapentin (NEURONTIN) 400 MG capsule Take 400 mg by mouth Every Evening.   9/14/2022 at Unknown time   • loratadine (CLARITIN) 10 MG tablet Take 10 mg by mouth Daily.   9/14/2022 at Unknown time   • losartan-hydrochlorothiazide (HYZAAR) 100-12.5 MG per tablet Take 1 tablet by mouth Daily.   9/14/2022 at Unknown time   , Scheduled Meds:  amLODIPine, 2.5 mg, Oral, Q PM  aspirin, 81 mg, Oral, Daily  atorvastatin, 40 mg, Oral, Nightly  azithromycin, 500 mg, Intravenous, Q24H  carvedilol, 6.25 mg, Oral, BID  cefTRIAXone, 2 g, Intravenous, Q24H  cetirizine, 10 mg, Oral, Daily  clopidogrel, 75 mg, Oral, Daily  enoxaparin, 40 mg, Subcutaneous, Q24H  gabapentin, 400 mg, Oral, Q PM  senna-docusate sodium, 2 tablet, Oral, BID  sodium chloride, 10 mL, Intravenous, Q12H    , Continuous Infusions:  dextrose, 6 mL/kg (Ideal)  Pharmacy to Dose enoxaparin (LOVENOX),     , PRN Meds:  •  acetaminophen **OR** acetaminophen **OR** acetaminophen  •  senna-docusate sodium **AND** polyethylene glycol **AND** bisacodyl **AND** bisacodyl  •  desmopressin  •  dextrose  •  ipratropium-albuterol  •  melatonin  •  ondansetron **OR** ondansetron  •  Pharmacy to Dose enoxaparin (LOVENOX)  •  sodium chloride  •  sodium chloride  •  sodium chloride and Allergies:  Patient  has no known allergies.    Objective     Vital Signs  Temp:  [97.5 °F (36.4 °C)-98.1 °F (36.7 °C)] 97.5 °F (36.4 °C)  Heart Rate:  [] 105  Resp:  [12-20] 14  BP: (113-152)/() 125/95    No intake/output data recorded.  I/O last 3 completed shifts:  In: 1800 [I.V.:500; IV Piggyback:1300]  Out: -     Physical Exam  Constitutional:       Appearance: Normal appearance.   HENT:      Nose: Nose normal.      Mouth/Throat:      Mouth: Mucous membranes are moist.   Eyes:      General: No scleral icterus.     Pupils: Pupils are equal, round, and reactive to light.   Cardiovascular:      Rate and Rhythm: Normal rate and regular rhythm.      Pulses: Normal pulses.   Pulmonary:      Effort: Pulmonary effort is normal.   Abdominal:      General: Abdomen is flat.   Musculoskeletal:         General: Normal range of motion.      Cervical back: Normal range of motion.      Right lower leg: No edema.      Left lower leg: No edema.   Skin:     General: Skin is warm and dry.   Neurological:      General: No focal deficit present.      Mental Status: He is alert.   Psychiatric:         Mood and Affect: Mood normal.         Results Review:    I reviewed the patient's new clinical results.    WBC WBC   Date Value Ref Range Status   09/15/2022 10.24 3.40 - 10.80 10*3/mm3 Final   09/14/2022 8.81 3.40 - 10.80 10*3/mm3 Final      HGB Hemoglobin   Date Value Ref Range Status   09/15/2022 12.5 (L) 13.0 - 17.7 g/dL Final   09/14/2022 12.5 (L) 13.0 - 17.7 g/dL Final      HCT Hematocrit   Date Value Ref Range Status   09/15/2022 34.8 (L) 37.5 - 51.0 % Final   09/14/2022 34.9 (L) 37.5 - 51.0 % Final      Platlets No results found for: LABPLAT   MCV MCV   Date Value Ref Range Status   09/15/2022 74.5 (L) 79.0 - 97.0 fL Final   09/14/2022 74.9 (L) 79.0 - 97.0 fL Final          Sodium Sodium   Date Value Ref Range Status   09/15/2022 115 (C) 136 - 145 mmol/L Final   09/15/2022 114 (C) 136 - 145 mmol/L Final   09/14/2022 112 (C) 136 - 145  mmol/L Final     Sodium, Venous   Date Value Ref Range Status   09/14/2022 110.3 (C) 136 - 146 mmol/L Final      Potassium Potassium   Date Value Ref Range Status   09/15/2022 3.6 3.5 - 5.2 mmol/L Final   09/15/2022 3.8 3.5 - 5.2 mmol/L Final   09/14/2022 3.8 3.5 - 5.2 mmol/L Final     Comment:     Slight hemolysis detected by analyzer. Results may be affected.     Potassium, Venous   Date Value Ref Range Status   09/14/2022 3.9 3.5 - 5.0 mmol/L Final      Chloride Chloride   Date Value Ref Range Status   09/15/2022 80 (L) 98 - 107 mmol/L Final   09/14/2022 78 (L) 98 - 107 mmol/L Final     Chloride, Venous    Date Value Ref Range Status   09/14/2022 78 (L) 98 - 106 mmol/L Final      CO2 CO2   Date Value Ref Range Status   09/15/2022 28.0 22.0 - 29.0 mmol/L Final   09/14/2022 23.6 22.0 - 29.0 mmol/L Final      BUN BUN   Date Value Ref Range Status   09/15/2022 9 8 - 23 mg/dL Final   09/14/2022 10 8 - 23 mg/dL Final      Creatinine Creatinine   Date Value Ref Range Status   09/15/2022 0.80 0.76 - 1.27 mg/dL Final   09/14/2022 0.80 0.76 - 1.27 mg/dL Final      Calcium Calcium   Date Value Ref Range Status   09/15/2022 8.5 (L) 8.6 - 10.5 mg/dL Final   09/14/2022 8.4 (L) 8.6 - 10.5 mg/dL Final      PO4 No results found for: CAPO4   Albumin Albumin   Date Value Ref Range Status   09/15/2022 3.40 (L) 3.50 - 5.20 g/dL Final   09/14/2022 3.20 (L) 3.50 - 5.20 g/dL Final      Magnesium Magnesium   Date Value Ref Range Status   09/15/2022 1.9 1.6 - 2.4 mg/dL Final   09/14/2022 1.9 1.6 - 2.4 mg/dL Final      Uric Acid No results found for: URICACID         Assessment & Plan       Hyponatremia    PAF (paroxysmal atrial fibrillation) (HCC)    Wheelchair bound    Hyperlipidemia    CAP (community acquired pneumonia)    Morbid obesity (HCC)    Simple chronic bronchitis (HCC)    Metabolic encephalopathy      Assessment & Plan  Hyponatremia-  Appears euvolemic on exam.  Checking urine sodium/osm at this time.  Will hold 3% at this time  to avoid overcorrection of hyponatremia since mental status seems to have improved.  Will keep of fluid restriction 1L/day.  Once we have urine osm may restart with normal saline.  Monitor sodium q4hrs.  HTN-  BP controlled.  Hold HCTZ with hyponatremia   PNA-  Likely contributing to hyponatremia.  On IV abx.  Chronic afib-  H/o watchman device.  Chronic immobility-  Wheelchair bound after arthritis  COPD-    I discussed the patients findings and my recommendations with patient and family    Joe Espinosa MD  09/15/22  10:02 EDT

## 2022-09-15 NOTE — PLAN OF CARE
Goal Outcome Evaluation: Patient alert and oriented x4. Neuro checks performed q2h. Upon 1200 neuro check, recognized L facial droop and slight L arm drift. Patient stated L sided droop and drift are normal for him. Scored patient NIH of 9 due to baseline slurred speech and lower extremity weakness from previous stroke. Notified MARIA ANTONIA Arango and Dr. Leonard of change in status. CT ordered at 1300. Called CT at 1540 to see when patient could go for test. CT stated the scan would be done today but they could not give me a designated time. Asked patient's wife about L sided droop and drift and wife confirmed that this was normal for patient from past stroke that affected his left side. Patient in a.fib throughout shift. Physician stated Watchman device was sufficient for management. ALEXAS. Dahlia Gilbert RNA

## 2022-09-15 NOTE — ED PROVIDER NOTES
Time: 9:28 PM EDT  Arrived by: ambulance  Chief Complaint: AMS, weakness  History provided by: pt  History is limited by: N/A     History of Present Illness:  Patient is a 77 y.o. year old male that presents to the emergency department with AMS and weakness.    Family at bedside reports pt having cough for past week. Pt has appointment tomorow for evaluation of a cough.     Tonight, his family members could not get pt out of a chair to go to the bathroom or bed as pt was too weak. This prompted them to call EMS. Pt wife says pt has had a bad cough and chest congestion for a week now. She notes pt having a poor appetite today and fatigue. She reports him normally being wheelchair bound secondary to arthritis and him normally being alert and oriented x3. She denies pt having any vomiting, fever, abdominal pain, or chest pain. She reports him having diarrhea. Per pt wife, he had 2 watery stools yesterday and 2 today that were less watery. Pt wife denies hematochezia and melena. Pt wife denies patient experiencing dysuria, hematuria, urinary urgency, hesitancy or frequency.       Pt wife says PCP is Dr. Donahue.            Similar Symptoms Previously: No  Recently seen: No      Patient Care Team  Primary Care Provider: Kat Donahue APRN    Past Medical History:     No Known Allergies  Past Medical History:   Diagnosis Date   • Arthritis     BACK.   • At risk for central sleep apnea     STOP BANG 6   • Bladder cancer ([facility])     DR DAGMAR GARCIA. HISTORY OF. BLADDER WASH, TURBP   • Chronic back pain    • COPD (chronic obstructive pulmonary disease) ([facility])     EMPYSEMA- NO INHALERS   • History of loop recorder     CURRENTLY HAS [employer] LOOP RECORDER S/P STROKE, MONITORED BY DR GONG    • Hydrocephalus ([facility])     NO CURRENT PROBLEMS    • Hyperlipidemia    • Hypertension    • TIA (transient ischemic attack) 02/2021   • Tubular adenoma of colon 02/05/2015   • Urothelial carcinoma of kidney, left ([facility])  06/24/2019   • Wears glasses    • Wheelchair bound      Past Surgical History:   Procedure Laterality Date   • ATRIAL APPENDAGE EXCLUSION LEFT WITH TRANSESOPHAGEAL ECHOCARDIOGRAM N/A 11/9/2021    Procedure: 11/9/21 Atrial Appendage Occlusion on eliquis hold 2 doses prior;  Surgeon: Primo Tobias DO;  Location:  [facility];  Service: Cardiology;  Laterality: N/A;   • BACK SURGERY  06/1980   • CARDIAC CATHETERIZATION  02/2001   • CARDIOVERSION      AT [facility] YEARS AGO (40 YEARS AGO)   • CATARACT EXTRACTION WITH INTRAOCULAR LENS IMPLANT Bilateral 2019   • CHOLECYSTECTOMY  06/2002   • COLONOSCOPY     • CYSTOSCOPY      MULTIPLE   • CYSTOSCOPY BLADDER BIOPSY N/A 8/24/2021    Procedure: CYSTOSCOPY, TRANSURETHRAL RESECTION OF BLADDER TUMOR;  Surgeon: Kaelyn Alvarado MD;  Location: [facility] OR;  Service: Urology;  Laterality: N/A;   • EMBOLIZATION CEREBRAL N/A 4/14/2021    Procedure: CEREBRAL ANGIOGRAM;  Surgeon: Noah Bruno MD;  Location: [facility] OR 18/19;  Service: Interventional Radiology;  Laterality: N/A;   • EPIDURAL      LUMBAR   • GALLBLADDER SURGERY     • JOINT REPLACEMENT Right     Knee   • KNEE ARTHROPLASTY Right 2013   • NEPHROURETERECTOMY Left 04/2019   • TRANSURETHRAL RESECTION OF BLADDER TUMOR       Family History   Problem Relation Age of Onset   • Heart attack Father    • No Known Problems Other    • Malig Hyperthermia Neg Hx        Home Medications:  Prior to Admission medications    Medication Sig Start Date End Date Taking? Authorizing Provider   acetaminophen (TYLENOL) 500 MG tablet Take 500 mg by mouth Every 6 (Six) Hours As Needed for Mild Pain .    Provider, MD Anu   amLODIPine (NORVASC) 2.5 MG tablet Take 2.5 mg by mouth Every Evening.    Provider, MD Anu   aspirin 81 MG EC tablet Take 1 tablet by mouth Daily. 8/23/22   Ciro Patterson PA   atorvastatin (LIPITOR) 40 MG tablet Take 40 mg by mouth Every Night. 2/20/21   Provider  "MD Anu   carvedilol (COREG) 6.25 MG tablet Take 6.25 mg by mouth 2 (Two) Times a Day. 21   Anu Gaviria MD   clopidogrel (PLAVIX) 75 MG tablet Take 1 tablet by mouth Daily. 21   Rigoberto Conway MD   gabapentin (NEURONTIN) 400 MG capsule Take 400 mg by mouth Every Evening.    Anu Gaviria MD   loratadine (CLARITIN) 10 MG tablet Take 10 mg by mouth Daily.    Anu Gaviria MD   losartan-hydrochlorothiazide (HYZAAR) 100-12.5 MG per tablet Take 1 tablet by mouth Daily. 22   Anu Gaviria MD        Social History:   Social History     Tobacco Use   • Smoking status: Former Smoker     Quit date: 1972     Years since quittin.7   • Smokeless tobacco: Never Used   • Tobacco comment: QUIT 50 YRS AGO. SMOKED 3 YEARS IN [employer]Plus   Vaping Use   • Vaping Use: Never used   Substance Use Topics   • Alcohol use: Not Currently   • Drug use: Never         Review of Systems:  Review of Systems   Constitutional: Negative for chills, diaphoresis and fever.   HENT: Positive for congestion. Negative for postnasal drip, rhinorrhea and sore throat.    Eyes: Negative for photophobia.   Respiratory: Positive for cough. Negative for chest tightness and shortness of breath.    Cardiovascular: Negative for chest pain, palpitations and leg swelling.   Gastrointestinal: Positive for diarrhea. Negative for abdominal pain, nausea and vomiting.   Genitourinary: Negative for difficulty urinating, dysuria, flank pain, frequency, hematuria and urgency.   Musculoskeletal: Negative for neck pain and neck stiffness.   Skin: Negative for pallor and rash.   Neurological: Positive for weakness. Negative for dizziness, syncope, numbness and headaches.   Hematological: Negative for adenopathy. Does not bruise/bleed easily.   Psychiatric/Behavioral: Negative.         Physical Exam:  /66   Pulse 59   Temp 98.4 °F (36.9 °C) (Axillary)   Resp 14   Ht 165.1 cm (65\")   Wt 112 kg (246 lb 11.1 " oz)   SpO2 96%   BMI 41.05 kg/m²     Physical Exam  Vitals and nursing note reviewed.   Constitutional:       General: He is not in acute distress.     Appearance: Normal appearance. He is not ill-appearing, toxic-appearing or diaphoretic.   HENT:      Head: Normocephalic and atraumatic.      Mouth/Throat:      Mouth: Mucous membranes are moist.   Eyes:      Pupils: Pupils are equal, round, and reactive to light.   Cardiovascular:      Rate and Rhythm: Normal rate and regular rhythm.      Pulses: Normal pulses.           Carotid pulses are 2+ on the right side and 2+ on the left side.       Radial pulses are 2+ on the right side and 2+ on the left side.        Femoral pulses are 2+ on the right side and 2+ on the left side.       Popliteal pulses are 2+ on the right side and 2+ on the left side.        Dorsalis pedis pulses are 2+ on the right side and 2+ on the left side.        Posterior tibial pulses are 2+ on the right side and 2+ on the left side.      Heart sounds: Normal heart sounds. No murmur heard.  Pulmonary:      Effort: Pulmonary effort is normal. No accessory muscle usage, respiratory distress or retractions.      Breath sounds: Examination of the right-upper field reveals wheezing. Examination of the left-upper field reveals wheezing. Wheezing present. No rhonchi or rales.      Comments: Crackles in Left-side of chest  Abdominal:      General: Abdomen is flat. There is no distension.      Palpations: Abdomen is soft. There is no mass.      Tenderness: There is no abdominal tenderness. There is no right CVA tenderness, left CVA tenderness, guarding or rebound.      Comments: No rigidity   Musculoskeletal:         General: No swelling, tenderness or deformity.      Cervical back: Neck supple. No tenderness.      Right lower leg: Edema present.      Left lower leg: Edema present.   Skin:     General: Skin is warm and dry.      Capillary Refill: Capillary refill takes less than 2 seconds.       Coloration: Skin is not jaundiced or pale.      Findings: No erythema.   Neurological:      General: No focal deficit present.      Mental Status: He is confused.      Sensory: No sensory deficit.      Motor: Weakness present.      Comments: Severe diffuse weakness in extremities; somnolent and confused   Psychiatric:         Mood and Affect: Mood normal.         Behavior: Behavior normal.                Medications in the Emergency Department:  Medications   sodium chloride 0.9 % flush 10 mL (has no administration in time range)   amLODIPine (NORVASC) tablet 2.5 mg (2.5 mg Oral Given 9/15/22 1618)   aspirin EC tablet 81 mg (81 mg Oral Given 9/15/22 0856)   atorvastatin (LIPITOR) tablet 40 mg (40 mg Oral Given 9/15/22 2118)   carvedilol (COREG) tablet 6.25 mg (6.25 mg Oral Given 9/15/22 2118)   clopidogrel (PLAVIX) tablet 75 mg (75 mg Oral Given 9/15/22 0856)   gabapentin (NEURONTIN) capsule 400 mg (400 mg Oral Given 9/15/22 1618)   cetirizine (zyrTEC) tablet 10 mg (10 mg Oral Given 9/15/22 0856)   sodium chloride 0.9 % flush 10 mL (has no administration in time range)   sodium chloride 0.9 % flush 10 mL (10 mL Intravenous Given 9/15/22 2118)   sodium chloride 0.9 % infusion 40 mL (has no administration in time range)   acetaminophen (TYLENOL) tablet 650 mg (has no administration in time range)     Or   acetaminophen (TYLENOL) 160 MG/5ML solution 650 mg (has no administration in time range)     Or   acetaminophen (TYLENOL) suppository 650 mg (has no administration in time range)   sennosides-docusate (PERICOLACE) 8.6-50 MG per tablet 2 tablet (2 tablets Oral Given 9/15/22 2118)     And   polyethylene glycol (MIRALAX) packet 17 g (has no administration in time range)     And   bisacodyl (DULCOLAX) EC tablet 5 mg (has no administration in time range)     And   bisacodyl (DULCOLAX) suppository 10 mg (has no administration in time range)   ondansetron (ZOFRAN) tablet 4 mg (has no administration in time range)     Or    ondansetron (ZOFRAN) injection 4 mg (has no administration in time range)   melatonin tablet 5 mg (has no administration in time range)   Pharmacy to Dose enoxaparin (LOVENOX) (has no administration in time range)   dextrose (D5W) 5 % infusion 369 mL (has no administration in time range)   desmopressin (DDAVP) injection 2 mcg (has no administration in time range)   ipratropium-albuterol (DUO-NEB) nebulizer solution 3 mL (has no administration in time range)   Enoxaparin Sodium (LOVENOX) syringe 40 mg (40 mg Subcutaneous Given 9/15/22 0855)   sodium chloride 0.9 % bolus 1,000 mL (0 mL Intravenous Stopped 9/15/22 0108)   cefTRIAXone (ROCEPHIN) IVPB 1 g (0 g Intravenous Stopped 9/14/22 2357)   AZITHROMYCIN 500 MG/250 ML 0.9% NS IVPB (vial-mate) (0 mg Intravenous Stopped 9/15/22 0108)   furosemide (LASIX) injection 20 mg (20 mg Intravenous Given 9/15/22 0110)   iopamidol (ISOVUE-370) 76 % injection 100 mL (100 mL Intravenous Given 9/15/22 0441)   magnesium sulfate in D5W 1g/100mL (PREMIX) (1 g Intravenous New Bag 9/15/22 1045)   potassium chloride (MICRO-K) CR capsule 40 mEq (40 mEq Oral Given 9/15/22 1823)        Labs  Lab Results (last 24 hours)     Procedure Component Value Units Date/Time    Ferritin [[id number]]  (Normal) Collected: 09/15/22 0553    Specimen: Blood Updated: 09/15/22 0630     Ferritin 166.60 ng/mL     Narrative:      <12 ng/mL usually associated with Iron Deficiency Anemia. Above normal range levels may be due to Hepatic and/or Chronic Inflammatory Disease.  Results may be falsely decreased if patient taking Biotin.      CBC Auto Differential [[id number]]  (Abnormal) Collected: 09/15/22 0553    Specimen: Blood Updated: 09/15/22 0647     WBC 10.24 10*3/mm3      RBC 4.67 10*6/mm3      Hemoglobin 12.5 g/dL      Hematocrit 34.8 %      MCV 74.5 fL      MCH 26.8 pg      MCHC 35.9 g/dL      RDW 12.3 %      RDW-SD 33.2 fl      MPV 9.2 fL      Platelets 348 10*3/mm3      Neutrophil % 72.6 %      Lymphocyte %  14.4 %      Monocyte % 10.4 %      Eosinophil % 2.1 %      Basophil % 0.2 %      Immature Grans % 0.3 %      Neutrophils, Absolute 7.44 10*3/mm3      Lymphocytes, Absolute 1.47 10*3/mm3      Monocytes, Absolute 1.06 10*3/mm3      Eosinophils, Absolute 0.22 10*3/mm3      Basophils, Absolute 0.02 10*3/mm3      Immature Grans, Absolute 0.03 10*3/mm3      nRBC 0.0 /100 WBC     Comprehensive Metabolic Panel [[id number]]  (Abnormal) Collected: 09/15/22 0553    Specimen: Blood Updated: 09/15/22 0638     Glucose 87 mg/dL      BUN 9 mg/dL      Creatinine 0.80 mg/dL      Sodium 115 mmol/L      Potassium 3.6 mmol/L      Chloride 80 mmol/L      CO2 28.0 mmol/L      Calcium 8.5 mg/dL      Total Protein 6.4 g/dL      Albumin 3.40 g/dL      ALT (SGPT) 20 U/L      AST (SGOT) 20 U/L      Alkaline Phosphatase 93 U/L      Total Bilirubin 0.5 mg/dL      Globulin 3.0 gm/dL      A/G Ratio 1.1 g/dL      BUN/Creatinine Ratio 11.3     Anion Gap 7.0 mmol/L      eGFR 92.3 mL/min/1.73      Comment: National Kidney Foundation and American Society of Nephrology (ASN) Task Force recommended calculation based on the Chronic Kidney Disease Epidemiology Collaboration (CKD-EPI) equation refit without adjustment for race.       Narrative:      GFR Normal >60  Chronic Kidney Disease <60  Kidney Failure <15      Magnesium [[id number]]  (Normal) Collected: 09/15/22 0553    Specimen: Blood Updated: 09/15/22 0745     Magnesium 1.9 mg/dL     Phosphorus [[id number]]  (Normal) Collected: 09/15/22 0553    Specimen: Blood Updated: 09/15/22 0745     Phosphorus 2.9 mg/dL     Basic Metabolic Panel [[id number]]  (Abnormal) Collected: 09/15/22 1159    Specimen: Blood Updated: 09/15/22 1301     Glucose 112 mg/dL      BUN 9 mg/dL      Creatinine 0.86 mg/dL      Sodium 117 mmol/L      Potassium 3.6 mmol/L      Chloride 81 mmol/L      CO2 27.4 mmol/L      Calcium 8.4 mg/dL      BUN/Creatinine Ratio 10.5     Anion Gap 8.6 mmol/L      eGFR 90.3 mL/min/1.73      Comment:  National Kidney Foundation and American Society of Nephrology (ASN) Task Force recommended calculation based on the Chronic Kidney Disease Epidemiology Collaboration (CKD-EPI) equation refit without adjustment for race.       Narrative:      GFR Normal >60  Chronic Kidney Disease <60  Kidney Failure <15      TSH Rfx On Abnormal To Free T4 [[id number]]  (Normal) Collected: 09/15/22 1159    Specimen: Blood Updated: 09/15/22 1248     TSH 2.920 uIU/mL     Osmolality, Urine - Urine, Clean Catch [[id number]]  (Normal) Collected: 09/15/22 1302    Specimen: Urine, Clean Catch Updated: 09/15/22 1406     Osmolality, Urine 318 mOsm/kg     Sodium, Urine, Random - Urine, Clean Catch [[id number]] Collected: 09/15/22 1302    Specimen: Urine, Clean Catch Updated: 09/15/22 1646     Sodium, Urine 62 mmol/L     Narrative:      Reference intervals for random urine have not been established.  Clinical usage is dependent upon physician's interpretation in combination with other laboratory tests.       Urea Nitrogen, Urine - Urine, Clean Catch [[id number]] Collected: 09/15/22 1302    Specimen: Urine, Clean Catch Updated: 09/15/22 1702     Urea Nitrogen, Urine 249 mg/dL     Narrative:      Reference intervals for random urine have not been established.  Clinical usage is dependent upon physician's interpretation in combination with other laboratory tests.       Protein, Urine, Random - Urine, Clean Catch [[id number]] Collected: 09/15/22 1302    Specimen: Urine, Clean Catch Updated: 09/15/22 1702     Total Protein, Urine 9.8 mg/dL     Narrative:      Reference intervals for random urine have not been established.  Clinical usage is dependent upon physician's interpretation in combination with other laboratory tests.       Basic Metabolic Panel [[id number]]  (Abnormal) Collected: 09/15/22 1659    Specimen: Blood Updated: 09/15/22 1743     Glucose 105 mg/dL      BUN 9 mg/dL      Creatinine 0.85 mg/dL      Sodium 118 mmol/L      Potassium 3.6 mmol/L       Chloride 81 mmol/L      CO2 28.8 mmol/L      Calcium 8.5 mg/dL      BUN/Creatinine Ratio 10.6     Anion Gap 8.2 mmol/L      eGFR 90.6 mL/min/1.73      Comment: National Kidney Foundation and American Society of Nephrology (ASN) Task Force recommended calculation based on the Chronic Kidney Disease Epidemiology Collaboration (CKD-EPI) equation refit without adjustment for race.       Narrative:      GFR Normal >60  Chronic Kidney Disease <60  Kidney Failure <15      Basic Metabolic Panel [[id number]]  (Abnormal) Collected: 09/15/22 2010    Specimen: Blood Updated: 09/15/22 2134     Glucose 134 mg/dL      BUN 10 mg/dL      Creatinine 0.97 mg/dL      Sodium 118 mmol/L      Potassium 3.6 mmol/L      Chloride 82 mmol/L      CO2 28.7 mmol/L      Calcium 8.5 mg/dL      BUN/Creatinine Ratio 10.3     Anion Gap 7.3 mmol/L      eGFR 81.4 mL/min/1.73      Comment: National Kidney Foundation and American Society of Nephrology (ASN) Task Force recommended calculation based on the Chronic Kidney Disease Epidemiology Collaboration (CKD-EPI) equation refit without adjustment for race.       Narrative:      GFR Normal >60  Chronic Kidney Disease <60  Kidney Failure <15      Basic Metabolic Panel [[id number]]  (Abnormal) Collected: 09/16/22 0006    Specimen: Blood Updated: 09/16/22 0101     Glucose 96 mg/dL      BUN 11 mg/dL      Creatinine 0.95 mg/dL      Sodium 117 mmol/L      Potassium 4.3 mmol/L      Comment: Slight hemolysis detected by analyzer. Results may be affected.        Chloride 82 mmol/L      CO2 28.0 mmol/L      Calcium 8.6 mg/dL      BUN/Creatinine Ratio 11.6     Anion Gap 7.0 mmol/L      eGFR 83.5 mL/min/1.73      Comment: National Kidney Foundation and American Society of Nephrology (ASN) Task Force recommended calculation based on the Chronic Kidney Disease Epidemiology Collaboration (CKD-EPI) equation refit without adjustment for race.       Narrative:      GFR Normal >60  Chronic Kidney Disease <60  Kidney  Failure <15             Imaging:  CT Chest With Contrast Diagnostic    Result Date: 9/15/2022  PROCEDURE: CT CHEST W CONTRAST DIAGNOSTIC  COMPARISONS: 9/15/2022; 9/14/2022; 2/18/2021.  INDICATIONS: POSSIBLE PULMONARY EMBOLISM (PE); THE PATIENT HAD NO COMPLAINTS AT THE TIME OF SCAN; H/O LEFT RENAL CELL CARCINOMA & PRIOR LEFT NEPHRECTOMY.  TECHNIQUE: After obtaining the patient's consent, 686 CTCTA images were obtained with non-ionic intravenous contrast material.   PROTOCOL:   Pulmonary embolism CTA imaging protocol performed    RADIATION:   Total DLP: 176 mGy*cm; total mAs: 1,249.   Automated exposure control was utilized to minimize radiation dose. CONTRAST: 100 mL Isovue 370 I.V.  FINDINGS:  LUNGS: Mild bibasilar atelectasis and/or infiltrate(s) is (are) seen.  The findings may represent pulmonary edema.  Pneumonia cannot be excluded but is thought to be less likely.  Subsegmental atelectasis is particularly seen in the dependent portions of the lungs, greater on the right.  VASCULATURE: No pulmonary embolism.  There are coronary artery calcifications.  RAKESH: No mass or adenopathy.  MEDIASTINUM: No mass or adenopathy.  CARDIAC: There is mild cardiomegaly.  No significant pericardial effusion.  There is a suspected left atrial appendage occlusion device in place.  Please correlate with the surgical/endovascular history. AORTA: No aneurysm.  The contrast bolus in the thoracic aorta is limited, precluding assessment for arterial dissection.  PLEURA: There are tiny bilateral pleural effusions, slightly greater on the right. CHEST WALL: There is an implantable loop recorder (ILR) in place within the left paramidline anterior chest wall (precordial region). LIMITED ABDOMEN: The patient has undergone cholecystectomy. BONES: Degenerative changes are seen throughout the imaged spine.  There may be diffuse idiopathic skeletal hyperostosis (DISH).  No acute fracture.  No aggressive osseous lesion is suggested.  OTHER: There  is pulmonary hypoinflation.  The central tracheobronchial tree is well aerated without filling defect.       No pulmonary embolism.      Please note that portions of this note were completed with a voice recognition program.  NESS ROBLEDO JR, MD       Electronically Signed and Approved By: NESS ROBLEDO JR, MD on 9/15/2022 at 5:21                Procedures:  Procedures    Progress                            Medical Decision Making:  MDM  Number of Diagnoses or Management Options  Altered mental status, unspecified altered mental status type  Hyponatremia  Multifocal pneumonia  Weakness  Diagnosis management comments:     Sepsis was not present in the emergency department or on arrival. This is supported as the patient does not either meet two out of the four SIRS criteria or has an obvious bacterial infection.      SIRS criteria considered:   1.                     Temperature > 100.4 or <98.6    2.                     Heart Rate > 90    3.                     Respiratory Rate > 22    4.                     WBC > 12K or <4K.     The patient's chest x-ray demonstrated multifocal pneumonia.  At the time of admission the patient's COVID is pending.  The patient's white blood cell count was normal.  The patient had mild anemia.  The patient's chemistry demonstrated severe hyponatremia.  The patient's calcium was slightly low.  I did reconfirm the patient's severe hyponatremia with a blood gas.  The patient's sodium on that was 110.  The patient's flu was negative.  The patient's urine demonstrated 3-5 white blood cells but there is no bacteria.  Patient's serum osmolality was 243.  Patient's BNP was 921.  The patient's magnesium is 1.9.  The patient's troponin was normal.  The patient's urine spot sodium was pending at the time of admission.  The patient was given a liter of normal saline.  Patient was given Rocephin and Zithromax for the multifocal pneumonia.  It appears the patient's somnolence and confusion is  most likely related to the hyponatremia.  The patient will subsequently be admitted to the hospital under the care of the hospitalist for severe hyponatremia weakness altered mental status and multifocal pneumonia.       Amount and/or Complexity of Data Reviewed  Clinical lab tests: reviewed  Tests in the radiology section of CPT®: reviewed  Tests in the medicine section of CPT®: reviewed  Decide to obtain previous medical records or to obtain history from someone other than the patient: yes                 Final diagnoses:   Altered mental status, unspecified altered mental status type   Weakness   Hyponatremia   Multifocal pneumonia        Disposition:  ED Disposition     ED Disposition   Decision to Admit    Condition   --    Comment   Level of Care: Critical Care [6]   Diagnosis: Hyponatremia [198519]   Admitting Physician: MAXIME CERNA [[id number]]   Attending Physician: MAXIME CERNA [[id number]]   Isolate for COVID?: Yes [1]   Certification: I Certify That Inpatient Hospital Services Are Medically Necessary For Greater Than 2 Midnights               Documentation assistance provided by JEREMY ARANDA acting as scribe for Kenroy Garza DO. Information recorded by the scribe was done at my direction and has been verified and validated by me.        Jeremy Aranda  09/14/22 2207       Jeremy Aranda  09/14/22 2208       Kenroy Garza DO  09/16/22 0215

## 2022-09-15 NOTE — ED NOTES
Family in room states over the past week pt has had a cough. She states they depend upon state transport and have an appointment tomorrow for an evaluation of the cough, but tonight her and her grand daughter could not get pt out of chair to the bathroom or bed because pt was too weak and they decided to call EMS for eval

## 2022-09-16 ENCOUNTER — APPOINTMENT (OUTPATIENT)
Dept: CT IMAGING | Facility: HOSPITAL | Age: 77
End: 2022-09-16

## 2022-09-16 LAB
ANION GAP SERPL CALCULATED.3IONS-SCNC: 6.5 MMOL/L (ref 5–15)
ANION GAP SERPL CALCULATED.3IONS-SCNC: 6.9 MMOL/L (ref 5–15)
ANION GAP SERPL CALCULATED.3IONS-SCNC: 7 MMOL/L (ref 5–15)
ANION GAP SERPL CALCULATED.3IONS-SCNC: 7.2 MMOL/L (ref 5–15)
ANION GAP SERPL CALCULATED.3IONS-SCNC: 7.3 MMOL/L (ref 5–15)
ANION GAP SERPL CALCULATED.3IONS-SCNC: 8.3 MMOL/L (ref 5–15)
BASOPHILS # BLD AUTO: 0.02 10*3/MM3 (ref 0–0.2)
BASOPHILS NFR BLD AUTO: 0.2 % (ref 0–1.5)
BUN SERPL-MCNC: 11 MG/DL (ref 8–23)
BUN/CREAT SERPL: 11 (ref 7–25)
BUN/CREAT SERPL: 11.6 (ref 7–25)
BUN/CREAT SERPL: 11.7 (ref 7–25)
BUN/CREAT SERPL: 11.8 (ref 7–25)
BUN/CREAT SERPL: 12 (ref 7–25)
BUN/CREAT SERPL: 12 (ref 7–25)
CALCIUM SPEC-SCNC: 8.2 MG/DL (ref 8.6–10.5)
CALCIUM SPEC-SCNC: 8.3 MG/DL (ref 8.6–10.5)
CALCIUM SPEC-SCNC: 8.4 MG/DL (ref 8.6–10.5)
CALCIUM SPEC-SCNC: 8.5 MG/DL (ref 8.6–10.5)
CALCIUM SPEC-SCNC: 8.6 MG/DL (ref 8.6–10.5)
CALCIUM SPEC-SCNC: 8.6 MG/DL (ref 8.6–10.5)
CHLORIDE SERPL-SCNC: 81 MMOL/L (ref 98–107)
CHLORIDE SERPL-SCNC: 82 MMOL/L (ref 98–107)
CHLORIDE SERPL-SCNC: 83 MMOL/L (ref 98–107)
CHLORIDE SERPL-SCNC: 83 MMOL/L (ref 98–107)
CHLORIDE SERPL-SCNC: 84 MMOL/L (ref 98–107)
CHLORIDE SERPL-SCNC: 85 MMOL/L (ref 98–107)
CO2 SERPL-SCNC: 26.7 MMOL/L (ref 22–29)
CO2 SERPL-SCNC: 27.5 MMOL/L (ref 22–29)
CO2 SERPL-SCNC: 27.7 MMOL/L (ref 22–29)
CO2 SERPL-SCNC: 28 MMOL/L (ref 22–29)
CO2 SERPL-SCNC: 28.1 MMOL/L (ref 22–29)
CO2 SERPL-SCNC: 28.8 MMOL/L (ref 22–29)
CREAT SERPL-MCNC: 0.92 MG/DL (ref 0.76–1.27)
CREAT SERPL-MCNC: 0.92 MG/DL (ref 0.76–1.27)
CREAT SERPL-MCNC: 0.93 MG/DL (ref 0.76–1.27)
CREAT SERPL-MCNC: 0.94 MG/DL (ref 0.76–1.27)
CREAT SERPL-MCNC: 0.95 MG/DL (ref 0.76–1.27)
CREAT SERPL-MCNC: 1 MG/DL (ref 0.76–1.27)
DEPRECATED RDW RBC AUTO: 36.8 FL (ref 37–54)
EGFRCR SERPLBLD CKD-EPI 2021: 78.5 ML/MIN/1.73
EGFRCR SERPLBLD CKD-EPI 2021: 83.5 ML/MIN/1.73
EGFRCR SERPLBLD CKD-EPI 2021: 84.5 ML/MIN/1.73
EGFRCR SERPLBLD CKD-EPI 2021: 85.6 ML/MIN/1.73
EGFRCR SERPLBLD CKD-EPI 2021: 86.7 ML/MIN/1.73
EGFRCR SERPLBLD CKD-EPI 2021: 86.7 ML/MIN/1.73
EOSINOPHIL # BLD AUTO: 0.29 10*3/MM3 (ref 0–0.4)
EOSINOPHIL NFR BLD AUTO: 3.1 % (ref 0.3–6.2)
ERYTHROCYTE [DISTWIDTH] IN BLOOD BY AUTOMATED COUNT: 13.1 % (ref 12.3–15.4)
GLUCOSE SERPL-MCNC: 103 MG/DL (ref 65–99)
GLUCOSE SERPL-MCNC: 103 MG/DL (ref 65–99)
GLUCOSE SERPL-MCNC: 104 MG/DL (ref 65–99)
GLUCOSE SERPL-MCNC: 108 MG/DL (ref 65–99)
GLUCOSE SERPL-MCNC: 89 MG/DL (ref 65–99)
GLUCOSE SERPL-MCNC: 96 MG/DL (ref 65–99)
HCT VFR BLD AUTO: 36.2 % (ref 37.5–51)
HGB BLD-MCNC: 12.4 G/DL (ref 13–17.7)
IMM GRANULOCYTES # BLD AUTO: 0.04 10*3/MM3 (ref 0–0.05)
IMM GRANULOCYTES NFR BLD AUTO: 0.4 % (ref 0–0.5)
LYMPHOCYTES # BLD AUTO: 1.58 10*3/MM3 (ref 0.7–3.1)
LYMPHOCYTES NFR BLD AUTO: 17.1 % (ref 19.6–45.3)
MAGNESIUM SERPL-MCNC: 2.2 MG/DL (ref 1.6–2.4)
MCH RBC QN AUTO: 26.6 PG (ref 26.6–33)
MCHC RBC AUTO-ENTMCNC: 34.3 G/DL (ref 31.5–35.7)
MCV RBC AUTO: 77.7 FL (ref 79–97)
MONOCYTES # BLD AUTO: 0.78 10*3/MM3 (ref 0.1–0.9)
MONOCYTES NFR BLD AUTO: 8.4 % (ref 5–12)
NEUTROPHILS NFR BLD AUTO: 6.55 10*3/MM3 (ref 1.7–7)
NEUTROPHILS NFR BLD AUTO: 70.8 % (ref 42.7–76)
NRBC BLD AUTO-RTO: 0 /100 WBC (ref 0–0.2)
PHOSPHATE SERPL-MCNC: 3.4 MG/DL (ref 2.5–4.5)
PLATELET # BLD AUTO: 343 10*3/MM3 (ref 140–450)
PMV BLD AUTO: 9.4 FL (ref 6–12)
POTASSIUM SERPL-SCNC: 4.2 MMOL/L (ref 3.5–5.2)
POTASSIUM SERPL-SCNC: 4.3 MMOL/L (ref 3.5–5.2)
POTASSIUM SERPL-SCNC: 4.3 MMOL/L (ref 3.5–5.2)
POTASSIUM SERPL-SCNC: 4.4 MMOL/L (ref 3.5–5.2)
RBC # BLD AUTO: 4.66 10*6/MM3 (ref 4.14–5.8)
SODIUM SERPL-SCNC: 117 MMOL/L (ref 136–145)
SODIUM SERPL-SCNC: 118 MMOL/L (ref 136–145)
SODIUM SERPL-SCNC: 118 MMOL/L (ref 136–145)
SODIUM SERPL-SCNC: 121 MMOL/L (ref 136–145)
T-UPTAKE NFR SERPL: 0.88 TBI (ref 0.8–1.3)
T4 SERPL-MCNC: 6.81 MCG/DL (ref 4.5–11.7)
TSH SERPL DL<=0.05 MIU/L-ACNC: 4.22 UIU/ML (ref 0.27–4.2)
WBC NRBC COR # BLD: 9.26 10*3/MM3 (ref 3.4–10.8)

## 2022-09-16 PROCEDURE — 80048 BASIC METABOLIC PNL TOTAL CA: CPT | Performed by: INTERNAL MEDICINE

## 2022-09-16 PROCEDURE — 84479 ASSAY OF THYROID (T3 OR T4): CPT | Performed by: PHYSICIAN ASSISTANT

## 2022-09-16 PROCEDURE — 85025 COMPLETE CBC W/AUTO DIFF WBC: CPT | Performed by: INTERNAL MEDICINE

## 2022-09-16 PROCEDURE — 94799 UNLISTED PULMONARY SVC/PX: CPT

## 2022-09-16 PROCEDURE — 25010000002 ENOXAPARIN PER 10 MG: Performed by: FAMILY MEDICINE

## 2022-09-16 PROCEDURE — 70450 CT HEAD/BRAIN W/O DYE: CPT

## 2022-09-16 PROCEDURE — 84443 ASSAY THYROID STIM HORMONE: CPT | Performed by: PHYSICIAN ASSISTANT

## 2022-09-16 PROCEDURE — 83735 ASSAY OF MAGNESIUM: CPT | Performed by: INTERNAL MEDICINE

## 2022-09-16 PROCEDURE — 84100 ASSAY OF PHOSPHORUS: CPT | Performed by: INTERNAL MEDICINE

## 2022-09-16 PROCEDURE — 99291 CRITICAL CARE FIRST HOUR: CPT | Performed by: INTERNAL MEDICINE

## 2022-09-16 PROCEDURE — 84436 ASSAY OF TOTAL THYROXINE: CPT | Performed by: PHYSICIAN ASSISTANT

## 2022-09-16 PROCEDURE — 99233 SBSQ HOSP IP/OBS HIGH 50: CPT | Performed by: STUDENT IN AN ORGANIZED HEALTH CARE EDUCATION/TRAINING PROGRAM

## 2022-09-16 RX ORDER — SODIUM CHLORIDE 9 MG/ML
60 INJECTION, SOLUTION INTRAVENOUS CONTINUOUS
Status: DISCONTINUED | OUTPATIENT
Start: 2022-09-16 | End: 2022-09-18 | Stop reason: HOSPADM

## 2022-09-16 RX ADMIN — GABAPENTIN 400 MG: 400 CAPSULE ORAL at 17:24

## 2022-09-16 RX ADMIN — ATORVASTATIN CALCIUM 40 MG: 40 TABLET, FILM COATED ORAL at 20:33

## 2022-09-16 RX ADMIN — SENNOSIDES AND DOCUSATE SODIUM 2 TABLET: 8.6; 5 TABLET ORAL at 09:33

## 2022-09-16 RX ADMIN — SENNOSIDES AND DOCUSATE SODIUM 2 TABLET: 8.6; 5 TABLET ORAL at 20:33

## 2022-09-16 RX ADMIN — SODIUM CHLORIDE 75 ML/HR: 9 INJECTION, SOLUTION INTRAVENOUS at 20:34

## 2022-09-16 RX ADMIN — CARVEDILOL 6.25 MG: 6.25 TABLET, FILM COATED ORAL at 09:33

## 2022-09-16 RX ADMIN — ENOXAPARIN SODIUM 40 MG: 100 INJECTION SUBCUTANEOUS at 09:33

## 2022-09-16 RX ADMIN — Medication 10 ML: at 09:33

## 2022-09-16 RX ADMIN — CARVEDILOL 6.25 MG: 6.25 TABLET, FILM COATED ORAL at 20:33

## 2022-09-16 RX ADMIN — AMLODIPINE BESYLATE 2.5 MG: 2.5 TABLET ORAL at 17:24

## 2022-09-16 RX ADMIN — CETIRIZINE HYDROCHLORIDE 10 MG: 10 TABLET, FILM COATED ORAL at 09:33

## 2022-09-16 RX ADMIN — SODIUM CHLORIDE 75 ML/HR: 9 INJECTION, SOLUTION INTRAVENOUS at 09:03

## 2022-09-16 RX ADMIN — CLOPIDOGREL BISULFATE 75 MG: 75 TABLET ORAL at 09:33

## 2022-09-16 RX ADMIN — ASPIRIN 81 MG: 81 TABLET, COATED ORAL at 09:33

## 2022-09-16 NOTE — PROGRESS NOTES
[facility]   Hospitalist Progress Note  Date: 2022  Patient Name: Curtis Richmond  : 1945  MRN: [id number]  Date of admission: 2022      Subjective   Subjective     Chief Complaint: Cough, shortness of breath, confusion    Summary: Curtis Richmond is a 77 y.o. old male patient who presents with reported 1 week of cough, increased work of breathing, shortness of breath, confusion.  History of morbid obesity, stroke, hypertension, bladder cancer, COPD, paroxysmal atrial fibrillation, hyperlipidemia, debility.     Patient presents from home after inability to stand or bear weight today in addition to increasing confusion.  The patient's wife provides history due to altered mental status.  Reports has had a cough for the past 1 week.  No known fevers or chills.  The patient is typically in a wheelchair, on the night of presentation he was unable to stand to assist.  He reportedly has had confusion over the past 2 to 3 days.     ED course: Vital signs normal.  Sodium 112.  Serum osmolality 243.  Chest x-ray shows bilateral infiltrates.  Started on antibiotics.     Interval Followup: No events overnight.  Patient remained stable off antibiotic therapy without any evidence of fever.  His sodium is slowly improving.  He denies any other symptoms at this time.  He is answering questions appropriately.  Patient currently at his mental baseline.  He is tolerating oral intake.    Review of Systems  All systems reviewed and are negative except as above in HPI.    Objective   Objective     Vitals:   Temp:  [97.4 °F (36.3 °C)-98.4 °F (36.9 °C)] 98 °F (36.7 °C)  Heart Rate:  [] 64  Resp:  [14] 14  BP: ()/() 125/60  Flow (L/min):  [2] 2  Physical Exam   Constitutional: Alert, awake, no acute distress, obese  HEENT:  PERRLA, EOMI; No Scleral icterus  Neck:  Supple; No Mass, No Lymphadenopathy  Cardiovascular:  No Rubs, No Edema, No JVD  Respiratory: No respiratory distress, unlabored  breathing, saturating well on room air  Abdomen:  Normal BS all 4 Quadrants; No Guarding, No Tenderness  Musculoskeletal:  Pulses Positive all 4 Ext; No Cyanosis, No Edema  Neurological: Follows commands, answers questions appropriately, moves all extremities, limb range of motion due to weakness  Skin:  No Rash, No Cellulitis, No Erythema     Result Review    Result Review:  I have personally reviewed the results from the time of this admission to 9/16/2022 07:03 EDT and agree with these findings:  [x]  Laboratory  [x]  Microbiology  [x]  Radiology  [x]  EKG/Telemetry   []  Cardiology/Vascular   []  Pathology  [x]  Old records  []  Other:    Assessment & Plan   Assessment / Plan     Assessment:  Hyponatremia, likely secondary to HCTZ and GI losses from diarrhea  Acute metabolic encephalopathy secondary to hyponatremia  Dismal atrial fibrillation with history of watchman's device  Hyperlipidemia  COPD, not exacerbated  Physical debility with wheelchair-bound status  Anterior communicating artery thrombosed aneurysm stable since 2/9/2021  Morbid obesity, BMI 41.49    Plan:  Continue to monitor in ICU given hyponatremia  Pulmonary care assistance appreciated this patient  Sodium level improving, continue to monitor serial levels to decrease risk of overcorrection  Nephrology assistance appreciated  CT head reviewed and is stable, no acute CVA, thrombosed aneurysm stable  Blood pressure currently stable, continue with amlodipine and carvedilol  Continue atorvastatin hyperlipidemia  Continue Lovenox for DVT prophylaxis  Continue with Neurontin for history of neuropathy  Bowel regimen in place  Zofran as needed for nausea  Labs reviewed, image reviewed, vital signs reviewed, medications reviewed  Further recommendations pending clinical course      Discussed plan with RN.    DVT prophylaxis:  Medical and mechanical DVT prophylaxis orders are present.    CODE STATUS:   Level Of Support Discussed With: Patient  Code Status  (Patient has no pulse and is not breathing): CPR (Attempt to Resuscitate)  Medical Interventions (Patient has pulse or is breathing): Full        Electronically signed by Geoff Leonard DO, 09/16/22, 7:03 AM EDT.

## 2022-09-16 NOTE — PROGRESS NOTES
" LOS: 1 day   Patient Care Team:  Kat Donahue APRN as PCP - General (Nurse Practitioner)  Kaelyn Alvarado MD as Consulting Physician (Urology)    Chief Complaint: Hyponatremia    Subjective     History of Present Illness    Subjective:  Symptoms:  No shortness of breath, chest pain, chest pressure or anxiety.    Diet:  No nausea or vomiting.    Pain:  He reports no pain.      Pt without any acute complaints  Wife at bedside  Awake and alert.    History taken from: patient    Objective     Vital Sign Min/Max for last 24 hours  Temp  Min: 97.4 °F (36.3 °C)  Max: 98.4 °F (36.9 °C)   BP  Min: 95/45  Max: 160/85   Pulse  Min: 59  Max: 91   Resp  Min: 14  Max: 14   SpO2  Min: 90 %  Max: 98 %   Flow (L/min)  Min: 2  Max: 2   Weight  Min: 113 kg (249 lb 5.4 oz)  Max: 113 kg (249 lb 5.4 oz)     Flowsheet Rows    Flowsheet Row First Filed Value   Admission Height 165.1 cm (65\") Documented at 09/14/2022 1946   Admission Weight 112 kg (246 lb 11.1 oz) Documented at 09/14/2022 1946          No intake/output data recorded.  I/O last 3 completed shifts:  In: 1900 [I.V.:600; IV Piggyback:1300]  Out: 200 [Urine:200]    Objective:  General Appearance:  Comfortable.    Vital signs: (most recent): Blood pressure 125/60, pulse 68, temperature 98 °F (36.7 °C), temperature source Oral, resp. rate 14, height 165.1 cm (65\"), weight 113 kg (249 lb 5.4 oz), SpO2 93 %.  Vital signs are normal.    Output: Producing urine.    HEENT: Normal HEENT exam.    Lungs:  Normal effort and normal respiratory rate.    Heart: Normal rate.    Abdomen: Abdomen is soft.  Bowel sounds are normal.   There is no abdominal tenderness.     Extremities: Normal range of motion.  There is dependent edema.  (Trace edema flo.)  Neurological: Patient is alert and oriented to person, place and time.    Skin:  Warm.              Results Review:     I reviewed the patient's new clinical results.    WBC WBC   Date Value Ref Range Status   09/16/2022 9.26 " 3.40 - 10.80 10*3/mm3 Final   09/15/2022 10.24 3.40 - 10.80 10*3/mm3 Final   09/14/2022 8.81 3.40 - 10.80 10*3/mm3 Final      HGB Hemoglobin   Date Value Ref Range Status   09/16/2022 12.4 (L) 13.0 - 17.7 g/dL Final   09/15/2022 12.5 (L) 13.0 - 17.7 g/dL Final   09/14/2022 12.5 (L) 13.0 - 17.7 g/dL Final      HCT Hematocrit   Date Value Ref Range Status   09/16/2022 36.2 (L) 37.5 - 51.0 % Final   09/15/2022 34.8 (L) 37.5 - 51.0 % Final   09/14/2022 34.9 (L) 37.5 - 51.0 % Final      Platlets No results found for: LABPLAT   MCV MCV   Date Value Ref Range Status   09/16/2022 77.7 (L) 79.0 - 97.0 fL Final   09/15/2022 74.5 (L) 79.0 - 97.0 fL Final   09/14/2022 74.9 (L) 79.0 - 97.0 fL Final          Sodium Sodium   Date Value Ref Range Status   09/16/2022 118 (C) 136 - 145 mmol/L Final   09/16/2022 117 (C) 136 - 145 mmol/L Final   09/16/2022 118 (C) 136 - 145 mmol/L Final   09/16/2022 117 (C) 136 - 145 mmol/L Final   09/15/2022 118 (C) 136 - 145 mmol/L Final   09/15/2022 118 (C) 136 - 145 mmol/L Final   09/15/2022 117 (C) 136 - 145 mmol/L Final   09/15/2022 115 (C) 136 - 145 mmol/L Final   09/15/2022 114 (C) 136 - 145 mmol/L Final   09/14/2022 112 (C) 136 - 145 mmol/L Final     Sodium, Venous   Date Value Ref Range Status   09/14/2022 110.3 (C) 136 - 146 mmol/L Final      Potassium Potassium   Date Value Ref Range Status   09/16/2022 4.3 3.5 - 5.2 mmol/L Final   09/16/2022 4.2 3.5 - 5.2 mmol/L Final   09/16/2022 4.4 3.5 - 5.2 mmol/L Final     Comment:     Slight hemolysis detected by analyzer. Results may be affected.   09/16/2022 4.3 3.5 - 5.2 mmol/L Final     Comment:     Slight hemolysis detected by analyzer. Results may be affected.   09/15/2022 3.6 3.5 - 5.2 mmol/L Final   09/15/2022 3.6 3.5 - 5.2 mmol/L Final   09/15/2022 3.6 3.5 - 5.2 mmol/L Final   09/15/2022 3.6 3.5 - 5.2 mmol/L Final   09/15/2022 3.8 3.5 - 5.2 mmol/L Final   09/14/2022 3.8 3.5 - 5.2 mmol/L Final     Comment:     Slight hemolysis detected by  analyzer. Results may be affected.     Potassium, Venous   Date Value Ref Range Status   09/14/2022 3.9 3.5 - 5.0 mmol/L Final      Chloride Chloride   Date Value Ref Range Status   09/16/2022 84 (L) 98 - 107 mmol/L Final   09/16/2022 83 (L) 98 - 107 mmol/L Final   09/16/2022 83 (L) 98 - 107 mmol/L Final   09/16/2022 82 (L) 98 - 107 mmol/L Final   09/15/2022 82 (L) 98 - 107 mmol/L Final   09/15/2022 81 (L) 98 - 107 mmol/L Final   09/15/2022 81 (L) 98 - 107 mmol/L Final   09/15/2022 80 (L) 98 - 107 mmol/L Final   09/14/2022 78 (L) 98 - 107 mmol/L Final     Chloride, Venous    Date Value Ref Range Status   09/14/2022 78 (L) 98 - 106 mmol/L Final      CO2 CO2   Date Value Ref Range Status   09/16/2022 27.5 22.0 - 29.0 mmol/L Final   09/16/2022 26.7 22.0 - 29.0 mmol/L Final   09/16/2022 28.1 22.0 - 29.0 mmol/L Final   09/16/2022 28.0 22.0 - 29.0 mmol/L Final   09/15/2022 28.7 22.0 - 29.0 mmol/L Final   09/15/2022 28.8 22.0 - 29.0 mmol/L Final   09/15/2022 27.4 22.0 - 29.0 mmol/L Final   09/15/2022 28.0 22.0 - 29.0 mmol/L Final   09/14/2022 23.6 22.0 - 29.0 mmol/L Final      BUN BUN   Date Value Ref Range Status   09/16/2022 11 8 - 23 mg/dL Final   09/16/2022 11 8 - 23 mg/dL Final   09/16/2022 11 8 - 23 mg/dL Final   09/16/2022 11 8 - 23 mg/dL Final   09/15/2022 10 8 - 23 mg/dL Final   09/15/2022 9 8 - 23 mg/dL Final   09/15/2022 9 8 - 23 mg/dL Final   09/15/2022 9 8 - 23 mg/dL Final   09/14/2022 10 8 - 23 mg/dL Final      Creatinine Creatinine   Date Value Ref Range Status   09/16/2022 0.92 0.76 - 1.27 mg/dL Final   09/16/2022 0.94 0.76 - 1.27 mg/dL Final   09/16/2022 1.00 0.76 - 1.27 mg/dL Final   09/16/2022 0.95 0.76 - 1.27 mg/dL Final   09/15/2022 0.97 0.76 - 1.27 mg/dL Final   09/15/2022 0.85 0.76 - 1.27 mg/dL Final   09/15/2022 0.86 0.76 - 1.27 mg/dL Final   09/15/2022 0.80 0.76 - 1.27 mg/dL Final   09/14/2022 0.80 0.76 - 1.27 mg/dL Final      Calcium Calcium   Date Value Ref Range Status   09/16/2022 8.4 (L) 8.6 -  10.5 mg/dL Final   09/16/2022 8.5 (L) 8.6 - 10.5 mg/dL Final   09/16/2022 8.6 8.6 - 10.5 mg/dL Final   09/16/2022 8.6 8.6 - 10.5 mg/dL Final   09/15/2022 8.5 (L) 8.6 - 10.5 mg/dL Final   09/15/2022 8.5 (L) 8.6 - 10.5 mg/dL Final   09/15/2022 8.4 (L) 8.6 - 10.5 mg/dL Final   09/15/2022 8.5 (L) 8.6 - 10.5 mg/dL Final   09/14/2022 8.4 (L) 8.6 - 10.5 mg/dL Final      PO4 No results found for: CAPO4   Albumin Albumin   Date Value Ref Range Status   09/15/2022 3.40 (L) 3.50 - 5.20 g/dL Final   09/14/2022 3.20 (L) 3.50 - 5.20 g/dL Final      Magnesium Magnesium   Date Value Ref Range Status   09/16/2022 2.2 1.6 - 2.4 mg/dL Final   09/15/2022 1.9 1.6 - 2.4 mg/dL Final   09/14/2022 1.9 1.6 - 2.4 mg/dL Final      Uric Acid No results found for: URICACID     Medication Review:   amLODIPine, 2.5 mg, Oral, Q PM  aspirin, 81 mg, Oral, Daily  atorvastatin, 40 mg, Oral, Nightly  carvedilol, 6.25 mg, Oral, BID  cetirizine, 10 mg, Oral, Daily  clopidogrel, 75 mg, Oral, Daily  enoxaparin, 40 mg, Subcutaneous, Q24H  gabapentin, 400 mg, Oral, Q PM  senna-docusate sodium, 2 tablet, Oral, BID  sodium chloride, 10 mL, Intravenous, Q12H          Assessment & Plan       Hyponatremia    PAF (paroxysmal atrial fibrillation) (HCC)    Wheelchair bound    Hyperlipidemia    CAP (community acquired pneumonia)    Morbid obesity (HCC)    Simple chronic bronchitis (HCC)    Metabolic encephalopathy      Assessment & Plan  Hyponatremia-  Appears euvolemic on exam.  TSH normal.  Urine osm of 318, urine sodium 62.  Restart IVF's and monitor.  Appears due to HCTZ.  Will keep of fluid restriction 1L/day.   Monitor sodium q4hrs.  Slowly better to 118.  HTN-  BP controlled.  Hold HCTZ with hyponatremia   PNA-  Likely contributing to hyponatremia.  On IV abx.  Chronic afib-  H/o watchman device.  Chronic immobility-  Wheelchair bound after arthritis  COPD-     Joe Espinosa MD  09/16/22  12:32 EDT

## 2022-09-16 NOTE — PLAN OF CARE
Goal Outcome Evaluation:   Patient was transferred from CCU. Alert to self, place and situation. Wife at bedside. VSS. No needs voiced at this time.

## 2022-09-16 NOTE — PROGRESS NOTES
Pulmonary / Critical Care Progress Note      Patient Name: Curtis Richmond  : 1945  MRN: [id number]  Attending:  Geoff Leonard DO   Date of admission: 2022    Subjective   Subjective   Patient critically ill with hyponatremia    Over past 24 hours:  Sodium stable at 118  Remains very weak and fatigued but mental status reportedly at baseline  Had CT for concern and change in mental status.  Per family report, this is his baseline mental status given previous stroke  Hemodynamically stable  Urine output adequate  On room air    Review of Systems  Constitutional symptoms:   Fatigue, otherwise denied complaints   Ear, nose, throat: Denied complaints  Cardiovascular:  Denied complaints  Respiratory: Denied complaints  Gastrointestinal: Denied complaints  Musculoskeletal: Weakness, otherwise denied complaints  Neurologic: Denied complaints  Skin: Denied complaints        Objective   Objective     Vitals:   Vital signs for last 24 hours:  Temp:  [97.4 °F (36.3 °C)-98.4 °F (36.9 °C)] 98 °F (36.7 °C)  Heart Rate:  [] 64  Resp:  [14] 14  BP: ()/() 125/60    Intake/Output last 3 shifts:  I/O last 3 completed shifts:  In: 1900 [I.V.:600; IV Piggyback:1300]  Out: 200 [Urine:200]  Intake/Output this shift:  No intake/output data recorded.    Vent settings for last 24 hours:       Hemodynamic parameters for last 24 hours:       Physical Exam   Vital Signs Reviewed   General: Chronically ill-appearing male, Alert, NAD.    HEENT:  PERRL, EOMI.  OP, nares clear  Neck:  Supple, no JVD, no thyromegaly  Chest:  good aeration, clear to auscultation bilaterally, tympanic to percussion bilaterally, no work of breathing noted  CV: Sinus tachycardia, no MGR, pulses 2+, equal.  Abd:  Soft, NT, ND, + BS, no HSM  EXT:  no clubbing, no cyanosis, no edema  Neuro:  A&Ox3, CN grossly intact, no focal deficits.  Skin: No rashes or lesions noted              Result Review    Result Review:  I have personally  reviewed the results from the time of this admission to 9/16/2022 07:02 EDT and agree with these findings:  [x]  Laboratory  [x]  Microbiology  [x]  Radiology  [x]  EKG/Telemetry   []  Cardiology/Vascular   []  Pathology  []  Old records  []  Other:  Most notable findings include:       Lab 09/16/22  1158 09/16/22  0857 09/16/22  0358 09/16/22  0006 09/15/22  2010 09/15/22  1659 09/15/22  1159 09/15/22  0553 09/14/22 2257 09/14/22 1957   WBC  --  9.26  --   --   --   --   --  10.24  --  8.81   HEMOGLOBIN  --  12.4*  --   --   --   --   --  12.5*  --  12.5*   HEMATOCRIT  --  36.2*  --   --   --   --   --  34.8*  --  34.9*   PLATELETS  --  343  --   --   --   --   --  348  --  363   SODIUM 118* 117* 118* 117* 118* 118* 117* 115*   < > 112*   SODIUM, VENOUS  --   --   --   --   --   --   --   --    < >  --    POTASSIUM 4.3 4.2 4.4 4.3 3.6 3.6 3.6 3.6   < > 3.8   POTASSIUM, VENOUS  --   --   --   --   --   --   --   --    < >  --    CHLORIDE 84* 83* 83* 82* 82* 81* 81* 80*  --  78*   CHLORIDE, VENOUS  --   --   --   --   --   --   --   --    < >  --    CO2 27.5 26.7 28.1 28.0 28.7 28.8 27.4 28.0  --  23.6   BUN 11 11 11 11 10 9 9 9  --  10   CREATININE 0.92 0.94 1.00 0.95 0.97 0.85 0.86 0.80  --  0.80   GLUCOSE 103* 103* 89 96 134* 105* 112* 87  --  149*   GLUCOSE, ARTERIAL  --   --   --   --   --   --   --   --    < >  --    CALCIUM 8.4* 8.5* 8.6 8.6 8.5* 8.5* 8.4* 8.5*  --  8.4*   PHOSPHORUS  --   --  3.4  --   --   --   --  2.9  --   --    TOTAL PROTEIN  --   --   --   --   --   --   --  6.4  --  6.4   ALBUMIN  --   --   --   --   --   --   --  3.40*  --  3.20*   GLOBULIN  --   --   --   --   --   --   --  3.0  --  3.2    < > = values in this interval not displayed.         Assessment & Plan   Assessment / Plan     Active Hospital Problems:  Active Hospital Problems    Diagnosis    • **Hyponatremia    • CAP (community acquired pneumonia)    • Morbid obesity (HCC)    • Simple chronic bronchitis (HCC)    • Metabolic  encephalopathy    • Wheelchair bound    • Hyperlipidemia    • PAF (paroxysmal atrial fibrillation) (HCC)      · Echocardiogram, 2/10/2021: EF 60%.  Normal LV systolic function diastolic dysfunction calcified aortic root and valve with normal aortic velocities and otherwise normal valvular morphology.  Left atrium 3.9 cm  · Left atrial appendage closure with 20 mm watchman FLX device, 11-9-21     Hypomagnesemia  Hypokalemia     Plan:  Hyponatremia likely secondary to GI losses, poor oral intake plus hydrochlorothiazide use.    Nephrology following and appreciate assistance.  Agree with gentle hydration with normal saline at 75 cc/h.  Sodium still very low will need to follow closely to avoid overcorrection with serial renal panels.  Wean O2 to keep SPO2 greater than 90%  Head CT overnight grossly unremarkable with old findings.  Patient is near his baseline mental status  Neurosurgery consulted and following.  Appreciate recommendations  Thyroid studies grossly unremarkable  Bowel regimen     DVT prophylaxis:  Medical and mechanical DVT prophylaxis orders are present.    CODE STATUS:   Level Of Support Discussed With: Patient  Code Status (Patient has no pulse and is not breathing): CPR (Attempt to Resuscitate)  Medical Interventions (Patient has pulse or is breathing): Full    Okay to move out of ICU      The patient is critically ill in the ICU with acute hyponatremia, altered mental status. Multidisciplinary bedside critical care rounds were performed with nursing staff, respiratory therapy, pharmacy, nutritional services, social work. I have personally reviewed the chart, labs and any pertinent imaging available.  I have spent 30 minutes of critical care time, excluding procedures, in the care of this patient.    Electronically signed by Calderon Rodriguez MD, 09/16/22, 2:06 PM EDT.

## 2022-09-16 NOTE — PLAN OF CARE
Goal Outcome Evaluation:      Pt required 2L NC over night due to O2 levels in mid/low 80's while asleep. VSS throughout the night. No complications noted by RN.

## 2022-09-17 LAB
ALBUMIN SERPL-MCNC: 3.3 G/DL (ref 3.5–5.2)
ALBUMIN/GLOB SERPL: 1.2 G/DL
ALP SERPL-CCNC: 87 U/L (ref 39–117)
ALT SERPL W P-5'-P-CCNC: 26 U/L (ref 1–41)
ANION GAP SERPL CALCULATED.3IONS-SCNC: 6.3 MMOL/L (ref 5–15)
ANION GAP SERPL CALCULATED.3IONS-SCNC: 6.3 MMOL/L (ref 5–15)
ANION GAP SERPL CALCULATED.3IONS-SCNC: 6.7 MMOL/L (ref 5–15)
ANION GAP SERPL CALCULATED.3IONS-SCNC: 8.1 MMOL/L (ref 5–15)
AST SERPL-CCNC: 20 U/L (ref 1–40)
BASOPHILS # BLD AUTO: 0.03 10*3/MM3 (ref 0–0.2)
BASOPHILS NFR BLD AUTO: 0.4 % (ref 0–1.5)
BILIRUB SERPL-MCNC: 0.4 MG/DL (ref 0–1.2)
BUN SERPL-MCNC: 8 MG/DL (ref 8–23)
BUN SERPL-MCNC: 8 MG/DL (ref 8–23)
BUN SERPL-MCNC: 9 MG/DL (ref 8–23)
BUN SERPL-MCNC: 9 MG/DL (ref 8–23)
BUN/CREAT SERPL: 10.3 (ref 7–25)
BUN/CREAT SERPL: 9.3 (ref 7–25)
BUN/CREAT SERPL: 9.3 (ref 7–25)
BUN/CREAT SERPL: 9.9 (ref 7–25)
CALCIUM SPEC-SCNC: 8.1 MG/DL (ref 8.6–10.5)
CALCIUM SPEC-SCNC: 8.3 MG/DL (ref 8.6–10.5)
CALCIUM SPEC-SCNC: 8.3 MG/DL (ref 8.6–10.5)
CALCIUM SPEC-SCNC: 8.4 MG/DL (ref 8.6–10.5)
CHLORIDE SERPL-SCNC: 86 MMOL/L (ref 98–107)
CHLORIDE SERPL-SCNC: 89 MMOL/L (ref 98–107)
CHLORIDE SERPL-SCNC: 89 MMOL/L (ref 98–107)
CHLORIDE SERPL-SCNC: 91 MMOL/L (ref 98–107)
CO2 SERPL-SCNC: 26.3 MMOL/L (ref 22–29)
CO2 SERPL-SCNC: 27.7 MMOL/L (ref 22–29)
CO2 SERPL-SCNC: 27.7 MMOL/L (ref 22–29)
CO2 SERPL-SCNC: 27.9 MMOL/L (ref 22–29)
CREAT SERPL-MCNC: 0.86 MG/DL (ref 0.76–1.27)
CREAT SERPL-MCNC: 0.86 MG/DL (ref 0.76–1.27)
CREAT SERPL-MCNC: 0.87 MG/DL (ref 0.76–1.27)
CREAT SERPL-MCNC: 0.91 MG/DL (ref 0.76–1.27)
DEPRECATED RDW RBC AUTO: 37.2 FL (ref 37–54)
EGFRCR SERPLBLD CKD-EPI 2021: 87.9 ML/MIN/1.73
EGFRCR SERPLBLD CKD-EPI 2021: 90 ML/MIN/1.73
EGFRCR SERPLBLD CKD-EPI 2021: 90.3 ML/MIN/1.73
EGFRCR SERPLBLD CKD-EPI 2021: 90.3 ML/MIN/1.73
EOSINOPHIL # BLD AUTO: 0.34 10*3/MM3 (ref 0–0.4)
EOSINOPHIL NFR BLD AUTO: 4 % (ref 0.3–6.2)
ERYTHROCYTE [DISTWIDTH] IN BLOOD BY AUTOMATED COUNT: 13.2 % (ref 12.3–15.4)
GLOBULIN UR ELPH-MCNC: 2.8 GM/DL
GLUCOSE SERPL-MCNC: 140 MG/DL (ref 65–99)
GLUCOSE SERPL-MCNC: 91 MG/DL (ref 65–99)
GLUCOSE SERPL-MCNC: 92 MG/DL (ref 65–99)
GLUCOSE SERPL-MCNC: 92 MG/DL (ref 65–99)
HCT VFR BLD AUTO: 33 % (ref 37.5–51)
HGB BLD-MCNC: 11.2 G/DL (ref 13–17.7)
IMM GRANULOCYTES # BLD AUTO: 0.05 10*3/MM3 (ref 0–0.05)
IMM GRANULOCYTES NFR BLD AUTO: 0.6 % (ref 0–0.5)
LYMPHOCYTES # BLD AUTO: 1.63 10*3/MM3 (ref 0.7–3.1)
LYMPHOCYTES NFR BLD AUTO: 19.4 % (ref 19.6–45.3)
MAGNESIUM SERPL-MCNC: 2 MG/DL (ref 1.6–2.4)
MCH RBC QN AUTO: 26.4 PG (ref 26.6–33)
MCHC RBC AUTO-ENTMCNC: 33.9 G/DL (ref 31.5–35.7)
MCV RBC AUTO: 77.6 FL (ref 79–97)
MONOCYTES # BLD AUTO: 0.76 10*3/MM3 (ref 0.1–0.9)
MONOCYTES NFR BLD AUTO: 9 % (ref 5–12)
NEUTROPHILS NFR BLD AUTO: 5.61 10*3/MM3 (ref 1.7–7)
NEUTROPHILS NFR BLD AUTO: 66.6 % (ref 42.7–76)
PHOSPHATE SERPL-MCNC: 2.6 MG/DL (ref 2.5–4.5)
PLAT MORPH BLD: NORMAL
PLATELET # BLD AUTO: 297 10*3/MM3 (ref 140–450)
PMV BLD AUTO: 9.7 FL (ref 6–12)
POTASSIUM SERPL-SCNC: 4.2 MMOL/L (ref 3.5–5.2)
POTASSIUM SERPL-SCNC: 4.2 MMOL/L (ref 3.5–5.2)
POTASSIUM SERPL-SCNC: 4.3 MMOL/L (ref 3.5–5.2)
POTASSIUM SERPL-SCNC: 4.3 MMOL/L (ref 3.5–5.2)
PROT SERPL-MCNC: 6.1 G/DL (ref 6–8.5)
RBC # BLD AUTO: 4.25 10*6/MM3 (ref 4.14–5.8)
RBC MORPH BLD: NORMAL
SODIUM SERPL-SCNC: 122 MMOL/L (ref 136–145)
SODIUM SERPL-SCNC: 123 MMOL/L (ref 136–145)
SODIUM SERPL-SCNC: 123 MMOL/L (ref 136–145)
SODIUM SERPL-SCNC: 124 MMOL/L (ref 136–145)
WBC MORPH BLD: NORMAL
WBC NRBC COR # BLD: 8.42 10*3/MM3 (ref 3.4–10.8)

## 2022-09-17 PROCEDURE — 99232 SBSQ HOSP IP/OBS MODERATE 35: CPT | Performed by: INTERNAL MEDICINE

## 2022-09-17 PROCEDURE — 25010000002 ENOXAPARIN PER 10 MG: Performed by: FAMILY MEDICINE

## 2022-09-17 PROCEDURE — 84100 ASSAY OF PHOSPHORUS: CPT | Performed by: STUDENT IN AN ORGANIZED HEALTH CARE EDUCATION/TRAINING PROGRAM

## 2022-09-17 PROCEDURE — 80053 COMPREHEN METABOLIC PANEL: CPT | Performed by: INTERNAL MEDICINE

## 2022-09-17 PROCEDURE — 85025 COMPLETE CBC W/AUTO DIFF WBC: CPT | Performed by: INTERNAL MEDICINE

## 2022-09-17 PROCEDURE — 94799 UNLISTED PULMONARY SVC/PX: CPT

## 2022-09-17 PROCEDURE — 85007 BL SMEAR W/DIFF WBC COUNT: CPT | Performed by: INTERNAL MEDICINE

## 2022-09-17 PROCEDURE — 99233 SBSQ HOSP IP/OBS HIGH 50: CPT | Performed by: STUDENT IN AN ORGANIZED HEALTH CARE EDUCATION/TRAINING PROGRAM

## 2022-09-17 PROCEDURE — 83735 ASSAY OF MAGNESIUM: CPT | Performed by: INTERNAL MEDICINE

## 2022-09-17 RX ORDER — AMLODIPINE BESYLATE 5 MG/1
5 TABLET ORAL EVERY EVENING
Status: DISCONTINUED | OUTPATIENT
Start: 2022-09-17 | End: 2022-09-18 | Stop reason: HOSPADM

## 2022-09-17 RX ADMIN — Medication 10 ML: at 08:20

## 2022-09-17 RX ADMIN — CLOPIDOGREL BISULFATE 75 MG: 75 TABLET ORAL at 08:19

## 2022-09-17 RX ADMIN — CETIRIZINE HYDROCHLORIDE 10 MG: 10 TABLET, FILM COATED ORAL at 08:19

## 2022-09-17 RX ADMIN — CARVEDILOL 6.25 MG: 6.25 TABLET, FILM COATED ORAL at 08:19

## 2022-09-17 RX ADMIN — ASPIRIN 81 MG: 81 TABLET, COATED ORAL at 08:19

## 2022-09-17 RX ADMIN — ENOXAPARIN SODIUM 40 MG: 100 INJECTION SUBCUTANEOUS at 08:19

## 2022-09-17 RX ADMIN — AMLODIPINE BESYLATE 5 MG: 5 TABLET ORAL at 16:06

## 2022-09-17 RX ADMIN — ATORVASTATIN CALCIUM 40 MG: 40 TABLET, FILM COATED ORAL at 20:02

## 2022-09-17 RX ADMIN — GABAPENTIN 400 MG: 400 CAPSULE ORAL at 16:06

## 2022-09-17 RX ADMIN — SENNOSIDES AND DOCUSATE SODIUM 2 TABLET: 8.6; 5 TABLET ORAL at 08:19

## 2022-09-17 RX ADMIN — CARVEDILOL 6.25 MG: 6.25 TABLET, FILM COATED ORAL at 20:02

## 2022-09-17 NOTE — PROGRESS NOTES
" LOS: 2 days   Patient Care Team:  Kat Donahue APRN as PCP - General (Nurse Practitioner)  Kaelyn Alvarado MD as Consulting Physician (Urology)    Chief Complaint: Hyponatremia    Subjective      Patient is alert however confused, denies any chest pain or shortness of breath.  Wife is at bedside and states he has been confused for several months.        Objective     Vital Sign Min/Max for last 24 hours  Temp  Min: 97.3 °F (36.3 °C)  Max: 98.5 °F (36.9 °C)   BP  Min: 137/61  Max: 156/74   Pulse  Min: 62  Max: 73   Resp  Min: 16  Max: 16   SpO2  Min: 94 %  Max: 97 %   No data recorded   No data recorded     Flowsheet Rows    Flowsheet Row First Filed Value   Admission Height 165.1 cm (65\") Documented at 09/14/2022 1946   Admission Weight 112 kg (246 lb 11.1 oz) Documented at 09/14/2022 1946          I/O this shift:  In: 480 [P.O.:480]  Out: -   I/O last 3 completed shifts:  In: 360 [P.O.:360]  Out: -     Objective:  General Appearance:  Comfortable and not in pain.    Vital signs: (most recent): Blood pressure 143/72, pulse 69, temperature 98.2 °F (36.8 °C), temperature source Oral, resp. rate 16, height 165.1 cm (65\"), weight 113 kg (249 lb 5.4 oz), SpO2 95 %.  Vital signs are normal.    HEENT: Normal HEENT exam.    Lungs:  Normal effort and normal respiratory rate.    Heart: Normal rate.    Abdomen: Abdomen is soft.  Bowel sounds are normal.   There is no abdominal tenderness.     Extremities: Normal range of motion.  There is dependent edema.  (Trace edema flo.)  Neurological: Patient is alert.  (Confused to place and year).    Skin:  Warm.              Results Review:     I reviewed the patient's new clinical results.    WBC WBC   Date Value Ref Range Status   09/17/2022 8.42 3.40 - 10.80 10*3/mm3 Final   09/16/2022 9.26 3.40 - 10.80 10*3/mm3 Final   09/15/2022 10.24 3.40 - 10.80 10*3/mm3 Final   09/14/2022 8.81 3.40 - 10.80 10*3/mm3 Final      HGB Hemoglobin   Date Value Ref Range Status "   09/17/2022 11.2 (L) 13.0 - 17.7 g/dL Final   09/16/2022 12.4 (L) 13.0 - 17.7 g/dL Final   09/15/2022 12.5 (L) 13.0 - 17.7 g/dL Final   09/14/2022 12.5 (L) 13.0 - 17.7 g/dL Final      HCT Hematocrit   Date Value Ref Range Status   09/17/2022 33.0 (L) 37.5 - 51.0 % Final   09/16/2022 36.2 (L) 37.5 - 51.0 % Final   09/15/2022 34.8 (L) 37.5 - 51.0 % Final   09/14/2022 34.9 (L) 37.5 - 51.0 % Final      Platlets No results found for: LABPLAT   MCV MCV   Date Value Ref Range Status   09/17/2022 77.6 (L) 79.0 - 97.0 fL Final   09/16/2022 77.7 (L) 79.0 - 97.0 fL Final   09/15/2022 74.5 (L) 79.0 - 97.0 fL Final   09/14/2022 74.9 (L) 79.0 - 97.0 fL Final          Sodium Sodium   Date Value Ref Range Status   09/17/2022 123 (L) 136 - 145 mmol/L Final   09/17/2022 123 (L) 136 - 145 mmol/L Final   09/17/2022 122 (L) 136 - 145 mmol/L Final   09/16/2022 121 (L) 136 - 145 mmol/L Final   09/16/2022 117 (C) 136 - 145 mmol/L Final   09/16/2022 118 (C) 136 - 145 mmol/L Final   09/16/2022 117 (C) 136 - 145 mmol/L Final   09/16/2022 118 (C) 136 - 145 mmol/L Final   09/16/2022 117 (C) 136 - 145 mmol/L Final   09/15/2022 118 (C) 136 - 145 mmol/L Final   09/15/2022 118 (C) 136 - 145 mmol/L Final   09/15/2022 117 (C) 136 - 145 mmol/L Final   09/15/2022 115 (C) 136 - 145 mmol/L Final   09/15/2022 114 (C) 136 - 145 mmol/L Final   09/14/2022 112 (C) 136 - 145 mmol/L Final     Sodium, Venous   Date Value Ref Range Status   09/14/2022 110.3 (C) 136 - 146 mmol/L Final      Potassium Potassium   Date Value Ref Range Status   09/17/2022 4.2 3.5 - 5.2 mmol/L Final   09/17/2022 4.2 3.5 - 5.2 mmol/L Final   09/17/2022 4.3 3.5 - 5.2 mmol/L Final   09/16/2022 4.2 3.5 - 5.2 mmol/L Final     Comment:     Slight hemolysis detected by analyzer. Results may be affected.   09/16/2022 4.2 3.5 - 5.2 mmol/L Final   09/16/2022 4.3 3.5 - 5.2 mmol/L Final   09/16/2022 4.2 3.5 - 5.2 mmol/L Final   09/16/2022 4.4 3.5 - 5.2 mmol/L Final     Comment:     Slight  hemolysis detected by analyzer. Results may be affected.   09/16/2022 4.3 3.5 - 5.2 mmol/L Final     Comment:     Slight hemolysis detected by analyzer. Results may be affected.   09/15/2022 3.6 3.5 - 5.2 mmol/L Final   09/15/2022 3.6 3.5 - 5.2 mmol/L Final   09/15/2022 3.6 3.5 - 5.2 mmol/L Final   09/15/2022 3.6 3.5 - 5.2 mmol/L Final   09/15/2022 3.8 3.5 - 5.2 mmol/L Final   09/14/2022 3.8 3.5 - 5.2 mmol/L Final     Comment:     Slight hemolysis detected by analyzer. Results may be affected.     Potassium, Venous   Date Value Ref Range Status   09/14/2022 3.9 3.5 - 5.0 mmol/L Final      Chloride Chloride   Date Value Ref Range Status   09/17/2022 89 (L) 98 - 107 mmol/L Final   09/17/2022 89 (L) 98 - 107 mmol/L Final   09/17/2022 86 (L) 98 - 107 mmol/L Final   09/16/2022 85 (L) 98 - 107 mmol/L Final   09/16/2022 81 (L) 98 - 107 mmol/L Final   09/16/2022 84 (L) 98 - 107 mmol/L Final   09/16/2022 83 (L) 98 - 107 mmol/L Final   09/16/2022 83 (L) 98 - 107 mmol/L Final   09/16/2022 82 (L) 98 - 107 mmol/L Final   09/15/2022 82 (L) 98 - 107 mmol/L Final   09/15/2022 81 (L) 98 - 107 mmol/L Final   09/15/2022 81 (L) 98 - 107 mmol/L Final   09/15/2022 80 (L) 98 - 107 mmol/L Final   09/14/2022 78 (L) 98 - 107 mmol/L Final     Chloride, Venous    Date Value Ref Range Status   09/14/2022 78 (L) 98 - 106 mmol/L Final      CO2 CO2   Date Value Ref Range Status   09/17/2022 27.7 22.0 - 29.0 mmol/L Final   09/17/2022 27.7 22.0 - 29.0 mmol/L Final   09/17/2022 27.9 22.0 - 29.0 mmol/L Final   09/16/2022 28.8 22.0 - 29.0 mmol/L Final   09/16/2022 27.7 22.0 - 29.0 mmol/L Final   09/16/2022 27.5 22.0 - 29.0 mmol/L Final   09/16/2022 26.7 22.0 - 29.0 mmol/L Final   09/16/2022 28.1 22.0 - 29.0 mmol/L Final   09/16/2022 28.0 22.0 - 29.0 mmol/L Final   09/15/2022 28.7 22.0 - 29.0 mmol/L Final   09/15/2022 28.8 22.0 - 29.0 mmol/L Final   09/15/2022 27.4 22.0 - 29.0 mmol/L Final   09/15/2022 28.0 22.0 - 29.0 mmol/L Final   09/14/2022 23.6  22.0 - 29.0 mmol/L Final      BUN BUN   Date Value Ref Range Status   09/17/2022 8 8 - 23 mg/dL Final   09/17/2022 8 8 - 23 mg/dL Final   09/17/2022 9 8 - 23 mg/dL Final   09/16/2022 11 8 - 23 mg/dL Final   09/16/2022 11 8 - 23 mg/dL Final   09/16/2022 11 8 - 23 mg/dL Final   09/16/2022 11 8 - 23 mg/dL Final   09/16/2022 11 8 - 23 mg/dL Final   09/16/2022 11 8 - 23 mg/dL Final   09/15/2022 10 8 - 23 mg/dL Final   09/15/2022 9 8 - 23 mg/dL Final   09/15/2022 9 8 - 23 mg/dL Final   09/15/2022 9 8 - 23 mg/dL Final   09/14/2022 10 8 - 23 mg/dL Final      Creatinine Creatinine   Date Value Ref Range Status   09/17/2022 0.86 0.76 - 1.27 mg/dL Final   09/17/2022 0.86 0.76 - 1.27 mg/dL Final   09/17/2022 0.91 0.76 - 1.27 mg/dL Final   09/16/2022 0.93 0.76 - 1.27 mg/dL Final   09/16/2022 0.92 0.76 - 1.27 mg/dL Final   09/16/2022 0.92 0.76 - 1.27 mg/dL Final   09/16/2022 0.94 0.76 - 1.27 mg/dL Final   09/16/2022 1.00 0.76 - 1.27 mg/dL Final   09/16/2022 0.95 0.76 - 1.27 mg/dL Final   09/15/2022 0.97 0.76 - 1.27 mg/dL Final   09/15/2022 0.85 0.76 - 1.27 mg/dL Final   09/15/2022 0.86 0.76 - 1.27 mg/dL Final   09/15/2022 0.80 0.76 - 1.27 mg/dL Final   09/14/2022 0.80 0.76 - 1.27 mg/dL Final      Calcium Calcium   Date Value Ref Range Status   09/17/2022 8.3 (L) 8.6 - 10.5 mg/dL Final   09/17/2022 8.3 (L) 8.6 - 10.5 mg/dL Final   09/17/2022 8.4 (L) 8.6 - 10.5 mg/dL Final   09/16/2022 8.3 (L) 8.6 - 10.5 mg/dL Final   09/16/2022 8.2 (L) 8.6 - 10.5 mg/dL Final   09/16/2022 8.4 (L) 8.6 - 10.5 mg/dL Final   09/16/2022 8.5 (L) 8.6 - 10.5 mg/dL Final   09/16/2022 8.6 8.6 - 10.5 mg/dL Final   09/16/2022 8.6 8.6 - 10.5 mg/dL Final   09/15/2022 8.5 (L) 8.6 - 10.5 mg/dL Final   09/15/2022 8.5 (L) 8.6 - 10.5 mg/dL Final   09/15/2022 8.4 (L) 8.6 - 10.5 mg/dL Final   09/15/2022 8.5 (L) 8.6 - 10.5 mg/dL Final   09/14/2022 8.4 (L) 8.6 - 10.5 mg/dL Final      PO4 No results found for: CAPO4   Albumin Albumin   Date Value Ref Range Status    09/17/2022 3.30 (L) 3.50 - 5.20 g/dL Final   09/15/2022 3.40 (L) 3.50 - 5.20 g/dL Final   09/14/2022 3.20 (L) 3.50 - 5.20 g/dL Final      Magnesium Magnesium   Date Value Ref Range Status   09/17/2022 2.0 1.6 - 2.4 mg/dL Final   09/16/2022 2.2 1.6 - 2.4 mg/dL Final   09/15/2022 1.9 1.6 - 2.4 mg/dL Final   09/14/2022 1.9 1.6 - 2.4 mg/dL Final      Uric Acid No results found for: URICACID     Medication Review:   amLODIPine, 2.5 mg, Oral, Q PM  aspirin, 81 mg, Oral, Daily  atorvastatin, 40 mg, Oral, Nightly  carvedilol, 6.25 mg, Oral, BID  cetirizine, 10 mg, Oral, Daily  clopidogrel, 75 mg, Oral, Daily  enoxaparin, 40 mg, Subcutaneous, Q24H  gabapentin, 400 mg, Oral, Q PM  senna-docusate sodium, 2 tablet, Oral, BID  sodium chloride, 10 mL, Intravenous, Q12H          Assessment & Plan       Hyponatremia    PAF (paroxysmal atrial fibrillation) (HCC)    Wheelchair bound    Hyperlipidemia    CAP (community acquired pneumonia)    Morbid obesity (HCC)    Simple chronic bronchitis (HCC)    Metabolic encephalopathy      Assessment & Plan  Hyponatremia-  very mild edema only and still suspect is most likely euvolemic hyponatremia.  Urine osm of 318, urine sodium 62.   On normal saline at 75 cc/h appears due to HCTZ.  Will keep of fluid restriction 1L/day.     Sodium is slowly improving.  Will DC every 4 hours sodium check if not already done and check a sodium later today and then in a.m.  Patient's confusion is still there and wife states has been going on for several months.  Questionable if it is related to the sodium or not  HTN-  BP starting to trend higher. Hold HCTZ with hyponatremia.  Will tweak up amlodipine  PNA-  Likely contributing to hyponatremia.  On IV abx.  Chronic afib-  H/o watchman device.  Chronic immobility-  Wheelchair bound after arthritis  COPD-     Ciro Jackson MD  09/17/22  13:10 EDT

## 2022-09-17 NOTE — PROGRESS NOTES
Pulmonary / Critical Care Progress Note      Patient Name: Curtis Richmond  : 1945  MRN: [id number]  Attending:  Geoff Leonard DO   Date of admission: 2022    Subjective   Subjective   Follow-up for hyponatremia    Over past 24 hours:  Moved out of ICU  Sodium up to 123  Weakness and fatigue unchanged.  Mental status at baseline  Denies any respiratory symptoms  Hemodynamically stable  Urine output adequate  On room air    Review of Systems  Constitutional symptoms:   Fatigue, otherwise denied complaints   Cardiovascular:  Denied complaints  Respiratory: Denied complaints  Gastrointestinal: Denied complaints  Musculoskeletal: Weakness, otherwise denied complaints          Objective   Objective     Vitals:   Vital signs for last 24 hours:  Temp:  [97.3 °F (36.3 °C)-98.5 °F (36.9 °C)] 98.2 °F (36.8 °C)  Heart Rate:  [62-73] 69  Resp:  [16] 16  BP: (137-156)/(58-74) 143/72    Intake/Output last 3 shifts:  I/O last 3 completed shifts:  In: 360 [P.O.:360]  Out: -   Intake/Output this shift:  I/O this shift:  In: 480 [P.O.:480]  Out: -     Physical Exam   Vital Signs Reviewed   General: Chronically ill-appearing male, Alert, NAD.    HEENT:  PERRL, EOMI.  OP, nares clear  Neck:  Supple, no JVD, no thyromegaly  Chest:  good aeration, clear to auscultation bilaterally, tympanic to percussion bilaterally, no work of breathing noted  CV:RRR, no MGR, pulses 2+, equal.  Abd:  Soft, NT, ND, + BS, no HSM  EXT:  no clubbing, no cyanosis, no edema  Neuro:  A&Ox3, CN grossly intact, no focal deficits.  Skin: No rashes or lesions noted        Result Review    Result Review:  I have personally reviewed the results from the time of this admission to 2022 12:42 EDT and agree with these findings:  [x]  Laboratory  [x]  Microbiology  [x]  Radiology  [x]  EKG/Telemetry   []  Cardiology/Vascular   []  Pathology  []  Old records  []  Other:  Most notable findings include:       Lab 22  0623 22  0434  09/17/22  0042 09/16/22 2012 09/16/22  1612 09/16/22  1158 09/16/22  0857 09/16/22  0358 09/15/22  1159 09/15/22  0553 09/14/22  2257 09/14/22  1957   WBC  --  8.42  --   --   --   --  9.26  --   --  10.24  --  8.81   HEMOGLOBIN  --  11.2*  --   --   --   --  12.4*  --   --  12.5*  --  12.5*   HEMATOCRIT  --  33.0*  --   --   --   --  36.2*  --   --  34.8*  --  34.9*   PLATELETS  --  297  --   --   --   --  343  --   --  348  --  363   SODIUM 123*  123*  --  122* 121* 117* 118* 117* 118*   < > 115*   < > 112*   SODIUM, VENOUS  --   --   --   --   --   --   --   --   --   --    < >  --    POTASSIUM 4.2  4.2  --  4.3 4.2 4.2 4.3 4.2 4.4   < > 3.6   < > 3.8   POTASSIUM, VENOUS  --   --   --   --   --   --   --   --   --   --    < >  --    CHLORIDE 89*  89*  --  86* 85* 81* 84* 83* 83*   < > 80*  --  78*   CHLORIDE, VENOUS  --   --   --   --   --   --   --   --   --   --    < >  --    CO2 27.7  27.7  --  27.9 28.8 27.7 27.5 26.7 28.1   < > 28.0  --  23.6   BUN 8  8  --  9 11 11 11 11 11   < > 9  --  10   CREATININE 0.86  0.86  --  0.91 0.93 0.92 0.92 0.94 1.00   < > 0.80  --  0.80   GLUCOSE 92  92  --  91 108* 104* 103* 103* 89   < > 87  --  149*   GLUCOSE, ARTERIAL  --   --   --   --   --   --   --   --   --   --    < >  --    CALCIUM 8.3*  8.3*  --  8.4* 8.3* 8.2* 8.4* 8.5* 8.6   < > 8.5*  --  8.4*   PHOSPHORUS 2.6  --   --   --   --   --   --  3.4  --  2.9  --   --    TOTAL PROTEIN 6.1  --   --   --   --   --   --   --   --  6.4  --  6.4   ALBUMIN 3.30*  --   --   --   --   --   --   --   --  3.40*  --  3.20*   GLOBULIN 2.8  --   --   --   --   --   --   --   --  3.0  --  3.2    < > = values in this interval not displayed.         Assessment & Plan   Assessment / Plan     Active Hospital Problems:  Active Hospital Problems    Diagnosis    • **Hyponatremia    • CAP (community acquired pneumonia)    • Morbid obesity (HCC)    • Simple chronic bronchitis (HCC)    • Metabolic encephalopathy    • Wheelchair bound     • Hyperlipidemia    • PAF (paroxysmal atrial fibrillation) ([facility])      · Echocardiogram, 2/10/2021: EF 60%.  Normal LV systolic function diastolic dysfunction calcified aortic root and valve with normal aortic velocities and otherwise normal valvular morphology.  Left atrium 3.9 cm  · Left atrial appendage closure with 20 mm watchman FLX device, 11-9-21     Hypomagnesemia  Hypokalemia     Plan:  Hyponatremia likely secondary to GI losses, poor oral intake plus hydrochlorothiazide use.    IV fluids per nephrology.  Sodium being monitored closely and overall improving.  Appreciate their assistance  On room air    DVT prophylaxis:  Medical and mechanical DVT prophylaxis orders are present.    CODE STATUS:   Level Of Support Discussed With: Patient  Code Status (Patient has no pulse and is not breathing): CPR (Attempt to Resuscitate)  Medical Interventions (Patient has pulse or is breathing): Full      Labs, imaging, microbiology, notes and medications personally reviewed  Discussed with primary    I will sign off.  Please call with questions.    Electronically signed by Calderon Rodriguez MD, 09/17/22, 12:43 PM EDT.

## 2022-09-17 NOTE — PROGRESS NOTES
[facility]   Hospitalist Progress Note  Date: 2022  Patient Name: Curtis Richmond  : 1945  MRN: [id number]  Date of admission: 2022      Subjective   Subjective     Chief Complaint: Cough, shortness of breath, confusion    Summary: 77-year-old male presented with confusion and weakness subsequently found to have hyponatremia with sodium level of 112.  Patient initially placed in ICU therefore pulmonary critical care was consulted.  Additionally given his hyponatremia nephrology was consulted.  Currently awaiting his sodium level to normalize slowly due to risk of overcorrection.     Interval Followup: No events overnight.  Patient answering questions appropriately.  He denies any fevers or chills.  His wife is at bedside updated on plan of care.  She is planning on taking the patient home when he is ready for discharge.  He denies any chest pain or chest pressure today.  He is eating and drinking without nausea or emesis.  His sodium level has improved to 123 today.  He remains on intravenous fluids with normal saline.    Review of Systems  All systems reviewed and are negative except as above in HPI.    Objective   Objective     Vitals:   Temp:  [97.3 °F (36.3 °C)-98.5 °F (36.9 °C)] 98.5 °F (36.9 °C)  Heart Rate:  [62-76] 73  Resp:  [16] 16  BP: (137-160)/(58-74) 156/74  Physical Exam   Constitutional: Alert, awake, no acute distress, obese  HEENT:  PERRLA, EOMI; No Scleral icterus  Neck:  Supple; No Mass, No Lymphadenopathy  Cardiovascular:  No Rubs, No Edema, No JVD  Respiratory: No respiratory distress, unlabored breathing, saturating well on room air  Abdomen:  Normal BS all 4 Quadrants; No Guarding, No Tenderness  Musculoskeletal:  Pulses Positive all 4 Ext; No Cyanosis, No Edema  Neurological: Follows commands, answers questions appropriately, no tremor, no slurred speech, diffuse weakness in upper and lower extremities bilaterally  Skin:  No Rash, No Cellulitis, No Erythema      Result Review    Result Review:  I have personally reviewed the results from the time of this admission to 9/17/2022 08:39 EDT and agree with these findings:  [x]  Laboratory  [x]  Microbiology  [x]  Radiology  [x]  EKG/Telemetry   []  Cardiology/Vascular   []  Pathology  [x]  Old records  []  Other:    Assessment & Plan   Assessment / Plan     Assessment:  Hyponatremia, likely secondary to HCTZ and GI losses from diarrhea  Acute metabolic encephalopathy secondary to hyponatremia  Dismal atrial fibrillation with history of watchman's device  Hyperlipidemia  COPD, not exacerbated  Physical debility with wheelchair-bound status  Anterior communicating artery thrombosed aneurysm stable since 2/9/2021  Morbid obesity, BMI 41.49    Plan:  Pulmonary critical care assistance appreciated this patient  Continue to monitor sodium level, continues to improve, continue to monitor to avoid overcorrection  Nephrology assistance appreciated  We will discontinue HCTZ as an outpatient, can continue with losartan  CT head reviewed and is stable, no acute CVA, thrombosed aneurysm stable 9/16/2022  Blood pressure currently stable, continue with amlodipine and carvedilol  Continue atorvastatin for hyperlipidemia  Continue Lovenox for DVT prophylaxis  Continue with Neurontin for history of neuropathy  Bowel regimen in place   Zofran as needed for nausea  Labs reviewed, image reviewed, vital signs reviewed, medications reviewed  Further recommendations pending clinical course      Discussed plan with RN.    DVT prophylaxis:  Medical and mechanical DVT prophylaxis orders are present.    CODE STATUS:   Level Of Support Discussed With: Patient  Code Status (Patient has no pulse and is not breathing): CPR (Attempt to Resuscitate)  Medical Interventions (Patient has pulse or is breathing): Full        Electronically signed by Geoff Leonard DO, 09/17/22, 8:39 AM EDT.

## 2022-09-18 ENCOUNTER — READMISSION MANAGEMENT (OUTPATIENT)
Dept: CALL CENTER | Facility: HOSPITAL | Age: 77
End: 2022-09-18

## 2022-09-18 VITALS
RESPIRATION RATE: 16 BRPM | WEIGHT: 239.64 LBS | BODY MASS INDEX: 39.93 KG/M2 | HEART RATE: 68 BPM | DIASTOLIC BLOOD PRESSURE: 74 MMHG | TEMPERATURE: 98.6 F | OXYGEN SATURATION: 95 % | HEIGHT: 65 IN | SYSTOLIC BLOOD PRESSURE: 156 MMHG

## 2022-09-18 LAB
ALBUMIN SERPL-MCNC: 3.2 G/DL (ref 3.5–5.2)
ALBUMIN/GLOB SERPL: 1.1 G/DL
ALP SERPL-CCNC: 85 U/L (ref 39–117)
ALT SERPL W P-5'-P-CCNC: 25 U/L (ref 1–41)
ANION GAP SERPL CALCULATED.3IONS-SCNC: 6.7 MMOL/L (ref 5–15)
AST SERPL-CCNC: 17 U/L (ref 1–40)
BASOPHILS # BLD AUTO: 0.03 10*3/MM3 (ref 0–0.2)
BASOPHILS NFR BLD AUTO: 0.4 % (ref 0–1.5)
BILIRUB SERPL-MCNC: 0.4 MG/DL (ref 0–1.2)
BUN SERPL-MCNC: 9 MG/DL (ref 8–23)
BUN/CREAT SERPL: 10.1 (ref 7–25)
CALCIUM SPEC-SCNC: 8.7 MG/DL (ref 8.6–10.5)
CHLORIDE SERPL-SCNC: 93 MMOL/L (ref 98–107)
CO2 SERPL-SCNC: 28.3 MMOL/L (ref 22–29)
CREAT SERPL-MCNC: 0.89 MG/DL (ref 0.76–1.27)
DEPRECATED RDW RBC AUTO: 38.7 FL (ref 37–54)
EGFRCR SERPLBLD CKD-EPI 2021: 89.4 ML/MIN/1.73
EOSINOPHIL # BLD AUTO: 0.37 10*3/MM3 (ref 0–0.4)
EOSINOPHIL NFR BLD AUTO: 4.4 % (ref 0.3–6.2)
ERYTHROCYTE [DISTWIDTH] IN BLOOD BY AUTOMATED COUNT: 13.4 % (ref 12.3–15.4)
GLOBULIN UR ELPH-MCNC: 2.8 GM/DL
GLUCOSE SERPL-MCNC: 98 MG/DL (ref 65–99)
HCT VFR BLD AUTO: 34.5 % (ref 37.5–51)
HGB BLD-MCNC: 11.5 G/DL (ref 13–17.7)
IMM GRANULOCYTES # BLD AUTO: 0.04 10*3/MM3 (ref 0–0.05)
IMM GRANULOCYTES NFR BLD AUTO: 0.5 % (ref 0–0.5)
LYMPHOCYTES # BLD AUTO: 1.6 10*3/MM3 (ref 0.7–3.1)
LYMPHOCYTES NFR BLD AUTO: 19 % (ref 19.6–45.3)
MAGNESIUM SERPL-MCNC: 2.1 MG/DL (ref 1.6–2.4)
MCH RBC QN AUTO: 26.6 PG (ref 26.6–33)
MCHC RBC AUTO-ENTMCNC: 33.3 G/DL (ref 31.5–35.7)
MCV RBC AUTO: 79.7 FL (ref 79–97)
MONOCYTES # BLD AUTO: 0.86 10*3/MM3 (ref 0.1–0.9)
MONOCYTES NFR BLD AUTO: 10.2 % (ref 5–12)
NEUTROPHILS NFR BLD AUTO: 5.53 10*3/MM3 (ref 1.7–7)
NEUTROPHILS NFR BLD AUTO: 65.5 % (ref 42.7–76)
NRBC BLD AUTO-RTO: 0 /100 WBC (ref 0–0.2)
PHOSPHATE SERPL-MCNC: 2.7 MG/DL (ref 2.5–4.5)
PLATELET # BLD AUTO: 271 10*3/MM3 (ref 140–450)
PMV BLD AUTO: 9.7 FL (ref 6–12)
POTASSIUM SERPL-SCNC: 4.5 MMOL/L (ref 3.5–5.2)
PROT SERPL-MCNC: 6 G/DL (ref 6–8.5)
RBC # BLD AUTO: 4.33 10*6/MM3 (ref 4.14–5.8)
SODIUM SERPL-SCNC: 128 MMOL/L (ref 136–145)
WBC NRBC COR # BLD: 8.43 10*3/MM3 (ref 3.4–10.8)

## 2022-09-18 PROCEDURE — 80053 COMPREHEN METABOLIC PANEL: CPT | Performed by: STUDENT IN AN ORGANIZED HEALTH CARE EDUCATION/TRAINING PROGRAM

## 2022-09-18 PROCEDURE — 83735 ASSAY OF MAGNESIUM: CPT | Performed by: STUDENT IN AN ORGANIZED HEALTH CARE EDUCATION/TRAINING PROGRAM

## 2022-09-18 PROCEDURE — 84100 ASSAY OF PHOSPHORUS: CPT | Performed by: STUDENT IN AN ORGANIZED HEALTH CARE EDUCATION/TRAINING PROGRAM

## 2022-09-18 PROCEDURE — 99239 HOSP IP/OBS DSCHRG MGMT >30: CPT | Performed by: HOSPITALIST

## 2022-09-18 PROCEDURE — 25010000002 ENOXAPARIN PER 10 MG: Performed by: FAMILY MEDICINE

## 2022-09-18 PROCEDURE — 85025 COMPLETE CBC W/AUTO DIFF WBC: CPT | Performed by: STUDENT IN AN ORGANIZED HEALTH CARE EDUCATION/TRAINING PROGRAM

## 2022-09-18 RX ORDER — LOSARTAN POTASSIUM 100 MG/1
100 TABLET ORAL DAILY
Qty: 30 TABLET | Refills: 0 | Status: SHIPPED | OUTPATIENT
Start: 2022-09-18

## 2022-09-18 RX ADMIN — ENOXAPARIN SODIUM 40 MG: 100 INJECTION SUBCUTANEOUS at 08:31

## 2022-09-18 RX ADMIN — CARVEDILOL 6.25 MG: 6.25 TABLET, FILM COATED ORAL at 08:31

## 2022-09-18 RX ADMIN — ASPIRIN 81 MG: 81 TABLET, COATED ORAL at 08:31

## 2022-09-18 RX ADMIN — SODIUM CHLORIDE 60 ML/HR: 9 INJECTION, SOLUTION INTRAVENOUS at 04:52

## 2022-09-18 RX ADMIN — CLOPIDOGREL BISULFATE 75 MG: 75 TABLET ORAL at 08:31

## 2022-09-18 RX ADMIN — Medication 10 ML: at 08:32

## 2022-09-18 RX ADMIN — CETIRIZINE HYDROCHLORIDE 10 MG: 10 TABLET, FILM COATED ORAL at 08:31

## 2022-09-18 NOTE — DISCHARGE SUMMARY
[facility]         HOSPITALIST  DISCHARGE SUMMARY    Patient Name: Curtis Richmond  : 1945  MRN: [id number]    Date of Admission: 2022  Date of Discharge: 2022  Primary Care Physician: Kat Donahue APRN    Consults     Date and Time Order Name Status Description    9/15/2022  2:00 PM Inpatient Pulmonology Consult Completed     9/15/2022  8:14 AM Inpatient Nephrology Consult      9/15/2022 12:12 AM Inpatient Hospitalist Consult            Active and Resolved Hospital Problems:  Active Hospital Problems    Diagnosis POA   • **Hyponatremia [E87.1] Yes   • CAP (community acquired pneumonia) [J18.9] Yes   • Morbid obesity (HCC) [E66.01] Yes   • Simple chronic bronchitis (HCC) [J41.0] Yes   • Metabolic encephalopathy [G93.41] Yes   • Wheelchair bound [Z99.3] Not Applicable   • Hyperlipidemia [E78.5] Yes   • PAF (paroxysmal atrial fibrillation) (HCC) [I48.0] Yes      Resolved Hospital Problems   No resolved problems to display.       Hospital Course   From 's progress note.  77-year-old male presented with confusion and weakness subsequently found to have hyponatremia with sodium level of 112.  Patient initially placed in ICU therefore pulmonary critical care was consulted.  Additionally given his hyponatremia nephrology was consulted. Patient sodium continues to improve.  The plan is to discontinue HCTZ.  He can continue losartan.  We will recheck sodium outpatient.  Discussed discharge with consultants.    Patient discharged in stable condition.  Day of Discharge     Vital Signs:  Temp:  [97.7 °F (36.5 °C)-98.6 °F (37 °C)] 97.7 °F (36.5 °C)  Heart Rate:  [63-72] 72  Resp:  [16-18] 16  BP: (125-165)/(62-83) 165/73  Physical Exam: Constitutional: Alert, awake, no acute distress, obese  HEENT:  PERRLA, EOMI; No Scleral icterus  Neck:  Supple; No Mass, No Lymphadenopathy  Cardiovascular:  No Rubs, No Edema, No JVD  Respiratory: No respiratory distress, unlabored  breathing, saturating well on room air  Abdomen:  Normal BS all 4 Quadrants; No Guarding, No Tenderness  Musculoskeletal:  Pulses Positive all 4 Ext; No Cyanosis, No Edema  Neurological: Follows commands, answers questions appropriately, no tremor, no slurred speech, diffuse weakness in upper and lower extremities bilaterally  Skin:  No Rash, No Cellulitis, No Erythema   Discharge Details        Discharge Medications      ASK your doctor about these medications      Instructions Start Date   acetaminophen 500 MG tablet  Commonly known as: TYLENOL   500 mg, Oral, Every 6 Hours PRN      amLODIPine 2.5 MG tablet  Commonly known as: NORVASC   2.5 mg, Oral, Every Evening      aspirin 81 MG EC tablet   81 mg, Oral, Daily      atorvastatin 40 MG tablet  Commonly known as: LIPITOR   40 mg, Oral, Nightly      carvedilol 6.25 MG tablet  Commonly known as: COREG   6.25 mg, Oral, 2 Times Daily      clopidogrel 75 MG tablet  Commonly known as: PLAVIX   75 mg, Oral, Daily      gabapentin 400 MG capsule  Commonly known as: NEURONTIN   400 mg, Oral, Every Evening      loratadine 10 MG tablet  Commonly known as: CLARITIN   10 mg, Oral, Daily      losartan-hydrochlorothiazide 100-12.5 MG per tablet  Commonly known as: HYZAAR   1 tablet, Oral, Daily             No Known Allergies    Discharge Disposition:      Diet:  Hospital:  Diet Order   Procedures   • Diet Regular; Daily Fluid Restriction; 1000 mL Fluid Per Day       Discharge Activity:       CODE STATUS:  Code Status and Medical Interventions:   Ordered at: 09/15/22 0046     Level Of Support Discussed With:    Patient     Code Status (Patient has no pulse and is not breathing):    CPR (Attempt to Resuscitate)     Medical Interventions (Patient has pulse or is breathing):    Full         Future Appointments   Date Time Provider Department Center   4/17/2023 10:15 AM STEFANO Carlos MD [facility] [place] EDIX[facility]   9/26/2023 10:30 AM Primo Tobias DO ONEL[facility] ETWN [facility]           Pertinent   and/or Most Recent Results     PROCEDURES:   None    LAB RESULTS:      Lab 09/18/22  0420 09/17/22  0434 09/16/22  0857 09/15/22  0553 09/15/22  0149 09/14/22  2337 09/14/22  2257 09/14/22  1957   WBC 8.43 8.42 9.26 10.24  --   --   --  8.81   HEMOGLOBIN 11.5* 11.2* 12.4* 12.5*  --   --   --  12.5*   HEMATOCRIT 34.5* 33.0* 36.2* 34.8*  --   --   --  34.9*   PLATELETS 271 297 343 348  --   --   --  363   NEUTROS ABS 5.53 5.61 6.55 7.44*  --   --   --  6.56   IMMATURE GRANS (ABS) 0.04 0.05 0.04 0.03  --   --   --  0.04   LYMPHS ABS 1.60 1.63 1.58 1.47  --   --   --  1.22   MONOS ABS 0.86 0.76 0.78 1.06*  --   --   --  0.74   EOS ABS 0.37 0.34 0.29 0.22  --   --   --  0.23   MCV 79.7 77.6* 77.7* 74.5*  --   --   --  74.9*   CRP  --   --   --   --  0.46  --   --   --    PROCALCITONIN  --   --   --   --  0.15  --   --   --    LACTATE  --   --   --   --   --  0.8  --   --    LACTATE, ARTERIAL  --   --   --   --   --   --  0.96  --    D DIMER QUANT  --   --   --   --  1.40*  --   --   --          Lab 09/18/22  0420 09/17/22  1805 09/17/22  0623 09/17/22  0042 09/16/22 2012 09/16/22  0857 09/16/22  0358 09/15/22  1659 09/15/22  1159 09/15/22  0553 09/15/22  0149 09/14/22  2257 09/14/22  1957   SODIUM 128* 124* 123*  123* 122* 121*   < > 118*   < > 117* 115*   < >  --  112*   SODIUM, VENOUS  --   --   --   --   --   --   --   --   --   --   --  110.3*  --    POTASSIUM 4.5 4.3 4.2  4.2 4.3 4.2   < > 4.4   < > 3.6 3.6   < >  --  3.8   POTASSIUM, VENOUS  --   --   --   --   --   --   --   --   --   --   --  3.9  --    CHLORIDE 93* 91* 89*  89* 86* 85*   < > 83*   < > 81* 80*  --   --  78*   CHLORIDE, VENOUS  --   --   --   --   --   --   --   --   --   --   --  78*  --    CO2 28.3 26.3 27.7  27.7 27.9 28.8   < > 28.1   < > 27.4 28.0  --   --  23.6   ANION GAP 6.7 6.7 6.3  6.3 8.1 7.2   < > 6.9   < > 8.6 7.0  --   --  10.4   BUN 9 9 8  8 9 11   < > 11   < > 9 9  --   --  10   CREATININE 0.89 0.87 0.86  0.86 0.91 0.93   <  > 1.00   < > 0.86 0.80  --   --  0.80   EGFR 89.4 90.0 90.3  90.3 87.9 85.6   < > 78.5   < > 90.3 92.3  --   --  92.3   GLUCOSE 98 140* 92  92 91 108*   < > 89   < > 112* 87  --   --  149*   GLUCOSE, ARTERIAL  --   --   --   --   --   --   --   --   --   --   --  106*  --    CALCIUM 8.7 8.1* 8.3*  8.3* 8.4* 8.3*   < > 8.6   < > 8.4* 8.5*  --   --  8.4*   IONIZED CALCIUM  --   --   --   --   --   --   --   --   --   --   --  1.06*  --    MAGNESIUM 2.1  --  2.0  --   --   --  2.2  --   --  1.9  --   --  1.9   PHOSPHORUS 2.7  --  2.6  --   --   --  3.4  --   --  2.9  --   --   --    TSH  --   --   --   --   --   --  4.220*  --  2.920  --   --   --   --     < > = values in this interval not displayed.         Lab 09/18/22  0420 09/17/22  0623 09/15/22  0553 09/14/22 1957   TOTAL PROTEIN 6.0 6.1 6.4 6.4   ALBUMIN 3.20* 3.30* 3.40* 3.20*   GLOBULIN 2.8 2.8 3.0 3.2   ALT (SGPT) 25 26 20 18   AST (SGOT) 17 20 20 25   BILIRUBIN 0.4 0.4 0.5 0.6   ALK PHOS 85 87 93 93         Lab 09/14/22 1957   PROBNP 921.0   TROPONIN T <0.010             Lab 09/15/22  0553   FERRITIN 166.60         Lab 09/14/22  2257   FIO2 21   CARBOXYHEMOGLOBIN 1.7*     Brief Urine Lab Results  (Last result in the past 365 days)      Color   Clarity   Blood   Leuk Est   Nitrite   Protein   CREAT   Urine HCG        09/14/22 2207 Yellow   Clear   Negative   Negative   Negative   30 mg/dL (1+)               Microbiology Results (last 10 days)     Procedure Component Value - Date/Time    Blood Culture - Blood, Arm, Left [[id number]]  (Normal) Collected: 09/14/22 2337    Lab Status: Preliminary result Specimen: Blood from Arm, Left Updated: 09/17/22 1631     Blood Culture No growth at 2 days    Blood Culture - Blood, Arm, Left [[id number]]  (Normal) Collected: 09/14/22 2337    Lab Status: Preliminary result Specimen: Blood from Arm, Left Updated: 09/17/22 2347     Blood Culture No growth at 3 days    COVID-19,APTIMA PANTHER(JAELYN),[facility], NP/OP SWAB IN  UTM/VTM/SALINE TRANSPORT MEDIA,24 HR TAT - Swab, Nasal Cavity [[id number]]  (Normal) Collected: 09/14/22 2209    Lab Status: Final result Specimen: Swab from Nasal Cavity Updated: 09/15/22 0202     COVID19 Not Detected    Narrative:      Fact sheet for providers: https://www.fda.gov/media/556441/download     Fact sheet for patients: https://www.fda.gov/media/398514/download    Test performed by RT PCR.    Influenza Antigen, Rapid - Swab, Nasopharynx [[id number]]  (Normal) Collected: 09/14/22 2209    Lab Status: Final result Specimen: Swab from Nasopharynx Updated: 09/14/22 2244     Influenza A Ag, EIA Negative     Influenza B Ag, EIA Negative    Legionella Antigen, Urine - Urine, Urine, Clean Catch [[id number]]  (Normal) Collected: 09/14/22 2207    Lab Status: Final result Specimen: Urine, Clean Catch Updated: 09/15/22 0826     LEGIONELLA ANTIGEN, URINE Negative    S. Pneumo Ag Urine or CSF - Urine, Urine, Clean Catch [[id number]]  (Normal) Collected: 09/14/22 2207    Lab Status: Final result Specimen: Urine, Clean Catch Updated: 09/15/22 0827     Strep Pneumo Ag Negative          CT Head Without Contrast    Result Date: 9/15/2022  Impression:    1. No acute brain abnormality is appreciated.  No acute intracranial hemorrhage.  No acute infarction.   2. There is moderate-to-severe age-indeterminate sinus disease.   3. 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   4. No change in the ventriculomegaly, as discussed.  5. The study is motion-limited.   6. Please see above comments for further detail.    Please note that portions of this note were completed with a voice recognition program.  NESS ROBLEDO JR, MD       Electronically Signed and Approved By: NESS ROBLEDO JR, MD on 9/15/2022 at 3:25              CT Chest With Contrast Diagnostic    Result Date: 9/15/2022  Impression:  No pulmonary embolism.      Please note that portions of this  note were completed with a voice recognition program.  NESS ROBLEDO JR, MD       Electronically Signed and Approved By: NESS ROBLEDO JR, MD on 9/15/2022 at 5:21              XR Chest 1 View    Result Date: 9/14/2022  Impression:  New bilateral infiltrates are seen.  The findings may represent infectious multifocal pneumonia.  Pulmonary edema is possible.    Please note that portions of this note were completed with a voice recognition program.  NESS ROBLEDO JR, MD       Electronically Signed and Approved By: NESS ROBLEDO JR, MD on 9/14/2022 at 21:02                        Results for orders placed during the hospital encounter of 12/22/21    Adult Transesophageal Echo (KIMI) W/ Cont if Necessary Per Protocol    Interpretation Summary  · There is a 20 mm watchman FLX left atrial appendage occlusion device in place. The device appears well-seated. There is no significant peridevice flow.  · Left ventricular ejection fraction appears to be 56 - 60%.  · Mild to moderate mitral valve regurgitation is present  · There is no evidence of pericardial effusion. .      Labs Pending at Discharge:  Pending Labs     Order Current Status    Blood Culture - Blood, Arm, Left Preliminary result    Blood Culture - Blood, Arm, Left Preliminary result            Time spent on Discharge including face to face service: Greater than 30 minutes    Electronically signed by Iveth Vieira DO, 09/18/22, 10:17 AM EDT.

## 2022-09-19 LAB — BACTERIA SPEC AEROBE CULT: NORMAL

## 2022-09-19 NOTE — OUTREACH NOTE
Prep Survey    Flowsheet Row Responses   [facility] facility patient discharged from? [facility]   Is LACE score < 7 ? No   Emergency Room discharge w/ pulse ox? No   Eligibility Readm Mgmt   Discharge diagnosis HyponatremiaCommunity-acquired pneumoniaMetabolic encephalopathy   Does the patient have one of the following disease processes/diagnoses(primary or secondary)? Pneumonia   Does the patient have Home health ordered? No   Is there a DME ordered? No   Prep survey completed? Yes          BREANNE CAMACHO - Registered Nurse

## 2022-09-20 LAB — BACTERIA SPEC AEROBE CULT: NORMAL

## 2022-09-22 ENCOUNTER — READMISSION MANAGEMENT (OUTPATIENT)
Dept: CALL CENTER | Facility: HOSPITAL | Age: 77
End: 2022-09-22

## 2022-09-22 LAB — QT INTERVAL: 426 MS

## 2022-09-28 ENCOUNTER — READMISSION MANAGEMENT (OUTPATIENT)
Dept: CALL CENTER | Facility: HOSPITAL | Age: 77
End: 2022-09-28

## 2022-09-28 NOTE — OUTREACH NOTE
COPD/PN Week 2 Survey    Flowsheet Row Responses   [facility] facility patient discharged from? [facility]   Does the patient have one of the following disease processes/diagnoses(primary or secondary)? Pneumonia   Week 2 attempt successful? No   Unsuccessful attempts Attempt 1          TOMASZ Fabian Registered Nurse

## 2022-09-29 ENCOUNTER — LAB (OUTPATIENT)
Dept: LAB | Facility: HOSPITAL | Age: 77
End: 2022-09-29

## 2022-09-29 ENCOUNTER — LAB REQUISITION (OUTPATIENT)
Dept: LAB | Facility: HOSPITAL | Age: 77
End: 2022-09-29

## 2022-09-29 ENCOUNTER — TRANSCRIBE ORDERS (OUTPATIENT)
Dept: ADMINISTRATIVE | Facility: HOSPITAL | Age: 77
End: 2022-09-29

## 2022-09-29 DIAGNOSIS — E87.1 HYPONATREMIA: ICD-10-CM

## 2022-09-29 DIAGNOSIS — E87.1 HYPONATREMIA: Primary | ICD-10-CM

## 2022-09-29 DIAGNOSIS — E87.1 HYPO-OSMOLALITY AND HYPONATREMIA: ICD-10-CM

## 2022-09-29 LAB
ALBUMIN SERPL-MCNC: 3.8 G/DL (ref 3.5–5.2)
ALBUMIN/GLOB SERPL: 1.3 G/DL
ALP SERPL-CCNC: 95 U/L (ref 39–117)
ALT SERPL W P-5'-P-CCNC: 16 U/L (ref 1–41)
ANION GAP SERPL CALCULATED.3IONS-SCNC: 8.6 MMOL/L (ref 5–15)
AST SERPL-CCNC: 14 U/L (ref 1–40)
BILIRUB SERPL-MCNC: 0.6 MG/DL (ref 0–1.2)
BUN SERPL-MCNC: 12 MG/DL (ref 8–23)
BUN/CREAT SERPL: 13.8 (ref 7–25)
CALCIUM SPEC-SCNC: 9.1 MG/DL (ref 8.6–10.5)
CHLORIDE SERPL-SCNC: 98 MMOL/L (ref 98–107)
CO2 SERPL-SCNC: 26.4 MMOL/L (ref 22–29)
CREAT SERPL-MCNC: 0.87 MG/DL (ref 0.76–1.27)
EGFRCR SERPLBLD CKD-EPI 2021: 90 ML/MIN/1.73
GLOBULIN UR ELPH-MCNC: 3 GM/DL
GLUCOSE SERPL-MCNC: 92 MG/DL (ref 65–99)
HOLD SPECIMEN: NORMAL
POTASSIUM SERPL-SCNC: 4.1 MMOL/L (ref 3.5–5.2)
PROT SERPL-MCNC: 6.8 G/DL (ref 6–8.5)
SODIUM SERPL-SCNC: 133 MMOL/L (ref 136–145)
WHOLE BLOOD HOLD SPECIMEN: NORMAL

## 2022-09-29 PROCEDURE — 80053 COMPREHEN METABOLIC PANEL: CPT | Performed by: NURSE PRACTITIONER

## 2022-10-03 ENCOUNTER — READMISSION MANAGEMENT (OUTPATIENT)
Dept: CALL CENTER | Facility: HOSPITAL | Age: 77
End: 2022-10-03

## 2022-10-03 NOTE — OUTREACH NOTE
COPD/PN Week 3 Survey    Flowsheet Row Responses   [facility] facility patient discharged from? [facility]   Does the patient have one of the following disease processes/diagnoses(primary or secondary)? Pneumonia   Week 3 attempt successful? No   Unsuccessful attempts Attempt 1          TOMASZ Fabian Registered Nurse

## 2022-10-04 ENCOUNTER — LAB (OUTPATIENT)
Dept: LAB | Facility: HOSPITAL | Age: 77
End: 2022-10-04

## 2022-10-04 ENCOUNTER — LAB REQUISITION (OUTPATIENT)
Dept: LAB | Facility: HOSPITAL | Age: 77
End: 2022-10-04

## 2022-10-04 ENCOUNTER — TRANSCRIBE ORDERS (OUTPATIENT)
Dept: ADMINISTRATIVE | Facility: HOSPITAL | Age: 77
End: 2022-10-04

## 2022-10-04 DIAGNOSIS — E87.1 HYPONATREMIA: Primary | ICD-10-CM

## 2022-10-04 DIAGNOSIS — E87.1 HYPO-OSMOLALITY AND HYPONATREMIA: ICD-10-CM

## 2022-10-04 DIAGNOSIS — E87.1 HYPONATREMIA: ICD-10-CM

## 2022-10-04 LAB
ALBUMIN SERPL-MCNC: 3.5 G/DL (ref 3.5–5.2)
ALBUMIN/GLOB SERPL: 1.1 G/DL
ALP SERPL-CCNC: 101 U/L (ref 39–117)
ALT SERPL W P-5'-P-CCNC: 13 U/L (ref 1–41)
ANION GAP SERPL CALCULATED.3IONS-SCNC: 9 MMOL/L (ref 5–15)
AST SERPL-CCNC: 17 U/L (ref 1–40)
BILIRUB SERPL-MCNC: 0.4 MG/DL (ref 0–1.2)
BUN SERPL-MCNC: 16 MG/DL (ref 8–23)
BUN/CREAT SERPL: 17.6 (ref 7–25)
CALCIUM SPEC-SCNC: 9.2 MG/DL (ref 8.6–10.5)
CHLORIDE SERPL-SCNC: 98 MMOL/L (ref 98–107)
CHOLEST SERPL-MCNC: 89 MG/DL (ref 0–200)
CO2 SERPL-SCNC: 24 MMOL/L (ref 22–29)
CREAT SERPL-MCNC: 0.91 MG/DL (ref 0.76–1.27)
EGFRCR SERPLBLD CKD-EPI 2021: 87.9 ML/MIN/1.73
GLOBULIN UR ELPH-MCNC: 3.2 GM/DL
GLUCOSE SERPL-MCNC: 131 MG/DL (ref 65–99)
HDLC SERPL-MCNC: 34 MG/DL (ref 40–60)
HOLD SPECIMEN: NORMAL
LDLC SERPL CALC-MCNC: 36 MG/DL (ref 0–100)
LDLC/HDLC SERPL: 1.05 {RATIO}
POTASSIUM SERPL-SCNC: 4.3 MMOL/L (ref 3.5–5.2)
PROT SERPL-MCNC: 6.7 G/DL (ref 6–8.5)
SODIUM SERPL-SCNC: 131 MMOL/L (ref 136–145)
TRIGL SERPL-MCNC: 96 MG/DL (ref 0–150)
VLDLC SERPL-MCNC: 19 MG/DL (ref 5–40)

## 2022-10-04 PROCEDURE — 80053 COMPREHEN METABOLIC PANEL: CPT | Performed by: NURSE PRACTITIONER

## 2022-10-04 PROCEDURE — 80061 LIPID PANEL: CPT | Performed by: NURSE PRACTITIONER

## 2022-10-05 ENCOUNTER — READMISSION MANAGEMENT (OUTPATIENT)
Dept: CALL CENTER | Facility: HOSPITAL | Age: 77
End: 2022-10-05

## 2022-10-05 NOTE — OUTREACH NOTE
COPD/PN Week 3 Survey    Flowsheet Row Responses   [facility] facility patient discharged from? [facility]   Does the patient have one of the following disease processes/diagnoses(primary or secondary)? Pneumonia   Week 3 attempt successful? No   Unsuccessful attempts Attempt 2  [attempted all numbers listed]          QUE HALE - Registered Nurse

## 2022-10-13 ENCOUNTER — TELEPHONE (OUTPATIENT)
Dept: UROLOGY | Facility: CLINIC | Age: 77
End: 2022-10-13

## 2022-10-13 ENCOUNTER — LAB REQUISITION (OUTPATIENT)
Dept: LAB | Facility: HOSPITAL | Age: 77
End: 2022-10-13

## 2022-10-13 ENCOUNTER — TRANSCRIBE ORDERS (OUTPATIENT)
Dept: ADMINISTRATIVE | Facility: HOSPITAL | Age: 77
End: 2022-10-13

## 2022-10-13 ENCOUNTER — LAB (OUTPATIENT)
Dept: LAB | Facility: HOSPITAL | Age: 77
End: 2022-10-13

## 2022-10-13 DIAGNOSIS — E87.1 HYPO-OSMOLALITY AND HYPONATREMIA: ICD-10-CM

## 2022-10-13 DIAGNOSIS — E87.1 HYPONATREMIA: ICD-10-CM

## 2022-10-13 DIAGNOSIS — E87.1 HYPONATREMIA: Primary | ICD-10-CM

## 2022-10-13 LAB
ALBUMIN SERPL-MCNC: 3.2 G/DL (ref 3.5–5.2)
ALBUMIN/GLOB SERPL: 1 G/DL
ALP SERPL-CCNC: 95 U/L (ref 39–117)
ALT SERPL W P-5'-P-CCNC: 11 U/L (ref 1–41)
ANION GAP SERPL CALCULATED.3IONS-SCNC: 10.1 MMOL/L (ref 5–15)
AST SERPL-CCNC: 14 U/L (ref 1–40)
BILIRUB SERPL-MCNC: 0.3 MG/DL (ref 0–1.2)
BUN SERPL-MCNC: 13 MG/DL (ref 8–23)
BUN/CREAT SERPL: 14.6 (ref 7–25)
CALCIUM SPEC-SCNC: 9.1 MG/DL (ref 8.6–10.5)
CHLORIDE SERPL-SCNC: 99 MMOL/L (ref 98–107)
CO2 SERPL-SCNC: 23.9 MMOL/L (ref 22–29)
CREAT SERPL-MCNC: 0.89 MG/DL (ref 0.76–1.27)
EGFRCR SERPLBLD CKD-EPI 2021: 89.4 ML/MIN/1.73
GLOBULIN UR ELPH-MCNC: 3.2 GM/DL
GLUCOSE SERPL-MCNC: 97 MG/DL (ref 65–99)
POTASSIUM SERPL-SCNC: 4.5 MMOL/L (ref 3.5–5.2)
PROT SERPL-MCNC: 6.4 G/DL (ref 6–8.5)
SODIUM SERPL-SCNC: 133 MMOL/L (ref 136–145)

## 2022-10-13 PROCEDURE — 80053 COMPREHEN METABOLIC PANEL: CPT | Performed by: NURSE PRACTITIONER

## 2022-10-13 NOTE — TELEPHONE ENCOUNTER
Spoke with patient's wife, patient has been hospitalized for hyponatremia and is now home with home health care trying to help patient from being bed bound and weak.

## 2022-10-24 ENCOUNTER — LAB REQUISITION (OUTPATIENT)
Dept: LAB | Facility: HOSPITAL | Age: 77
End: 2022-10-24

## 2022-10-24 ENCOUNTER — LAB (OUTPATIENT)
Dept: LAB | Facility: HOSPITAL | Age: 77
End: 2022-10-24

## 2022-10-24 ENCOUNTER — TRANSCRIBE ORDERS (OUTPATIENT)
Dept: ADMINISTRATIVE | Facility: HOSPITAL | Age: 77
End: 2022-10-24

## 2022-10-24 DIAGNOSIS — E87.1 HYPONATREMIA: Primary | ICD-10-CM

## 2022-10-24 DIAGNOSIS — E87.1 HYPO-OSMOLALITY AND HYPONATREMIA: ICD-10-CM

## 2022-10-24 DIAGNOSIS — E87.1 HYPONATREMIA: ICD-10-CM

## 2022-10-24 LAB
ALBUMIN SERPL-MCNC: 3.5 G/DL (ref 3.5–5.2)
ALBUMIN/GLOB SERPL: 1 G/DL
ALP SERPL-CCNC: 94 U/L (ref 39–117)
ALT SERPL W P-5'-P-CCNC: 10 U/L (ref 1–41)
ANION GAP SERPL CALCULATED.3IONS-SCNC: 11.6 MMOL/L (ref 5–15)
AST SERPL-CCNC: 13 U/L (ref 1–40)
BILIRUB SERPL-MCNC: 0.2 MG/DL (ref 0–1.2)
BUN SERPL-MCNC: 14 MG/DL (ref 8–23)
BUN/CREAT SERPL: 15.4 (ref 7–25)
CALCIUM SPEC-SCNC: 9.3 MG/DL (ref 8.6–10.5)
CHLORIDE SERPL-SCNC: 100 MMOL/L (ref 98–107)
CO2 SERPL-SCNC: 24.4 MMOL/L (ref 22–29)
CREAT SERPL-MCNC: 0.91 MG/DL (ref 0.76–1.27)
EGFRCR SERPLBLD CKD-EPI 2021: 87.9 ML/MIN/1.73
GLOBULIN UR ELPH-MCNC: 3.5 GM/DL
GLUCOSE SERPL-MCNC: 98 MG/DL (ref 65–99)
POTASSIUM SERPL-SCNC: 4.2 MMOL/L (ref 3.5–5.2)
PROT SERPL-MCNC: 7 G/DL (ref 6–8.5)
SODIUM SERPL-SCNC: 136 MMOL/L (ref 136–145)

## 2022-10-24 PROCEDURE — 80053 COMPREHEN METABOLIC PANEL: CPT | Performed by: NURSE PRACTITIONER

## 2022-12-09 ENCOUNTER — LAB (OUTPATIENT)
Dept: LAB | Facility: HOSPITAL | Age: 77
End: 2022-12-09

## 2022-12-09 ENCOUNTER — TRANSCRIBE ORDERS (OUTPATIENT)
Dept: ADMINISTRATIVE | Facility: HOSPITAL | Age: 77
End: 2022-12-09

## 2022-12-09 ENCOUNTER — LAB REQUISITION (OUTPATIENT)
Dept: LAB | Facility: HOSPITAL | Age: 77
End: 2022-12-09

## 2022-12-09 DIAGNOSIS — E87.1 HYPONATREMIA: Primary | ICD-10-CM

## 2022-12-09 DIAGNOSIS — E87.1 HYPONATREMIA: ICD-10-CM

## 2022-12-09 DIAGNOSIS — E87.1 HYPO-OSMOLALITY AND HYPONATREMIA: ICD-10-CM

## 2022-12-09 LAB
ANION GAP SERPL CALCULATED.3IONS-SCNC: 7.3 MMOL/L (ref 5–15)
BUN SERPL-MCNC: 17 MG/DL (ref 8–23)
BUN/CREAT SERPL: 17 (ref 7–25)
CALCIUM SPEC-SCNC: 9 MG/DL (ref 8.6–10.5)
CHLORIDE SERPL-SCNC: 102 MMOL/L (ref 98–107)
CO2 SERPL-SCNC: 26.7 MMOL/L (ref 22–29)
CREAT SERPL-MCNC: 1 MG/DL (ref 0.76–1.27)
EGFRCR SERPLBLD CKD-EPI 2021: 78.5 ML/MIN/1.73
GLUCOSE SERPL-MCNC: 100 MG/DL (ref 65–99)
POTASSIUM SERPL-SCNC: 4.6 MMOL/L (ref 3.5–5.2)
SODIUM SERPL-SCNC: 136 MMOL/L (ref 136–145)
SODIUM SERPL-SCNC: 136 MMOL/L (ref 136–145)
WHOLE BLOOD HOLD SPECIMEN: NORMAL

## 2022-12-09 PROCEDURE — 80048 BASIC METABOLIC PNL TOTAL CA: CPT | Performed by: NURSE PRACTITIONER

## 2022-12-09 PROCEDURE — 84295 ASSAY OF SERUM SODIUM: CPT | Performed by: NURSE PRACTITIONER

## 2022-12-20 ENCOUNTER — TELEPHONE (OUTPATIENT)
Dept: UROLOGY | Facility: CLINIC | Age: 77
End: 2022-12-20

## 2022-12-20 NOTE — TELEPHONE ENCOUNTER
I sent myself a message to check on MR. Richmond from back in October. He was hospitalized for hyponatremia and  weakness back then. He did not have his cysto retrograde because of his condition. I called and spoke with his wife this morning and she says he is in a hospital bed and it is very difficult to even get him standing and she cannot transport him to appointments. She does not want to do the scope in the OR due to this. I told her to call if she needed anything from us here in the office. She verbalized understanding. I sent a message to Dr. Alvarado informing her of his situation.

## 2023-02-28 ENCOUNTER — TELEPHONE (OUTPATIENT)
Dept: NEUROSURGERY | Facility: CLINIC | Age: 78
End: 2023-02-28
Payer: MEDICARE

## 2023-04-20 ENCOUNTER — TELEPHONE (OUTPATIENT)
Dept: CARDIOLOGY | Facility: CLINIC | Age: 78
End: 2023-04-20
Payer: MEDICARE

## 2023-04-20 NOTE — TELEPHONE ENCOUNTER
Caller: FRANCINE MAYA    Relationship: Emergency Contact    Best call back number: [phone]    Who are you requesting to speak with (clinical staff, provider,  specific staff member): CLINICAL    What was the call regarding: WIFE WOULD LIKE TO UPDATE PROVIDER. SHE IS UNABLE TO GET PATIENT TO APPOINTMENTS AS HE IS IN A HOSPITAL BED.  NO EXISTING APPOINTMENTS WITH DR YATES BUT A RECENT APPT WAS MISSED. SHE HAS ARRANGED HOME HEALTH.

## 2023-04-21 NOTE — TELEPHONE ENCOUNTER
Spoke with pt spouse Alix  and she said patient will no longer be able to come into any appointments because he is in the hospital and bed bound . She said if anything changes she will call to let us know and set up any future appointments she said if he is able to once again walk and come into his appointments they wish he can continue to be a patient of Dr. Carlos  .

## 2023-04-28 ENCOUNTER — TELEPHONE (OUTPATIENT)
Dept: UROLOGY | Facility: CLINIC | Age: 78
End: 2023-04-28
Payer: MEDICARE

## 2023-04-28 NOTE — TELEPHONE ENCOUNTER
Left message for patient asking for a call back to let us know how he's doing and if he's able to do the retrogrades over in the OR.

## 2023-06-02 ENCOUNTER — TELEPHONE (OUTPATIENT)
Dept: NEUROSURGERY | Facility: CLINIC | Age: 78
End: 2023-06-02

## 2023-06-02 NOTE — TELEPHONE ENCOUNTER
PRITI for patient to call me regarding his 2 year f/up with CTA head prior for aneurysm. I wanted to find out if he plans on scheduling the CTA and f/up.
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
## 2024-09-09 NOTE — OUTREACH NOTE
COPD/PN Week 1 Survey    Flowsheet Row Responses   [facility] patient discharged from? [facility]   Does the patient have one of the following disease processes/diagnoses(primary or secondary)? Pneumonia   Week 1 attempt successful? Yes   Call start time 1053   Call end time 1055   Discharge diagnosis HyponatremiaCommunity-acquired pneumoniaMetabolic encephalopathy   Is patient permission given to speak with other caregiver? Yes   List who call center can speak with spouse- Alix   Person spoke with today (if not patient) and relationship spouse   Does the patient have all medications ordered at discharge? Yes   Is the patient taking all medications as directed (includes completed medication regime)? Yes   Does the patient have a primary care provider?  Yes   Comments regarding PCP spouse has spoken with PCP   Has the patient kept scheduled appointments due by today? N/A   Home health comments spouse says she has talked with PCP and they ordering home health   Psychosocial issues? No   Did the patient receive a copy of their discharge instructions? Yes   What is the patient's perception of their health status since discharge? Same   Nursing Interventions Nurse provided patient education   Is the patient/caregiver able to teach back the hierarchy of who to call/visit for symptoms/problems? PCP, Specialist, Home health nurse, Urgent Care, ED, 911 Yes   Week 1 call completed? Yes   Wrap up additional comments Per spouse, patient is doing ok, she has spoken with his PCP.          STACY MURRY - Registered Nurse   Breath sounds clear and equal bilaterally.

## 2024-10-29 ENCOUNTER — APPOINTMENT (OUTPATIENT)
Dept: CT IMAGING | Facility: HOSPITAL | Age: 79
End: 2024-10-29
Payer: MEDICARE

## 2024-10-29 ENCOUNTER — HOSPITAL ENCOUNTER (EMERGENCY)
Facility: HOSPITAL | Age: 79
Discharge: HOME | End: 2024-10-29
Attending: EMERGENCY MEDICINE | Admitting: EMERGENCY MEDICINE
Payer: MEDICARE

## 2024-10-29 VITALS
RESPIRATION RATE: 18 BRPM | HEART RATE: 89 BPM | TEMPERATURE: 98.3 F | DIASTOLIC BLOOD PRESSURE: 75 MMHG | BODY MASS INDEX: 34.37 KG/M2 | SYSTOLIC BLOOD PRESSURE: 143 MMHG | WEIGHT: 240.08 LBS | HEIGHT: 70 IN | OXYGEN SATURATION: 93 %

## 2024-10-29 DIAGNOSIS — R31.9 HEMATURIA, UNSPECIFIED TYPE: Primary | ICD-10-CM

## 2024-10-29 DIAGNOSIS — N32.89 BLADDER MASS: ICD-10-CM

## 2024-10-29 LAB
ALBUMIN SERPL-MCNC: 3.4 G/DL (ref 3.5–5.2)
ALBUMIN/GLOB SERPL: 0.9 G/DL
ALP SERPL-CCNC: 85 U/L (ref 39–117)
ALT SERPL W P-5'-P-CCNC: 17 U/L (ref 1–41)
ANION GAP SERPL CALCULATED.3IONS-SCNC: 8.7 MMOL/L (ref 5–15)
AST SERPL-CCNC: 17 U/L (ref 1–40)
BACTERIA UR QL AUTO: ABNORMAL /HPF
BASOPHILS # BLD AUTO: 0.04 10*3/MM3 (ref 0–0.2)
BASOPHILS NFR BLD AUTO: 0.4 % (ref 0–1.5)
BILIRUB SERPL-MCNC: 0.4 MG/DL (ref 0–1.2)
BILIRUB UR QL STRIP: ABNORMAL
BUN SERPL-MCNC: 13 MG/DL (ref 8–23)
BUN/CREAT SERPL: 13.5 (ref 7–25)
CALCIUM SPEC-SCNC: 9.4 MG/DL (ref 8.6–10.5)
CHLORIDE SERPL-SCNC: 98 MMOL/L (ref 98–107)
CLARITY UR: ABNORMAL
CO2 SERPL-SCNC: 30.3 MMOL/L (ref 22–29)
COLOR UR: ABNORMAL
CREAT SERPL-MCNC: 0.96 MG/DL (ref 0.76–1.27)
D-LACTATE SERPL-SCNC: 0.9 MMOL/L (ref 0.5–2)
DEPRECATED RDW RBC AUTO: 43.2 FL (ref 37–54)
EGFRCR SERPLBLD CKD-EPI 2021: 81.4 ML/MIN/1.73
EOSINOPHIL # BLD AUTO: 0.47 10*3/MM3 (ref 0–0.4)
EOSINOPHIL NFR BLD AUTO: 4.8 % (ref 0.3–6.2)
ERYTHROCYTE [DISTWIDTH] IN BLOOD BY AUTOMATED COUNT: 14.3 % (ref 12.3–15.4)
GLOBULIN UR ELPH-MCNC: 3.6 GM/DL
GLUCOSE SERPL-MCNC: 120 MG/DL (ref 65–99)
GLUCOSE UR STRIP-MCNC: ABNORMAL MG/DL
HCT VFR BLD AUTO: 40.5 % (ref 37.5–51)
HGB BLD-MCNC: 12.3 G/DL (ref 13–17.7)
HGB UR QL STRIP.AUTO: ABNORMAL
HOLD SPECIMEN: NORMAL
HOLD SPECIMEN: NORMAL
HYALINE CASTS UR QL AUTO: ABNORMAL /LPF
IMM GRANULOCYTES # BLD AUTO: 0.03 10*3/MM3 (ref 0–0.05)
IMM GRANULOCYTES NFR BLD AUTO: 0.3 % (ref 0–0.5)
INR PPP: 5.32 (ref 0.86–1.15)
KETONES UR QL STRIP: ABNORMAL
LEUKOCYTE ESTERASE UR QL STRIP.AUTO: ABNORMAL
LIPASE SERPL-CCNC: 19 U/L (ref 13–60)
LYMPHOCYTES # BLD AUTO: 1.81 10*3/MM3 (ref 0.7–3.1)
LYMPHOCYTES NFR BLD AUTO: 18.4 % (ref 19.6–45.3)
MCH RBC QN AUTO: 25 PG (ref 26.6–33)
MCHC RBC AUTO-ENTMCNC: 30.4 G/DL (ref 31.5–35.7)
MCV RBC AUTO: 82.3 FL (ref 79–97)
MONOCYTES # BLD AUTO: 0.89 10*3/MM3 (ref 0.1–0.9)
MONOCYTES NFR BLD AUTO: 9 % (ref 5–12)
NEUTROPHILS NFR BLD AUTO: 6.62 10*3/MM3 (ref 1.7–7)
NEUTROPHILS NFR BLD AUTO: 67.1 % (ref 42.7–76)
NITRITE UR QL STRIP: ABNORMAL
NRBC BLD AUTO-RTO: 0 /100 WBC (ref 0–0.2)
PH UR STRIP.AUTO: 7 [PH] (ref 5–8)
PLATELET # BLD AUTO: 275 10*3/MM3 (ref 140–450)
PMV BLD AUTO: 10.5 FL (ref 6–12)
POTASSIUM SERPL-SCNC: 4.2 MMOL/L (ref 3.5–5.2)
PROT SERPL-MCNC: 7 G/DL (ref 6–8.5)
PROT UR QL STRIP: ABNORMAL
PROTHROMBIN TIME: 49.5 SECONDS (ref 11.8–14.9)
RBC # BLD AUTO: 4.92 10*6/MM3 (ref 4.14–5.8)
RBC # UR STRIP: ABNORMAL /HPF
REF LAB TEST METHOD: ABNORMAL
SODIUM SERPL-SCNC: 137 MMOL/L (ref 136–145)
SP GR UR STRIP: 1.01 (ref 1–1.03)
SQUAMOUS #/AREA URNS HPF: ABNORMAL /HPF
UROBILINOGEN UR QL STRIP: ABNORMAL
WBC # UR STRIP: ABNORMAL /HPF
WBC NRBC COR # BLD AUTO: 9.86 10*3/MM3 (ref 3.4–10.8)
WHOLE BLOOD HOLD COAG: NORMAL
WHOLE BLOOD HOLD SPECIMEN: NORMAL

## 2024-10-29 PROCEDURE — 99285 EMERGENCY DEPT VISIT HI MDM: CPT

## 2024-10-29 PROCEDURE — 74177 CT ABD & PELVIS W/CONTRAST: CPT

## 2024-10-29 PROCEDURE — 81001 URINALYSIS AUTO W/SCOPE: CPT | Performed by: EMERGENCY MEDICINE

## 2024-10-29 PROCEDURE — P9612 CATHETERIZE FOR URINE SPEC: HCPCS

## 2024-10-29 PROCEDURE — 85610 PROTHROMBIN TIME: CPT | Performed by: EMERGENCY MEDICINE

## 2024-10-29 PROCEDURE — 25510000001 IOPAMIDOL PER 1 ML: Performed by: EMERGENCY MEDICINE

## 2024-10-29 PROCEDURE — 80053 COMPREHEN METABOLIC PANEL: CPT | Performed by: EMERGENCY MEDICINE

## 2024-10-29 PROCEDURE — 85025 COMPLETE CBC W/AUTO DIFF WBC: CPT | Performed by: EMERGENCY MEDICINE

## 2024-10-29 PROCEDURE — 83690 ASSAY OF LIPASE: CPT | Performed by: EMERGENCY MEDICINE

## 2024-10-29 PROCEDURE — 96365 THER/PROPH/DIAG IV INF INIT: CPT

## 2024-10-29 PROCEDURE — 83605 ASSAY OF LACTIC ACID: CPT | Performed by: EMERGENCY MEDICINE

## 2024-10-29 PROCEDURE — 25010000002 PHYTONADIONE 10 MG/ML SOLUTION 1 ML AMPULE

## 2024-10-29 RX ORDER — IOPAMIDOL 755 MG/ML
100 INJECTION, SOLUTION INTRAVASCULAR
Status: COMPLETED | OUTPATIENT
Start: 2024-10-29 | End: 2024-10-29

## 2024-10-29 RX ORDER — WARFARIN SODIUM 5 MG/1
5 TABLET ORAL
COMMUNITY
End: 2024-11-11

## 2024-10-29 RX ORDER — SODIUM CHLORIDE 0.9 % (FLUSH) 0.9 %
10 SYRINGE (ML) INJECTION AS NEEDED
Status: DISCONTINUED | OUTPATIENT
Start: 2024-10-29 | End: 2024-10-30 | Stop reason: HOSPADM

## 2024-10-29 RX ORDER — AMITRIPTYLINE HYDROCHLORIDE 10 MG/1
10 TABLET ORAL NIGHTLY
COMMUNITY

## 2024-10-29 RX ADMIN — IOPAMIDOL 100 ML: 755 INJECTION, SOLUTION INTRAVENOUS at 13:36

## 2024-10-29 RX ADMIN — PHYTONADIONE 5 MG: 10 INJECTION, EMULSION INTRAMUSCULAR; INTRAVENOUS; SUBCUTANEOUS at 18:22

## 2024-10-29 NOTE — ED PROVIDER NOTES
Time: 11:57 AM EDT  Date of encounter:  10/29/2024  Room number: 06/06  Independent Historian/Clinical History and Information was obtained by:   Patient and Family    History is limited by:  Patient is very hard of hearing and has some cognitive deficits from prior stroke, wife provides most of history.    Chief Complaint: Hematuria      History of Present Illness:  Patient is a 79 y.o. year old male who presents to the emergency department for evaluation of hematuria.  Patient's wife describes patient's baseline to be incontinence, prior stroke.  She has recently noticed patient to be bleeding with urinating.  He has a history of having only 1 kidney with the other 1 being surgically removed due to cancer.  He has also had bladder cancer.  He has had no recent nausea, vomiting, diarrhea, fever, or chills. He denies pain. Pt takes blood thinners daily.    HPI    Patient Care Team  Primary Care Provider: Kat Donahue APRN    Past Medical History:     No Known Allergies  Past Medical History:   Diagnosis Date    Arthritis     BACK.    At risk for central sleep apnea     STOP BANG 6    Bladder cancer     DR DAGMAR GARCIA. HISTORY OF. 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    Wears glasses     Wheelchair bound      Past Surgical History:   Procedure Laterality Date    ATRIAL APPENDAGE EXCLUSION LEFT WITH TRANSESOPHAGEAL ECHOCARDIOGRAM N/A 11/9/2021    Procedure: 11/9/21 Atrial Appendage Occlusion on eliquis hold 2 doses prior;  Surgeon: Primo Tobias DO;  Location: [facility] INVASIVE LOCATION;  Service:  Cardiology;  Laterality: N/A;    BACK SURGERY  06/1980    CARDIAC CATHETERIZATION  02/2001    CARDIOVERSION      AT [facility] YEARS AGO (40 YEARS AGO)    CATARACT EXTRACTION WITH INTRAOCULAR LENS IMPLANT Bilateral 2019    CHOLECYSTECTOMY  06/2002    COLONOSCOPY      CYSTOSCOPY      MULTIPLE    CYSTOSCOPY BLADDER BIOPSY N/A 8/24/2021    Procedure: CYSTOSCOPY, TRANSURETHRAL RESECTION OF BLADDER TUMOR;  Surgeon: Kaelyn Alvarado MD;  Location: [facility] MAIN OR;  Service: Urology;  Laterality: N/A;    EMBOLIZATION CEREBRAL N/A 4/14/2021    Procedure: CEREBRAL ANGIOGRAM;  Surgeon: Noah Bruno MD;  Location: [facility] HYBRID OR 18/19;  Service: Interventional Radiology;  Laterality: N/A;    EPIDURAL      LUMBAR    GALLBLADDER SURGERY      JOINT REPLACEMENT Right     Knee    KNEE ARTHROPLASTY Right 2013    NEPHROURETERECTOMY Left 04/2019    TRANSURETHRAL RESECTION OF BLADDER TUMOR       Family History   Problem Relation Age of Onset    Heart attack Father     No Known Problems Other     Malig Hyperthermia Neg Hx        Home Medications:  Prior to Admission medications    Medication Sig Start Date End Date Taking? Authorizing Provider   amLODIPine (NORVASC) 2.5 MG tablet Take 2.5 mg by mouth Every Evening.    Anu Gaviria MD   aspirin 81 MG EC tablet Take 1 tablet by mouth Daily. 8/23/22   Ciro Patterson PA   atorvastatin (LIPITOR) 40 MG tablet Take 40 mg by mouth Every Night. 2/20/21   Anu Gaviria MD   carvedilol (COREG) 6.25 MG tablet Take 6.25 mg by mouth 2 (Two) Times a Day. 2/11/21   Anu Gaviria MD   clopidogrel (PLAVIX) 75 MG tablet Take 1 tablet by mouth Daily. 12/22/21   Rigoberto Conway MD   gabapentin (NEURONTIN) 400 MG capsule Take 400 mg by mouth Every Evening.    Anu Gaviria MD   loratadine (CLARITIN) 10 MG tablet Take 10 mg by mouth Daily.    Anu Gaviria MD   losartan (Cozaar) 100 MG tablet Take 1 tablet by mouth Daily. 9/18/22   Dariel  "DO Iveth        Social History:   Social History     Tobacco Use    Smoking status: Former     Current packs/day: 0.00     Types: Cigarettes     Quit date: 1972     Years since quittin.8    Smokeless tobacco: Never    Tobacco comments:     QUIT 50 YRS AGO. SMOKED 3 YEARS IN [employer]   Vaping Use    Vaping status: Never Used   Substance Use Topics    Alcohol use: Not Currently    Drug use: Never         Review of Systems:  Review of Systems     I performed a review of systems today, which was all negative, except for the positives found in the HPI above.      Physical Exam:  /75 (BP Location: Left arm, Patient Position: Sitting)   Pulse 89   Temp 98.3 °F (36.8 °C) (Oral)   Resp 18   Ht 177.8 cm (70\")   Wt 109 kg (240 lb 1.3 oz)   SpO2 93%   BMI 34.45 kg/m²     Physical Exam   General:  Awake alert no apparent distress    Head: Normocephalic, atraumatic, eyes PERRLA EOMI, nose normal, oropharynx normal    Neck: Supple, no meningismus, no cervical lymphadenopathy or JVD    Heart: Regular rate and rhythm, no murmurs or rubs, 2+ radial pulses    Lungs: Clear to auscultation bilaterally, no wheezing or rhonchi or rales, no respiratory distress    Abdomen: Soft, nontender, nondistended, no signs of peritonitis    Skin: Warm, dry, no rash    Musculoskeletal: Normal range of motion, no obvious deformities, no edema noted    Neurologic: Awake, alert, oriented x 3, no motor or sensory deficits appreciated    Psych: No suicidal or homicidal ideations, no psychosis          Procedures:  Procedures      Medical Decision Making:      Comorbidities that affect care:    Kidney cancer, bladder cancer, hypertension, TIA, COPD, arthritis, hydrocephalus    External Notes reviewed:          The following orders were placed and all results were independently analyzed by me:  Orders Placed This Encounter   Procedures    CT Abdomen Pelvis With Contrast    [place] Draw    Comprehensive Metabolic Panel    Lipase    Urinalysis " "With Microscopic If Indicated (No Culture) - Urine, Clean Catch    Lactic Acid, Plasma    CBC Auto Differential    Protime-INR    Urinalysis, Microscopic Only - Urine, Clean Catch    Undress & Gown    CBC & Differential    Green Top (Gel)    Lavender Top    Gold Top - SST    Light Blue Top       Medications Given in the Emergency Department:  Medications   iopamidol (ISOVUE-370) 76 % injection 100 mL (100 mL Intravenous Given 10/29/24 1336)   phytonadione (AQUA-MEPHYTON, VITAMIN K) 5 mg in sodium chloride 0.9 % 50 mL IVPB (0 mg Intravenous Stopped 10/29/24 1852)        ED Course:    ED Course as of 10/30/24 1217   Tue Oct 29, 2024   1451 Called to check on status of CT read. \"Imagine Available,\"status was posted over an hour.  [MS]   1510  called CT department and pt's scan is be pushed through to radiologist  [MS]      ED Course User Index  [MS] Ignacia Matthew, OSWALDO       Labs:    Lab Results (last 24 hours)       ** No results found for the last 24 hours. **             Imaging:    CT Abdomen Pelvis With Contrast    Result Date: 10/29/2024  CT ABDOMEN PELVIS W CONTRAST Date of Exam: 10/29/2024 1:29 PM EDT Indication: hx 1 kidney kidney adn bladder cancer. Comparison: CT abdomen pelvis 2/18/2021 Technique: Axial CT images were obtained of the abdomen and pelvis after the uneventful intravenous administration of iodinated contrast. Reconstructed coronal and sagittal images were also obtained. Automated exposure control and iterative construction methods were used. Findings: Lung Bases: Bibasal atelectasis. Coronary calcifications. Left atrial appendage occlusion device. Liver: No significant abnormality.  Gallbladder and biliary tree: Cholecystectomy  Spleen: No significant abnormality.  Pancreas: No significant abnormality.  Adrenal glands: No significant abnormality.  Kidneys and ureters: Prior left nephrectomy. Nonobstructing right renal calculus. No right hydroureteronephrosis.  Stomach " and duodenum:No significant abnormality. Small and large bowel: Few colonic diverticuli. No evidence of bowel obstruction. Normal-appearing appendix. Peritoneal cavity: No free fluid or free air. Bladder: Irregular thickening of the anterior bladder wall.  Pelvic organs: No significant abnormality.  Vasculature: Atherosclerotic calcifications.  Lymph nodes: No pathologic appearing lymph nodes by imaging criteria.  Bones and soft tissues: Similar-appearing postoperative and degenerative changes of the lumbar spine including similar-appearing lucency, height loss, and retropulsion at L5. Bones appear demineralized.     Impression: No acute findings in the abdomen/pelvis. Irregular thickening of the anterior bladder wall suspicious for malignancy. Recommend cystoscopy for further evaluation. Electronically Signed: Rohith Arredondo  10/29/2024 6:27 PM EDT  Workstation ID: [id number]       Differential Diagnosis and Discussion:    Hematuria: Differential diagnosis includes but is not limited to medications, coagulopathy, glomerulonephritis, nephritis, neoplasm, vascular abnormalities, cystitis, urethritis, neoplasms of the bladder, and autoimmune disorders.    All labs were reviewed and interpreted by me.  All X-rays impressions were independently interpreted by me.  CT scan radiology impression was interpreted by me.    MDM                 Patient Care Considerations:    SEPSIS was considered but is NOT present in the emergency department as SIRS criteria is not present. ANTIBIOTICS: I considered prescribing antibiotics as an outpatient however no bacterial focus of infection was found.      Consultants/Shared Management Plan:    I have discussed this pt and pending disposition with ER attending Dr. Hawkins.     Social Determinants of Health:    Patient has presented with family members who are responsible, reliable and will ensure follow up care.      Disposition and Care Coordination:    Discharged: I considered escalation  of care by admitting this patient to the hospital, however CT did not indicate any life threatening hemorrhage or emergent situation that would require immediate urgent intervention. Additionally, his H&H was within acceptable limits and there were no concerns for sepsis or hypovolemic shock.           Final diagnoses:   Hematuria, unspecified type   Bladder mass        ED Disposition       ED Disposition   Discharge    Condition   Stable    Comment   --               This medical record created using voice recognition software.       Ignacia Matthew, APRN  10/30/24 [id number]

## 2024-10-30 ENCOUNTER — TELEPHONE (OUTPATIENT)
Dept: UROLOGY | Age: 79
End: 2024-10-30
Payer: MEDICARE

## 2024-10-30 NOTE — TELEPHONE ENCOUNTER
Spoke with spouse and informed per Dr. Alvarado that patient needs to have a procedure in the OR. I informed that since it has been over 2 years since patient was seen by a cardiologist that an appointment with that MD would be needed in order to be cleared to do surgery. Spouse voiced understanding and is going to call and get an appointment with Dr. Carlos.

## 2024-10-30 NOTE — TELEPHONE ENCOUNTER
He needs a cystoscopy and bilateral retrogrades.  He was scheduled to have one back in 2022 but was unable to do it due to mobility issues.  Please call him to set up.  I will put orders in 1 to let me know what day he is going to be done.

## 2024-10-30 NOTE — TELEPHONE ENCOUNTER
PT WAS SEEN IN THE ER FOR HEMATURIA, HISTORY OF RENAL AND BLADDER CANCER. HAD CT DONE AND IS RECOMMENDED TO HAVE A CYSTO. HE HAS HAD A STROKE AND HAS MOBILITY ISSUES. WIFE IS ASKING FOR AN ASAP APPT TO GET THIS EVALUATED.

## 2024-10-31 NOTE — TELEPHONE ENCOUNTER
APRIL CALLED FROM [facility].  SHE SAID PATIENT IS THERE.  HE NEEDS CARDIAC CLEARANCE FOR A CYSTOSCOPY.  THEY WANT TO KNOW IF DR. PEARL CAN CLEAR HIM.  HE HAS NOT HAD A HEART ATTACK.  HE HAS HAD A-FIB.      I SPOKE TO MAGDA, AND KIRILL SAID THE PATIENT HAS TO HAVE CLEARANCE FROM CARDIOLOGY.     I RELAYED THIS TO APRIL.

## 2024-11-05 ENCOUNTER — PATIENT OUTREACH (OUTPATIENT)
Age: 79
End: 2024-11-05
Payer: MEDICARE

## 2024-11-05 NOTE — OUTREACH NOTE
Social Work Assessment  Questions/Answers      Flowsheet Row Most Recent Value   Referral Source hospital clinician/department   Reason for Consult community resources, transportation   Preferred Language English   Advance Care Planning Reviewed present on chart   Decision Making Considerations patient/family ability to make health care decisions   People in Home spouse   Current Living Arrangements home   In the past 12 months has the electric, gas, oil, or water company threatened to shut off services in your home? No   Primary Care Provided by spouse/significant other   Provides Primary Care For no one, unable/limited ability to care for self   Family Caregiver if Needed spouse   Quality of Family Relationships helpful, stressful, supportive   Source of Income social security   Application for Public Assistance obtained public assistance pending number   Do you want help finding or keeping work or a job? I do not need or want help          SDOH updated and reviewed with the patient during this program:  --     Disabilities: At Risk (11/5/2024)    Disabilities     Concentrating, Remembering, or Making Decisions Difficulty: yes     Doing Errands Independently Difficulty: yes      --     Employment: Not At Risk (11/5/2024)    Employment     Do you want help finding or keeping work or a job?: I do not need or want help      Financial Resource Strain: Low Risk  (11/5/2024)    Overall Financial Resource Strain (CARDIA)     Difficulty of Paying Living Expenses: Not very hard      --     Food Insecurity: No Food Insecurity (11/5/2024)    Hunger Vital Sign     Worried About Running Out of Food in the Last Year: Never true     Ran Out of Food in the Last Year: Never true      --     Housing Stability: Unknown (11/5/2024)    Housing Stability     Current Living Arrangements: home      --     Transportation Needs: Unmet Transportation Needs (11/5/2024)    PRAPARE - Transportation     Lack of Transportation (Medical): Yes      Lack of Transportation (Non-Medical): Yes      --     Utilities: Not At Risk (11/5/2024)    [facility] Utilities     Threatened with loss of utilities: No      Continuing Care   [facility]    [address] [place] [address]    Phone: [phone]    Request Status: Pending - No Request Sent    Services: Financial Assistance, Food Insecurity Services, Personal Care Services    Resource for: Financial Resource Strain, Food Insecurity, Utilities   [employer]    [address] [place] [address]    Phone: [phone]    Request Status: Pending - No Request Sent    Services: Community Transportation Programs    Resource for: Disabilities, Transportation Needs     Patient Outreach    MSW Spoke with patient's wife and provided information for RICHIE. Patient is established and they have utilized this service and will continue to do so. Patient also mailed information for caregiver supports through [employer].     Carole STAPLETON -   Ambulatory Case Management    11/5/2024, 11:35 EST

## 2024-11-06 ENCOUNTER — PATIENT OUTREACH (OUTPATIENT)
Age: 79
End: 2024-11-06
Payer: MEDICARE

## 2024-11-06 NOTE — OUTREACH NOTE
Patient Outreach    MSW compiled list of resources and [employer] brochure for in home assistance and sent to Ann Juarez states that she has mailed resources.    Carole STAPLETON -   Ambulatory Case Management    11/6/2024, 09:06 EST

## 2024-11-11 ENCOUNTER — OFFICE VISIT (OUTPATIENT)
Dept: CARDIOLOGY | Facility: CLINIC | Age: 79
End: 2024-11-11
Payer: MEDICARE

## 2024-11-11 ENCOUNTER — TELEPHONE (OUTPATIENT)
Dept: UROLOGY | Age: 79
End: 2024-11-11
Payer: MEDICARE

## 2024-11-11 VITALS
WEIGHT: 240 LBS | SYSTOLIC BLOOD PRESSURE: 136 MMHG | DIASTOLIC BLOOD PRESSURE: 80 MMHG | HEIGHT: 70 IN | BODY MASS INDEX: 34.36 KG/M2 | HEART RATE: 83 BPM

## 2024-11-11 DIAGNOSIS — I10 HYPERTENSION, ESSENTIAL: ICD-10-CM

## 2024-11-11 DIAGNOSIS — Z01.810 PREOP CARDIOVASCULAR EXAM: ICD-10-CM

## 2024-11-11 DIAGNOSIS — I48.0 PAROXYSMAL ATRIAL FIBRILLATION: Primary | ICD-10-CM

## 2024-11-11 DIAGNOSIS — Z95.818 PRESENCE OF WATCHMAN LEFT ATRIAL APPENDAGE CLOSURE DEVICE: ICD-10-CM

## 2024-11-11 PROCEDURE — 1159F MED LIST DOCD IN RCRD: CPT | Performed by: INTERNAL MEDICINE

## 2024-11-11 PROCEDURE — 3075F SYST BP GE 130 - 139MM HG: CPT | Performed by: INTERNAL MEDICINE

## 2024-11-11 PROCEDURE — 3079F DIAST BP 80-89 MM HG: CPT | Performed by: INTERNAL MEDICINE

## 2024-11-11 PROCEDURE — 1160F RVW MEDS BY RX/DR IN RCRD: CPT | Performed by: INTERNAL MEDICINE

## 2024-11-11 PROCEDURE — 99214 OFFICE O/P EST MOD 30 MIN: CPT | Performed by: INTERNAL MEDICINE

## 2024-11-11 NOTE — TELEPHONE ENCOUNTER
[facility]  [address] [place] [address]  Fax [phone]  Phone [phone]       11/11/24            To Whom It May Concern:        Our records indicate this patient is currently under anticoagulant therapy Curtis Richmond 1945 is to be cleared to hold aspirin  for 5 days prior to Cystoscopy Bilateral Retrograde Pyelograms. You may contact our office at [phone] with any questions. I appreciate your prompt response in this matter. Please return this form to our office as soon as possible to [phone]. Thank you for your time and assistance.     [] I approve my patient, Curtis Richmond , to stop taking anticoagulant therapy medication 5 days prior to procedure  [] I do NOT approve my patient, Curtis Richmond , to stop taking anticoagulant therapy medication at this time.   [] I approve my patient, Curtis Richmond from a cardiac standpoint  [] I do NOT approve my patient, Curtis Richmond from a cardiac standpoint      Thank you,          Approving physician name (please print): SHAHZAD Carlos MD    Approving Physician signature: _________________________________ Date: _____________________      Sincerely,     Kaelyn Alvarado MD

## 2024-11-11 NOTE — PROGRESS NOTES
Chief Complaint  Atrial Fibrillation, Cryptogenic stroke (HCC), PACEMAKER, and Hypertension    Subjective            Curtis Richmond presents to [facility] CARDIOLOGY  Atrial Fibrillation  Symptoms are negative for chest pain and shortness of breath. Past medical history includes atrial fibrillation.   Hypertension  Pertinent negatives include no chest pain or shortness of breath.       The patient is here for follow-up evaluation management of paroxysmal atrial fibrillation, history of recurrent bleeding problems on anticoagulation and implanted Watchman left atrial occluder device, hypertension.  He is in the preop stages for getting cystoscopy.  He has had hematuria.  I have not seen the patient in the last couple of years for follow-up but he has previously been taken off of anticoagulation and had a Watchman device done because of recurrent  bleeding.  For unclear reasons a different provider restarted his warfarin.  He has not had any other blood clot or stroke or any other indication that I can tell to resume the warfarin.  He denies palpitations, chest pain or excessive shortness of breath.    PMH  Past Medical History:   Diagnosis Date    Arthritis     BACK.    At risk for central sleep apnea     STOP BANG 6    Bladder cancer     DR DAGMAR GARCIA. HISTORY OF. 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    Wears glasses     Wheelchair bound          SURGICALHX  Past Surgical History:   Procedure Laterality Date    ATRIAL APPENDAGE EXCLUSION LEFT WITH TRANSESOPHAGEAL ECHOCARDIOGRAM N/A 11/9/2021     Procedure: 21 Atrial Appendage Occlusion on eliquis hold 2 doses prior;  Surgeon: Primo Tobias DO;  Location:  [facility] INVASIVE LOCATION;  Service: Cardiology;  Laterality: N/A;    BACK SURGERY  1980    CARDIAC CATHETERIZATION  2001    CARDIOVERSION      AT [facility] YEARS AGO (40 YEARS AGO)    CATARACT EXTRACTION WITH INTRAOCULAR LENS IMPLANT Bilateral 2019    CHOLECYSTECTOMY  2002    COLONOSCOPY      CYSTOSCOPY      MULTIPLE    CYSTOSCOPY BLADDER BIOPSY N/A 2021    Procedure: CYSTOSCOPY, TRANSURETHRAL RESECTION OF BLADDER TUMOR;  Surgeon: Kaelyn Alvarado MD;  Location: [facility] MAIN OR;  Service: Urology;  Laterality: N/A;    EMBOLIZATION CEREBRAL N/A 2021    Procedure: CEREBRAL ANGIOGRAM;  Surgeon: Noah Bruno MD;  Location:  [facility] HYBRID OR ;  Service: Interventional Radiology;  Laterality: N/A;    EPIDURAL      LUMBAR    GALLBLADDER SURGERY      JOINT REPLACEMENT Right     Knee    KNEE ARTHROPLASTY Right     NEPHROURETERECTOMY Left 2019    TRANSURETHRAL RESECTION OF BLADDER TUMOR          SOC  Social History     Socioeconomic History    Marital status:    Tobacco Use    Smoking status: Former     Current packs/day: 0.00     Types: Cigarettes     Quit date:      Years since quittin.8    Smokeless tobacco: Never    Tobacco comments:     QUIT 50 YRS AGO. SMOKED 3 YEARS IN [employer]   Vaping Use    Vaping status: Never Used   Substance and Sexual Activity    Alcohol use: Not Currently    Drug use: Never    Sexual activity: Defer         FAMHX  Family History   Problem Relation Age of Onset    Heart attack Father     No Known Problems Other     Malig Hyperthermia Neg Hx           ALLERGY  No Known Allergies     MEDSCURRENT    Current Outpatient Medications:     amitriptyline (ELAVIL) 10 MG tablet, Take 1 tablet by mouth Every Night., Disp: , Rfl:     amLODIPine (NORVASC) 2.5 MG tablet, Take 1 tablet by mouth Every Evening., Disp: , Rfl:     aspirin  "81 MG EC tablet, Take 1 tablet by mouth Daily., Disp: , Rfl:     atorvastatin (LIPITOR) 40 MG tablet, Take 1 tablet by mouth Every Night., Disp: , Rfl:     carvedilol (COREG) 6.25 MG tablet, Take 1 tablet by mouth 2 (Two) Times a Day., Disp: , Rfl:     gabapentin (NEURONTIN) 400 MG capsule, Take 1 capsule by mouth Every Evening., Disp: , Rfl:     loratadine (CLARITIN) 10 MG tablet, Take 1 tablet by mouth Daily., Disp: , Rfl:     losartan (Cozaar) 100 MG tablet, Take 1 tablet by mouth Daily., Disp: 30 tablet, Rfl: 0      Review of Systems   Cardiovascular:  Negative for chest pain.   Respiratory:  Negative for shortness of breath.    Genitourinary:  Positive for hematuria.        Objective     /80   Pulse 83   Ht 177.8 cm (70\")   Wt 109 kg (240 lb)   BMI 34.44 kg/m²       General Appearance:   well developed  well nourished  HENT:   oropharynx moist  lips not cyanotic  Neck:  thyroid not enlarged  supple  Respiratory:  no respiratory distress  normal breath sounds  no rales  Cardiovascular:  no jugular venous distention  regular rhythm  apical impulse normal  S1 normal, S2 normal  no S3, no S4   no murmur  no rub, no thrill  carotid pulses normal; no bruit  pedal pulses normal  lower extremity edema: none    Musculoskeletal:  no clubbing of fingers.   normocephalic, head atraumatic  Skin:   warm, dry  Psychiatric:  judgement and insight appropriate  normal mood and affect      Result Review :     The following data was reviewed by: Lion Carlos MD on 11/11/2024:    CMP          10/29/2024    10:32   CMP   Glucose 120    BUN 13    Creatinine 0.96    EGFR 81.4    Sodium 137    Potassium 4.2    Chloride 98    Calcium 9.4    Total Protein 7.0    Albumin 3.4    Globulin 3.6    Total Bilirubin 0.4    Alkaline Phosphatase 85    AST (SGOT) 17    ALT (SGPT) 17    Albumin/Globulin Ratio 0.9    BUN/Creatinine Ratio 13.5    Anion Gap 8.7      CBC          10/29/2024    10:32   CBC   WBC 9.86    RBC 4.92  "   Hemoglobin 12.3    Hematocrit 40.5    MCV 82.3    MCH 25.0    MCHC 30.4    RDW 14.3    Platelets 275            Data reviewed : Primary care and urology records reviewed      Procedures             Assessment and Plan        ASSESSMENT:  Encounter Diagnoses   Name Primary?    Paroxysmal atrial fibrillation Yes    Presence of Watchman left atrial appendage closure device     Hypertension, essential     Preop cardiovascular exam          PLAN:    1.  Preop cardiovascular exam for cystoscopy-the patient has had recurrent hematuria and does have a prior history of bladder cancer.  He had hematuria previously and was the reason why we stopped anticoagulation instead opting for a watchman implant which she had previously.  He is low to moderate stable cardiovascular risk for anesthesia and no additional cardiac testing is needed before the procedure.  We will communicate with urology  2.  Presence of Watchman left atrial appendage occlusion device-he does not need to be on warfarin long-term and I am stopping that.  He will continue aspirin  3.  Essential hypertension-stable, continue current medical therapy      Routine annual follow-up will also be arranged          Patient was given instructions and counseling regarding his condition or for health maintenance advice. Please see specific information pulled into the AVS if appropriate.             STEFANO Carlos MD  11/11/2024    13:09 EST

## 2024-11-11 NOTE — TELEPHONE ENCOUNTER
Procedure: Cystoscopy Bilateral Retrograde Pyelograms    Med Directive: Aspirin (request 5 days)    PMH: Watchman, Pacemaker, cryptogenic stroke, paroxysmal Afib, HTN    Last Seen: 11/11/2024 (cleared by Dr. Carlos just need med directive)

## 2024-11-12 ENCOUNTER — TELEPHONE (OUTPATIENT)
Dept: UROLOGY | Age: 79
End: 2024-11-12
Payer: MEDICARE

## 2024-11-12 ENCOUNTER — PREP FOR SURGERY (OUTPATIENT)
Dept: OTHER | Facility: HOSPITAL | Age: 79
End: 2024-11-12
Payer: MEDICARE

## 2024-11-12 DIAGNOSIS — C67.8 MALIGNANT NEOPLASM OF OVERLAPPING SITES OF BLADDER: ICD-10-CM

## 2024-11-12 DIAGNOSIS — R31.9 HEMATURIA, UNSPECIFIED TYPE: Primary | ICD-10-CM

## 2024-11-12 RX ORDER — SODIUM CHLORIDE 0.9 % (FLUSH) 0.9 %
10 SYRINGE (ML) INJECTION AS NEEDED
OUTPATIENT
Start: 2024-11-12

## 2024-11-12 RX ORDER — SODIUM CHLORIDE 9 MG/ML
40 INJECTION, SOLUTION INTRAVENOUS AS NEEDED
OUTPATIENT
Start: 2024-11-12

## 2024-11-12 RX ORDER — SODIUM CHLORIDE 0.9 % (FLUSH) 0.9 %
10 SYRINGE (ML) INJECTION EVERY 12 HOURS SCHEDULED
OUTPATIENT
Start: 2024-11-12

## 2024-11-12 NOTE — TELEPHONE ENCOUNTER
Spoke to spouse and scheduled procedure for 11/21/24 . I went over preop instructions and informed that I would be mailing the information. I instructed that patient is to hold aspirin for 5 days prior to procedure. Spouse voiced understanding.

## 2024-11-12 NOTE — H&P (VIEW-ONLY)
[facility]   Urology HISTORY AND PHYSICAL    Patient Name: Curtis Richmond  : 1945  MRN: [id number]  Primary Care Physician:  Kat Donahue APRN  Date of admission: (Not on file)    Subjective   Subjective       History of Present Illness  The patient has a history of bladder cancer and presents for cystoscopy and bilateral retrograde pyelograms      Personal History     Past Medical History:   Diagnosis Date    Arthritis     BACK.    At risk for central sleep apnea     STOP BANG 6    Bladder cancer     DR KAELYN GARCIA. HISTORY OF. BLADDER WASH, TURBP    Chronic back pain     COPD (chronic obstructive pulmonary disease)     EMPYSEMA- NO INHALERS    History of loop recorder     CURRENTLY HAS [employer] LOOP RECORDER S/P STROKE, MONITORED BY DR GONG     Hydrocephalus     NO CURRENT PROBLEMS     Hyperlipidemia     Hypertension     TIA (transient ischemic attack) 2021    Tubular adenoma of colon 2015    Urothelial carcinoma of kidney, left 2019    Wears glasses     Wheelchair bound        Past Surgical History:   Procedure Laterality Date    ATRIAL APPENDAGE EXCLUSION LEFT WITH TRANSESOPHAGEAL ECHOCARDIOGRAM N/A 2021    Procedure: 21 Atrial Appendage Occlusion on eliquis hold 2 doses prior;  Surgeon: Primo Tobias DO;  Location: [facility] INVASIVE LOCATION;  Service: Cardiology;  Laterality: N/A;    BACK SURGERY  1980    CARDIAC CATHETERIZATION  2001    CARDIOVERSION      AT [facility] YEARS AGO (40 YEARS AGO)    CATARACT EXTRACTION WITH INTRAOCULAR LENS IMPLANT Bilateral 2019    CHOLECYSTECTOMY  2002    COLONOSCOPY      CYSTOSCOPY      MULTIPLE    CYSTOSCOPY BLADDER BIOPSY N/A 2021    Procedure: CYSTOSCOPY, TRANSURETHRAL RESECTION OF BLADDER TUMOR;  Surgeon: Kaelyn Alvarado MD;  Location: [facility] MAIN OR;  Service: Urology;  Laterality: N/A;    EMBOLIZATION CEREBRAL N/A 2021    Procedure: CEREBRAL ANGIOGRAM;  Surgeon: Kiana  MD Noah;  Location: [facility] OR 18/19;  Service: Interventional Radiology;  Laterality: N/A;    EPIDURAL      LUMBAR    GALLBLADDER SURGERY      JOINT REPLACEMENT Right     Knee    KNEE ARTHROPLASTY Right 2013    NEPHROURETERECTOMY Left 04/2019    TRANSURETHRAL RESECTION OF BLADDER TUMOR         Family History: family history includes Heart attack in his father; No Known Problems in an other family member. Otherwise pertinent FHx was reviewed and not pertinent to current issue.    Social History:  reports that he quit smoking about 52 years ago. His smoking use included cigarettes. He has never used smokeless tobacco. He reports that he does not currently use alcohol. He reports that he does not use drugs.    Home Medications:  amLODIPine, amitriptyline, aspirin, atorvastatin, carvedilol, gabapentin, loratadine, and losartan    Allergies:  No Known Allergies    Objective    Objective     Vitals:        Physical Exam  Constitutional:       Appearance: Normal appearance.   Cardiovascular:      Rate and Rhythm: Normal rate and regular rhythm.   Pulmonary:      Effort: Pulmonary effort is normal.      Breath sounds: Normal breath sounds.   Neurological:      Mental Status: He is alert. Mental status is at baseline.   Psychiatric:         Mood and Affect: Mood and affect normal.         Speech: Speech normal.         Judgment: Judgment normal.         Result Review    Result Review:  I have personally reviewed the results from the time of this admission to 11/12/2024 18:13 EST and agree with these findings:  [x]  Laboratory  []  Microbiology  [x]  Radiology  []  EKG/Telemetry   []  Cardiology/Vascular   []  Pathology  [x]  Old records  []  Other:      Assessment & Plan   Assessment / Plan       Active Hospital Problems:  There are no active hospital problems to display for this patient.      Plan: cystoscopy and bilateral retrograde pyelograms  Risks and benefits discussed with patient and they are agreeable to  proceed.    VTE Prophylaxis:  No VTE prophylaxis order currently exists.        CODE STATUS:           Electronically signed by Kaelyn Alvarado MD, 11/12/24, 6:13 PM EST.

## 2024-11-12 NOTE — H&P
[facility]   Urology HISTORY AND PHYSICAL    Patient Name: Curtis Richmond  : 1945  MRN: [id number]  Primary Care Physician:  Kat Donahue APRN  Date of admission: (Not on file)    Subjective   Subjective       History of Present Illness  The patient has a history of bladder cancer and presents for cystoscopy and bilateral retrograde pyelograms      Personal History     Past Medical History:   Diagnosis Date    Arthritis     BACK.    At risk for central sleep apnea     STOP BANG 6    Bladder cancer     DR KAELYN GARCIA. HISTORY OF. BLADDER WASH, TURBP    Chronic back pain     COPD (chronic obstructive pulmonary disease)     EMPYSEMA- NO INHALERS    History of loop recorder     CURRENTLY HAS [employer] LOOP RECORDER S/P STROKE, MONITORED BY DR GONG     Hydrocephalus     NO CURRENT PROBLEMS     Hyperlipidemia     Hypertension     TIA (transient ischemic attack) 2021    Tubular adenoma of colon 2015    Urothelial carcinoma of kidney, left 2019    Wears glasses     Wheelchair bound        Past Surgical History:   Procedure Laterality Date    ATRIAL APPENDAGE EXCLUSION LEFT WITH TRANSESOPHAGEAL ECHOCARDIOGRAM N/A 2021    Procedure: 21 Atrial Appendage Occlusion on eliquis hold 2 doses prior;  Surgeon: Primo Tobias DO;  Location: [facility] INVASIVE LOCATION;  Service: Cardiology;  Laterality: N/A;    BACK SURGERY  1980    CARDIAC CATHETERIZATION  2001    CARDIOVERSION      AT [facility] YEARS AGO (40 YEARS AGO)    CATARACT EXTRACTION WITH INTRAOCULAR LENS IMPLANT Bilateral 2019    CHOLECYSTECTOMY  2002    COLONOSCOPY      CYSTOSCOPY      MULTIPLE    CYSTOSCOPY BLADDER BIOPSY N/A 2021    Procedure: CYSTOSCOPY, TRANSURETHRAL RESECTION OF BLADDER TUMOR;  Surgeon: Kaelyn Alvarado MD;  Location: [facility] MAIN OR;  Service: Urology;  Laterality: N/A;    EMBOLIZATION CEREBRAL N/A 2021    Procedure: CEREBRAL ANGIOGRAM;  Surgeon: Kiana  MD Noah;  Location: [facility] OR 18/19;  Service: Interventional Radiology;  Laterality: N/A;    EPIDURAL      LUMBAR    GALLBLADDER SURGERY      JOINT REPLACEMENT Right     Knee    KNEE ARTHROPLASTY Right 2013    NEPHROURETERECTOMY Left 04/2019    TRANSURETHRAL RESECTION OF BLADDER TUMOR         Family History: family history includes Heart attack in his father; No Known Problems in an other family member. Otherwise pertinent FHx was reviewed and not pertinent to current issue.    Social History:  reports that he quit smoking about 52 years ago. His smoking use included cigarettes. He has never used smokeless tobacco. He reports that he does not currently use alcohol. He reports that he does not use drugs.    Home Medications:  amLODIPine, amitriptyline, aspirin, atorvastatin, carvedilol, gabapentin, loratadine, and losartan    Allergies:  No Known Allergies    Objective    Objective     Vitals:        Physical Exam  Constitutional:       Appearance: Normal appearance.   Cardiovascular:      Rate and Rhythm: Normal rate and regular rhythm.   Pulmonary:      Effort: Pulmonary effort is normal.      Breath sounds: Normal breath sounds.   Neurological:      Mental Status: He is alert. Mental status is at baseline.   Psychiatric:         Mood and Affect: Mood and affect normal.         Speech: Speech normal.         Judgment: Judgment normal.         Result Review    Result Review:  I have personally reviewed the results from the time of this admission to 11/12/2024 18:13 EST and agree with these findings:  [x]  Laboratory  []  Microbiology  [x]  Radiology  []  EKG/Telemetry   []  Cardiology/Vascular   []  Pathology  [x]  Old records  []  Other:      Assessment & Plan   Assessment / Plan       Active Hospital Problems:  There are no active hospital problems to display for this patient.      Plan: cystoscopy and bilateral retrograde pyelograms  Risks and benefits discussed with patient and they are agreeable to  proceed.    VTE Prophylaxis:  No VTE prophylaxis order currently exists.        CODE STATUS:           Electronically signed by Kaelyn Alvarado MD, 11/12/24, 6:13 PM EST.

## 2024-11-15 RX ORDER — CHOLECALCIFEROL (VITAMIN D3) 1250 MCG
50000 CAPSULE ORAL WEEKLY
COMMUNITY

## 2024-11-15 NOTE — PRE-PROCEDURE INSTRUCTIONS
IMPORTANT INSTRUCTIONS - PRE-ADMISSION TESTING  DO NOT EAT OR CHEW anything after midnight the night before your procedure.    NPO AFTER MN EXCEPT SIPS OF WATER TO TAKE MEDICATIONS LISTED BELOW  Take the following medications the morning of your procedure with JUST A SIP OF WATER:  _COREG, CLARITIN______________________________________________________________________________________________________________________________________________________________________________________    DO NOT BRING your medications to the hospital with you, UNLESS something has changed since your PRE-Admission Testing appointment.  Hold all vitamins, supplements, and NSAIDS (Non- steroidal anti-inflammatory meds) for one week prior to surgery (you MAY take Tylenol or Acetaminophen).  If you are diabetic, check your blood sugar the morning of your procedure. If it is less than 70 or if you are feeling symptomatic, call the following number for further instructions: [phone]  Use your inhalers/nebulizers as usual, the morning of your procedure. BRING YOUR INHALERS with you.   Bring your CPAP or BIPAP to hospital, ONLY IF YOU WILL BE SPENDING THE NIGHT.   Make sure you have a ride home and have someone who will stay with you the day of your procedure after you go home.  If you have any questions, please call your Pre-Admission Testing Nurse, ___BREANNE_____________ at [phone]____________.   Per anesthesia request, do not smoke for 24 hours before your procedure or as instructed by your surgeon.    INFORMATION GIVEN TO APRIL [facility] THAT ARRIVING VIA [facility] AND [facility] WILL GIVE YOU EARLY ARRIVAL TIME SO TRANSPORTATION CAN BE SET UP   BATHING INSTRUCTIONS GIVEN. NO JEWELRY OF ANY TYPE   COME TO ENTRANCE A, ELEVATOR A, 3RD FLOOR DAY OF PROCEDURE.  CASH,  OR CARD FOR MEDS TO BED IF INDICATED AT DISCHARGE  LAST DOSE OF ASA TO BE 11/15/24

## 2024-11-15 NOTE — PAT
SPOKE WITH APRIL IN SDS AND MADE AWARE PT WILL BE ARRIVING VIA [facility] ALONG WITH HIS WIFE FRANCINE. PT IS CHAIR BOUND LIVES AT HOME.  WIFE USES [employer] LIFT ADVISED HER TO LEAVE LIFT PAD BELOW HIM SO CAN BE USED TO TRANSFER HIM FROM  TO [facility] DAY OF PROCEDURE. ALSO ADVISED APRIL PT WILL NEED WEIGHING BED

## 2024-11-18 ENCOUNTER — ANESTHESIA EVENT (OUTPATIENT)
Dept: PERIOP | Facility: HOSPITAL | Age: 79
End: 2024-11-18
Payer: MEDICARE

## 2024-11-21 ENCOUNTER — HOSPITAL ENCOUNTER (OUTPATIENT)
Facility: HOSPITAL | Age: 79
Setting detail: HOSPITAL OUTPATIENT SURGERY
Discharge: HOME | End: 2024-11-21
Attending: UROLOGY | Admitting: UROLOGY
Payer: MEDICARE

## 2024-11-21 ENCOUNTER — ANESTHESIA (OUTPATIENT)
Dept: PERIOP | Facility: HOSPITAL | Age: 79
End: 2024-11-21
Payer: MEDICARE

## 2024-11-21 ENCOUNTER — APPOINTMENT (OUTPATIENT)
Dept: GENERAL RADIOLOGY | Facility: HOSPITAL | Age: 79
End: 2024-11-21
Payer: MEDICARE

## 2024-11-21 VITALS
BODY MASS INDEX: 37.88 KG/M2 | OXYGEN SATURATION: 97 % | RESPIRATION RATE: 16 BRPM | SYSTOLIC BLOOD PRESSURE: 150 MMHG | DIASTOLIC BLOOD PRESSURE: 87 MMHG | WEIGHT: 235.67 LBS | HEIGHT: 66 IN | HEART RATE: 85 BPM | TEMPERATURE: 97.2 F

## 2024-11-21 DIAGNOSIS — R31.9 HEMATURIA, UNSPECIFIED TYPE: ICD-10-CM

## 2024-11-21 DIAGNOSIS — C67.8 MALIGNANT NEOPLASM OF OVERLAPPING SITES OF BLADDER: ICD-10-CM

## 2024-11-21 LAB
INR PPP: 0.96 (ref 0.86–1.15)
PROTHROMBIN TIME: 13 SECONDS (ref 11.8–14.9)

## 2024-11-21 PROCEDURE — 25010000002 ONDANSETRON PER 1 MG: Performed by: NURSE ANESTHETIST, CERTIFIED REGISTERED

## 2024-11-21 PROCEDURE — 25810000003 LACTATED RINGERS PER 1000 ML: Performed by: ANESTHESIOLOGY

## 2024-11-21 PROCEDURE — 25010000002 LIDOCAINE PF 2% 2 % SOLUTION: Performed by: NURSE ANESTHETIST, CERTIFIED REGISTERED

## 2024-11-21 PROCEDURE — 25010000002 FENTANYL CITRATE (PF) 50 MCG/ML SOLUTION: Performed by: NURSE ANESTHETIST, CERTIFIED REGISTERED

## 2024-11-21 PROCEDURE — 25010000002 DEXAMETHASONE PER 1 MG: Performed by: NURSE ANESTHETIST, CERTIFIED REGISTERED

## 2024-11-21 PROCEDURE — 52005 CYSTO W/URTRL CATHJ: CPT | Performed by: UROLOGY

## 2024-11-21 PROCEDURE — 76000 FLUOROSCOPY <1 HR PHYS/QHP: CPT

## 2024-11-21 PROCEDURE — 25010000002 PROPOFOL 10 MG/ML EMULSION: Performed by: NURSE ANESTHETIST, CERTIFIED REGISTERED

## 2024-11-21 PROCEDURE — 25010000002 CEFAZOLIN PER 500 MG: Performed by: UROLOGY

## 2024-11-21 PROCEDURE — C1758 CATHETER, URETERAL: HCPCS | Performed by: UROLOGY

## 2024-11-21 PROCEDURE — 85610 PROTHROMBIN TIME: CPT | Performed by: ANESTHESIOLOGY

## 2024-11-21 RX ORDER — SODIUM CHLORIDE, SODIUM LACTATE, POTASSIUM CHLORIDE, CALCIUM CHLORIDE 600; 310; 30; 20 MG/100ML; MG/100ML; MG/100ML; MG/100ML
9 INJECTION, SOLUTION INTRAVENOUS CONTINUOUS PRN
Status: DISCONTINUED | OUTPATIENT
Start: 2024-11-21 | End: 2024-11-21 | Stop reason: HOSPADM

## 2024-11-21 RX ORDER — PROMETHAZINE HYDROCHLORIDE 25 MG/1
25 SUPPOSITORY RECTAL ONCE AS NEEDED
Status: DISCONTINUED | OUTPATIENT
Start: 2024-11-21 | End: 2024-11-21 | Stop reason: HOSPADM

## 2024-11-21 RX ORDER — SODIUM CHLORIDE 0.9 % (FLUSH) 0.9 %
10 SYRINGE (ML) INJECTION AS NEEDED
Status: DISCONTINUED | OUTPATIENT
Start: 2024-11-21 | End: 2024-11-21 | Stop reason: HOSPADM

## 2024-11-21 RX ORDER — DEXAMETHASONE SODIUM PHOSPHATE 4 MG/ML
INJECTION, SOLUTION INTRA-ARTICULAR; INTRALESIONAL; INTRAMUSCULAR; INTRAVENOUS; SOFT TISSUE AS NEEDED
Status: DISCONTINUED | OUTPATIENT
Start: 2024-11-21 | End: 2024-11-21 | Stop reason: SURG

## 2024-11-21 RX ORDER — ACETAMINOPHEN 500 MG
1000 TABLET ORAL ONCE
Status: COMPLETED | OUTPATIENT
Start: 2024-11-21 | End: 2024-11-21

## 2024-11-21 RX ORDER — PROMETHAZINE HYDROCHLORIDE 12.5 MG/1
25 TABLET ORAL ONCE AS NEEDED
Status: DISCONTINUED | OUTPATIENT
Start: 2024-11-21 | End: 2024-11-21 | Stop reason: HOSPADM

## 2024-11-21 RX ORDER — OXYCODONE HYDROCHLORIDE 5 MG/1
5 TABLET ORAL
Status: DISCONTINUED | OUTPATIENT
Start: 2024-11-21 | End: 2024-11-21 | Stop reason: HOSPADM

## 2024-11-21 RX ORDER — ONDANSETRON 2 MG/ML
4 INJECTION INTRAMUSCULAR; INTRAVENOUS ONCE AS NEEDED
Status: DISCONTINUED | OUTPATIENT
Start: 2024-11-21 | End: 2024-11-21 | Stop reason: HOSPADM

## 2024-11-21 RX ORDER — PROPOFOL 10 MG/ML
VIAL (ML) INTRAVENOUS AS NEEDED
Status: DISCONTINUED | OUTPATIENT
Start: 2024-11-21 | End: 2024-11-21 | Stop reason: SURG

## 2024-11-21 RX ORDER — LIDOCAINE HYDROCHLORIDE 20 MG/ML
INJECTION, SOLUTION EPIDURAL; INFILTRATION; INTRACAUDAL; PERINEURAL AS NEEDED
Status: DISCONTINUED | OUTPATIENT
Start: 2024-11-21 | End: 2024-11-21 | Stop reason: SURG

## 2024-11-21 RX ORDER — FENTANYL CITRATE 50 UG/ML
INJECTION, SOLUTION INTRAMUSCULAR; INTRAVENOUS AS NEEDED
Status: DISCONTINUED | OUTPATIENT
Start: 2024-11-21 | End: 2024-11-21 | Stop reason: SURG

## 2024-11-21 RX ORDER — ONDANSETRON 2 MG/ML
INJECTION INTRAMUSCULAR; INTRAVENOUS AS NEEDED
Status: DISCONTINUED | OUTPATIENT
Start: 2024-11-21 | End: 2024-11-21 | Stop reason: SURG

## 2024-11-21 RX ORDER — SODIUM CHLORIDE 9 MG/ML
40 INJECTION, SOLUTION INTRAVENOUS AS NEEDED
Status: DISCONTINUED | OUTPATIENT
Start: 2024-11-21 | End: 2024-11-21 | Stop reason: HOSPADM

## 2024-11-21 RX ORDER — PROMETHAZINE HYDROCHLORIDE 12.5 MG/1
12.5 TABLET ORAL ONCE AS NEEDED
Status: DISCONTINUED | OUTPATIENT
Start: 2024-11-21 | End: 2024-11-21 | Stop reason: HOSPADM

## 2024-11-21 RX ORDER — MEPERIDINE HYDROCHLORIDE 25 MG/ML
12.5 INJECTION INTRAMUSCULAR; INTRAVENOUS; SUBCUTANEOUS
Status: DISCONTINUED | OUTPATIENT
Start: 2024-11-21 | End: 2024-11-21 | Stop reason: HOSPADM

## 2024-11-21 RX ORDER — ACETAMINOPHEN 325 MG/1
650 TABLET ORAL ONCE
Status: DISCONTINUED | OUTPATIENT
Start: 2024-11-21 | End: 2024-11-21 | Stop reason: HOSPADM

## 2024-11-21 RX ORDER — IBUPROFEN 600 MG/1
600 TABLET, FILM COATED ORAL EVERY 6 HOURS PRN
Status: DISCONTINUED | OUTPATIENT
Start: 2024-11-21 | End: 2024-11-21 | Stop reason: HOSPADM

## 2024-11-21 RX ORDER — SODIUM CHLORIDE 0.9 % (FLUSH) 0.9 %
10 SYRINGE (ML) INJECTION EVERY 12 HOURS SCHEDULED
Status: DISCONTINUED | OUTPATIENT
Start: 2024-11-21 | End: 2024-11-21 | Stop reason: HOSPADM

## 2024-11-21 RX ADMIN — FENTANYL CITRATE 25 MCG: 50 INJECTION, SOLUTION INTRAMUSCULAR; INTRAVENOUS at 12:58

## 2024-11-21 RX ADMIN — ACETAMINOPHEN 1000 MG: 500 TABLET ORAL at 09:28

## 2024-11-21 RX ADMIN — FENTANYL CITRATE 50 MCG: 50 INJECTION, SOLUTION INTRAMUSCULAR; INTRAVENOUS at 12:35

## 2024-11-21 RX ADMIN — SODIUM CHLORIDE, SODIUM LACTATE, POTASSIUM CHLORIDE, CALCIUM CHLORIDE 9 ML/HR: 20; 30; 600; 310 INJECTION, SOLUTION INTRAVENOUS at 09:27

## 2024-11-21 RX ADMIN — ONDANSETRON HYDROCHLORIDE 4 MG: 2 SOLUTION INTRAMUSCULAR; INTRAVENOUS at 12:57

## 2024-11-21 RX ADMIN — PROPOFOL 140 MG: 10 INJECTION, EMULSION INTRAVENOUS at 12:35

## 2024-11-21 RX ADMIN — FENTANYL CITRATE 25 MCG: 50 INJECTION, SOLUTION INTRAMUSCULAR; INTRAVENOUS at 12:51

## 2024-11-21 RX ADMIN — DEXAMETHASONE SODIUM PHOSPHATE 4 MG: 4 INJECTION, SOLUTION INTRAMUSCULAR; INTRAVENOUS at 12:57

## 2024-11-21 RX ADMIN — SODIUM CHLORIDE 2000 MG: 9 INJECTION, SOLUTION INTRAVENOUS at 12:35

## 2024-11-21 RX ADMIN — LIDOCAINE HYDROCHLORIDE 40 MG: 20 INJECTION, SOLUTION INTRAVENOUS at 12:35

## 2024-11-21 NOTE — DISCHARGE INSTRUCTIONS
DISCHARGE INSTRUCTIONS CYSTOSCOPY      For your surgery you had:  Local anesthesia  Monitored anesthesia care  You may experience dizziness, drowsiness, or lightheadedness for several hours following surgery.  Do not stay alone today or tonight.  Limit your activity for 24 hours.  You should not drive, operate machinery, drink alcohol, or sign legally binding documents for 24 hours or while you are taking pain medication.  Resume your diet slowly.  Follow any special dietary instructions you may have been given by your doctor.    NOTIFY YOUR DOCTOR IF YOU EXPERIENCE ANY OF THE FOLLOWING:  Temperature greater than 101 degrees Fahrenheit  Shaking Chills  Redness or excessive drainage from incision  Nausea, vomiting and/or pain that is not controlled by prescribed medications  Increase in bleeding or bleeding that is excessive  Unable to urinate in 6 hours after surgery  If unable to reach your doctor, please go to the closest Emergency Room   Following your cystoscopy exam, you may experience burning upon urination.  You may also pass some bloody urine.  If the burning sensation and/or bloody urine should persist beyond 48 hours, call your doctor.  To encourage kidney and bladder function, you should drink as much fluid as possible.  If you have difficulty urinating, try sitting in a bath tub of warm water.  If you become uncomfortable because you cannot urinate, call your doctor or come to the Emergency Room at the hospital.      SPECIAL INSTRUCTIONS:  Follow any verbal instructions given by Dr. Alvarado.      Last dose of pain medication given at:   Tylenol (1000mg) last at 9:30am. Do not exceed 4000mg of tylenol in a 24 hour period.  May take tylenol next at 3:30pm if needed.

## 2024-11-21 NOTE — ANESTHESIA PREPROCEDURE EVALUATION
Anesthesia Evaluation     Patient summary reviewed and Nursing notes reviewed   no history of anesthetic complications:   NPO Solid Status: > 8 hours  NPO Liquid Status: > 2 hours           Airway   Mallampati: II  TM distance: >3 FB  Neck ROM: full  No difficulty expected and Large neck circumference  Dental    (+) poor dentition        Pulmonary - normal exam    breath sounds clear to auscultation  (+) COPD,sleep apnea (no formal diagnosis)  Cardiovascular - normal exam  Exercise tolerance: good (4-7 METS)    ECG reviewed  Rhythm: regular  Rate: normal    (+) hypertension, dysrhythmias (watchman) Paroxysmal Atrial Fib, hyperlipidemia      Neuro/Psych  (+) TIA, CVA (mild left sided weakness)  GI/Hepatic/Renal/Endo    (+) obesity, renal disease (s/p nephrectomy)-    Musculoskeletal (-) negative ROS    Abdominal    Substance History - negative use     OB/GYN negative ob/gyn ROS         Other - negative ROS       ROS/Med Hx Other: <4METS, DEC MOBILITY/W/C BOUND.HX PAF,CRYTOGENIC STROKE,HTN,WATCHMAN DEVICE. HYPONATREMIA (LAST Na+ 137 ON 10/29/24)  CARD OV 11/11/24 STABLE, F/U 1 YR.   CARDS CLEARANCE,ACCEPTABLE RISK/MED DIRECTIVE 11/11/24. KT     Has been off of Coumadin for over 2 weeks (was incorrectly started on it by home health)              Anesthesia Plan    ASA 4     general     (Patient understands anesthesia not responsible for dental damage.)  intravenous induction     Anesthetic plan, risks, benefits, and alternatives have been provided, discussed and informed consent has been obtained with: patient.    Use of blood products discussed with patient .    Plan discussed with CRNA.    CODE STATUS:

## 2024-11-21 NOTE — OP NOTE
CYSTOSCOPY RETROGRADE PYELOGRAM  Procedure Report    Patient Name:  Curtis Richmond  YOB: 1945    Date of Surgery:  11/21/2024     Pre-op Diagnosis:   Hematuria, unspecified type [R31.9]  Malignant neoplasm of overlapping sites of bladder [C67.8]       Post-Op Diagnosis Codes:     * Hematuria, unspecified type [R31.9]     * Malignant neoplasm of overlapping sites of bladder [C67.8]      Procedure/CPT® Codes:    Procedure(s):  CYSTOSCOPY AND RIGHT RETROGRADE PYELOGRAM        Staff:  Surgeon(s):  Kaelyn Alvarado MD         Anesthesia: Choice    Estimated Blood Loss: none    Implants:    Nothing was implanted during the procedure    Specimen:          None        Complications: None    Description of Procedure:     After proper consent was obtained, patient was taken to operating room and MAC anesthesia was performed.  The patient was placed in the dorsal lithotomy position and prepped and draped in the normal sterile fashion for cystoscopy.      A 22 [place] rigid cystoscope was inserted into the bladder.  The bladder was inspected in a systemic meridian fashion using a 30  degree lens. There were no tumors, lesions, stones or other abnormalities seen.  The right ureteral orifice was normal in appearance.      A right retrograde pyelogram was then performed.  There were no filling defects, tumors, stones or other abnormalities seen.  The bladder was emptied and the cystoscope removed.      The patient tolerated the procedure well and was transferred to the PACU in stable condition.        Kaelyn Alvarado MD     Date: 11/21/2024  Time: 13:28 EST

## 2024-11-21 NOTE — ANESTHESIA POSTPROCEDURE EVALUATION
Patient: Curtis Richmond    Procedure Summary       Date: 11/21/24 Room / Location: [facility] OR  / [facility] MAIN OR    Anesthesia Start: 1229 Anesthesia Stop: 1310    Procedure: CYSTOSCOPY RETROGRADE PYELOGRAM  Scope of the bladder with x-rays of the ureters using dye (Bilateral) Diagnosis:       Hematuria, unspecified type      Malignant neoplasm of overlapping sites of bladder      (Hematuria, unspecified type [R31.9])      (Malignant neoplasm of overlapping sites of bladder [C67.8])    Surgeons: Kaelyn Alvarado MD Provider: Darryl Silva CRNA    Anesthesia Type: general ASA Status: 4            Anesthesia Type: general    Vitals  Vitals Value Taken Time   /69 11/21/24 1335   Temp 36.3 °C (97.3 °F) 11/21/24 1332   Pulse 86 11/21/24 1336   Resp 16 11/21/24 1330   SpO2 92 % 11/21/24 1336   Vitals shown include unfiled device data.        Post Anesthesia Care and Evaluation    Patient location during evaluation: bedside  Patient participation: complete - patient participated  Level of consciousness: awake  Pain management: adequate    Airway patency: patent  PONV Status: none  Cardiovascular status: acceptable and stable  Respiratory status: acceptable  Hydration status: acceptable

## 2024-11-25 ENCOUNTER — TELEPHONE (OUTPATIENT)
Dept: UROLOGY | Age: 79
End: 2024-11-25
Payer: MEDICARE

## 2024-11-25 NOTE — TELEPHONE ENCOUNTER
Caller: FRANCINE MAYA     Relationship: SPOUSE    Best call back number: [phone]     What is the best time to reach you: ANYTIME    Who are you requesting to speak with (clinical staff, provider,  specific staff member): CLINICAL    Do you know the name of the person who called: INCOMING CALL     What was the call regarding: PT'S WIFE CALLED AND SHE STATES PT IS DOING FINE. SHE WOULD LIKE TO CANCEL HIS POST-OP APPT OR RESCHEDULE IT FOR JANUARY. PLEASE GIVE HER A CALL BACK TO DISCUSS.     Is it okay if the provider responds through MyChart: NO

## 2025-03-21 ENCOUNTER — HOSPITAL ENCOUNTER (EMERGENCY)
Facility: HOSPITAL | Age: 80
Discharge: HOME | End: 2025-03-21
Attending: EMERGENCY MEDICINE
Payer: MEDICARE

## 2025-03-21 ENCOUNTER — APPOINTMENT (OUTPATIENT)
Dept: GENERAL RADIOLOGY | Facility: HOSPITAL | Age: 80
End: 2025-03-21
Payer: MEDICARE

## 2025-03-21 VITALS
HEART RATE: 105 BPM | DIASTOLIC BLOOD PRESSURE: 60 MMHG | TEMPERATURE: 99.5 F | HEIGHT: 66 IN | WEIGHT: 242.51 LBS | SYSTOLIC BLOOD PRESSURE: 132 MMHG | OXYGEN SATURATION: 94 % | RESPIRATION RATE: 18 BRPM | BODY MASS INDEX: 38.97 KG/M2

## 2025-03-21 DIAGNOSIS — N39.0 URINARY TRACT INFECTION WITHOUT HEMATURIA, SITE UNSPECIFIED: Primary | ICD-10-CM

## 2025-03-21 LAB
ALBUMIN SERPL-MCNC: 3.5 G/DL (ref 3.5–5.2)
ALBUMIN/GLOB SERPL: 0.9 G/DL
ALP SERPL-CCNC: 82 U/L (ref 39–117)
ALT SERPL W P-5'-P-CCNC: 11 U/L (ref 1–41)
AMORPH URATE CRY URNS QL MICRO: ABNORMAL /HPF
ANION GAP SERPL CALCULATED.3IONS-SCNC: 11.8 MMOL/L (ref 5–15)
AST SERPL-CCNC: 13 U/L (ref 1–40)
BACTERIA UR QL AUTO: ABNORMAL /HPF
BASOPHILS # BLD AUTO: 0.03 10*3/MM3 (ref 0–0.2)
BASOPHILS NFR BLD AUTO: 0.2 % (ref 0–1.5)
BILIRUB SERPL-MCNC: 0.6 MG/DL (ref 0–1.2)
BILIRUB UR QL STRIP: NEGATIVE
BUN SERPL-MCNC: 13 MG/DL (ref 8–23)
BUN/CREAT SERPL: 13.1 (ref 7–25)
CALCIUM SPEC-SCNC: 9.3 MG/DL (ref 8.6–10.5)
CHLORIDE SERPL-SCNC: 95 MMOL/L (ref 98–107)
CLARITY UR: CLEAR
CO2 SERPL-SCNC: 23.2 MMOL/L (ref 22–29)
COLOR UR: YELLOW
CREAT SERPL-MCNC: 0.99 MG/DL (ref 0.76–1.27)
D-LACTATE SERPL-SCNC: 1.4 MMOL/L (ref 0.5–2)
DEPRECATED RDW RBC AUTO: 43.8 FL (ref 37–54)
EGFRCR SERPLBLD CKD-EPI 2021: 78.5 ML/MIN/1.73
EOSINOPHIL # BLD AUTO: 0.02 10*3/MM3 (ref 0–0.4)
EOSINOPHIL NFR BLD AUTO: 0.1 % (ref 0.3–6.2)
ERYTHROCYTE [DISTWIDTH] IN BLOOD BY AUTOMATED COUNT: 14.6 % (ref 12.3–15.4)
FLUAV RNA RESP QL NAA+PROBE: NOT DETECTED
FLUBV RNA RESP QL NAA+PROBE: NOT DETECTED
GEN 5 1HR TROPONIN T REFLEX: 37 NG/L
GLOBULIN UR ELPH-MCNC: 3.8 GM/DL
GLUCOSE BLDC GLUCOMTR-MCNC: 110 MG/DL (ref 70–99)
GLUCOSE SERPL-MCNC: 107 MG/DL (ref 65–99)
GLUCOSE UR STRIP-MCNC: NEGATIVE MG/DL
HCT VFR BLD AUTO: 44 % (ref 37.5–51)
HGB BLD-MCNC: 13.7 G/DL (ref 13–17.7)
HGB UR QL STRIP.AUTO: NEGATIVE
HOLD SPECIMEN: NORMAL
HOLD SPECIMEN: NORMAL
HYALINE CASTS UR QL AUTO: ABNORMAL /LPF
IMM GRANULOCYTES # BLD AUTO: 0.03 10*3/MM3 (ref 0–0.05)
IMM GRANULOCYTES NFR BLD AUTO: 0.2 % (ref 0–0.5)
KETONES UR QL STRIP: NEGATIVE
LEUKOCYTE ESTERASE UR QL STRIP.AUTO: ABNORMAL
LYMPHOCYTES # BLD AUTO: 1.53 10*3/MM3 (ref 0.7–3.1)
LYMPHOCYTES NFR BLD AUTO: 10.9 % (ref 19.6–45.3)
MAGNESIUM SERPL-MCNC: 1.9 MG/DL (ref 1.6–2.4)
MCH RBC QN AUTO: 25.6 PG (ref 26.6–33)
MCHC RBC AUTO-ENTMCNC: 31.1 G/DL (ref 31.5–35.7)
MCV RBC AUTO: 82.2 FL (ref 79–97)
MONOCYTES # BLD AUTO: 1.22 10*3/MM3 (ref 0.1–0.9)
MONOCYTES NFR BLD AUTO: 8.7 % (ref 5–12)
NEUTROPHILS NFR BLD AUTO: 11.26 10*3/MM3 (ref 1.7–7)
NEUTROPHILS NFR BLD AUTO: 79.9 % (ref 42.7–76)
NITRITE UR QL STRIP: NEGATIVE
NRBC BLD AUTO-RTO: 0 /100 WBC (ref 0–0.2)
PH UR STRIP.AUTO: 6.5 [PH] (ref 5–8)
PLATELET # BLD AUTO: 229 10*3/MM3 (ref 140–450)
PMV BLD AUTO: 10.8 FL (ref 6–12)
POTASSIUM SERPL-SCNC: 4.3 MMOL/L (ref 3.5–5.2)
PROT SERPL-MCNC: 7.3 G/DL (ref 6–8.5)
PROT UR QL STRIP: ABNORMAL
QT INTERVAL: 290 MS
QTC INTERVAL: 446 MS
RBC # BLD AUTO: 5.35 10*6/MM3 (ref 4.14–5.8)
RBC # UR STRIP: ABNORMAL /HPF
REF LAB TEST METHOD: ABNORMAL
RSV RNA RESP QL NAA+PROBE: NOT DETECTED
SARS-COV-2 RNA RESP QL NAA+PROBE: NOT DETECTED
SODIUM SERPL-SCNC: 130 MMOL/L (ref 136–145)
SP GR UR STRIP: 1.02 (ref 1–1.03)
SQUAMOUS #/AREA URNS HPF: ABNORMAL /HPF
TROPONIN T % DELTA: 0
TROPONIN T NUMERIC DELTA: 0 NG/L
TROPONIN T SERPL HS-MCNC: 37 NG/L
UROBILINOGEN UR QL STRIP: ABNORMAL
WBC # UR STRIP: ABNORMAL /HPF
WBC NRBC COR # BLD AUTO: 14.09 10*3/MM3 (ref 3.4–10.8)
WHOLE BLOOD HOLD COAG: NORMAL
WHOLE BLOOD HOLD SPECIMEN: NORMAL

## 2025-03-21 PROCEDURE — 25010000002 CEFTRIAXONE PER 250 MG: Performed by: EMERGENCY MEDICINE

## 2025-03-21 PROCEDURE — 87637 SARSCOV2&INF A&B&RSV AMP PRB: CPT | Performed by: EMERGENCY MEDICINE

## 2025-03-21 PROCEDURE — 36415 COLL VENOUS BLD VENIPUNCTURE: CPT | Performed by: EMERGENCY MEDICINE

## 2025-03-21 PROCEDURE — 83605 ASSAY OF LACTIC ACID: CPT

## 2025-03-21 PROCEDURE — 99284 EMERGENCY DEPT VISIT MOD MDM: CPT

## 2025-03-21 PROCEDURE — 93005 ELECTROCARDIOGRAM TRACING: CPT | Performed by: EMERGENCY MEDICINE

## 2025-03-21 PROCEDURE — 25810000003 SODIUM CHLORIDE 0.9 % SOLUTION: Performed by: EMERGENCY MEDICINE

## 2025-03-21 PROCEDURE — 93005 ELECTROCARDIOGRAM TRACING: CPT

## 2025-03-21 PROCEDURE — 83735 ASSAY OF MAGNESIUM: CPT | Performed by: EMERGENCY MEDICINE

## 2025-03-21 PROCEDURE — 80053 COMPREHEN METABOLIC PANEL: CPT | Performed by: EMERGENCY MEDICINE

## 2025-03-21 PROCEDURE — 82948 REAGENT STRIP/BLOOD GLUCOSE: CPT

## 2025-03-21 PROCEDURE — 85025 COMPLETE CBC W/AUTO DIFF WBC: CPT

## 2025-03-21 PROCEDURE — 81001 URINALYSIS AUTO W/SCOPE: CPT | Performed by: EMERGENCY MEDICINE

## 2025-03-21 PROCEDURE — 84484 ASSAY OF TROPONIN QUANT: CPT | Performed by: EMERGENCY MEDICINE

## 2025-03-21 PROCEDURE — 96374 THER/PROPH/DIAG INJ IV PUSH: CPT

## 2025-03-21 PROCEDURE — 71045 X-RAY EXAM CHEST 1 VIEW: CPT

## 2025-03-21 RX ORDER — ONDANSETRON 4 MG/1
4 TABLET, ORALLY DISINTEGRATING ORAL EVERY 8 HOURS PRN
Qty: 14 TABLET | Refills: 0 | Status: SHIPPED | OUTPATIENT
Start: 2025-03-21

## 2025-03-21 RX ORDER — SODIUM CHLORIDE 0.9 % (FLUSH) 0.9 %
10 SYRINGE (ML) INJECTION AS NEEDED
Status: DISCONTINUED | OUTPATIENT
Start: 2025-03-21 | End: 2025-03-22 | Stop reason: HOSPADM

## 2025-03-21 RX ORDER — CETIRIZINE HYDROCHLORIDE 10 MG/1
1 TABLET ORAL DAILY
COMMUNITY
Start: 2025-01-16

## 2025-03-21 RX ORDER — CEPHALEXIN 500 MG/1
500 CAPSULE ORAL 3 TIMES DAILY
Qty: 21 CAPSULE | Refills: 0 | Status: SHIPPED | OUTPATIENT
Start: 2025-03-21

## 2025-03-21 RX ADMIN — SODIUM CHLORIDE 500 ML: 9 INJECTION, SOLUTION INTRAVENOUS at 22:06

## 2025-03-21 RX ADMIN — CEFTRIAXONE SODIUM 2000 MG: 2 INJECTION, POWDER, FOR SOLUTION INTRAMUSCULAR; INTRAVENOUS at 22:06

## 2025-03-22 NOTE — ED PROVIDER NOTES
Time: 10:08 PM EDT  Date of encounter:  3/21/2025  Independent Historian/Clinical History and Information was obtained by:   Patient and Family    History is limited by: N/A    Chief Complaint: Not acting himself      History of Present Illness:  Patient is a 79 y.o. year old male who presents to the emergency department for evaluation of not acting himself according to family bedside.      Patient Care Team  Primary Care Provider: Kat Eubanks APRN    Past Medical History:     No Known Allergies  Past Medical History:   Diagnosis Date    Arthritis     BACK.    At risk for central sleep apnea     STOP BANG 5    Bladder cancer     DR DAGMAR MENA. HISTORY OF. BLADDER WASH, TURBP    Chronic back pain     COPD (chronic obstructive pulmonary disease)     EMPHYSEMA- NO INHALERS    History of loop recorder     CURRENTLY HAS [employer] LOOP RECORDER S/P STROKE WIFE REPORTS CURRENTLY NOT FUNCTIONING BUT PT HAD WATCHMAN DEVICE IMPLANTED. 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    Wears glasses     Wheelchair bound     SEPT 2022     Past Surgical History:   Procedure Laterality Date    ATRIAL APPENDAGE EXCLUSION LEFT WITH TRANSESOPHAGEAL ECHOCARDIOGRAM N/A 11/09/2021    Procedure: 11/9/21 Atrial Appendage Occlusion on eliquis hold 2 doses prior;  Surgeon: Primo Tobias DO;  Location: [facility] INVASIVE LOCATION;  Service: Cardiology;  Laterality: N/A;    BACK SURGERY  06/1980    CARDIAC CATHETERIZATION  02/2001    CARDIOVERSION      AT [facility] YEARS AGO (40 YEARS AGO)    CATARACT EXTRACTION WITH INTRAOCULAR LENS IMPLANT Bilateral 2019     CHOLECYSTECTOMY  06/2002    COLONOSCOPY      CYSTOSCOPY      MULTIPLE    CYSTOSCOPY BLADDER BIOPSY N/A 08/24/2021    Procedure: CYSTOSCOPY, TRANSURETHRAL RESECTION OF BLADDER TUMOR;  Surgeon: Kaelyn Alvarado MD;  Location: [facility] MAIN OR;  Service: Urology;  Laterality: N/A;    CYSTOSCOPY RETROGRADE PYELOGRAM Bilateral 11/21/2024    Procedure: CYSTOSCOPY RETROGRADE PYELOGRAM  Scope of the bladder with x-rays of the ureters using dye;  Surgeon: Kaelyn Alvarado MD;  Location: [facility] MAIN OR;  Service: Urology;  Laterality: Bilateral;    EMBOLIZATION CEREBRAL N/A 04/14/2021    Procedure: CEREBRAL ANGIOGRAM;  Surgeon: Noah Bruno MD;  Location: [facility] OR 18/19;  Service: Interventional Radiology;  Laterality: N/A;    EPIDURAL      LUMBAR    JOINT REPLACEMENT Right     Knee    KNEE ARTHROPLASTY Right 2013    NEPHROURETERECTOMY Left 04/2019    TRANSURETHRAL RESECTION OF BLADDER TUMOR       Family History   Problem Relation Age of Onset    Heart attack Father     No Known Problems Other     Malig Hyperthermia Neg Hx        Home Medications:  Prior to Admission medications    Medication Sig Start Date End Date Taking? Authorizing Provider   cetirizine (zyrTEC) 10 MG tablet Take 1 tablet by mouth Daily. 1/16/25  Yes Anu Gaviria MD   amitriptyline (ELAVIL) 10 MG tablet Take 1 tablet by mouth Every Night.    Anu Gaviria MD   amLODIPine (NORVASC) 2.5 MG tablet Take 1 tablet by mouth Every Evening.    Anu Gaviria MD   aspirin 81 MG EC tablet Take 1 tablet by mouth Daily. 8/23/22   Ciro Patterson PA   atorvastatin (LIPITOR) 40 MG tablet Take 1 tablet by mouth Every Night. 2/20/21   Anu Gaviria MD   carvedilol (COREG) 6.25 MG tablet Take 1 tablet by mouth 2 (Two) Times a Day. 2/11/21   Anu Gaviria MD   cephalexin (KEFLEX) 500 MG capsule Take 1 capsule by mouth 3 (Three) Times a Day. 3/21/25   Lorenzo Hawkins MD   Cholecalciferol (Vitamin D3) 1.25 MG (89481  "UT) capsule Take 1 capsule by mouth 1 (One) Time Per Week.    Provider, Anu, MD   loratadine (CLARITIN) 10 MG tablet Take 1 tablet by mouth Daily.    Provider, Anu, MD   losartan (Cozaar) 100 MG tablet Take 1 tablet by mouth Daily. 22   Iveth Vieira DO        Social History:   Social History     Tobacco Use    Smoking status: Former     Current packs/day: 0.00     Types: Cigarettes     Quit date: 1972     Years since quittin.2    Smokeless tobacco: Never    Tobacco comments:     QUIT 50 YRS AGO. SMOKED 3 YEARS IN [employer]   Vaping Use    Vaping status: Never Used   Substance Use Topics    Alcohol use: Not Currently    Drug use: Never         Review of Systems:  Review of Systems   Unable to perform ROS: Mental status change        Physical Exam:  /63 (BP Location: Right arm, Patient Position: Lying)   Pulse 110   Temp 99.5 °F (37.5 °C) (Oral)   Resp 19   Ht 167.6 cm (65.98\")   Wt 110 kg (242 lb 8.1 oz)   SpO2 93%   BMI 39.16 kg/m²     Physical Exam  Vitals and nursing note reviewed.   Constitutional:       General: He is not in acute distress.     Appearance: Normal appearance. He is not toxic-appearing.   HENT:      Head: Normocephalic and atraumatic.      Jaw: There is normal jaw occlusion.   Eyes:      General: Lids are normal.      Extraocular Movements: Extraocular movements intact.      Conjunctiva/sclera: Conjunctivae normal.      Pupils: Pupils are equal, round, and reactive to light.   Cardiovascular:      Rate and Rhythm: Normal rate and regular rhythm.      Pulses: Normal pulses.      Heart sounds: Normal heart sounds.   Pulmonary:      Effort: Pulmonary effort is normal. No respiratory distress.      Breath sounds: Normal breath sounds. No wheezing or rhonchi.   Abdominal:      General: Abdomen is flat.      Palpations: Abdomen is soft.      Tenderness: There is no abdominal tenderness. There is no guarding or rebound.   Musculoskeletal:         General: Normal range of " motion.      Cervical back: Normal range of motion and neck supple.      Right lower leg: No edema.      Left lower leg: No edema.   Skin:     General: Skin is warm and dry.   Neurological:      Mental Status: He is alert. Mental status is at baseline.   Psychiatric:         Mood and Affect: Mood normal.                    Medical Decision Making:      Comorbidities that affect care:    Stroke    External Notes reviewed:    Previous Clinic Note: Urology office visit for hematuria management      The following orders were placed and all results were independently analyzed by me:  Orders Placed This Encounter   Procedures    COVID PRE-OP / PRE-PROCEDURE SCREENING ORDER (NO ISOLATION) - Swab, Nasopharynx    COVID-19, FLU A/B, RSV PCR 1 HR TAT - Swab, Nasopharynx    XR Chest 1 View    Alda Draw    Comprehensive Metabolic Panel    High Sensitivity Troponin T    Magnesium    Urinalysis With Microscopic If Indicated (No Culture) - Urine, Clean Catch    CBC Auto Differential    Lactic Acid, Plasma    Urinalysis, Microscopic Only - Urine, Clean Catch    High Sensitivity Troponin T 1Hr    NPO Diet NPO Type: Strict NPO    Undress & Gown    Continuous Pulse Oximetry    Vital Signs    Straight Cath    Oxygen Therapy- Nasal Cannula; Titrate 1-6 LPM Per SpO2; 90 - 95%    POC Glucose Once    POC Glucose Once    ECG 12 Lead Altered Mental Status    Insert Peripheral IV    Fall Precautions    CBC & Differential    Green Top (Gel)    Lavender Top    Gold Top - SST    Light Blue Top       Medications Given in the Emergency Department:  Medications   sodium chloride 0.9 % flush 10 mL (has no administration in time range)   sodium chloride 0.9 % bolus 500 mL (500 mL Intravenous New Bag 3/21/25 2206)   cefTRIAXone (ROCEPHIN) in NS 2 GRAMS/20ml IV PUSH syringe (2,000 mg Intravenous Given 3/21/25 2206)        ED Course:         Labs:    Lab Results (last 24 hours)       Procedure Component Value Units Date/Time    CBC & Differential  [[id number]]  (Abnormal) Collected: 03/21/25 2020    Specimen: Blood Updated: 03/21/25 2030    Narrative:      The following orders were created for panel order CBC & Differential.  Procedure                               Abnormality         Status                     ---------                               -----------         ------                     CBC Auto Differential[[id number]]        Abnormal            Final result                 Please view results for these tests on the individual orders.    CBC Auto Differential [[id number]]  (Abnormal) Collected: 03/21/25 2020    Specimen: Blood Updated: 03/21/25 2030     WBC 14.09 10*3/mm3      RBC 5.35 10*6/mm3      Hemoglobin 13.7 g/dL      Hematocrit 44.0 %      MCV 82.2 fL      MCH 25.6 pg      MCHC 31.1 g/dL      RDW 14.6 %      RDW-SD 43.8 fl      MPV 10.8 fL      Platelets 229 10*3/mm3      Neutrophil % 79.9 %      Lymphocyte % 10.9 %      Monocyte % 8.7 %      Eosinophil % 0.1 %      Basophil % 0.2 %      Immature Grans % 0.2 %      Neutrophils, Absolute 11.26 10*3/mm3      Lymphocytes, Absolute 1.53 10*3/mm3      Monocytes, Absolute 1.22 10*3/mm3      Eosinophils, Absolute 0.02 10*3/mm3      Basophils, Absolute 0.03 10*3/mm3      Immature Grans, Absolute 0.03 10*3/mm3      nRBC 0.0 /100 WBC     Lactic Acid, Plasma [[id number]]  (Normal) Collected: 03/21/25 2023    Specimen: Blood Updated: 03/21/25 2043     Lactate 1.4 mmol/L     POC Glucose Once [[id number]]  (Abnormal) Collected: 03/21/25 2031    Specimen: Blood Updated: 03/21/25 2045     Glucose 110 mg/dL      Comment: Serial Number: [id number]:  [id number]       COVID PRE-OP / PRE-PROCEDURE SCREENING ORDER (NO ISOLATION) - Swab, Nasopharynx [[id number]]  (Normal) Collected: 03/21/25 2033    Specimen: Swab from Nasopharynx Updated: 03/21/25 2159    Narrative:      The following orders were created for panel order COVID PRE-OP / PRE-PROCEDURE SCREENING ORDER (NO ISOLATION) - Swab, Nasopharynx.  Procedure                                Abnormality         Status                     ---------                               -----------         ------                     COVID-19, FLU A/B, RSV P...[[id number]]  Normal              Final result                 Please view results for these tests on the individual orders.    COVID-19, FLU A/B, RSV PCR 1 HR TAT - Swab, Nasopharynx [[id number]]  (Normal) Collected: 03/21/25 2033    Specimen: Swab from Nasopharynx Updated: 03/21/25 2159     COVID19 Not Detected     Influenza A PCR Not Detected     Influenza B PCR Not Detected     RSV, PCR Not Detected    Narrative:      Fact sheet for providers: https://www.fda.gov/media/327566/download    Fact sheet for patients: https://www.fda.gov/media/276663/download    Test performed by PCR.    Urinalysis With Microscopic If Indicated (No Culture) - Urine, Clean Catch [[id number]]  (Abnormal) Collected: 03/21/25 2034    Specimen: Urine, Clean Catch Updated: 03/21/25 2110     Color, UA Yellow     Appearance, UA Clear     pH, UA 6.5     Specific Gravity, UA 1.016     Glucose, UA Negative     Ketones, UA Negative     Bilirubin, UA Negative     Blood, UA Negative     Protein, UA 30 mg/dL (1+)     Leuk Esterase, UA Small (1+)     Nitrite, UA Negative     Urobilinogen, UA 1.0 E.U./dL    Urinalysis, Microscopic Only - Urine, Clean Catch [[id number]]  (Abnormal) Collected: 03/21/25 2034    Specimen: Urine, Clean Catch Updated: 03/21/25 2148     RBC, UA 6-10 /HPF      WBC, UA 11-20 /HPF      Bacteria, UA 1+ /HPF      Squamous Epithelial Cells, UA 3-6 /HPF      Hyaline Casts, UA None Seen /LPF      Amorphous Crystals, UA Small/1+ /HPF      Methodology Manual Light Microscopy    Comprehensive Metabolic Panel [[id number]]  (Abnormal) Collected: 03/21/25 2053    Specimen: Blood from Hand, Left Updated: 03/21/25 2124     Glucose 107 mg/dL      BUN 13 mg/dL      Creatinine 0.99 mg/dL      Sodium 130 mmol/L      Potassium 4.3 mmol/L      Chloride 95 mmol/L       CO2 23.2 mmol/L      Calcium 9.3 mg/dL      Total Protein 7.3 g/dL      Albumin 3.5 g/dL      ALT (SGPT) 11 U/L      AST (SGOT) 13 U/L      Alkaline Phosphatase 82 U/L      Total Bilirubin 0.6 mg/dL      Globulin 3.8 gm/dL      A/G Ratio 0.9 g/dL      BUN/Creatinine Ratio 13.1     Anion Gap 11.8 mmol/L      eGFR 78.5 mL/min/1.73     Narrative:      GFR Categories in Chronic Kidney Disease (CKD)      GFR Category          GFR (mL/min/1.73)    Interpretation  G1                     90 or greater         Normal or high (1)  G2                      60-89                Mild decrease (1)  G3a                   45-59                Mild to moderate decrease  G3b                   30-44                Moderate to severe decrease  G4                    15-29                Severe decrease  G5                    14 or less           Kidney failure          (1)In the absence of evidence of kidney disease, neither GFR category G1 or G2 fulfill the criteria for CKD.    eGFR calculation 2021 CKD-EPI creatinine equation, which does not include race as a factor    High Sensitivity Troponin T [[id number]]  (Abnormal) Collected: 03/21/25 2053    Specimen: Blood from Hand, Left Updated: 03/21/25 2124     HS Troponin T 37 ng/L     Narrative:      High Sensitive Troponin T Reference Range:  <14.0 ng/L- Negative Female for AMI  <22.0 ng/L- Negative Male for AMI  >=14 - Abnormal Female indicating possible myocardial injury.  >=22 - Abnormal Male indicating possible myocardial injury.   Clinicians would have to utilize clinical acumen, EKG, Troponin, and serial changes to determine if it is an Acute Myocardial Infarction or myocardial injury due to an underlying chronic condition.         Magnesium [[id number]]  (Normal) Collected: 03/21/25 2053    Specimen: Blood from Hand, Left Updated: 03/21/25 2124     Magnesium 1.9 mg/dL     High Sensitivity Troponin T 1Hr [[id number]] Collected: 03/21/25 2156    Specimen: Blood from Hand, Left  Updated: 03/21/25 2158             Imaging:    XR Chest 1 View  Result Date: 3/21/2025  XR CHEST 1 VW Date of Exam: 3/21/2025 8:22 PM EDT Indication: Weak/Dizzy/AMS triage protocol Comparison: CT chest 9/15/2022, AP chest x-ray 9/14/2022 Findings: Right hemidiaphragm is elevated. Lungs appear clear. No pneumothorax or large pleural effusion is seen. Cardiac silhouette remains enlarged.     Impression: No acute cardiopulmonary abnormality is identified. Electronically Signed: Coty Mendoza  3/21/2025 8:59 PM EDT  Workstation ID: [id number]        Differential Diagnosis and Discussion:    Metabolic: Differential diagnosis includes but is not limited to hypertension, hyperglycemia, hyperkalemia, hypocalcemia, metabolic acidosis, hypokalemia, hypoglycemia, malnutrition, hypothyroidism, hyperthyroidism, and adrenal insufficiency.     PROCEDURES:    Labs were collected in the emergency department and all labs were reviewed and interpreted by me.  X-ray were performed in the emergency department and all X-ray impressions were independently interpreted by me.  An EKG was performed and the EKG was interpreted by me.    ECG 12 Lead Altered Mental Status   Preliminary Result   HEART UMNZ=882  bpm   RR Movysjco=351  ms   NV Interval=  ms   P Horizontal Axis=  deg   P Front Axis=  deg   QRSD Interval=76  ms   QT Abgamprm=264  ms   EPgA=907  ms   QRS Axis=-13  deg   T Wave Axis=  deg   - ABNORMAL ECG -   Atrial fibrillation   Abnormal R-wave progression, early transition   Repolarization abnormality, prob rate related   Date and Time of Study:2025-03-21 20:15:27        My interpretation of EKG shows tachycardic rate A-fib probable, normal QT, no acute ischemia.    Procedures    MDM  Number of Diagnoses or Management Options  Urinary tract infection without hematuria, site unspecified  Diagnosis management comments: In summary this is a 79-year-old male patient who presents with wife for evaluation of nausea/changes.  CBC  independently reviewed and interpreted by me and shows no critical abnormalities except mild leukocytosis.  CMP independently reviewed and interpreted by me and shows no critical abnormalities.  Urinalysis independent reviewed interpreted by me is positive for UTI for which patient was given IV antibiotics and prescription oral antibiotics.  COVID-19, influenza, RSV testing independent reviewed and troponin is unremarkable and negative.  Chest x-ray reviewed by me is unremarkable negative.  I discussed with patient's wife admission versus discharge home and she elects discharge home at this time.  Very strict return to ER and follow-up instructions have been provided to the patient.                         Patient Care Considerations:    CT ABDOMEN AND PELVIS: I considered ordering a CT scan of the abdomen and pelvis however benign abdominal examination      Consultants/Shared Management Plan:    None    Social Determinants of Health:    Patient has presented with family members who are responsible, reliable and will ensure follow up care.      Disposition and Care Coordination:    Discharged: I considered escalation of care by admitting this patient to the hospital, however discussion with patient's wife she prefers to take him home and try to treat the infection at home.    I have explained the patient´s condition, diagnoses and treatment plan based on the information available to me at this time. I have answered questions and addressed any concerns. The patient has a good  understanding of the patient´s diagnosis, condition, and treatment plan as can be expected at this point. The vital signs have been stable. The patient´s condition is stable and appropriate for discharge from the emergency department.      The patient will pursue further outpatient evaluation with the primary care physician or other designated or consulting physician as outlined in the discharge instructions. They are agreeable to this plan of  care and follow-up instructions have been explained in detail. The patient has received these instructions in written format and has expressed an understanding of the discharge instructions. The patient is aware that any significant change in condition or worsening of symptoms should prompt an immediate return to this or the closest emergency department or call to 1.  I have explained discharge medications and the need for follow up with the patient/caretakers. This was also printed in the discharge instructions. Patient was discharged with the following medications and follow up:      Medication List        New Prescriptions      cephalexin 500 MG capsule  Commonly known as: KEFLEX  Take 1 capsule by mouth 3 (Three) Times a Day.     ondansetron ODT 4 MG disintegrating tablet  Commonly known as: ZOFRAN-ODT  Place 1 tablet on the tongue Every 8 (Eight) Hours As Needed for Nausea or Vomiting.               Where to Get Your Medications        These medications were sent to [address] [phone] [facility] [phone] [address] [address]      Phone: [phone]   cephalexin 500 MG capsule  ondansetron ODT 4 MG disintegrating tablet      Kat Eubanks, APRN  [address]  [place] [id number] [phone]    In 1 week         Final diagnoses:   Urinary tract infection without hematuria, site unspecified        ED Disposition       ED Disposition   Discharge    Condition   Stable    Comment   --               This medical record created using voice recognition software.             Lorenzo Hawkins MD  03/21/25 8038

## 2025-04-02 LAB
QT INTERVAL: 290 MS
QTC INTERVAL: 446 MS

## 2025-06-14 ENCOUNTER — APPOINTMENT (OUTPATIENT)
Dept: CT IMAGING | Facility: HOSPITAL | Age: 80
DRG: 871 | End: 2025-06-14
Payer: MEDICARE

## 2025-06-14 ENCOUNTER — APPOINTMENT (OUTPATIENT)
Dept: GENERAL RADIOLOGY | Facility: HOSPITAL | Age: 80
DRG: 871 | End: 2025-06-14
Payer: MEDICARE

## 2025-06-14 ENCOUNTER — HOSPITAL ENCOUNTER (INPATIENT)
Facility: HOSPITAL | Age: 80
LOS: 5 days | Discharge: HOME HEALTH | DRG: 871 | End: 2025-06-20
Attending: EMERGENCY MEDICINE | Admitting: STUDENT IN AN ORGANIZED HEALTH CARE EDUCATION/TRAINING PROGRAM
Payer: MEDICARE

## 2025-06-14 DIAGNOSIS — I48.0 PAROXYSMAL ATRIAL FIBRILLATION: ICD-10-CM

## 2025-06-14 DIAGNOSIS — L89.42: ICD-10-CM

## 2025-06-14 DIAGNOSIS — A41.9 SEPSIS WITH ENCEPHALOPATHY WITHOUT SEPTIC SHOCK, DUE TO UNSPECIFIED ORGANISM: ICD-10-CM

## 2025-06-14 DIAGNOSIS — R26.2 DIFFICULTY WALKING: ICD-10-CM

## 2025-06-14 DIAGNOSIS — G93.41 SEPSIS WITH ENCEPHALOPATHY WITHOUT SEPTIC SHOCK, DUE TO UNSPECIFIED ORGANISM: ICD-10-CM

## 2025-06-14 DIAGNOSIS — N39.0 ACUTE UTI: ICD-10-CM

## 2025-06-14 DIAGNOSIS — R33.8 ACUTE URINARY RETENTION: ICD-10-CM

## 2025-06-14 DIAGNOSIS — R65.20 SEPSIS WITH ENCEPHALOPATHY WITHOUT SEPTIC SHOCK, DUE TO UNSPECIFIED ORGANISM: ICD-10-CM

## 2025-06-14 DIAGNOSIS — Z74.01 BEDBOUND: ICD-10-CM

## 2025-06-14 DIAGNOSIS — E87.1 HYPONATREMIA: Primary | ICD-10-CM

## 2025-06-14 DIAGNOSIS — I50.9 ACUTE CONGESTIVE HEART FAILURE, UNSPECIFIED HEART FAILURE TYPE: ICD-10-CM

## 2025-06-14 DIAGNOSIS — R13.10 DYSPHAGIA, UNSPECIFIED TYPE: ICD-10-CM

## 2025-06-14 LAB
ALBUMIN SERPL-MCNC: 3.5 G/DL (ref 3.5–5.2)
ALBUMIN/GLOB SERPL: 1 G/DL
ALP SERPL-CCNC: 86 U/L (ref 39–117)
ALT SERPL W P-5'-P-CCNC: 15 U/L (ref 1–41)
ANION GAP SERPL CALCULATED.3IONS-SCNC: 9.5 MMOL/L (ref 5–15)
ARTERIAL PATENCY WRIST A: POSITIVE
AST SERPL-CCNC: 23 U/L (ref 1–40)
ATMOSPHERIC PRESS: 741.6 MMHG
BACTERIA UR QL AUTO: ABNORMAL /HPF
BASE EXCESS BLDA CALC-SCNC: 0.8 MMOL/L (ref -2–2)
BASOPHILS # BLD AUTO: 0.04 10*3/MM3 (ref 0–0.2)
BASOPHILS NFR BLD AUTO: 0.2 % (ref 0–1.5)
BDY SITE: ABNORMAL
BILIRUB SERPL-MCNC: 0.5 MG/DL (ref 0–1.2)
BILIRUB UR QL STRIP: NEGATIVE
BUN BLDA-MCNC: 16 MG/DL (ref 8–23)
BUN SERPL-MCNC: 16.2 MG/DL (ref 8–23)
BUN/CREAT SERPL: 13.1 (ref 7–25)
CA-I BLDA-SCNC: 1.07 MMOL/L (ref 1.13–1.32)
CALCIUM SPEC-SCNC: 9.1 MG/DL (ref 8.6–10.5)
CHLORIDE BLDA-SCNC: 90 MMOL/L (ref 98–107)
CHLORIDE SERPL-SCNC: 88 MMOL/L (ref 98–107)
CLARITY UR: CLEAR
CO2 BLDA-SCNC: 23.8 MMOL/L (ref 23–27)
CO2 SERPL-SCNC: 24.5 MMOL/L (ref 22–29)
COLOR UR: YELLOW
CREAT BLDA-MCNC: 1.12 MG/DL (ref 0.6–1.3)
CREAT SERPL-MCNC: 1.24 MG/DL (ref 0.76–1.27)
D-LACTATE SERPL-SCNC: 1 MMOL/L
D-LACTATE SERPL-SCNC: 1.7 MMOL/L (ref 0.5–2)
DEPRECATED RDW RBC AUTO: 43.1 FL (ref 37–54)
EGFRCR SERPLBLD CKD-EPI 2021: 59.9 ML/MIN/1.73
EOSINOPHIL # BLD AUTO: 0.05 10*3/MM3 (ref 0–0.4)
EOSINOPHIL NFR BLD AUTO: 0.3 % (ref 0.3–6.2)
ERYTHROCYTE [DISTWIDTH] IN BLOOD BY AUTOMATED COUNT: 14.4 % (ref 12.3–15.4)
GEN 5 1HR TROPONIN T REFLEX: 35 NG/L
GLOBULIN UR ELPH-MCNC: 3.6 GM/DL
GLUCOSE BLDC GLUCOMTR-MCNC: 149 MG/DL (ref 70–99)
GLUCOSE SERPL-MCNC: 172 MG/DL (ref 65–99)
GLUCOSE UR STRIP-MCNC: NEGATIVE MG/DL
HCO3 BLDA-SCNC: 25.1 MMOL/L (ref 22–26)
HCT VFR BLD AUTO: 39.3 % (ref 37.5–51)
HCT VFR BLD CALC: 37 % (ref 38–51)
HEMODILUTION: NO
HGB BLD-MCNC: 12.4 G/DL (ref 13–17.7)
HGB BLDA-MCNC: 12.7 G/DL (ref 12–18)
HGB UR QL STRIP.AUTO: NEGATIVE
HOLD SPECIMEN: NORMAL
HYALINE CASTS UR QL AUTO: ABNORMAL /LPF
IMM GRANULOCYTES # BLD AUTO: 0.1 10*3/MM3 (ref 0–0.05)
IMM GRANULOCYTES NFR BLD AUTO: 0.5 % (ref 0–0.5)
KETONES UR QL STRIP: NEGATIVE
LEUKOCYTE ESTERASE UR QL STRIP.AUTO: ABNORMAL
LYMPHOCYTES # BLD AUTO: 1.18 10*3/MM3 (ref 0.7–3.1)
LYMPHOCYTES NFR BLD AUTO: 5.9 % (ref 19.6–45.3)
MCH RBC QN AUTO: 26.1 PG (ref 26.6–33)
MCHC RBC AUTO-ENTMCNC: 31.6 G/DL (ref 31.5–35.7)
MCV RBC AUTO: 82.6 FL (ref 79–97)
MODALITY: ABNORMAL
MONOCYTES # BLD AUTO: 1.57 10*3/MM3 (ref 0.1–0.9)
MONOCYTES NFR BLD AUTO: 7.9 % (ref 5–12)
NEUTROPHILS NFR BLD AUTO: 17.03 10*3/MM3 (ref 1.7–7)
NEUTROPHILS NFR BLD AUTO: 85.2 % (ref 42.7–76)
NITRITE UR QL STRIP: NEGATIVE
NRBC BLD AUTO-RTO: 0 /100 WBC (ref 0–0.2)
NT-PROBNP SERPL-MCNC: 4820 PG/ML (ref 0–1800)
OSMOLALITY SERPL: 268 MOSM/KG (ref 280–301)
PCO2 BLDA: 38.5 MM HG (ref 35–45)
PH BLDA: 7.42 PH UNITS (ref 7.35–7.45)
PH UR STRIP.AUTO: 6 [PH] (ref 5–8)
PLATELET # BLD AUTO: 257 10*3/MM3 (ref 140–450)
PMV BLD AUTO: 10.9 FL (ref 6–12)
PO2 BLDA: 67.3 MM HG (ref 80–100)
POTASSIUM BLDA-SCNC: 4 MMOL/L (ref 3.5–5)
POTASSIUM SERPL-SCNC: 4.9 MMOL/L (ref 3.5–5.2)
PROT SERPL-MCNC: 7.1 G/DL (ref 6–8.5)
PROT UR QL STRIP: ABNORMAL
QT INTERVAL: 311 MS
QTC INTERVAL: 416 MS
RBC # BLD AUTO: 4.76 10*6/MM3 (ref 4.14–5.8)
RBC # UR STRIP: ABNORMAL /HPF
REF LAB TEST METHOD: ABNORMAL
SAO2 % BLDCOA: 93.5 % (ref 95–99)
SODIUM BLD-SCNC: 127 MMOL/L (ref 131–143)
SODIUM SERPL-SCNC: 122 MMOL/L (ref 136–145)
SP GR UR STRIP: 1.03 (ref 1–1.03)
SQUAMOUS #/AREA URNS HPF: ABNORMAL /HPF
TROPONIN T % DELTA: -17
TROPONIN T NUMERIC DELTA: -7 NG/L
TROPONIN T SERPL HS-MCNC: 42 NG/L
UROBILINOGEN UR QL STRIP: ABNORMAL
WBC # UR STRIP: ABNORMAL /HPF
WBC NRBC COR # BLD AUTO: 19.97 10*3/MM3 (ref 3.4–10.8)
WHOLE BLOOD HOLD COAG: NORMAL
WHOLE BLOOD HOLD SPECIMEN: NORMAL

## 2025-06-14 PROCEDURE — 25010000002 PIPERACILLIN SOD-TAZOBACTAM PER 1 G

## 2025-06-14 PROCEDURE — 93005 ELECTROCARDIOGRAM TRACING: CPT | Performed by: NURSE PRACTITIONER

## 2025-06-14 PROCEDURE — 94799 UNLISTED PULMONARY SVC/PX: CPT

## 2025-06-14 PROCEDURE — 83605 ASSAY OF LACTIC ACID: CPT

## 2025-06-14 PROCEDURE — 83935 ASSAY OF URINE OSMOLALITY: CPT | Performed by: STUDENT IN AN ORGANIZED HEALTH CARE EDUCATION/TRAINING PROGRAM

## 2025-06-14 PROCEDURE — 36415 COLL VENOUS BLD VENIPUNCTURE: CPT

## 2025-06-14 PROCEDURE — 71275 CT ANGIOGRAPHY CHEST: CPT

## 2025-06-14 PROCEDURE — 25010000002 METHYLPREDNISOLONE PER 125 MG

## 2025-06-14 PROCEDURE — 81001 URINALYSIS AUTO W/SCOPE: CPT | Performed by: NURSE PRACTITIONER

## 2025-06-14 PROCEDURE — 85025 COMPLETE CBC W/AUTO DIFF WBC: CPT | Performed by: NURSE PRACTITIONER

## 2025-06-14 PROCEDURE — 84145 PROCALCITONIN (PCT): CPT | Performed by: STUDENT IN AN ORGANIZED HEALTH CARE EDUCATION/TRAINING PROGRAM

## 2025-06-14 PROCEDURE — 25510000001 IOPAMIDOL PER 1 ML: Performed by: EMERGENCY MEDICINE

## 2025-06-14 PROCEDURE — P9612 CATHETERIZE FOR URINE SPEC: HCPCS

## 2025-06-14 PROCEDURE — 80053 COMPREHEN METABOLIC PANEL: CPT | Performed by: NURSE PRACTITIONER

## 2025-06-14 PROCEDURE — 82570 ASSAY OF URINE CREATININE: CPT | Performed by: STUDENT IN AN ORGANIZED HEALTH CARE EDUCATION/TRAINING PROGRAM

## 2025-06-14 PROCEDURE — 36600 WITHDRAWAL OF ARTERIAL BLOOD: CPT

## 2025-06-14 PROCEDURE — 25810000003 SODIUM CHLORIDE 0.9 % SOLUTION

## 2025-06-14 PROCEDURE — 25010000002 FAMOTIDINE 10 MG/ML SOLUTION

## 2025-06-14 PROCEDURE — 99291 CRITICAL CARE FIRST HOUR: CPT

## 2025-06-14 PROCEDURE — 70450 CT HEAD/BRAIN W/O DYE: CPT

## 2025-06-14 PROCEDURE — 94640 AIRWAY INHALATION TREATMENT: CPT

## 2025-06-14 PROCEDURE — 25010000002 FUROSEMIDE PER 20 MG

## 2025-06-14 PROCEDURE — 80047 BASIC METABLC PNL IONIZED CA: CPT

## 2025-06-14 PROCEDURE — 84484 ASSAY OF TROPONIN QUANT: CPT

## 2025-06-14 PROCEDURE — 82803 BLOOD GASES ANY COMBINATION: CPT

## 2025-06-14 PROCEDURE — 83930 ASSAY OF BLOOD OSMOLALITY: CPT

## 2025-06-14 PROCEDURE — 93010 ELECTROCARDIOGRAM REPORT: CPT | Performed by: INTERNAL MEDICINE

## 2025-06-14 PROCEDURE — 87086 URINE CULTURE/COLONY COUNT: CPT | Performed by: EMERGENCY MEDICINE

## 2025-06-14 PROCEDURE — 87077 CULTURE AEROBIC IDENTIFY: CPT | Performed by: EMERGENCY MEDICINE

## 2025-06-14 PROCEDURE — 83880 ASSAY OF NATRIURETIC PEPTIDE: CPT | Performed by: NURSE PRACTITIONER

## 2025-06-14 PROCEDURE — 87186 SC STD MICRODIL/AGAR DIL: CPT | Performed by: EMERGENCY MEDICINE

## 2025-06-14 PROCEDURE — 25010000002 VANCOMYCIN 5 G RECONSTITUTED SOLUTION

## 2025-06-14 PROCEDURE — 99223 1ST HOSP IP/OBS HIGH 75: CPT | Performed by: STUDENT IN AN ORGANIZED HEALTH CARE EDUCATION/TRAINING PROGRAM

## 2025-06-14 PROCEDURE — 87040 BLOOD CULTURE FOR BACTERIA: CPT

## 2025-06-14 PROCEDURE — 74177 CT ABD & PELVIS W/CONTRAST: CPT

## 2025-06-14 RX ORDER — SODIUM CHLORIDE 0.9 % (FLUSH) 0.9 %
10 SYRINGE (ML) INJECTION AS NEEDED
Status: DISCONTINUED | OUTPATIENT
Start: 2025-06-14 | End: 2025-06-20 | Stop reason: HOSPADM

## 2025-06-14 RX ORDER — FUROSEMIDE 10 MG/ML
40 INJECTION INTRAMUSCULAR; INTRAVENOUS ONCE
Status: COMPLETED | OUTPATIENT
Start: 2025-06-14 | End: 2025-06-14

## 2025-06-14 RX ORDER — FAMOTIDINE 10 MG/ML
20 INJECTION, SOLUTION INTRAVENOUS ONCE
Status: COMPLETED | OUTPATIENT
Start: 2025-06-14 | End: 2025-06-14

## 2025-06-14 RX ORDER — IOPAMIDOL 755 MG/ML
100 INJECTION, SOLUTION INTRAVASCULAR
Status: COMPLETED | OUTPATIENT
Start: 2025-06-14 | End: 2025-06-14

## 2025-06-14 RX ORDER — IPRATROPIUM BROMIDE AND ALBUTEROL SULFATE 2.5; .5 MG/3ML; MG/3ML
3 SOLUTION RESPIRATORY (INHALATION)
Status: COMPLETED | OUTPATIENT
Start: 2025-06-14 | End: 2025-06-14

## 2025-06-14 RX ADMIN — SODIUM CHLORIDE 2250 MG: 9 INJECTION, SOLUTION INTRAVENOUS at 22:40

## 2025-06-14 RX ADMIN — IPRATROPIUM BROMIDE AND ALBUTEROL SULFATE 3 ML: .5; 3 SOLUTION RESPIRATORY (INHALATION) at 20:58

## 2025-06-14 RX ADMIN — FAMOTIDINE 20 MG: 10 INJECTION INTRAVENOUS at 21:42

## 2025-06-14 RX ADMIN — WATER 125 MG: 1 INJECTION INTRAMUSCULAR; INTRAVENOUS; SUBCUTANEOUS at 21:39

## 2025-06-14 RX ADMIN — PIPERACILLIN AND TAZOBACTAM 3.38 G: 3; .375 INJECTION, POWDER, FOR SOLUTION INTRAVENOUS at 22:05

## 2025-06-14 RX ADMIN — FUROSEMIDE 40 MG: 10 INJECTION, SOLUTION INTRAMUSCULAR; INTRAVENOUS at 23:09

## 2025-06-14 RX ADMIN — IPRATROPIUM BROMIDE AND ALBUTEROL SULFATE 3 ML: .5; 3 SOLUTION RESPIRATORY (INHALATION) at 21:00

## 2025-06-14 RX ADMIN — IPRATROPIUM BROMIDE AND ALBUTEROL SULFATE 3 ML: .5; 3 SOLUTION RESPIRATORY (INHALATION) at 20:59

## 2025-06-14 RX ADMIN — IOPAMIDOL 100 ML: 755 INJECTION, SOLUTION INTRAVENOUS at 21:25

## 2025-06-15 ENCOUNTER — APPOINTMENT (OUTPATIENT)
Dept: CARDIOLOGY | Facility: HOSPITAL | Age: 80
DRG: 871 | End: 2025-06-15
Payer: MEDICARE

## 2025-06-15 PROBLEM — R41.82 AMS (ALTERED MENTAL STATUS): Status: ACTIVE | Noted: 2025-06-15

## 2025-06-15 LAB
ALBUMIN SERPL-MCNC: 3.3 G/DL (ref 3.5–5.2)
ALBUMIN/GLOB SERPL: 0.9 G/DL
ALP SERPL-CCNC: 78 U/L (ref 39–117)
ALT SERPL W P-5'-P-CCNC: 13 U/L (ref 1–41)
ANION GAP SERPL CALCULATED.3IONS-SCNC: 10.4 MMOL/L (ref 5–15)
AST SERPL-CCNC: 13 U/L (ref 1–40)
BASOPHILS # BLD AUTO: 0.03 10*3/MM3 (ref 0–0.2)
BASOPHILS NFR BLD AUTO: 0.2 % (ref 0–1.5)
BILIRUB SERPL-MCNC: 0.4 MG/DL (ref 0–1.2)
BUN SERPL-MCNC: 16.9 MG/DL (ref 8–23)
BUN/CREAT SERPL: 14.6 (ref 7–25)
CALCIUM SPEC-SCNC: 8.9 MG/DL (ref 8.6–10.5)
CHLORIDE SERPL-SCNC: 92 MMOL/L (ref 98–107)
CO2 SERPL-SCNC: 23.6 MMOL/L (ref 22–29)
CREAT SERPL-MCNC: 1.16 MG/DL (ref 0.76–1.27)
CREAT UR-MCNC: 120.8 MG/DL
DEPRECATED RDW RBC AUTO: 42.9 FL (ref 37–54)
EGFRCR SERPLBLD CKD-EPI 2021: 64.9 ML/MIN/1.73
EOSINOPHIL # BLD AUTO: 0.04 10*3/MM3 (ref 0–0.4)
EOSINOPHIL NFR BLD AUTO: 0.2 % (ref 0.3–6.2)
ERYTHROCYTE [DISTWIDTH] IN BLOOD BY AUTOMATED COUNT: 14.3 % (ref 12.3–15.4)
GLOBULIN UR ELPH-MCNC: 3.7 GM/DL
GLUCOSE SERPL-MCNC: 179 MG/DL (ref 65–99)
HCT VFR BLD AUTO: 39.6 % (ref 37.5–51)
HGB BLD-MCNC: 12.6 G/DL (ref 13–17.7)
IMM GRANULOCYTES # BLD AUTO: 0.14 10*3/MM3 (ref 0–0.05)
IMM GRANULOCYTES NFR BLD AUTO: 0.7 % (ref 0–0.5)
LYMPHOCYTES # BLD AUTO: 1.3 10*3/MM3 (ref 0.7–3.1)
LYMPHOCYTES NFR BLD AUTO: 6.6 % (ref 19.6–45.3)
MAGNESIUM SERPL-MCNC: 2.1 MG/DL (ref 1.6–2.4)
MCH RBC QN AUTO: 26.4 PG (ref 26.6–33)
MCHC RBC AUTO-ENTMCNC: 31.8 G/DL (ref 31.5–35.7)
MCV RBC AUTO: 82.8 FL (ref 79–97)
MONOCYTES # BLD AUTO: 0.56 10*3/MM3 (ref 0.1–0.9)
MONOCYTES NFR BLD AUTO: 2.8 % (ref 5–12)
NEUTROPHILS NFR BLD AUTO: 17.67 10*3/MM3 (ref 1.7–7)
NEUTROPHILS NFR BLD AUTO: 89.5 % (ref 42.7–76)
NRBC BLD AUTO-RTO: 0 /100 WBC (ref 0–0.2)
OSMOLALITY UR: 644 MOSM/KG (ref 50–1400)
PHOSPHATE SERPL-MCNC: 3.8 MG/DL (ref 2.5–4.5)
PLATELET # BLD AUTO: 205 10*3/MM3 (ref 140–450)
PMV BLD AUTO: 10.4 FL (ref 6–12)
POTASSIUM SERPL-SCNC: 4.7 MMOL/L (ref 3.5–5.2)
PROCALCITONIN SERPL-MCNC: 0.31 NG/ML (ref 0–0.25)
PROT SERPL-MCNC: 7 G/DL (ref 6–8.5)
RBC # BLD AUTO: 4.78 10*6/MM3 (ref 4.14–5.8)
SODIUM SERPL-SCNC: 126 MMOL/L (ref 136–145)
VANCOMYCIN SERPL-MCNC: 19.13 MCG/ML (ref 5–40)
WBC NRBC COR # BLD AUTO: 19.74 10*3/MM3 (ref 3.4–10.8)

## 2025-06-15 PROCEDURE — 25010000002 VANCOMYCIN 750 MG RECONSTITUTED SOLUTION 1 EACH VIAL: Performed by: STUDENT IN AN ORGANIZED HEALTH CARE EDUCATION/TRAINING PROGRAM

## 2025-06-15 PROCEDURE — 85025 COMPLETE CBC W/AUTO DIFF WBC: CPT | Performed by: STUDENT IN AN ORGANIZED HEALTH CARE EDUCATION/TRAINING PROGRAM

## 2025-06-15 PROCEDURE — 99232 SBSQ HOSP IP/OBS MODERATE 35: CPT | Performed by: INTERNAL MEDICINE

## 2025-06-15 PROCEDURE — 94761 N-INVAS EAR/PLS OXIMETRY MLT: CPT

## 2025-06-15 PROCEDURE — 84100 ASSAY OF PHOSPHORUS: CPT | Performed by: STUDENT IN AN ORGANIZED HEALTH CARE EDUCATION/TRAINING PROGRAM

## 2025-06-15 PROCEDURE — 25010000002 HEPARIN (PORCINE) PER 1000 UNITS: Performed by: STUDENT IN AN ORGANIZED HEALTH CARE EDUCATION/TRAINING PROGRAM

## 2025-06-15 PROCEDURE — 25010000002 CEFEPIME PER 500 MG: Performed by: STUDENT IN AN ORGANIZED HEALTH CARE EDUCATION/TRAINING PROGRAM

## 2025-06-15 PROCEDURE — 83735 ASSAY OF MAGNESIUM: CPT | Performed by: STUDENT IN AN ORGANIZED HEALTH CARE EDUCATION/TRAINING PROGRAM

## 2025-06-15 PROCEDURE — 80202 ASSAY OF VANCOMYCIN: CPT | Performed by: STUDENT IN AN ORGANIZED HEALTH CARE EDUCATION/TRAINING PROGRAM

## 2025-06-15 PROCEDURE — 25810000003 SODIUM CHLORIDE 0.9 % SOLUTION 250 ML FLEX CONT: Performed by: STUDENT IN AN ORGANIZED HEALTH CARE EDUCATION/TRAINING PROGRAM

## 2025-06-15 PROCEDURE — 36415 COLL VENOUS BLD VENIPUNCTURE: CPT | Performed by: STUDENT IN AN ORGANIZED HEALTH CARE EDUCATION/TRAINING PROGRAM

## 2025-06-15 PROCEDURE — 80053 COMPREHEN METABOLIC PANEL: CPT | Performed by: STUDENT IN AN ORGANIZED HEALTH CARE EDUCATION/TRAINING PROGRAM

## 2025-06-15 PROCEDURE — 94799 UNLISTED PULMONARY SVC/PX: CPT

## 2025-06-15 RX ORDER — SODIUM CHLORIDE 9 MG/ML
40 INJECTION, SOLUTION INTRAVENOUS AS NEEDED
Status: DISCONTINUED | OUTPATIENT
Start: 2025-06-15 | End: 2025-06-20 | Stop reason: HOSPADM

## 2025-06-15 RX ORDER — AMITRIPTYLINE HYDROCHLORIDE 50 MG/1
25 TABLET ORAL NIGHTLY
Status: DISCONTINUED | OUTPATIENT
Start: 2025-06-15 | End: 2025-06-20 | Stop reason: HOSPADM

## 2025-06-15 RX ORDER — ATORVASTATIN CALCIUM 40 MG/1
40 TABLET, FILM COATED ORAL NIGHTLY
Status: DISCONTINUED | OUTPATIENT
Start: 2025-06-15 | End: 2025-06-20 | Stop reason: HOSPADM

## 2025-06-15 RX ORDER — NICOTINE POLACRILEX 4 MG
15 LOZENGE BUCCAL
Status: DISCONTINUED | OUTPATIENT
Start: 2025-06-15 | End: 2025-06-20 | Stop reason: HOSPADM

## 2025-06-15 RX ORDER — NYSTATIN 100000 U/G
1 OINTMENT TOPICAL EVERY 12 HOURS SCHEDULED
Status: DISCONTINUED | OUTPATIENT
Start: 2025-06-15 | End: 2025-06-20 | Stop reason: HOSPADM

## 2025-06-15 RX ORDER — CARVEDILOL 6.25 MG/1
6.25 TABLET ORAL 2 TIMES DAILY
Status: DISCONTINUED | OUTPATIENT
Start: 2025-06-15 | End: 2025-06-16

## 2025-06-15 RX ORDER — ASPIRIN 81 MG/1
81 TABLET ORAL DAILY
Status: DISCONTINUED | OUTPATIENT
Start: 2025-06-15 | End: 2025-06-20 | Stop reason: HOSPADM

## 2025-06-15 RX ORDER — SODIUM CHLORIDE 9 MG/ML
100 INJECTION, SOLUTION INTRAVENOUS CONTINUOUS
Status: DISCONTINUED | OUTPATIENT
Start: 2025-06-15 | End: 2025-06-15

## 2025-06-15 RX ORDER — IBUPROFEN 600 MG/1
1 TABLET ORAL
Status: DISCONTINUED | OUTPATIENT
Start: 2025-06-15 | End: 2025-06-20 | Stop reason: HOSPADM

## 2025-06-15 RX ORDER — SODIUM CHLORIDE 0.9 % (FLUSH) 0.9 %
10 SYRINGE (ML) INJECTION EVERY 12 HOURS SCHEDULED
Status: DISCONTINUED | OUTPATIENT
Start: 2025-06-15 | End: 2025-06-20 | Stop reason: HOSPADM

## 2025-06-15 RX ORDER — HEPARIN SODIUM 5000 [USP'U]/ML
5000 INJECTION, SOLUTION INTRAVENOUS; SUBCUTANEOUS EVERY 8 HOURS SCHEDULED
Status: DISCONTINUED | OUTPATIENT
Start: 2025-06-15 | End: 2025-06-20 | Stop reason: HOSPADM

## 2025-06-15 RX ORDER — FUROSEMIDE 10 MG/ML
40 INJECTION INTRAMUSCULAR; INTRAVENOUS ONCE
Status: DISCONTINUED | OUTPATIENT
Start: 2025-06-15 | End: 2025-06-15

## 2025-06-15 RX ORDER — DEXTROSE MONOHYDRATE 25 G/50ML
25 INJECTION, SOLUTION INTRAVENOUS
Status: DISCONTINUED | OUTPATIENT
Start: 2025-06-15 | End: 2025-06-20 | Stop reason: HOSPADM

## 2025-06-15 RX ORDER — AMLODIPINE BESYLATE 5 MG/1
2.5 TABLET ORAL EVERY EVENING
Status: DISCONTINUED | OUTPATIENT
Start: 2025-06-15 | End: 2025-06-16

## 2025-06-15 RX ORDER — SODIUM CHLORIDE 0.9 % (FLUSH) 0.9 %
10 SYRINGE (ML) INJECTION AS NEEDED
Status: DISCONTINUED | OUTPATIENT
Start: 2025-06-15 | End: 2025-06-20 | Stop reason: HOSPADM

## 2025-06-15 RX ADMIN — CARVEDILOL 6.25 MG: 6.25 TABLET, FILM COATED ORAL at 10:57

## 2025-06-15 RX ADMIN — CARVEDILOL 6.25 MG: 6.25 TABLET, FILM COATED ORAL at 21:25

## 2025-06-15 RX ADMIN — VANCOMYCIN HYDROCHLORIDE 750 MG: 750 INJECTION, POWDER, LYOPHILIZED, FOR SOLUTION INTRAVENOUS at 23:03

## 2025-06-15 RX ADMIN — HEPARIN SODIUM 5000 UNITS: 5000 INJECTION INTRAVENOUS; SUBCUTANEOUS at 06:11

## 2025-06-15 RX ADMIN — ASPIRIN 81 MG: 81 TABLET, COATED ORAL at 10:55

## 2025-06-15 RX ADMIN — VANCOMYCIN HYDROCHLORIDE 750 MG: 750 INJECTION, POWDER, LYOPHILIZED, FOR SOLUTION INTRAVENOUS at 10:55

## 2025-06-15 RX ADMIN — AMLODIPINE BESYLATE 2.5 MG: 5 TABLET ORAL at 16:42

## 2025-06-15 RX ADMIN — Medication 10 ML: at 10:57

## 2025-06-15 RX ADMIN — HEPARIN SODIUM 5000 UNITS: 5000 INJECTION INTRAVENOUS; SUBCUTANEOUS at 21:26

## 2025-06-15 RX ADMIN — NYSTATIN 1 APPLICATION: 100000 OINTMENT TOPICAL at 23:08

## 2025-06-15 RX ADMIN — AMITRIPTYLINE HYDROCHLORIDE 25 MG: 50 TABLET, FILM COATED ORAL at 21:26

## 2025-06-15 RX ADMIN — NYSTATIN 1 APPLICATION: 100000 OINTMENT TOPICAL at 15:02

## 2025-06-15 RX ADMIN — ATORVASTATIN CALCIUM 40 MG: 40 TABLET, FILM COATED ORAL at 21:26

## 2025-06-15 RX ADMIN — CEFEPIME 2000 MG: 2 INJECTION, POWDER, FOR SOLUTION INTRAVENOUS at 04:24

## 2025-06-15 RX ADMIN — CEFEPIME 2000 MG: 2 INJECTION, POWDER, FOR SOLUTION INTRAVENOUS at 19:45

## 2025-06-15 RX ADMIN — CEFEPIME 2000 MG: 2 INJECTION, POWDER, FOR SOLUTION INTRAVENOUS at 12:39

## 2025-06-15 NOTE — H&P
[facility]  Date: 6/15/2025   Patient Name: Curtis Richmond  : 1945  MRN: [id number]  Primary Care Physician:  Kat Eubanks APRN  Date of admission: 2025    Subjective   Subjective     Chief Complaint: Altered mental status    HPI:    Curtis Richmond is a 79 y.o. male with a past medical history of hypertension, hyperlipidemia, A-fib status post Watchman device, not on anticoagulation due to genitourinary bleeding, CVA with residual left-sided weakness, bedbound status presented to the ED for evaluation of altered mental status since last night.  History provided by patient wife at the bedside.  Patient was in usual state of health until last night where he appeared to be confused.  Denied any fevers.  Throughout the day today patient continued to get more confused and withdrawn and brought him to the ED for further evaluation.    Upon arrival, vital signs temperature 98.4, pulse 117, respiratory 26, blood pressure 117/67 on room air saturating at 93%.  ABG 7.4 , troponin 42, repeat 35, proBNP 4820 sodium 122, chloride 88, rest of the CMP with no significant findings, normal lactic acid, osmolality 268, WBC 19.97, hemoglobin 12.4, platelets 257.  Urinalysis showed 1+ bacteria, 6-10 WBC.  Blood cultures in process.  CT head without contrast showed no acute abnormality.  CTA chest showed no PE, no infiltrate.  CT abdomen pelvis with contrast showed no significant findings.  Received vancomycin, Zosyn, Solu-Medrol, nebulizer treatments in the ED.  Patient admitted for further evaluation and management of sepsis, possible UTI, concern for new CHF exacerbation, hyponatremia      Personal History     Past Medical History:   Diagnosis Date    Arthritis     BACK.    At risk for central sleep apnea     STOP BANG 5    Bladder cancer     DR DAGMAR MENA. HISTORY OF. BLADDER WASH, TURBP    Chronic back pain     COPD (chronic obstructive pulmonary disease)      EMPHYSEMA- NO INHALERS    History of loop recorder     CURRENTLY HAS Cylex LOOP RECORDER S/P STROKE WIFE REPORTS CURRENTLY NOT FUNCTIONING BUT PT HAD WATCHMAN DEVICE IMPLANTED. 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    Wears glasses     Wheelchair bound     SEPT 2022         Past Surgical History:   Procedure Laterality Date    ATRIAL APPENDAGE EXCLUSION LEFT WITH TRANSESOPHAGEAL ECHOCARDIOGRAM N/A 11/09/2021    Procedure: 11/9/21 Atrial Appendage Occlusion on eliquis hold 2 doses prior;  Surgeon: Primo Tobias DO;  Location: [facility] INVASIVE LOCATION;  Service: Cardiology;  Laterality: N/A;    BACK SURGERY  06/1980    CARDIAC CATHETERIZATION  02/2001    CARDIOVERSION      AT [facility] YEARS AGO (40 YEARS AGO)    CATARACT EXTRACTION WITH INTRAOCULAR LENS IMPLANT Bilateral 2019    CHOLECYSTECTOMY  06/2002    COLONOSCOPY      CYSTOSCOPY      MULTIPLE    CYSTOSCOPY BLADDER BIOPSY N/A 08/24/2021    Procedure: CYSTOSCOPY, TRANSURETHRAL RESECTION OF BLADDER TUMOR;  Surgeon: Kaelyn Alvarado MD;  Location: [facility] OR;  Service: Urology;  Laterality: N/A;    CYSTOSCOPY RETROGRADE PYELOGRAM Bilateral 11/21/2024    Procedure: CYSTOSCOPY RETROGRADE PYELOGRAM  Scope of the bladder with x-rays of the ureters using dye;  Surgeon: Kaelyn Alvarado MD;  Location: [facility] OR;  Service: Urology;  Laterality: Bilateral;    EMBOLIZATION CEREBRAL N/A 04/14/2021    Procedure: CEREBRAL ANGIOGRAM;  Surgeon: Noah Bruno MD;  Location: [facility] OR 18/19;  Service: Interventional Radiology;  Laterality: N/A;    EPIDURAL      LUMBAR    JOINT  REPLACEMENT Right     Knee    KNEE ARTHROPLASTY Right     NEPHROURETERECTOMY Left 2019    TRANSURETHRAL RESECTION OF BLADDER TUMOR           Family History   Problem Relation Age of Onset    Heart attack Father     No Known Problems Other     Malig Hyperthermia Neg Hx          Social History     Socioeconomic History    Marital status:    Tobacco Use    Smoking status: Former     Current packs/day: 0.00     Types: Cigarettes     Quit date: 1972     Years since quittin.4    Smokeless tobacco: Never    Tobacco comments:     QUIT 50 YRS AGO. SMOKED 3 YEARS IN [employer]   Vaping Use    Vaping status: Never Used   Substance and Sexual Activity    Alcohol use: Not Currently    Drug use: Never    Sexual activity: Defer         Home Medications:  amLODIPine, amitriptyline, aspirin, atorvastatin, carvedilol, cetirizine, and losartan    Allergies:  No Known Allergies      Objective   Objective     Vitals:   Temp:  [98.4 °F (36.9 °C)] 98.4 °F (36.9 °C)  Heart Rate:  [101-117] 102  Resp:  [18-26] 19  BP: (112-123)/(67-84) 115/67  Flow (L/min) (Oxygen Therapy):  [2] 2    Physical Exam    Constitutional: Somnolent, arousable to voice   Eyes: Pupils equal, sclerae anicteric, no conjunctival injection, reactive to light bilaterally   HENT: NCAT, mucous membranes moist   Respiratory: Clear to auscultation bilaterally, nonlabored respirations    Cardiovascular: irregular, tachycardia, no murmurs   Gastrointestinal: Positive bowel sounds, soft, nontender, nondistended   Musculoskeletal: 1+ edema bilateral lower extremities, no clubbing or cyanosis to extremities   Neurologic: Somnolent, arousable to voice, able to slightly wiggle his toes on the right, able to slightly move the fingers bilaterally.  Patient has history of left sided residual deficits from prior stroke    Result Review    Result Review:  I have personally reviewed the results from the time of this admission to 6/15/2025 00:59 EDT and agree with these  findings:  [x]  Laboratory  []  Microbiology  [x]  Radiology  [x]  EKG/Telemetry   []  Cardiology/Vascular   []  Pathology  []  Old records  []  Other:        Imaging Results (Last 24 Hours)       Procedure Component Value Units Date/Time    CT Abdomen Pelvis With Contrast [[id number]] Collected: 06/14/25 2209     Updated: 06/14/25 2221    Narrative:      CT ABDOMEN PELVIS W CONTRAST-     Date of exam: 6/14/2025 9:10 PM.     Comparisons: 10/29/2024; 2/18/2021; 3/14/2029.     INDICATIONS:   79-year-old male with altered mental status (AMS), + leukocytosis, &  possible sepsis.     TECHNIQUE:  Axial CT images were obtained of the abdomen and pelvis following the  uneventful intravenous administration of 70 mL (or less) of Isovue-370  contrast agent. Reconstructed 2D (two-dimensional) coronal and sagittal  images were also obtained. Automated exposure control and iterative  construction methods were used. Additionally, the radiation dose  reduction device was turned on for each scan per the ALARA (As Low as  Reasonably Achievable) protocol. No oral contrast agent was administered  for the study. Please see the electronic medical record, EMR (i.e.,  Epic), for the documented dose of intravenous contrast agent as well as  the radiation dose. There is slight motion artifact on the study.     FINDINGS:  There has been interval decrease in (if not complete resolution of) the  mural thickening involving the urinary bladder with minimal, if any,  persisting since 10/29/2024. There are prostatic calcifications. A  moderate-to-large stool burden is seen, as before. Severe degenerative  changes involve the imaged spine with postoperative changes of the lower  lumbar spine. A similar CT appearance of the spine was seen previously.  No acute findings are seen, otherwise, and no significant interval  change is appreciated since the prior study from 10/29/2024. Please see  prior exam reports for further detail regarding  "additional  incidental/chronic findings.          Impression:      No definite significant acute finding is appreciated.        Portions of this note were completed with a voice recognition program.     6/14/2025 10:19 PM by Cortez Montes De Oca MD on Workstation: [email or web address]       CT Angiogram Chest Pulmonary Embolism [[id number]] Collected: 06/14/25 2205     Updated: 06/14/25 2211    Narrative:      CT ANGIOGRAM CHEST PULMONARY EMBOLISM-     Date of exam: 6/14/2025 9:10 PM.     Indications: AMS (altered mental status); h/o atrial fibrillation &  urinary bladder carcinoma.      Comparison: 9/15/2022.     TECHNIQUE:  Axial CT images were obtained of the chest after the uneventful  intravenous administration of 70 mL (or less) of Isovue-370 nonionic  contrast agent. Reconstructed 2D coronal and sagittal images were also  obtained. Automated exposure control and iterative construction methods  were used. Additionally, the radiation dose reduction device was turned  on for each scan per the ALARA (As Low as Reasonably Achievable)  protocol. Please see the electronic medical record, EMR (i.e., Epic),  for the documented radiation dose. A 3D \"radial range\" reformation  (and/or other type[s] of 3D reformations) is (are) provided for review.  Please see the EMR (i.e., YuanV) for the documented dose of intravenous  contrast agent as well as the radiation dose. Despite best efforts, poor  image quality limits interpretation of the study. For instance, motion  artifact obscures detail.     FINDINGS:  No pulmonary embolism is seen. Grossly, no acute infiltrate is seen.  Grossly, no thoracic aortic aneurysm or arterial dissection. Please see  prior exam reports for further detail regarding additional  incidental/chronic findings.       Impression:      The study is motion-limited. No pulmonary embolism. Grossly, no thoracic  aortic aneurysm or dissection. Grossly, no acute infiltrate.        Portions of this note were completed with a voice " recognition program.     6/14/2025 10:09 PM by Cortez Montes De Oca MD on Workstation: [email or web address]       CT Head Without Contrast [[id number]] Collected: 06/14/25 2155     Updated: 06/14/25 2207    Narrative:      CT HEAD WO CONTRAST-     Date of exam: 6/14/2025 9:22 PM.     Indications: AMS (altered mental status); + leukocytosis.     Comparisons: 9/16/2022; 9/15/2022; 4/14/2021; 2/9/2021; 10/23/2003;  10/13/2003.     TECHNIQUE:  Axial CT images were obtained of the head without contrast  administration. Reconstructed 2D coronal and sagittal images were also  obtained. Automated exposure control and iterative construction methods  were used. Additionally, the radiation dose reduction device was turned  on for each scan per the ALARA (As Low as Reasonably Achievable)  protocol. Please see the electronic medical record, EMR (i.e., Epic),  for the documented radiation dose.      FINDINGS:  A routine nonenhanced head CT was performed. No acute brain abnormality  is seen. No acute infarct. No acute intracranial hemorrhage. No midline  shift or acute intracranial mass effect is seen. The ventricular system  and, to a lesser degree, the extra-axial spaces are prominent,  suggesting global atrophy. The callosal angle is 86 degrees. The Engel  index is 0.5. There is relative effacement of the extra-axial spaces  along the parasagittal regions of the bilateral frontal-parietal lobes.  These findings suggest the possibility of normal pressure hydrocephalus  (NPH). Please correlate clinically. There is suspected mild chronic  small vessel ischemic disease. Arterial calcifications are seen. No  acute skull fracture is identified. 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 There may be some improvement in the probably chronic sinus  disease. Benign external auditory canal debris is suspected  bilaterally.  Please see prior exam reports for further detail regarding additional  incidental/chronic findings.          Impression:      No acute brain abnormality is appreciated. No acute intracranial  hemorrhage. No acute infarction. No midline shift or acute intracranial  herniation syndrome. Please see above comments for further detail.            Portions of this note were completed with a voice recognition program.     6/14/2025 10:05 PM by Cortez Montes De Oca MD on Workstation: [email or web address]                        Assessment & Plan   Assessment / Plan     Assessment/Plan:   Altered mental status DDx sepsis, hyponatremia, stroke  Sepsis, elevated heart rate, white count, unclear etiology  Possible UTI contributing to encephalopathy  Volume overload  New onset CHF with exacerbation  Moderate hyponatremia  Hypertension  Hyperlipidemia  A-fib status post Watchman device    Plan  Admit to inpatient, telemetry  Continue cefepime, vancomycin  Follow-up on urine cultures, blood cultures  Lasix 40 mg IV once  Cardiac echo  Monitor sodium levels with a.m. labs  Follow-up on urine sodium, urine creatinine, urine osmolality, serum osmolality  Nephrology consult in a.m.   If the mental status does not improve with treatment for sepsis, consider getting MRI of the brain, MRA head and neck to rule out stroke   N.p.o.  Hypoglycemic protocol  POC glucose every 6 hours    VTE Prophylaxis:  Pharmacologic VTE prophylaxis orders are signed & held.      CODE STATUS:    Code Status (Patient has no pulse and is not breathing): CPR (Attempt to Resuscitate)  Medical Interventions (Patient has pulse or is breathing): Full Support  Level Of Support Discussed With: Patient      Admission Status:  I believe this patient meets inpatient status.    Part of this note may be an electronic transcription/translation of spoken language to printed text using the Dragon Dictation System    Ofelia Mathews MD

## 2025-06-15 NOTE — NURSING NOTE
Patient Curtis Richmond admitted to 4MTU from the ED. Patient is alert and oriented to person. Patient oriented to unit, call light system and bed alarm use, teaching needs reinforcement. Patient agreed to bed alarm use. Candida Auris screening revealed patient will NOT need tested, contact isolation and bleach cleaning due to no risk factors. Patient currently is not a concern for an active CDIFF infection.    4 eyes skin assessment completed with Other RN:. Per policy, the following skin interventions were put in place as a result of the skin assessment below: Wound care/nutrition consulted for present wound, Wound care/nutrition consulted for skin breakdown, Wound care/nutrition consulted for a samuel score 18 or less, LDA created and pictures and measurements taken of present wounds, Q2 turns, and Pillow supprt for offloading pressure, floating heels or padding for bony prominences    Skin Assessments (most recent)      Flowsheet Row Most Recent Value   Sensory Perception 3-->slightly limited   Moisture 2-->very moist   Activity 1-->bedfast   Mobility 1-->completely immobile   Nutrition 2-->probably inadequate   Friction and Shear 1-->problem   Samuel Score 10            Fall assessment completed. Per policy, the patient was determined be a Low fall risk due to Khan Jag score 14 or less (only used within the first 24 hours of admission). Patient assessed to need the following mobility assistance: 2 Assist with the following assistive devices: Wheel Chair and Frederick Lift, and will be using a purewick for toileting. Per policy, the following fall interventions were put in place: Bed Alarm and Room close to nurse's station.     Patient's belongings that were sent with family: Clothing  Patient's belongings that were sent to security: None  Patient's belongings locked in safe box in room: None  Patient's belongings that were sent to pharmacy: None  Patient's belongings kept at bedside per patient:  Clothing       [Negative] : Heme/Lymph [de-identified] : as per HPI

## 2025-06-15 NOTE — NURSING NOTE
Patient Curtis Richmond admitted to 4MTU from {4MTU Admit Location:54332}. Patient is alert and oriented to {A&O:88445}. Patient oriented to unit, call light system and bed alarm use, teaching {teachin}. Patient {Bed Alarm:85009} bed alarm use. Candida Auris screening revealed patient {Guerline Auris:61788}. Patient currently is {4MTU CDIFF:57802}.    4 eyes skin assessment completed with {Nurse:41087}. Per policy, the following skin interventions were put in place as a result of the skin assessment below: {Skin interventions:44749}    Skin Assessments (most recent)      Flowsheet Row Most Recent Value   Sensory Perception 3-->slightly limited   Moisture 2-->very moist   Activity 1-->bedfast   Mobility 1-->completely immobile   Nutrition 2-->probably inadequate   Friction and Shear 1-->problem   Charlie Score 10            Fall assessment completed. Per policy, the patient was determined be a {Fall Risk:35430}. Patient assessed to need the following mobility assistance: {Mobility Assistance:56168} with the following assistive devices: {Assistive devices:15471}, and will be using {Toiletin} for toileting. Per policy, the following fall interventions were put in place: {Fall Interventions:42900}.     Patient's belongings that were sent with family: {Belongings:20386}  Patient's belongings that were sent to security: {Belongings:06611}  Patient's belongings locked in safe box in room: {Belongings:41439}  Patient's belongings that were sent to pharmacy: {Belongings:32863}  Patient's belongings kept at bedside per patient: {Belongings:20386}

## 2025-06-15 NOTE — ED NOTES
Attempted to get blood cultures on patient. Stuck x2. Unable to obtain. Called another EDT to attempt.

## 2025-06-15 NOTE — PAYOR COMM NOTE
"Curtis Richmond (79 y.o. Male)       Date of Birth   1945    Social Security Number       Address   [address] [place] [address]    Home Phone   [phone]    MRN   [id number]       [facility]    Marital Status                               Admission Date   6/14/2025    Admission Type   Emergency    Admitting Provider   Oeflia Mathews MD    Attending Provider   Spencer Vera DO    Department, Room/Bed   [facility] 4TH FLOOR MEDICAL TELEMETRY UNIT, 425/2       Discharge Date       Discharge Disposition       Discharge Destination                                 Attending Provider: Spencer Vera DO    Allergies: No Known Allergies    Isolation: None   Infection: None   Code Status: CPR    Ht: 172.7 cm (67.99\")   Wt: 112 kg (246 lb 14.6 oz)    Admission Cmt: None   Principal Problem: AMS (altered mental status) [R41.82]                   Active Insurance as of 6/14/2025       Primary Coverage       Payor Plan Insurance Group Employer/Plan Group    AETNA MEDICARE REPLACEMENT AETNA MEDICARE ADVANTAGE PPO [id number]       Payor Plan Address Payor Plan Phone Number Payor Plan Fax Number Effective Dates    [address] [phone]  1/1/2024 - None Entered    [place] [address]         Subscriber Name Subscriber Birth Date Member ID       CURTIS RICHMOND 1945 [id number]                     Emergency Contacts        (Rel.) Home Phone Work Phone Mobile Phone    FRANCINE RICHMOND (Spouse) [phone] -- [phone]           Sepsis and Other Febrile Illness, without Focal Infection [facility] Inpatient Care       Indications Met   Last updated by Rossana Godfrey RN on 6/15/2025 0727     Review Status Created By   Primary Completed Rossana Godfrey RN      Criteria Review   Sepsis and Other Febrile Illness, without Focal Infection [facility] Inpatient Care     Overall Determination: Indications Met     Criteria:  [×] Admission is indicated for  1 or more  of the " following  [D] [E]:      [×] Hemodynamic instability          6/15/2025  7:27 AM              -- 6/15/2025  7:27 AM by Rossana Godfrey RN --                  HR- 117-123      [  ] Bacteremia (if blood cultures performed)          6/15/2025  7:27 AM              -- 6/15/2025  7:27 AM by Rossana Godfrey RN --                  cultures pending      [×] Altered mental status that is severe or persistent [F]          6/15/2025  7:27 AM              -- 6/15/2025  7:27 AM by Rossana Godfrey RN --                  presents to the ED for evaluation of altered mental status.  wife report usual state of health until prior to presenting to ED confusion.      [×] Parenteral antimicrobial regimen that must be implemented on inpatient basis (eg, infusion or monitoring needs beyond capabilities of outpatient parenteral therapy)     Notes:  -- 6/15/2025  7:27 AM by Rossana Godfrey RN --      Subject: Admission      79 y.o. male with a past medical history of hypertension, hyperlipidemia, A-fib status post Watchman device, not on anticoagulation due to genitourinary bleeding, CVA with residual left-sided weakness, bedbound status presented to the ED for evaluation of altered mental status since last night. Throughout the day today patient continued to get more confused and withdrawn and brought him to the ED for further evaluation.  Urinalysis showed 1+ bacteria, 6-10 WBC. Blood cultures in process. CT head without contrast showed no acute abnormality. CTA chest showed no PE, no infiltrate. CT abdomen pelvis with contrast showed no significant findings. Received vancomycin, Zosyn, Solu-Medrol, nebulizer treatments in the ED. Patient admitted for further evaluation and management of sepsis, possible UTI, concern for new CHF exacerbation, hyponatremia,                  WBC 19.9                        Meds in ED:  vancomycin iv, zosyn iv, solumedrol iv, pepcid iv,                   Orders noted:  telemetry, nephrology consult,   eval, NPO, fall precautions,  culture pending, cefepime iv q 8 hr, vancomycin iv q 12 hr, ECHO pending,  lasix iv x 1, monitor NA levels,                   History & Physical        Ofelia Mathews MD at [phone]           [facility]  Date: 6/15/2025   Patient Name: Curtis Richmond  : 1945  MRN: [id number]  Primary Care Physician:  Kat Eubanks APRN  Date of admission: 2025    Subjective  Subjective     Chief Complaint: Altered mental status    HPI:    Curtis Richmond is a 79 y.o. male with a past medical history of hypertension, hyperlipidemia, A-fib status post Watchman device, not on anticoagulation due to genitourinary bleeding, CVA with residual left-sided weakness, bedbound status presented to the ED for evaluation of altered mental status since last night.  History provided by patient wife at the bedside.  Patient was in usual state of health until last night where he appeared to be confused.  Denied any fevers.  Throughout the day today patient continued to get more confused and withdrawn and brought him to the ED for further evaluation.    Upon arrival, vital signs temperature 98.4, pulse 117, respiratory 26, blood pressure 117/67 on room air saturating at 93%.  ABG 7.4 , troponin 42, repeat 35, proBNP 4820 sodium 122, chloride 88, rest of the CMP with no significant findings, normal lactic acid, osmolality 268, WBC 19.97, hemoglobin 12.4, platelets 257.  Urinalysis showed 1+ bacteria, 6-10 WBC.  Blood cultures in process.  CT head without contrast showed no acute abnormality.  CTA chest showed no PE, no infiltrate.  CT abdomen pelvis with contrast showed no significant findings.  Received vancomycin, Zosyn, Solu-Medrol, nebulizer treatments in the ED.  Patient admitted for further evaluation and management of sepsis, possible UTI, concern for new CHF exacerbation, hyponatremia      Personal History     Past Medical History:    Diagnosis Date    Arthritis     BACK.    At risk for central sleep apnea     STOP BANG 5    Bladder cancer     DR KAELYN MENA. HISTORY OF. BLADDER WASH, TURBP    Chronic back pain     COPD (chronic obstructive pulmonary disease)     EMPHYSEMA- NO INHALERS    History of loop recorder     CURRENTLY HAS Feebbo LOOP RECORDER S/P STROKE WIFE REPORTS CURRENTLY NOT FUNCTIONING BUT PT HAD WATCHMAN DEVICE IMPLANTED. 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    Wears glasses     Wheelchair bound     SEPT 2022         Past Surgical History:   Procedure Laterality Date    ATRIAL APPENDAGE EXCLUSION LEFT WITH TRANSESOPHAGEAL ECHOCARDIOGRAM N/A 11/09/2021    Procedure: 11/9/21 Atrial Appendage Occlusion on eliquis hold 2 doses prior;  Surgeon: Primo Tobias DO;  Location: [facility] INVASIVE LOCATION;  Service: Cardiology;  Laterality: N/A;    BACK SURGERY  06/1980    CARDIAC CATHETERIZATION  02/2001    CARDIOVERSION      AT [facility] YEARS AGO (40 YEARS AGO)    CATARACT EXTRACTION WITH INTRAOCULAR LENS IMPLANT Bilateral 2019    CHOLECYSTECTOMY  06/2002    COLONOSCOPY      CYSTOSCOPY      MULTIPLE    CYSTOSCOPY BLADDER BIOPSY N/A 08/24/2021    Procedure: CYSTOSCOPY, TRANSURETHRAL RESECTION OF BLADDER TUMOR;  Surgeon: Kaelyn Mena MD;  Location: [facility];  Service: Urology;  Laterality: N/A;    CYSTOSCOPY RETROGRADE PYELOGRAM Bilateral 11/21/2024    Procedure: CYSTOSCOPY RETROGRADE PYELOGRAM  Scope of the bladder with x-rays of the ureters using dye;  Surgeon: Kaelyn Mena MD;  Location: [facility];  Service: Urology;  Laterality:  Bilateral;    EMBOLIZATION CEREBRAL N/A 2021    Procedure: CEREBRAL ANGIOGRAM;  Surgeon: Noah Bruno MD;  Location: [facility] OR ;  Service: Interventional Radiology;  Laterality: N/A;    EPIDURAL      LUMBAR    JOINT REPLACEMENT Right     Knee    KNEE ARTHROPLASTY Right     NEPHROURETERECTOMY Left 2019    TRANSURETHRAL RESECTION OF BLADDER TUMOR           Family History   Problem Relation Age of Onset    Heart attack Father     No Known Problems Other     Malig Hyperthermia Neg Hx          Social History     Socioeconomic History    Marital status:    Tobacco Use    Smoking status: Former     Current packs/day: 0.00     Types: Cigarettes     Quit date: 1972     Years since quittin.4    Smokeless tobacco: Never    Tobacco comments:     QUIT 50 YRS AGO. SMOKED 3 YEARS IN [employer]   Vaping Use    Vaping status: Never Used   Substance and Sexual Activity    Alcohol use: Not Currently    Drug use: Never    Sexual activity: Defer         Home Medications:  amLODIPine, amitriptyline, aspirin, atorvastatin, carvedilol, cetirizine, and losartan    Allergies:  No Known Allergies      Objective  Objective     Vitals:   Temp:  [98.4 °F (36.9 °C)] 98.4 °F (36.9 °C)  Heart Rate:  [101-117] 102  Resp:  [18-26] 19  BP: (112-123)/(67-84) 115/67  Flow (L/min) (Oxygen Therapy):  [2] 2    Physical Exam    Constitutional: Somnolent, arousable to voice   Eyes: Pupils equal, sclerae anicteric, no conjunctival injection, reactive to light bilaterally   HENT: NCAT, mucous membranes moist   Respiratory: Clear to auscultation bilaterally, nonlabored respirations    Cardiovascular: irregular, tachycardia, no murmurs   Gastrointestinal: Positive bowel sounds, soft, nontender, nondistended   Musculoskeletal: 1+ edema bilateral lower extremities, no clubbing or cyanosis to extremities   Neurologic: Somnolent, arousable to voice, able to slightly wiggle his toes on the right, able to slightly move the fingers  bilaterally.  Patient has history of left sided residual deficits from prior stroke    Result Review   Result Review:  I have personally reviewed the results from the time of this admission to 6/15/2025 00:59 EDT and agree with these findings:  [x]  Laboratory  []  Microbiology  [x]  Radiology  [x]  EKG/Telemetry   []  Cardiology/Vascular   []  Pathology  []  Old records  []  Other:        Imaging Results (Last 24 Hours)       Procedure Component Value Units Date/Time    CT Abdomen Pelvis With Contrast [[id number]] Collected: 06/14/25 2209     Updated: 06/14/25 2221    Narrative:      CT ABDOMEN PELVIS W CONTRAST-     Date of exam: 6/14/2025 9:10 PM.     Comparisons: 10/29/2024; 2/18/2021; 3/14/2029.     INDICATIONS:   79-year-old male with altered mental status (AMS), + leukocytosis, &  possible sepsis.     TECHNIQUE:  Axial CT images were obtained of the abdomen and pelvis following the  uneventful intravenous administration of 70 mL (or less) of Isovue-370  contrast agent. Reconstructed 2D (two-dimensional) coronal and sagittal  images were also obtained. Automated exposure control and iterative  construction methods were used. Additionally, the radiation dose  reduction device was turned on for each scan per the ALARA (As Low as  Reasonably Achievable) protocol. No oral contrast agent was administered  for the study. Please see the electronic medical record, EMR (i.e.,  Epic), for the documented dose of intravenous contrast agent as well as  the radiation dose. There is slight motion artifact on the study.     FINDINGS:  There has been interval decrease in (if not complete resolution of) the  mural thickening involving the urinary bladder with minimal, if any,  persisting since 10/29/2024. There are prostatic calcifications. A  moderate-to-large stool burden is seen, as before. Severe degenerative  changes involve the imaged spine with postoperative changes of the lower  lumbar spine. A similar CT appearance of  "the spine was seen previously.  No acute findings are seen, otherwise, and no significant interval  change is appreciated since the prior study from 10/29/2024. Please see  prior exam reports for further detail regarding additional  incidental/chronic findings.          Impression:      No definite significant acute finding is appreciated.        Portions of this note were completed with a voice recognition program.     6/14/2025 10:19 PM by Cortez Montes De Oca MD on Workstation: [email or web address]       CT Angiogram Chest Pulmonary Embolism [[id number]] Collected: 06/14/25 2205     Updated: 06/14/25 2211    Narrative:      CT ANGIOGRAM CHEST PULMONARY EMBOLISM-     Date of exam: 6/14/2025 9:10 PM.     Indications: AMS (altered mental status); h/o atrial fibrillation &  urinary bladder carcinoma.      Comparison: 9/15/2022.     TECHNIQUE:  Axial CT images were obtained of the chest after the uneventful  intravenous administration of 70 mL (or less) of Isovue-370 nonionic  contrast agent. Reconstructed 2D coronal and sagittal images were also  obtained. Automated exposure control and iterative construction methods  were used. Additionally, the radiation dose reduction device was turned  on for each scan per the ALARA (As Low as Reasonably Achievable)  protocol. Please see the electronic medical record, EMR (i.e., Epic),  for the documented radiation dose. A 3D \"radial range\" reformation  (and/or other type[s] of 3D reformations) is (are) provided for review.  Please see the EMR (i.e., [employer]) for the documented dose of intravenous  contrast agent as well as the radiation dose. Despite best efforts, poor  image quality limits interpretation of the study. For instance, motion  artifact obscures detail.     FINDINGS:  No pulmonary embolism is seen. Grossly, no acute infiltrate is seen.  Grossly, no thoracic aortic aneurysm or arterial dissection. Please see  prior exam reports for further detail regarding additional  incidental/chronic " findings.       Impression:      The study is motion-limited. No pulmonary embolism. Grossly, no thoracic  aortic aneurysm or dissection. Grossly, no acute infiltrate.        Portions of this note were completed with a voice recognition program.     6/14/2025 10:09 PM by Cortez Montes De Oca MD on Workstation: [id number]       CT Head Without Contrast [[id number]] Collected: 06/14/25 2155     Updated: 06/14/25 2207    Narrative:      CT HEAD WO CONTRAST-     Date of exam: 6/14/2025 9:22 PM.     Indications: AMS (altered mental status); + leukocytosis.     Comparisons: 9/16/2022; 9/15/2022; 4/14/2021; 2/9/2021; 10/23/2003;  10/13/2003.     TECHNIQUE:  Axial CT images were obtained of the head without contrast  administration. Reconstructed 2D coronal and sagittal images were also  obtained. Automated exposure control and iterative construction methods  were used. Additionally, the radiation dose reduction device was turned  on for each scan per the ALARA (As Low as Reasonably Achievable)  protocol. Please see the electronic medical record, EMR (i.e., Epic),  for the documented radiation dose.      FINDINGS:  A routine nonenhanced head CT was performed. No acute brain abnormality  is seen. No acute infarct. No acute intracranial hemorrhage. No midline  shift or acute intracranial mass effect is seen. The ventricular system  and, to a lesser degree, the extra-axial spaces are prominent,  suggesting global atrophy. The callosal angle is 86 degrees. The Engel  index is 0.5. There is relative effacement of the extra-axial spaces  along the parasagittal regions of the bilateral frontal-parietal lobes.  These findings suggest the possibility of normal pressure hydrocephalus  (NPH). Please correlate clinically. There is suspected mild chronic  small vessel ischemic disease. Arterial calcifications are seen. No  acute skull fracture is identified. 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 There may be some improvement in the probably chronic sinus  disease. Benign external auditory canal debris is suspected bilaterally.  Please see prior exam reports for further detail regarding additional  incidental/chronic findings.          Impression:      No acute brain abnormality is appreciated. No acute intracranial  hemorrhage. No acute infarction. No midline shift or acute intracranial  herniation syndrome. Please see above comments for further detail.            Portions of this note were completed with a voice recognition program.     6/14/2025 10:05 PM by Cortez Montes De Oca MD on Workstation: [email or web address]                        Assessment & Plan  Assessment / Plan     Assessment/Plan:   Altered mental status DDx sepsis, hyponatremia, stroke  Sepsis, elevated heart rate, white count, unclear etiology  Possible UTI contributing to encephalopathy  Volume overload  New onset CHF with exacerbation  Moderate hyponatremia  Hypertension  Hyperlipidemia  A-fib status post Watchman device    Plan  Admit to inpatient, telemetry  Continue cefepime, vancomycin  Follow-up on urine cultures, blood cultures  Lasix 40 mg IV once  Cardiac echo  Monitor sodium levels with a.m. labs  Follow-up on urine sodium, urine creatinine, urine osmolality, serum osmolality  Nephrology consult in a.m.   If the mental status does not improve with treatment for sepsis, consider getting MRI of the brain, MRA head and neck to rule out stroke   N.p.o.  Hypoglycemic protocol  POC glucose every 6 hours    VTE Prophylaxis:  Pharmacologic VTE prophylaxis orders are signed & held.      CODE STATUS:    Code Status (Patient has no pulse and is not breathing): CPR (Attempt to Resuscitate)  Medical Interventions (Patient has pulse or is breathing): Full Support  Level Of Support Discussed With: Patient      Admission Status:  I believe this patient meets  "inpatient status.    Part of this note may be an electronic transcription/translation of spoken language to printed text using the Dragon Dictation System    Ofelia Mathews MD               Electronically signed by Ofelia Mathews MD at 06/15/25 0059          Emergency Department Notes        Kaelyn Collins PCT at 06/14/25 2147          Attempted to get blood cultures on patient. Stuck x2. Unable to obtain. Called another EDT to attempt.    Electronically signed by Kaelyn Collins PCT at 06/14/25 2148       Perri Avalos MD at 06/14/25 2056          SHARED VISIT ATTESTATION:    This visit was performed by myself and an APC.  I personally approved the management plan/medical decision making and take responsibility for the patient management.      SHARED VISIT NOTE:    Patient is 79 y.o. year old male that presents to the ED for evaluation of altered mental status.     Physical Exam    ED Course:    /67 (BP Location: Right arm, Patient Position: Sitting)   Pulse 117   Temp 98.4 °F (36.9 °C) (Oral)   Resp 26   Ht 172.7 cm (68\")   Wt 113 kg (249 lb 12.5 oz)   SpO2 93%   BMI 37.98 kg/m²       The following orders were placed and all results were independently analyzed by me:  Orders Placed This Encounter   Procedures    Blood Culture - Blood,    Blood Culture - Blood,    XR Chest 1 View    CT Abdomen Pelvis With Contrast    CT Head Without Contrast    CT Angiogram Chest Pulmonary Embolism    [place] Draw    Comprehensive Metabolic Panel    Urinalysis With Microscopic If Indicated (No Culture) - Urine, Catheter    CBC Auto Differential    BNP    Lactic Acid, Plasma    High Sensitivity Troponin T    Arterial Blood Gas + Electrolytes    Continuous Pulse Oximetry    Vital Signs    Oxygen Therapy- Nasal Cannula; Titrate 1-6 LPM Per SpO2; 90 - 95%    POC Glucose Once    ECG 12 Lead Tachycardia    Insert Peripheral IV    Fall Precautions    CBC & Differential    Green Top (Gel)    Lavender Top    " Gold Top - SST    Light Blue Top    Extra Tubes    Gray Top       Medications Given in the Emergency Department:  Medications   sodium chloride 0.9 % flush 10 mL (has no administration in time range)   vancomycin 2250 mg/500 mL 0.9% NS IVPB (BHS) (has no administration in time range)   piperacillin-tazobactam (ZOSYN) IVPB 3.375 g IVPB in 100 mL NS (VTB) (has no administration in time range)   ipratropium-albuterol (DUO-NEB) nebulizer solution 3 mL (has no administration in time range)   methylPREDNISolone sodium succinate (SOLU-Medrol) 125 mg in sterile water (preservative free) 2 mL (has no administration in time range)   famotidine (PEPCID) injection 20 mg (has no administration in time range)        ED Course:    ED Course as of 06/15/25 0118   Sat Jun 14, 2025 2036 EKG reviewed:  Patient is in a atrial fibrillation with a heart rate of 107 bpm.  Patient's intervals are within normal limit.  Patient's axis is within normal limits.  No evidence of ST segment elevation or depression in contiguous leads or with reciprocal changes. [CB]   2038 Triage vitals reviewed.  Evidence of tachycardia and tachypnea. [CB]   2229 Sodium Correction for Hyperglycemia - MDCalc  Calculated on Jun 14 2025 10:29 PM  123 mEq/L -> Corrected Sodium (Romeo, 1973)  124 mEq/L -> Corrected Sodium (Margaret, 1999) [CB]   2230 Repeat troponin of 35 negative delta.  No evidence of MI [CB]      ED Course User Index  [CB] Derick Segal, PACarenC       Labs:    Lab Results (last 24 hours)       Procedure Component Value Units Date/Time    CBC & Differential [[id number]]  (Abnormal) Collected: 06/14/25 2032    Specimen: Blood Updated: 06/14/25 2042    Narrative:      The following orders were created for panel order CBC & Differential.  Procedure                               Abnormality         Status                     ---------                               -----------         ------                     CBC Auto Differential[[id number]]         Abnormal            Final result                 Please view results for these tests on the individual orders.    Comprehensive Metabolic Panel [[id number]] Collected: 06/14/25 2032    Specimen: Blood Updated: 06/14/25 2037    CBC Auto Differential [[id number]]  (Abnormal) Collected: 06/14/25 2032    Specimen: Blood Updated: 06/14/25 2042     WBC 19.97 10*3/mm3      RBC 4.76 10*6/mm3      Hemoglobin 12.4 g/dL      Hematocrit 39.3 %      MCV 82.6 fL      MCH 26.1 pg      MCHC 31.6 g/dL      RDW 14.4 %      RDW-SD 43.1 fl      MPV 10.9 fL      Platelets 257 10*3/mm3      Neutrophil % 85.2 %      Lymphocyte % 5.9 %      Monocyte % 7.9 %      Eosinophil % 0.3 %      Basophil % 0.2 %      Immature Grans % 0.5 %      Neutrophils, Absolute 17.03 10*3/mm3      Lymphocytes, Absolute 1.18 10*3/mm3      Monocytes, Absolute 1.57 10*3/mm3      Eosinophils, Absolute 0.05 10*3/mm3      Basophils, Absolute 0.04 10*3/mm3      Immature Grans, Absolute 0.10 10*3/mm3      nRBC 0.0 /100 WBC     BNP [[id number]] Collected: 06/14/25 2032    Specimen: Blood Updated: 06/14/25 2037    Lactic Acid, Plasma [[id number]] Collected: 06/14/25 2032    Specimen: Blood Updated: 06/14/25 2046    High Sensitivity Troponin T [[id number]] Collected: 06/14/25 2032    Specimen: Blood Updated: 06/14/25 2048             Imaging:    No Radiology Exams Resulted Within Past 24 Hours    MDM:    Procedures    Labs were collected in the emergency department and all labs were reviewed and interpreted by me.  X-ray were performed in the emergency department and all X-ray impressions were independently interpreted by me.  An EKG was performed and the EKG was interpreted by me.  CT scan was performed in the emergency department and the CT scan radiology impression was interpreted by me.                     Perri Avalos MD  20:57 EDT  06/14/25         Perri Avalos MD  06/15/25 0118      Electronically signed by Perri Avalos MD at 06/15/25 0118        Vital Signs (last day)       Date/Time Temp Temp src Pulse Resp BP Patient Position SpO2    06/15/25 0839 -- -- 108 18 -- -- 97    06/15/25 0715 97.7 (36.5) Oral 111 16 106/58 Lying 96    06/15/25 0513 -- -- 123 15 -- -- 96    06/15/25 0300 97.3 (36.3) Oral -- 18 99/61 Lying 96    06/15/25 0100 -- -- 107 19 120/75 Sitting 95    06/15/25 0015 -- -- 102 19 115/67 Sitting 96    06/14/25 2330 -- -- 116 18 123/79 Sitting 98    06/14/25 2309 -- -- 106 -- -- -- --    06/14/25 2245 -- -- 105 21 114/79 Sitting 98    06/14/25 2115 -- -- 101 26 112/84 Sitting 94    06/14/25 2102 -- -- 112 20 -- -- 93    06/14/25 2058 -- -- 114 24 -- -- 92    06/14/25 2054 -- -- 114 24 -- -- 93    06/14/25 2021 98.4 (36.9) Oral -- -- -- -- --    06/14/25 2020 -- Oral 117 26 117/67 Sitting 93          Lab Results (last 24 hours)       Procedure Component Value Units Date/Time    Phosphorus [[id number]]  (Normal) Collected: 06/15/25 0523    Specimen: Blood Updated: 06/15/25 0621     Phosphorus 3.8 mg/dL     Magnesium [[id number]]  (Normal) Collected: 06/15/25 0523    Specimen: Blood Updated: 06/15/25 0621     Magnesium 2.1 mg/dL     Comprehensive Metabolic Panel [[id number]]  (Abnormal) Collected: 06/15/25 0523    Specimen: Blood Updated: 06/15/25 0621     Glucose 179 mg/dL      BUN 16.9 mg/dL      Creatinine 1.16 mg/dL      Sodium 126 mmol/L      Potassium 4.7 mmol/L      Chloride 92 mmol/L      CO2 23.6 mmol/L      Calcium 8.9 mg/dL      Total Protein 7.0 g/dL      Albumin 3.3 g/dL      ALT (SGPT) 13 U/L      AST (SGOT) 13 U/L      Alkaline Phosphatase 78 U/L      Total Bilirubin 0.4 mg/dL      Globulin 3.7 gm/dL      A/G Ratio 0.9 g/dL      BUN/Creatinine Ratio 14.6     Anion Gap 10.4 mmol/L      eGFR 64.9 mL/min/1.73     Narrative:      GFR Categories in Chronic Kidney Disease (CKD)              GFR Category          GFR (mL/min/1.73)    Interpretation  G1                    90 or greater        Normal or high (1)  G2                     60-89                Mild decrease (1)  G3a                   45-59                Mild to moderate decrease  G3b                   30-44                Moderate to severe decrease  G4                    15-29                Severe decrease  G5                    14 or less           Kidney failure    (1)In the absence of evidence of kidney disease, neither GFR category G1 or G2 fulfill the criteria for CKD.    eGFR calculation 2021 CKD-EPI creatinine equation, which does not include race as a factor    CBC & Differential [[id number]]  (Abnormal) Collected: 06/15/25 0523    Specimen: Blood Updated: 06/15/25 0557    Narrative:      The following orders were created for panel order CBC & Differential.  Procedure                               Abnormality         Status                     ---------                               -----------         ------                     CBC Auto Differential[[id number]]        Abnormal            Final result                 Please view results for these tests on the individual orders.    CBC Auto Differential [[id number]]  (Abnormal) Collected: 06/15/25 0523    Specimen: Blood Updated: 06/15/25 0557     WBC 19.74 10*3/mm3      RBC 4.78 10*6/mm3      Hemoglobin 12.6 g/dL      Hematocrit 39.6 %      MCV 82.8 fL      MCH 26.4 pg      MCHC 31.8 g/dL      RDW 14.3 %      RDW-SD 42.9 fl      MPV 10.4 fL      Platelets 205 10*3/mm3      Neutrophil % 89.5 %      Lymphocyte % 6.6 %      Monocyte % 2.8 %      Eosinophil % 0.2 %      Basophil % 0.2 %      Immature Grans % 0.7 %      Neutrophils, Absolute 17.67 10*3/mm3      Lymphocytes, Absolute 1.30 10*3/mm3      Monocytes, Absolute 0.56 10*3/mm3      Eosinophils, Absolute 0.04 10*3/mm3      Basophils, Absolute 0.03 10*3/mm3      Immature Grans, Absolute 0.14 10*3/mm3      nRBC 0.0 /100 WBC     Procalcitonin [[id number]]  (Abnormal) Collected: 06/14/25 2156    Specimen: Blood Updated: 06/15/25 0413     Procalcitonin 0.31 ng/mL     Narrative:    "   As a Marker for Sepsis (Non-Neonates):    1. <0.5 ng/mL represents a low risk of severe sepsis and/or septic shock.  2. >2 ng/mL represents a high risk of severe sepsis and/or septic shock.    As a Marker for Lower Respiratory Tract Infections that require antibiotic therapy:    PCT on Admission    Antibiotic Therapy       6-12 Hrs later    >0.5                Strongly Recommended  >0.25 - <0.5        Recommended  0.1 - 0.25          Discouraged              Remeasure/reassess PCT  <0.1                Strongly Discouraged     Remeasure/reassess PCT    As 28 day mortality risk marker: \"Change in Procalcitonin Result\" (>80% or <=80%) if Day 0 (or Day 1) and Day 4 values are available. Refer to [email or web address]    Change in PCT <=80%  A decrease of PCT levels below or equal to 80% defines a positive change in PCT test result representing a higher risk for 28-day all-cause mortality of patients diagnosed with severe sepsis for septic shock.    Change in PCT >80%  A decrease of PCT levels of more than 80% defines a negative change in PCT result representing a lower risk for 28-day all-cause mortality of patients diagnosed with severe sepsis or septic shock.    This test is Prognostic not Diagnostic, if elevated correlate with clinical findings before administering antibiotic treatment.        Urine Culture - Urine, Straight Cath [[id number]] Collected: 06/14/25 2201    Specimen: Urine from Straight Cath Updated: 06/15/25 0254    Osmolality, Urine - Straight Cath [[id number]]  (Normal) Collected: 06/14/25 2201    Specimen: Urine from Straight Cath Updated: 06/15/25 0118     Osmolality, Urine 644 mOsm/kg     Creatinine Urine Random (kidney function) GFR component - Straight Cath [[id number]] Collected: 06/14/25 2201    Specimen: Urine from Straight Cath Updated: 06/15/25 0104    Osmolality, Serum [[id number]]  (Abnormal) Collected: 06/14/25 2032    Specimen: Blood Updated: 06/14/25 2235     Osmolality " 268 mOsm/kg     High Sensitivity Troponin T 1Hr [[id number]]  (Abnormal) Collected: 06/14/25 2156    Specimen: Blood Updated: 06/14/25 2223     HS Troponin T 35 ng/L      Troponin T Numeric Delta -7 ng/L      Troponin T % Delta -17    Narrative:      High Sensitive Troponin T Reference Range:  <14.0 ng/L- Negative Female for AMI  <22.0 ng/L- Negative Male for AMI  >=14 - Abnormal Female indicating possible myocardial injury.  >=22 - Abnormal Male indicating possible myocardial injury.   Clinicians would have to utilize clinical acumen, EKG, Troponin, and serial changes to determine if it is an Acute Myocardial Infarction or myocardial injury due to an underlying chronic condition.         Urinalysis, Microscopic Only - Straight Cath [[id number]]  (Abnormal) Collected: 06/14/25 2201    Specimen: Urine from Straight Cath Updated: 06/14/25 2214     RBC, UA 0-2 /HPF      WBC, UA 6-10 /HPF      Bacteria, UA 1+ /HPF      Squamous Epithelial Cells, UA 0-2 /HPF      Hyaline Casts, UA 3-6 /LPF      Methodology Automated Microscopy    Urinalysis With Microscopic If Indicated (No Culture) - Straight Cath [[id number]]  (Abnormal) Collected: 06/14/25 2201    Specimen: Urine from Straight Cath Updated: 06/14/25 2214     Color, UA Yellow     Appearance, UA Clear     pH, UA 6.0     Specific Gravity, UA 1.027     Glucose, UA Negative     Ketones, UA Negative     Bilirubin, UA Negative     Blood, UA Negative     Protein, UA 30 mg/dL (1+)     Leuk Esterase, UA Trace     Nitrite, UA Negative     Urobilinogen, UA 1.0 E.U./dL    Blood Culture - Blood, Arm, Right [[id number]] Collected: 06/14/25 2156    Specimen: Blood from Arm, Right Updated: 06/14/25 2200    Blood Culture - Blood, Arm, Left [[id number]] Collected: 06/14/25 2156    Specimen: Blood from Arm, Left Updated: 06/14/25 2200    Blood Gas, Arterial - [[id number]]  (Abnormal) Collected: 06/14/25 2144    Specimen: Arterial Blood Updated: 06/14/25 2147     Site Right Radial     Aleks's  Test Positive     pH, Arterial 7.423 pH units      pCO2, Arterial 38.5 mm Hg      pO2, Arterial 67.3 mm Hg      HCO3, Arterial 25.1 mmol/L      Base Excess, Arterial 0.8 mmol/L      Comment: Serial Number: [id number]:  [id number]        O2 Saturation, Arterial 93.5 %      Hemoglobin, Blood Gas 12.7 g/dL      Hematocrit, Blood Gas 37.0 %      Barometric Pressure for Blood Gas 741.6000 mmHg      Modality Room Air     Hemodilution No    POC Lactate [[id number]]  (Normal) Collected: 06/14/25 2144    Specimen: Arterial Blood Updated: 06/14/25 2147     Lactate 1.0 mmol/L      Comment: Serial Number: [id number]:  [id number]       POC Chem Panel [[id number]]  (Abnormal) Collected: 06/14/25 2144    Specimen: Arterial Blood Updated: 06/14/25 2147     Glucose 149 mg/dL      Comment: Serial Number: [id number]:  [id number]        Sodium 127 mmol/L      POC Potassium 4.0 mmol/L      Ionized Calcium 1.07 mmol/L      Chloride 90 mmol/L      Creatinine 1.12 mg/dL      BUN 16 mg/dL      CO2 Content 23.8 mmol/L     Comprehensive Metabolic Panel [[id number]]  (Abnormal) Collected: 06/14/25 2032    Specimen: Blood Updated: 06/14/25 2108     Glucose 172 mg/dL      BUN 16.2 mg/dL      Creatinine 1.24 mg/dL      Sodium 122 mmol/L      Potassium 4.9 mmol/L      Comment: Slight hemolysis detected by analyzer. Result may be falsely elevated.        Chloride 88 mmol/L      CO2 24.5 mmol/L      Calcium 9.1 mg/dL      Total Protein 7.1 g/dL      Albumin 3.5 g/dL      ALT (SGPT) 15 U/L      AST (SGOT) 23 U/L      Comment: Slight hemolysis detected by analyzer. Result may be falsely elevated.        Alkaline Phosphatase 86 U/L      Total Bilirubin 0.5 mg/dL      Globulin 3.6 gm/dL      A/G Ratio 1.0 g/dL      BUN/Creatinine Ratio 13.1     Anion Gap 9.5 mmol/L      eGFR 59.9 mL/min/1.73     Narrative:      GFR Categories in Chronic Kidney Disease (CKD)              GFR Category          GFR (mL/min/1.73)    Interpretation  G1                    90 or  greater        Normal or high (1)  G2                    60-89                Mild decrease (1)  G3a                   45-59                Mild to moderate decrease  G3b                   30-44                Moderate to severe decrease  G4                    15-29                Severe decrease  G5                    14 or less           Kidney failure    (1)In the absence of evidence of kidney disease, neither GFR category G1 or G2 fulfill the criteria for CKD.    eGFR calculation 2021 CKD-EPI creatinine equation, which does not include race as a factor    High Sensitivity Troponin T [[id number]]  (Abnormal) Collected: 06/14/25 2032    Specimen: Blood Updated: 06/14/25 2105     HS Troponin T 42 ng/L     Narrative:      High Sensitive Troponin T Reference Range:  <14.0 ng/L- Negative Female for AMI  <22.0 ng/L- Negative Male for AMI  >=14 - Abnormal Female indicating possible myocardial injury.  >=22 - Abnormal Male indicating possible myocardial injury.   Clinicians would have to utilize clinical acumen, EKG, Troponin, and serial changes to determine if it is an Acute Myocardial Infarction or myocardial injury due to an underlying chronic condition.         BNP [[id number]]  (Abnormal) Collected: 06/14/25 2032    Specimen: Blood Updated: 06/14/25 2103     proBNP 4,820.0 pg/mL     Narrative:      This assay is used as an aid in the diagnosis of individuals suspected of having heart failure. It can be used as an aid in the diagnosis of acute decompensated heart failure (ADHF) in patients presenting with signs and symptoms of ADHF to the emergency department (ED). In addition, NT-proBNP of <300 pg/mL indicates ADHF is not likely.    Age Range Result Interpretation  NT-proBNP Concentration (pg/mL:      <50             Positive            >450                   Gray                 300-450                    Negative             <300    50-75           Positive            >900                  Franco                 300-900                  Negative            <300      >75             Positive            >1800                  Gray                300-1800                  Negative            <300    Lactic Acid, Plasma [[id number]]  (Normal) Collected: 06/14/25 2032    Specimen: Blood Updated: 06/14/25 2102     Lactate 1.7 mmol/L     [place] Draw [[id number]] Collected: 06/14/25 2032    Specimen: Blood Updated: 06/14/25 2047    Narrative:      The following orders were created for panel order [place] Draw.  Procedure                               Abnormality         Status                     ---------                               -----------         ------                     Green Top (Gel)[[id number]]                                  Final result               Lavender Top[[id number]]                                     Final result               Gold Top - SST[[id number]]                                   Final result               Light Blue Top[[id number]]                                   Final result                 Please view results for these tests on the individual orders.    Green Top (Gel) [[id number]] Collected: 06/14/25 2032    Specimen: Blood Updated: 06/14/25 2047     Extra Tube Hold for add-ons.     Comment: Auto resulted.       Lavender Top [[id number]] Collected: 06/14/25 2032    Specimen: Blood Updated: 06/14/25 2047     Extra Tube hold for add-on     Comment: Auto resulted       Gold Top - SST [[id number]] Collected: 06/14/25 2032    Specimen: Blood Updated: 06/14/25 2047     Extra Tube Hold for add-ons.     Comment: Auto resulted.       Light Blue Top [[id number]] Collected: 06/14/25 2032    Specimen: Blood Updated: 06/14/25 2047     Extra Tube Hold for add-ons.     Comment: Auto resulted       Extra Tubes [[id number]] Collected: 06/14/25 2032    Specimen: Blood Updated: 06/14/25 2047    Narrative:      The following orders were created for panel order Extra Tubes.  Procedure                                Abnormality         Status                     ---------                               -----------         ------                     Franco Top[[id number]]                                         Final result                 Please view results for these tests on the individual orders.    Franco Top [[id number]] Collected: 06/14/25 2032    Specimen: Blood Updated: 06/14/25 2047     Extra Tube Hold for add-ons.     Comment: Auto resulted.       CBC & Differential [[id number]]  (Abnormal) Collected: 06/14/25 2032    Specimen: Blood Updated: 06/14/25 2042    Narrative:      The following orders were created for panel order CBC & Differential.  Procedure                               Abnormality         Status                     ---------                               -----------         ------                     CBC Auto Differential[[id number]]        Abnormal            Final result                 Please view results for these tests on the individual orders.    CBC Auto Differential [[id number]]  (Abnormal) Collected: 06/14/25 2032    Specimen: Blood Updated: 06/14/25 2042     WBC 19.97 10*3/mm3      RBC 4.76 10*6/mm3      Hemoglobin 12.4 g/dL      Hematocrit 39.3 %      MCV 82.6 fL      MCH 26.1 pg      MCHC 31.6 g/dL      RDW 14.4 %      RDW-SD 43.1 fl      MPV 10.9 fL      Platelets 257 10*3/mm3      Neutrophil % 85.2 %      Lymphocyte % 5.9 %      Monocyte % 7.9 %      Eosinophil % 0.3 %      Basophil % 0.2 %      Immature Grans % 0.5 %      Neutrophils, Absolute 17.03 10*3/mm3      Lymphocytes, Absolute 1.18 10*3/mm3      Monocytes, Absolute 1.57 10*3/mm3      Eosinophils, Absolute 0.05 10*3/mm3      Basophils, Absolute 0.04 10*3/mm3      Immature Grans, Absolute 0.10 10*3/mm3      nRBC 0.0 /100 WBC           Physician Progress Notes (last 24 hours)  Notes from 06/14/25 0922 through 06/15/25 0922   No notes of this type exist for this encounter.       Consult Notes (last 24 hours)  Notes from 06/14/25 0922 through  06/15/25 0922   No notes of this type exist for this encounter.     Gracia CURRY  [facility] ,[facility] [phone]  F [phone]

## 2025-06-15 NOTE — PROGRESS NOTES
"[facility] Clinical Pharmacy Services: Vancomycin Pharmacokinetic Initial Consult Note    Curtis Richmond is a 79 y.o. male who is on day  of pharmacy to dose vancomycin for Empiric.    Consult Information  Consulting Provider: Dande  Loading Dose  Given: 2250 mg on  614   @2240  PK/PD Target: Dose AUC  Other Antimicrobials: Cefepime    Imaging Reviewed?: Yes    Microbiology Data   blood cx ordered    Vitals/Labs  Ht: 172.7 cm (67.99\"); Wt: 112 kg (246 lb 14.6 oz)  Temp (24hrs), Av.8 °F (36.6 °C), Min:97.3 °F (36.3 °C), Max:98.4 °F (36.9 °C)   Estimated Creatinine Clearance: 64.7 mL/min (by C-G formula based on SCr of 1.16 mg/dL).       Results from last 7 days   Lab Units 06/15/25  0523 25  2144 25   VANCOMYCIN RM mcg/mL 19.13  --   --    CREATININE mg/dL 1.16 1.12 1.24   WBC 10*3/mm3 19.74*  --  19.97*     Assessment/Plan:    Vancomycin Dose: 750 mg IV every 12 hours; which provides the following predicted parameters:  AUC24,ss: 453 mg/L.hr  Probability of AUC24 > 400: 70 %  Ctrough,ss: 15.2 mg/L  Probability of Ctrough,ss > 20: 17 %  Probability of nephrotoxicity (Lodise CRYSTAL ): 10 %    Vanc Trough ordered for AM.      Pharmacy will follow patient's kidney function and will adjust doses and obtain levels as necessary. Thank you for involving pharmacy in this patient's care. Please contact pharmacy with any questions or concerns.                           Gracia Love  Clinical Pharmacist    "

## 2025-06-15 NOTE — CONSULTS
[facility]   Consult Note    Patient Name: Curtis Richmond  : 1945  MRN: [id number]  Primary Care Physician:  Kat Eubanks APRN  Referring Physician: No Known Provider  Date of admission: 2025    Subjective   Subjective     Reason for Consult/ Chief Complaint: Hyponatremia    HPI:  Curtis Richmond is a 79 y.o. male 79-year-old male with past medical history of hypertension, hyperlipidemia, CVA with residual left-sided weakness bedbound, atrial fibrillation status post watchman not on anticoagulation who presented the ER due to altered mental status which has been noticeably worsened over the last 24 hours.  It appears patient was in his usual state of health however he appeared to be more confused.  This had been progressively getting worse since that day.  Per documentation patient did not have any fevers chills nausea or vomiting.    In the ER patient's blood pressures and vitals were stable.  His heart rate was elevated at 117.  His chemistries were largely unremarkable except for sodium of 122 and proBNP of around 5000.  His white count was elevated at 20.  His urine analysis showed some hyaline cast with low suspicion for UTI.  CT head was unremarkable.  CT angiogram did not show any PE or infiltrate.  CT abdomen pelvis was unremarkable as well.  Patient was started on empiric antibiotics with neb treatments.  He was also given dose of Lasix due to concerns for volume overload and possible CHF exacerbation.  Nephrology has been consulted for hyponatremia    Review of Systems  All review of systems negative except as given below.    Personal History     Past Medical History:   Diagnosis Date    Arthritis     BACK.    At risk for central sleep apnea     STOP BANG 5    Bladder cancer     DR DAGMAR MENA. HISTORY OF. BLADDER WASH, TURBP    Chronic back pain     COPD (chronic obstructive pulmonary disease)     EMPHYSEMA- NO INHALERS    History of loop recorder     CURRENTLY HAS [employer]  SCIENTIFIC LOOP RECORDER S/P STROKE WIFE REPORTS CURRENTLY NOT FUNCTIONING BUT PT HAD WATCHMAN DEVICE IMPLANTED. 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    Wears glasses     Wheelchair bound     SEPT 2022       Past Surgical History:   Procedure Laterality Date    ATRIAL APPENDAGE EXCLUSION LEFT WITH TRANSESOPHAGEAL ECHOCARDIOGRAM N/A 11/09/2021    Procedure: 11/9/21 Atrial Appendage Occlusion on eliquis hold 2 doses prior;  Surgeon: Primo Tobias DO;  Location: [facility] INVASIVE LOCATION;  Service: Cardiology;  Laterality: N/A;    BACK SURGERY  06/1980    CARDIAC CATHETERIZATION  02/2001    CARDIOVERSION      AT [facility] YEARS AGO (40 YEARS AGO)    CATARACT EXTRACTION WITH INTRAOCULAR LENS IMPLANT Bilateral 2019    CHOLECYSTECTOMY  06/2002    COLONOSCOPY      CYSTOSCOPY      MULTIPLE    CYSTOSCOPY BLADDER BIOPSY N/A 08/24/2021    Procedure: CYSTOSCOPY, TRANSURETHRAL RESECTION OF BLADDER TUMOR;  Surgeon: Kaelyn Alvarado MD;  Location: [facility] OR;  Service: Urology;  Laterality: N/A;    CYSTOSCOPY RETROGRADE PYELOGRAM Bilateral 11/21/2024    Procedure: CYSTOSCOPY RETROGRADE PYELOGRAM  Scope of the bladder with x-rays of the ureters using dye;  Surgeon: Kaelyn Alvarado MD;  Location: [facility] OR;  Service: Urology;  Laterality: Bilateral;    EMBOLIZATION CEREBRAL N/A 04/14/2021    Procedure: CEREBRAL ANGIOGRAM;  Surgeon: Noah Bruno MD;  Location: [facility] OR 18/19;  Service: Interventional Radiology;  Laterality: N/A;    EPIDURAL      LUMBAR    JOINT REPLACEMENT Right     Knee    KNEE ARTHROPLASTY Right 2013    NEPHROURETERECTOMY  Left 04/2019    TRANSURETHRAL RESECTION OF BLADDER TUMOR         Family History: family history includes Heart attack in his father; No Known Problems in an other family member. Otherwise pertinent FHx was reviewed and not pertinent to current issue.    Social History:  reports that he quit smoking about 53 years ago. His smoking use included cigarettes. He has never used smokeless tobacco. He reports that he does not currently use alcohol. He reports that he does not use drugs.    Home Medications:  amLODIPine, amitriptyline, aspirin, atorvastatin, carvedilol, cetirizine, and losartan    Allergies:  No Known Allergies    Objective    Objective     Vitals:   Temp:  [97.3 °F (36.3 °C)-98.4 °F (36.9 °C)] 97.7 °F (36.5 °C)  Heart Rate:  [101-123] 108  Resp:  [15-26] 18  BP: ()/(58-84) 106/58  Flow (L/min) (Oxygen Therapy):  [2] 2    Physical Exam:             Constitutional:         Awake, alert responsive, conversant, no obvious distress   Eyes:                       PERRLA, sclerae anicteric, no conjunctival injection   HEENT:                   Moist mucous membranes, no nasal or eye discharge, no throat congestion   Neck:                      Supple, no thyromegaly, no lymphadenopathy, trachea midline, no elevated JVD   Respiratory:           Clear to auscultation bilaterally, nonlabored respirations    Cardiovascular:     RRR, no murmurs, rubs, or gallops, palpable pedal pulses bilaterally, No bilateral ankle edema   Gastrointestinal:   Positive bowel sounds, soft, nontender, non-distended, no organomegaly   Musculoskeletal:  No clubbing or cyanosis to extremities, muscle wasting, joint swelling, muscle weakness   Psychiatric:              Appropriate affect, cooperative   Neurologic:            Awake alert, oriented x 3, strength symmetric in all extremities, Cranial Nerves grossly intact to confrontation, speech clear   Skin:                      No rashes, bruising, skin ulcers, petechiae or  ecchymosis    Result Review    Result Review:  I have personally reviewed the results from the time of this admission to 6/15/2025 10:46 EDT and agree with these findings:  []  Laboratory  []  Microbiology  []  Radiology  []  EKG/Telemetry   []  Cardiology/Vascular   []  Pathology  []  Old records  []  Other:    Results from last 7 days   Lab Units 06/15/25  0523 06/14/25 2032   WBC 10*3/mm3 19.74* 19.97*   HEMOGLOBIN g/dL 12.6* 12.4*   PLATELETS 10*3/mm3 205 257     Results from last 7 days   Lab Units 06/15/25  0523 06/14/25 2144 06/14/25 2032   SODIUM mmol/L 126*  --  122*   POTASSIUM mmol/L 4.7  --  4.9   CHLORIDE mmol/L 92*  --  88*   CO2 mmol/L 23.6  --  24.5   ANION GAP mmol/L 10.4  --  9.5   BUN mg/dL 16.9  --  16.2   CREATININE mg/dL 1.16 1.12 1.24   GLUCOSE mg/dL 179*  --  172*   EGFR mL/min/1.73 64.9  --  59.9*   CALCIUM mg/dL 8.9  --  9.1   MAGNESIUM mg/dL 2.1  --   --    ALBUMIN g/dL 3.3*  --  3.5   BILIRUBIN mg/dL 0.4  --  0.5   ALK PHOS U/L 78  --  86   ALT (SGPT) U/L 13  --  15   AST (SGOT) U/L 13  --  23       Assessment & Plan   Assessment / Plan     Active Hospital Problems:  Active Hospital Problems    Diagnosis     **AMS (altered mental status)     Hyponatremia     Morbid obesity     Wheelchair bound     Presence of Watchman left atrial appendage closure device     COPD (chronic obstructive pulmonary disease)     Stroke      79-year-old male with past medical history of hypertension, hyperlipidemia, CVA with residual left-sided weakness bedbound, atrial fibrillation status post watchman not on anticoagulation who presented the ER due to altered mental status which has been noticeably worsened over the last 24 hours noted to have imaging that was largely unremarkable including CT head, CT angiogram of the lungs and CT abdomen pelvis.  He has an elevated white count with UA showing many hyaline cast and low suspicion for UTI.  Noted to have a sodium of 122 with improvement with Lasix to  127    Plan:   Patient appears to be euvolemic at this time and will hold off on further diuretics for today  Fluid restriction of 1500  Will continue to monitor over the next 24 hours  Urine indicis will not be ordered as he is already received diuretics and they would be affected by it  Will further reassess tomorrow morning with further recommendations    Electronically signed by Laly Gallegos MD, 06/15/25, 8:42 AM EDT.

## 2025-06-15 NOTE — ED PROVIDER NOTES
"SHARED VISIT ATTESTATION:    This visit was performed by myself and an APC.  I personally approved the management plan/medical decision making and take responsibility for the patient management.      SHARED VISIT NOTE:    Patient is 79 y.o. year old male that presents to the ED for evaluation of altered mental status.     Physical Exam    ED Course:    /67 (BP Location: Right arm, Patient Position: Sitting)   Pulse 117   Temp 98.4 °F (36.9 °C) (Oral)   Resp 26   Ht 172.7 cm (68\")   Wt 113 kg (249 lb 12.5 oz)   SpO2 93%   BMI 37.98 kg/m²       The following orders were placed and all results were independently analyzed by me:  Orders Placed This Encounter   Procedures    Blood Culture - Blood,    Blood Culture - Blood,    XR Chest 1 View    CT Abdomen Pelvis With Contrast    CT Head Without Contrast    CT Angiogram Chest Pulmonary Embolism    [place] Draw    Comprehensive Metabolic Panel    Urinalysis With Microscopic If Indicated (No Culture) - Urine, Catheter    CBC Auto Differential    BNP    Lactic Acid, Plasma    High Sensitivity Troponin T    Arterial Blood Gas + Electrolytes    Continuous Pulse Oximetry    Vital Signs    Oxygen Therapy- Nasal Cannula; Titrate 1-6 LPM Per SpO2; 90 - 95%    POC Glucose Once    ECG 12 Lead Tachycardia    Insert Peripheral IV    Fall Precautions    CBC & Differential    Green Top (Gel)    Lavender Top    Gold Top - SST    Light Blue Top    Extra Tubes    Gray Top       Medications Given in the Emergency Department:  Medications   sodium chloride 0.9 % flush 10 mL (has no administration in time range)   vancomycin 2250 mg/500 mL 0.9% NS IVPB (BHS) (has no administration in time range)   piperacillin-tazobactam (ZOSYN) IVPB 3.375 g IVPB in 100 mL NS (VTB) (has no administration in time range)   ipratropium-albuterol (DUO-NEB) nebulizer solution 3 mL (has no administration in time range)   methylPREDNISolone sodium succinate (SOLU-Medrol) 125 mg in sterile water " (preservative free) 2 mL (has no administration in time range)   famotidine (PEPCID) injection 20 mg (has no administration in time range)        ED Course:    ED Course as of 06/15/25 0118   Sat Jun 14, 2025 2036 EKG reviewed:  Patient is in a atrial fibrillation with a heart rate of 107 bpm.  Patient's intervals are within normal limit.  Patient's axis is within normal limits.  No evidence of ST segment elevation or depression in contiguous leads or with reciprocal changes. [CB]   2038 Triage vitals reviewed.  Evidence of tachycardia and tachypnea. [CB]   2229 Sodium Correction for Hyperglycemia - MDCalc  Calculated on Jun 14 2025 10:29 PM  123 mEq/L -> Corrected Sodium (Agueda, 1973)  124 mEq/L -> Corrected Sodium (Margaret, 1999) [CB]   2230 Repeat troponin of 35 negative delta.  No evidence of MI [CB]      ED Course User Index  [CB] Derick Segal, PATHEE       Labs:    Lab Results (last 24 hours)       Procedure Component Value Units Date/Time    CBC & Differential [[id number]]  (Abnormal) Collected: 06/14/25 2032    Specimen: Blood Updated: 06/14/25 2042    Narrative:      The following orders were created for panel order CBC & Differential.  Procedure                               Abnormality         Status                     ---------                               -----------         ------                     CBC Auto Differential[[id number]]        Abnormal            Final result                 Please view results for these tests on the individual orders.    Comprehensive Metabolic Panel [[id number]] Collected: 06/14/25 2032    Specimen: Blood Updated: 06/14/25 2037    CBC Auto Differential [[id number]]  (Abnormal) Collected: 06/14/25 2032    Specimen: Blood Updated: 06/14/25 2042     WBC 19.97 10*3/mm3      RBC 4.76 10*6/mm3      Hemoglobin 12.4 g/dL      Hematocrit 39.3 %      MCV 82.6 fL      MCH 26.1 pg      MCHC 31.6 g/dL      RDW 14.4 %      RDW-SD 43.1 fl      MPV 10.9 fL      Platelets 257  10*3/mm3      Neutrophil % 85.2 %      Lymphocyte % 5.9 %      Monocyte % 7.9 %      Eosinophil % 0.3 %      Basophil % 0.2 %      Immature Grans % 0.5 %      Neutrophils, Absolute 17.03 10*3/mm3      Lymphocytes, Absolute 1.18 10*3/mm3      Monocytes, Absolute 1.57 10*3/mm3      Eosinophils, Absolute 0.05 10*3/mm3      Basophils, Absolute 0.04 10*3/mm3      Immature Grans, Absolute 0.10 10*3/mm3      nRBC 0.0 /100 WBC     BNP [[id number]] Collected: 06/14/25 2032    Specimen: Blood Updated: 06/14/25 2037    Lactic Acid, Plasma [[id number]] Collected: 06/14/25 2032    Specimen: Blood Updated: 06/14/25 2046    High Sensitivity Troponin T [[id number]] Collected: 06/14/25 2032    Specimen: Blood Updated: 06/14/25 2048             Imaging:    No Radiology Exams Resulted Within Past 24 Hours    MDM:    Procedures    Labs were collected in the emergency department and all labs were reviewed and interpreted by me.  X-ray were performed in the emergency department and all X-ray impressions were independently interpreted by me.  An EKG was performed and the EKG was interpreted by me.  CT scan was performed in the emergency department and the CT scan radiology impression was interpreted by me.                     Perri Avalos MD  20:57 EDT  06/14/25         Perri Avalos MD  06/15/25 0118

## 2025-06-15 NOTE — PLAN OF CARE
"Goal Outcome Evaluation:  Plan of Care Reviewed With: patient, spouse        Progress: improving  Outcome Evaluation: pt aox2 to self and place, disoriented to situation and time. wife states patient is back to \"himself\" no c/o pain or discomfort. wet to dry dressing placed on wound on coccyx, photo obtained for rash/wound on neck and ointment ordered per MD. on 2L NC VSS. falls precautions in place, family to remain at bedside. call light in reach                             "

## 2025-06-15 NOTE — PROGRESS NOTES
[facility]   Hospitalist Progress Note  Date: 6/15/2025  Patient Name: Curtis Richmond  : 1945  MRN: [id number]  Date of admission: 2025  Room/Bed: [facility]      Subjective   Subjective     Chief Complaint: confusion     Summary:Curtis Richmond is a 79 y.o. male  with a past medical history of hypertension, hyperlipidemia, A-fib status post Watchman device, not on anticoagulation due to genitourinary bleeding, CVA with residual left-sided weakness, bedbound status presented to the ED for evaluation of altered mental status since last night.  History provided by patient wife at the bedside.  Patient was in usual state of health until last night where he appeared to be confused.  Denied any fevers.  Throughout the day today patient continued to get more confused and withdrawn and brought him to the ED for further evaluation.  Upon arrival, vital signs temperature 98.4, pulse 117, respiratory 26, blood pressure 117/67 on room air saturating at 93%.  ABG 7.4 , troponin 42, repeat 35, proBNP 4820 sodium 122, chloride 88, rest of the CMP with no significant findings, normal lactic acid, osmolality 268, WBC 19.97, hemoglobin 12.4, platelets 257.  Urinalysis showed 1+ bacteria, 6-10 WBC.  Blood cultures in process.  CT head without contrast showed no acute abnormality.  CTA chest showed no PE, no infiltrate.  CT abdomen pelvis with contrast showed no significant findings.  Received vancomycin, Zosyn, Solu-Medrol, nebulizer treatments in the ED.  Patient admitted for further evaluation and management of sepsis, possible UTI, concern for new CHF exacerbation, hyponatremia      Interval Followup: 6-  Patient's wife is at the bedside today and states that she feels he has returned back to his baseline  Patient's cultures remain in process  Patient sodium has improved  Patient remains on a fluid restriction from nephrology  Vital signs remained stable    Review of Systems    All systems reviewed  and negative except for what is outlined above.      Objective   Objective     Vitals:   Temp:  [97.3 °F (36.3 °C)-98.4 °F (36.9 °C)] 97.7 °F (36.5 °C)  Heart Rate:  [101-123] 108  Resp:  [15-26] 18  BP: ()/(58-84) 106/58  Flow (L/min) (Oxygen Therapy):  [2] 2    Physical Exam   General: Awake, alert, NAD resting in bed with his wife at the bedside  HENT: NCAT, MMM  Eyes: pupils equal, no scleral icterus  Cardiovascular: RRR, no murmurs   Pulmonary: CTA bilaterally; no wheezes; no conversational dyspnea  Gastrointestinal: S/ND/NT, +BS  Musculoskeletal: No gross deformities  Skin: No jaundice, no rash on exposed skin appreciated  Neuro: CN II through XII grossly intact; speech clear; no tremor  Psych: Mood and affect appropriate  : No Sequeira catheter; no suprapubic tenderness    Result Review    Result Review:  I have personally reviewed these results:  [x]  Laboratory      Lab 06/15/25  0523 06/14/25  2156 06/14/25  2144 06/14/25  2032   WBC 19.74*  --   --  19.97*   HEMOGLOBIN 12.6*  --   --  12.4*   HEMATOCRIT 39.6  --   --  39.3   PLATELETS 205  --   --  257   NEUTROS ABS 17.67*  --   --  17.03*   IMMATURE GRANS (ABS) 0.14*  --   --  0.10*   LYMPHS ABS 1.30  --   --  1.18   MONOS ABS 0.56  --   --  1.57*   EOS ABS 0.04  --   --  0.05   MCV 82.8  --   --  82.6   PROCALCITONIN  --  0.31*  --   --    LACTATE  --   --  1.0 1.7         Lab 06/15/25  0523 06/14/25  2144 06/14/25  2032   SODIUM 126*  --  122*   POTASSIUM 4.7  --  4.9   CHLORIDE 92*  --  88*   CO2 23.6  --  24.5   ANION GAP 10.4  --  9.5   BUN 16.9  --  16.2   CREATININE 1.16 1.12 1.24   EGFR 64.9  --  59.9*   GLUCOSE 179*  --  172*   CALCIUM 8.9  --  9.1   MAGNESIUM 2.1  --   --    PHOSPHORUS 3.8  --   --          Lab 06/15/25  0523 06/14/25 2032   TOTAL PROTEIN 7.0 7.1   ALBUMIN 3.3* 3.5   GLOBULIN 3.7 3.6   ALT (SGPT) 13 15   AST (SGOT) 13 23   BILIRUBIN 0.4 0.5   ALK PHOS 78 86         Lab 06/14/25 2156 06/14/25 2032   PROBNP  --  4,820.0*    HSTROP T 35* 42*                 Lab 06/14/25  2144   PH, ARTERIAL 7.423   PCO2, ARTERIAL 38.5   PO2 ART 67.3*   O2 SATURATION ART 93.5*   HCO3 ART 25.1   BASE EXCESS ART 0.8     Brief Urine Lab Results  (Last result in the past 365 days)        Color   Clarity   Blood   Leuk Est   Nitrite   Protein   CREAT   Urine HCG        06/14/25 2201 Yellow   Clear   Negative   Trace   Negative   30 mg/dL (1+)                 [x]  Microbiology   Microbiology Results (last 10 days)       ** No results found for the last 240 hours. **          [x]  Radiology  CT Abdomen Pelvis With Contrast  Result Date: 6/14/2025  No definite significant acute finding is appreciated.   Portions of this note were completed with a voice recognition program.  6/14/2025 10:19 PM by Cortez Montes De Oca MD on Workstation: [employer]      CT Angiogram Chest Pulmonary Embolism  Result Date: 6/14/2025  The study is motion-limited. No pulmonary embolism. Grossly, no thoracic aortic aneurysm or dissection. Grossly, no acute infiltrate.   Portions of this note were completed with a voice recognition program.  6/14/2025 10:09 PM by Cortez Montes De Oca MD on Workstation: [employer]      CT Head Without Contrast  Result Date: 6/14/2025  No acute brain abnormality is appreciated. No acute intracranial hemorrhage. No acute infarction. No midline shift or acute intracranial herniation syndrome. Please see above comments for further detail.    Portions of this note were completed with a voice recognition program.  6/14/2025 10:05 PM by Cortez Montes De Oca MD on Workstation: [employer]      []  EKG/Telemetry   []  Cardiology/Vascular   []  Pathology  []  Old records  []  Other:    Assessment & Plan   Assessment / Plan     Assessment:  Change in mental status of unclear etiology  Concern for possible UTI contributing to metabolic encephalopathy  Acute CHF exacerbation, new onset?  Hyponatremia, clinically significant  Hypertension  Hyperlipidemia  History of atrial fibrillation with  history of Watchman device    Plan:  Patient remains admitted to the medicine service  For now continue patient on cefepime and vancomycin while awaiting blood and urine cultures  Patient received IV Lasix x 1.  Continue to hold any further Lasix  Echocardiogram pending  Nephrology consulted for hyponatremia which already appears to be improved  Continue patient on fluid restriction  Resume patient on diet  Resume home medication  Discussed plan with nurse and with patient  Repeat CBC and BMP in the morning to monitor electrolytes and hemoglobin as well as white blood cell count       Discussed with RN.    VTE Prophylaxis:  Pharmacologic VTE prophylaxis orders are present.        CODE STATUS:   Code Status (Patient has no pulse and is not breathing): CPR (Attempt to Resuscitate)  Medical Interventions (Patient has pulse or is breathing): Full Support  Level Of Support Discussed With: Patient      Electronically signed by Spencer Vera DO, 6/15/2025, 09:57 EDT.

## 2025-06-15 NOTE — ED PROVIDER NOTES
Time: 8:52 PM EDT  Date of encounter:  6/14/2025  Independent Historian/Clinical History and Information was obtained by:   Patient    History is limited by: N/A    Chief Complaint: Altered mental status      History of Present Illness:  Patient is a 79 y.o. year old male who presents to the emergency department for evaluation of mental status x 2 days.  Patient has a prior medical history significant for A-fib paroxysmal with a current Watchman device placement secondary to history of genitourinary bleeding.  Patient's wife at bedside reports patient began to act altered beginning last night.  Reports she brought him in today as he did not improve with fluids.  Reports a history of frequent UTIs.  Reports patient has not been complaining of any known pain.  Denies any known falls or head injuries.  Denies anticoagulation.  Denies known fevers.        Patient Care Team  Primary Care Provider: Kat Eubanks APRN    Past Medical History:     No Known Allergies  Past Medical History:   Diagnosis Date    Arthritis     BACK.    At risk for central sleep apnea     STOP BANG 5    Bladder cancer     DR DAGMAR MENA. HISTORY OF. BLADDER WASH, TURBP    Chronic back pain     COPD (chronic obstructive pulmonary disease)     EMPHYSEMA- NO INHALERS    History of loop recorder     CURRENTLY HAS [employer] LOOP RECORDER S/P STROKE WIFE REPORTS CURRENTLY NOT FUNCTIONING BUT PT HAD WATCHMAN DEVICE IMPLANTED. 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    Wears glasses     Wheelchair bound     SEPT 2022     Past Surgical History:   Procedure Laterality Date    ATRIAL  APPENDAGE EXCLUSION LEFT WITH TRANSESOPHAGEAL ECHOCARDIOGRAM N/A 11/09/2021    Procedure: 11/9/21 Atrial Appendage Occlusion on eliquis hold 2 doses prior;  Surgeon: Primo Tobias DO;  Location: [facility] EP INVASIVE LOCATION;  Service: Cardiology;  Laterality: N/A;    BACK SURGERY  06/1980    CARDIAC CATHETERIZATION  02/2001    CARDIOVERSION      AT [facility] YEARS AGO (40 YEARS AGO)    CATARACT EXTRACTION WITH INTRAOCULAR LENS IMPLANT Bilateral 2019    CHOLECYSTECTOMY  06/2002    COLONOSCOPY      CYSTOSCOPY      MULTIPLE    CYSTOSCOPY BLADDER BIOPSY N/A 08/24/2021    Procedure: CYSTOSCOPY, TRANSURETHRAL RESECTION OF BLADDER TUMOR;  Surgeon: Kaelyn Alvarado MD;  Location: [facility] MAIN OR;  Service: Urology;  Laterality: N/A;    CYSTOSCOPY RETROGRADE PYELOGRAM Bilateral 11/21/2024    Procedure: CYSTOSCOPY RETROGRADE PYELOGRAM  Scope of the bladder with x-rays of the ureters using dye;  Surgeon: Kaelyn Alvarado MD;  Location: [facility] MAIN OR;  Service: Urology;  Laterality: Bilateral;    EMBOLIZATION CEREBRAL N/A 04/14/2021    Procedure: CEREBRAL ANGIOGRAM;  Surgeon: Noah Bruno MD;  Location: [facility] HYBRID OR 18/19;  Service: Interventional Radiology;  Laterality: N/A;    EPIDURAL      LUMBAR    JOINT REPLACEMENT Right     Knee    KNEE ARTHROPLASTY Right 2013    NEPHROURETERECTOMY Left 04/2019    TRANSURETHRAL RESECTION OF BLADDER TUMOR       Family History   Problem Relation Age of Onset    Heart attack Father     No Known Problems Other     Malig Hyperthermia Neg Hx        Home Medications:  Prior to Admission medications    Medication Sig Start Date End Date Taking? Authorizing Provider   amitriptyline (ELAVIL) 10 MG tablet Take 1 tablet by mouth Every Night.    ProviderAnu MD   amLODIPine (NORVASC) 2.5 MG tablet Take 1 tablet by mouth Every Evening.    ProviderAnu MD   aspirin 81 MG EC tablet Take 1 tablet by mouth Daily. 8/23/22   Ciro Patterson PA   atorvastatin  "(LIPITOR) 40 MG tablet Take 1 tablet by mouth Every Night. 21   Anu Gaviria MD   carvedilol (COREG) 6.25 MG tablet Take 1 tablet by mouth 2 (Two) Times a Day. 21   Anu Gaviria MD   cephalexin (KEFLEX) 500 MG capsule Take 1 capsule by mouth 3 (Three) Times a Day. 3/21/25   Lorenzo Hawkins MD   cetirizine (zyrTEC) 10 MG tablet Take 1 tablet by mouth Daily. 25   Anu Gaviria MD   Cholecalciferol (Vitamin D3) 1.25 MG (12014 UT) capsule Take 1 capsule by mouth 1 (One) Time Per Week.    Anu Gaviria MD   loratadine (CLARITIN) 10 MG tablet Take 1 tablet by mouth Daily.    Anu Gaviria MD   losartan (Cozaar) 100 MG tablet Take 1 tablet by mouth Daily. 22   Iveth Vieira,    ondansetron ODT (ZOFRAN-ODT) 4 MG disintegrating tablet Place 1 tablet on the tongue Every 8 (Eight) Hours As Needed for Nausea or Vomiting. 3/21/25   Lorenzo Hawkins MD        Social History:   Social History     Tobacco Use    Smoking status: Former     Current packs/day: 0.00     Types: Cigarettes     Quit date: 1972     Years since quittin.4    Smokeless tobacco: Never    Tobacco comments:     QUIT 50 YRS AGO. SMOKED 3 YEARS IN [employer]   Vaping Use    Vaping status: Never Used   Substance Use Topics    Alcohol use: Not Currently    Drug use: Never         Review of Systems:  Review of Systems     Physical Exam:  /76 (BP Location: Right arm, Patient Position: Lying)   Pulse 118   Temp 97.9 °F (36.6 °C) (Oral)   Resp 20   Ht 172.7 cm (67.99\")   Wt 112 kg (246 lb 14.6 oz)   SpO2 96%   BMI 37.55 kg/m²     Physical Exam  Vitals reviewed.   Constitutional:       Appearance: Normal appearance.   HENT:      Head: Normocephalic.   Eyes:      Extraocular Movements: Extraocular movements intact.      Conjunctiva/sclera: Conjunctivae normal.   Cardiovascular:      Rate and Rhythm: Tachycardia present. Rhythm irregular.   Pulmonary:      Breath sounds: Wheezing present. "   Abdominal:      General: There is no distension.      Tenderness: There is no abdominal tenderness. There is no right CVA tenderness, left CVA tenderness or guarding.   Skin:     General: Skin is warm.      Coloration: Skin is not cyanotic.   Neurological:      Mental Status: He is alert. He is disoriented.      GCS: GCS eye subscore is 4. GCS verbal subscore is 5. GCS motor subscore is 6.      Cranial Nerves: No cranial nerve deficit.   Psychiatric:         Attention and Perception: Attention and perception normal.         Mood and Affect: Mood normal.                    Medical Decision Making:      Comorbidities that affect care:    Atrial Fibrillation, Cancer, COPD, Hypertension, Smoking, Obesity    External Notes reviewed:    Previous Clinic Note: Previous family med visit on 4/15/2025 reviewed patient was seen for other hyperlipidemia      The following orders were placed and all results were independently analyzed by me:  Orders Placed This Encounter   Procedures    Blood Culture - Blood,    Blood Culture - Blood,    Urine Culture - Urine, Straight Cath    CT Abdomen Pelvis With Contrast    CT Head Without Contrast    CT Angiogram Chest Pulmonary Embolism    [place] Draw    Comprehensive Metabolic Panel    Urinalysis With Microscopic If Indicated (No Culture) - Urine, Catheter    CBC Auto Differential    BNP    Lactic Acid, Plasma    High Sensitivity Troponin T    Arterial Blood Gas + Electrolytes    High Sensitivity Troponin T 1Hr    Blood Gas, Arterial -    Urinalysis, Microscopic Only - Urine, Clean Catch    Osmolality, Serum    Phosphorus    Magnesium    Comprehensive Metabolic Panel    Osmolality, Urine - Urine, Clean Catch    Creatinine Urine Random (kidney function) GFR component - Urine, Clean Catch    Sodium, Urine, Random - Urine, Clean Catch    Procalcitonin    CBC Auto Differential    Basic Metabolic Panel    Vancomycin, Random    Vancomycin, Trough    CBC (No Diff)    Magnesium    Diet:  Regular/House, Fluid Restriction (240 mL/tray); 1500 mL/day; Fluid Consistency: Thin (IDDSI 0)    Continuous Pulse Oximetry    Vital Signs    Vital Signs    Intake & Output    Weigh Patient    Oral Care    Saline Lock & Maintain IV Access    Change Dressing    Code Status and Medical Interventions: CPR (Attempt to Resuscitate); Full Support    Inpatient Hospitalist Consult    Inpatient Nephrology Consult    Inpatient Case Management  Consult    Inpatient Nutrition Consult    PT Consult: Eval & Treat Functional Mobility Below Baseline    Oxygen Therapy- Nasal Cannula; Titrate 1-6 LPM Per SpO2; 90 - 95%    Capnography    SLP Consult: Eval & Treat Other; Wife states he has difficulty eating solid foods, has been feeding him soft foods    POC Glucose Once    POC Chem Panel    POC Lactate    ECG 12 Lead Tachycardia    Adult Transthoracic Echo Complete W/ Cont if Necessary Per Protocol    Wound Ostomy Eval & Treat    Insert Peripheral IV    Insert Peripheral IV    Inpatient Admission    Fall Precautions    CBC & Differential    Green Top (Gel)    Lavender Top    Gold Top - SST    Light Blue Top    Extra Tubes    Gray Top    CBC & Differential       Medications Given in the Emergency Department:  Medications   sodium chloride 0.9 % flush 10 mL (has no administration in time range)   sodium chloride 0.9 % flush 10 mL (10 mL Intravenous Given 6/15/25 1057)   sodium chloride 0.9 % flush 10 mL (has no administration in time range)   sodium chloride 0.9 % infusion 40 mL (has no administration in time range)   Pharmacy to dose vancomycin (has no administration in time range)   dextrose (GLUTOSE) oral gel 15 g (has no administration in time range)   dextrose (D50W) (25 g/50 mL) IV injection 25 g (has no administration in time range)   glucagon (GLUCAGEN) injection 1 mg (has no administration in time range)   heparin (porcine) 5000 UNIT/ML injection 5,000 Units (5,000 Units Subcutaneous Not Given 6/15/25 1346)    cefepime 2000 mg IVPB in 100 mL NS (VTB) (2,000 mg Intravenous New Bag 6/15/25 1239)   vancomycin 750 mg in sodium chloride 0.9 % 250 mL IVPB-VTB (750 mg Intravenous New Bag 6/15/25 1055)   amitriptyline (ELAVIL) tablet 25 mg (has no administration in time range)   amLODIPine (NORVASC) tablet 2.5 mg (2.5 mg Oral Given 6/15/25 1642)   aspirin EC tablet 81 mg (81 mg Oral Given 6/15/25 1055)   atorvastatin (LIPITOR) tablet 40 mg (has no administration in time range)   carvedilol (COREG) tablet 6.25 mg (6.25 mg Oral Given 6/15/25 1057)   nystatin (MYCOSTATIN) ointment 1 Application (1 Application Topical Given 6/15/25 1502)   vancomycin 2250 mg/500 mL 0.9% NS IVPB (BHS) (0 mg Intravenous Stopped 6/15/25 0116)   piperacillin-tazobactam (ZOSYN) IVPB 3.375 g IVPB in 100 mL NS (VTB) (0 g Intravenous Stopped 6/14/25 2240)   ipratropium-albuterol (DUO-NEB) nebulizer solution 3 mL (3 mL Nebulization Given 6/14/25 2100)   methylPREDNISolone sodium succinate (SOLU-Medrol) 125 mg in sterile water (preservative free) 2 mL (125 mg Intravenous Given 6/14/25 2139)   famotidine (PEPCID) injection 20 mg (20 mg Intravenous Given 6/14/25 2142)   iopamidol (ISOVUE-370) 76 % injection 100 mL (100 mL Intravenous Given 6/14/25 2125)   furosemide (LASIX) injection 40 mg (40 mg Intravenous Given 6/14/25 2309)   cefepime 2000 mg IVPB in 100 mL NS (VTB) (2,000 mg Intravenous New Bag 6/15/25 0424)        ED Course:    ED Course as of 06/15/25 1737   Sat Jun 14, 2025 2036 EKG reviewed:  Patient is in a atrial fibrillation with a heart rate of 107 bpm.  Patient's intervals are within normal limit.  Patient's axis is within normal limits.  No evidence of ST segment elevation or depression in contiguous leads or with reciprocal changes. [CB]   2038 Triage vitals reviewed.  Evidence of tachycardia and tachypnea. [CB]   2229 Sodium Correction for Hyperglycemia - MDCalc  Calculated on Jun 14 2025 10:29 PM  123 mEq/L -> Corrected Sodium (Agueda  1973)  124 mEq/L -> Corrected Sodium (Margaret, 1999) [CB]   2230 Repeat troponin of 35 negative delta.  No evidence of MI [CB]      ED Course User Index  [CB] Derick Segal, GERDA       Labs:    Lab Results (last 24 hours)       Procedure Component Value Units Date/Time    CBC & Differential [[id number]]  (Abnormal) Collected: 06/14/25 2032    Specimen: Blood Updated: 06/14/25 2042    Narrative:      The following orders were created for panel order CBC & Differential.  Procedure                               Abnormality         Status                     ---------                               -----------         ------                     CBC Auto Differential[[id number]]        Abnormal            Final result                 Please view results for these tests on the individual orders.    Comprehensive Metabolic Panel [[id number]]  (Abnormal) Collected: 06/14/25 2032    Specimen: Blood Updated: 06/14/25 2108     Glucose 172 mg/dL      BUN 16.2 mg/dL      Creatinine 1.24 mg/dL      Sodium 122 mmol/L      Potassium 4.9 mmol/L      Comment: Slight hemolysis detected by analyzer. Result may be falsely elevated.        Chloride 88 mmol/L      CO2 24.5 mmol/L      Calcium 9.1 mg/dL      Total Protein 7.1 g/dL      Albumin 3.5 g/dL      ALT (SGPT) 15 U/L      AST (SGOT) 23 U/L      Comment: Slight hemolysis detected by analyzer. Result may be falsely elevated.        Alkaline Phosphatase 86 U/L      Total Bilirubin 0.5 mg/dL      Globulin 3.6 gm/dL      A/G Ratio 1.0 g/dL      BUN/Creatinine Ratio 13.1     Anion Gap 9.5 mmol/L      eGFR 59.9 mL/min/1.73     Narrative:      GFR Categories in Chronic Kidney Disease (CKD)              GFR Category          GFR (mL/min/1.73)    Interpretation  G1                    90 or greater        Normal or high (1)  G2                    60-89                Mild decrease (1)  G3a                   45-59                Mild to moderate decrease  G3b                   30-44                 Moderate to severe decrease  G4                    15-29                Severe decrease  G5                    14 or less           Kidney failure    (1)In the absence of evidence of kidney disease, neither GFR category G1 or G2 fulfill the criteria for CKD.    eGFR calculation 2021 CKD-EPI creatinine equation, which does not include race as a factor    CBC Auto Differential [[id number]]  (Abnormal) Collected: 06/14/25 2032    Specimen: Blood Updated: 06/14/25 2042     WBC 19.97 10*3/mm3      RBC 4.76 10*6/mm3      Hemoglobin 12.4 g/dL      Hematocrit 39.3 %      MCV 82.6 fL      MCH 26.1 pg      MCHC 31.6 g/dL      RDW 14.4 %      RDW-SD 43.1 fl      MPV 10.9 fL      Platelets 257 10*3/mm3      Neutrophil % 85.2 %      Lymphocyte % 5.9 %      Monocyte % 7.9 %      Eosinophil % 0.3 %      Basophil % 0.2 %      Immature Grans % 0.5 %      Neutrophils, Absolute 17.03 10*3/mm3      Lymphocytes, Absolute 1.18 10*3/mm3      Monocytes, Absolute 1.57 10*3/mm3      Eosinophils, Absolute 0.05 10*3/mm3      Basophils, Absolute 0.04 10*3/mm3      Immature Grans, Absolute 0.10 10*3/mm3      nRBC 0.0 /100 WBC     BNP [[id number]]  (Abnormal) Collected: 06/14/25 2032    Specimen: Blood Updated: 06/14/25 2103     proBNP 4,820.0 pg/mL     Narrative:      This assay is used as an aid in the diagnosis of individuals suspected of having heart failure. It can be used as an aid in the diagnosis of acute decompensated heart failure (ADHF) in patients presenting with signs and symptoms of ADHF to the emergency department (ED). In addition, NT-proBNP of <300 pg/mL indicates ADHF is not likely.    Age Range Result Interpretation  NT-proBNP Concentration (pg/mL:      <50             Positive            >450                   Gray                 300-450                    Negative             <300    50-75           Positive            >900                  Gray                300-900                  Negative            <300      >75              Positive            >1800                  Gray                300-1800                  Negative            <300    Lactic Acid, Plasma [[id number]]  (Normal) Collected: 06/14/25 2032    Specimen: Blood Updated: 06/14/25 2102     Lactate 1.7 mmol/L     High Sensitivity Troponin T [[id number]]  (Abnormal) Collected: 06/14/25 2032    Specimen: Blood Updated: 06/14/25 2105     HS Troponin T 42 ng/L     Narrative:      High Sensitive Troponin T Reference Range:  <14.0 ng/L- Negative Female for AMI  <22.0 ng/L- Negative Male for AMI  >=14 - Abnormal Female indicating possible myocardial injury.  >=22 - Abnormal Male indicating possible myocardial injury.   Clinicians would have to utilize clinical acumen, EKG, Troponin, and serial changes to determine if it is an Acute Myocardial Infarction or myocardial injury due to an underlying chronic condition.         Osmolality, Serum [[id number]]  (Abnormal) Collected: 06/14/25 2032    Specimen: Blood Updated: 06/14/25 2235     Osmolality 268 mOsm/kg     POC Chem Panel [[id number]]  (Abnormal) Collected: 06/14/25 2144    Specimen: Arterial Blood Updated: 06/14/25 2147     Glucose 149 mg/dL      Comment: Serial Number: [id number]:  [id number]        Sodium 127 mmol/L      POC Potassium 4.0 mmol/L      Ionized Calcium 1.07 mmol/L      Chloride 90 mmol/L      Creatinine 1.12 mg/dL      BUN 16 mg/dL      CO2 Content 23.8 mmol/L     Blood Gas, Arterial - [[id number]]  (Abnormal) Collected: 06/14/25 2144    Specimen: Arterial Blood Updated: 06/14/25 2147     Site Right Radial     Aleks's Test Positive     pH, Arterial 7.423 pH units      pCO2, Arterial 38.5 mm Hg      pO2, Arterial 67.3 mm Hg      HCO3, Arterial 25.1 mmol/L      Base Excess, Arterial 0.8 mmol/L      Comment: Serial Number: [id number]:  [id number]        O2 Saturation, Arterial 93.5 %      Hemoglobin, Blood Gas 12.7 g/dL      Hematocrit, Blood Gas 37.0 %      Barometric Pressure for Blood Gas 741.6000 mmHg       "Modality Room Air     Hemodilution No    POC Lactate [[id number]]  (Normal) Collected: 06/14/25 2144    Specimen: Arterial Blood Updated: 06/14/25 2147     Lactate 1.0 mmol/L      Comment: Serial Number: [id number]:  [id number]       Blood Culture - Blood, Arm, Left [[id number]] Collected: 06/14/25 2156    Specimen: Blood from Arm, Left Updated: 06/14/25 2200    Blood Culture - Blood, Arm, Right [[id number]] Collected: 06/14/25 2156    Specimen: Blood from Arm, Right Updated: 06/14/25 2200    High Sensitivity Troponin T 1Hr [[id number]]  (Abnormal) Collected: 06/14/25 2156    Specimen: Blood Updated: 06/14/25 2223     HS Troponin T 35 ng/L      Troponin T Numeric Delta -7 ng/L      Troponin T % Delta -17    Narrative:      High Sensitive Troponin T Reference Range:  <14.0 ng/L- Negative Female for AMI  <22.0 ng/L- Negative Male for AMI  >=14 - Abnormal Female indicating possible myocardial injury.  >=22 - Abnormal Male indicating possible myocardial injury.   Clinicians would have to utilize clinical acumen, EKG, Troponin, and serial changes to determine if it is an Acute Myocardial Infarction or myocardial injury due to an underlying chronic condition.         Procalcitonin [[id number]]  (Abnormal) Collected: 06/14/25 2156    Specimen: Blood Updated: 06/15/25 0413     Procalcitonin 0.31 ng/mL     Narrative:      As a Marker for Sepsis (Non-Neonates):    1. <0.5 ng/mL represents a low risk of severe sepsis and/or septic shock.  2. >2 ng/mL represents a high risk of severe sepsis and/or septic shock.    As a Marker for Lower Respiratory Tract Infections that require antibiotic therapy:    PCT on Admission    Antibiotic Therapy       6-12 Hrs later    >0.5                Strongly Recommended  >0.25 - <0.5        Recommended  0.1 - 0.25          Discouraged              Remeasure/reassess PCT  <0.1                Strongly Discouraged     Remeasure/reassess PCT    As 28 day mortality risk marker: \"Change in Procalcitonin " "Result\" (>80% or <=80%) if Day 0 (or Day 1) and Day 4 values are available. Refer to http://[email or web address]-pct-[email or web address]    Change in PCT <=80%  A decrease of PCT levels below or equal to 80% defines a positive change in PCT test result representing a higher risk for 28-day all-cause mortality of patients diagnosed with severe sepsis for septic shock.    Change in PCT >80%  A decrease of PCT levels of more than 80% defines a negative change in PCT result representing a lower risk for 28-day all-cause mortality of patients diagnosed with severe sepsis or septic shock.    This test is Prognostic not Diagnostic, if elevated correlate with clinical findings before administering antibiotic treatment.        Urinalysis With Microscopic If Indicated (No Culture) - Straight Cath [[id number]]  (Abnormal) Collected: 06/14/25 2201    Specimen: Urine from Straight Cath Updated: 06/14/25 2214     Color, UA Yellow     Appearance, UA Clear     pH, UA 6.0     Specific Gravity, UA 1.027     Glucose, UA Negative     Ketones, UA Negative     Bilirubin, UA Negative     Blood, UA Negative     Protein, UA 30 mg/dL (1+)     Leuk Esterase, UA Trace     Nitrite, UA Negative     Urobilinogen, UA 1.0 E.U./dL    Urinalysis, Microscopic Only - Straight Cath [[id number]]  (Abnormal) Collected: 06/14/25 2201    Specimen: Urine from Straight Cath Updated: 06/14/25 2214     RBC, UA 0-2 /HPF      WBC, UA 6-10 /HPF      Bacteria, UA 1+ /HPF      Squamous Epithelial Cells, UA 0-2 /HPF      Hyaline Casts, UA 3-6 /LPF      Methodology Automated Microscopy    Osmolality, Urine - Straight Cath [[id number]]  (Normal) Collected: 06/14/25 2201    Specimen: Urine from Straight Cath Updated: 06/15/25 0118     Osmolality, Urine 644 mOsm/kg     Creatinine Urine Random (kidney function) GFR component - Straight Cath [[id number]] Collected: 06/14/25 2201    Specimen: Urine from Straight Cath Updated: 06/15/25 1304     Creatinine, Urine 120.8 mg/dL     Narrative:      " Reference intervals for random urine have not been established.  Clinical usage is dependent upon physician's interpretation in combination with other laboratory tests.       Urine Culture - Urine, Straight Cath [[id number]] Collected: 06/14/25 2201    Specimen: Urine from Straight Cath Updated: 06/15/25 0254    Phosphorus [[id number]]  (Normal) Collected: 06/15/25 0523    Specimen: Blood Updated: 06/15/25 0621     Phosphorus 3.8 mg/dL     Magnesium [[id number]]  (Normal) Collected: 06/15/25 0523    Specimen: Blood Updated: 06/15/25 0621     Magnesium 2.1 mg/dL     Comprehensive Metabolic Panel [[id number]]  (Abnormal) Collected: 06/15/25 0523    Specimen: Blood Updated: 06/15/25 0621     Glucose 179 mg/dL      BUN 16.9 mg/dL      Creatinine 1.16 mg/dL      Sodium 126 mmol/L      Potassium 4.7 mmol/L      Chloride 92 mmol/L      CO2 23.6 mmol/L      Calcium 8.9 mg/dL      Total Protein 7.0 g/dL      Albumin 3.3 g/dL      ALT (SGPT) 13 U/L      AST (SGOT) 13 U/L      Alkaline Phosphatase 78 U/L      Total Bilirubin 0.4 mg/dL      Globulin 3.7 gm/dL      A/G Ratio 0.9 g/dL      BUN/Creatinine Ratio 14.6     Anion Gap 10.4 mmol/L      eGFR 64.9 mL/min/1.73     Narrative:      GFR Categories in Chronic Kidney Disease (CKD)              GFR Category          GFR (mL/min/1.73)    Interpretation  G1                    90 or greater        Normal or high (1)  G2                    60-89                Mild decrease (1)  G3a                   45-59                Mild to moderate decrease  G3b                   30-44                Moderate to severe decrease  G4                    15-29                Severe decrease  G5                    14 or less           Kidney failure    (1)In the absence of evidence of kidney disease, neither GFR category G1 or G2 fulfill the criteria for CKD.    eGFR calculation 2021 CKD-EPI creatinine equation, which does not include race as a factor    CBC & Differential [[id number]]  (Abnormal)  Collected: 06/15/25 0523    Specimen: Blood Updated: 06/15/25 0557    Narrative:      The following orders were created for panel order CBC & Differential.  Procedure                               Abnormality         Status                     ---------                               -----------         ------                     CBC Auto Differential[[id number]]        Abnormal            Final result                 Please view results for these tests on the individual orders.    CBC Auto Differential [[id number]]  (Abnormal) Collected: 06/15/25 0523    Specimen: Blood Updated: 06/15/25 0557     WBC 19.74 10*3/mm3      RBC 4.78 10*6/mm3      Hemoglobin 12.6 g/dL      Hematocrit 39.6 %      MCV 82.8 fL      MCH 26.4 pg      MCHC 31.8 g/dL      RDW 14.3 %      RDW-SD 42.9 fl      MPV 10.4 fL      Platelets 205 10*3/mm3      Neutrophil % 89.5 %      Lymphocyte % 6.6 %      Monocyte % 2.8 %      Eosinophil % 0.2 %      Basophil % 0.2 %      Immature Grans % 0.7 %      Neutrophils, Absolute 17.67 10*3/mm3      Lymphocytes, Absolute 1.30 10*3/mm3      Monocytes, Absolute 0.56 10*3/mm3      Eosinophils, Absolute 0.04 10*3/mm3      Basophils, Absolute 0.03 10*3/mm3      Immature Grans, Absolute 0.14 10*3/mm3      nRBC 0.0 /100 WBC     Vancomycin, Random [[id number]]  (Normal) Collected: 06/15/25 0523    Specimen: Blood Updated: 06/15/25 1010     Vancomycin Random 19.13 mcg/mL     Narrative:      Therapeutic Ranges for Vancomycin    Vancomycin Random   5.0-40.0 mcg/mL  Vancomycin Trough   5.0-20.0 mcg/mL  Vancomycin Peak     20.0-40.0 mcg/mL             Imaging:    CT Abdomen Pelvis With Contrast  Result Date: 6/14/2025  CT ABDOMEN PELVIS W CONTRAST-  Date of exam: 6/14/2025 9:10 PM.  Comparisons: 10/29/2024; 2/18/2021; 3/14/2029.  INDICATIONS: 79-year-old male with altered mental status (AMS), + leukocytosis, & possible sepsis.  TECHNIQUE: Axial CT images were obtained of the abdomen and pelvis following the uneventful  intravenous administration of 70 mL (or less) of Isovue-370 contrast agent. Reconstructed 2D (two-dimensional) coronal and sagittal images were also obtained. Automated exposure control and iterative construction methods were used. Additionally, the radiation dose reduction device was turned on for each scan per the ALARA (As Low as Reasonably Achievable) protocol. No oral contrast agent was administered for the study. Please see the electronic medical record, EMR (i.e., Epic), for the documented dose of intravenous contrast agent as well as the radiation dose. There is slight motion artifact on the study.  FINDINGS: There has been interval decrease in (if not complete resolution of) the mural thickening involving the urinary bladder with minimal, if any, persisting since 10/29/2024. There are prostatic calcifications. A moderate-to-large stool burden is seen, as before. Severe degenerative changes involve the imaged spine with postoperative changes of the lower lumbar spine. A similar CT appearance of the spine was seen previously. No acute findings are seen, otherwise, and no significant interval change is appreciated since the prior study from 10/29/2024. Please see prior exam reports for further detail regarding additional incidental/chronic findings.       No definite significant acute finding is appreciated.   Portions of this note were completed with a voice recognition program.  6/14/2025 10:19 PM by Cortez Montes De Oca MD on Workstation: [employer]      CT Angiogram Chest Pulmonary Embolism  Result Date: 6/14/2025  CT ANGIOGRAM CHEST PULMONARY EMBOLISM-  Date of exam: 6/14/2025 9:10 PM.  Indications: AMS (altered mental status); h/o atrial fibrillation & urinary bladder carcinoma.  Comparison: 9/15/2022.  TECHNIQUE: Axial CT images were obtained of the chest after the uneventful intravenous administration of 70 mL (or less) of Isovue-370 nonionic contrast agent. Reconstructed 2D coronal and sagittal images were also  "obtained. Automated exposure control and iterative construction methods were used. Additionally, the radiation dose reduction device was turned on for each scan per the ALARA (As Low as Reasonably Achievable) protocol. Please see the electronic medical record, EMR (i.e., Epic), for the documented radiation dose. A 3D \"radial range\" reformation (and/or other type[s] of 3D reformations) is (are) provided for review. Please see the EMR (i.e., [facility]) for the documented dose of intravenous contrast agent as well as the radiation dose. Despite best efforts, poor image quality limits interpretation of the study. For instance, motion artifact obscures detail.  FINDINGS: No pulmonary embolism is seen. Grossly, no acute infiltrate is seen. Grossly, no thoracic aortic aneurysm or arterial dissection. Please see prior exam reports for further detail regarding additional incidental/chronic findings.      The study is motion-limited. No pulmonary embolism. Grossly, no thoracic aortic aneurysm or dissection. Grossly, no acute infiltrate.   Portions of this note were completed with a voice recognition program.  6/14/2025 10:09 PM by Cortez Montes De Oca MD on Workstation: [email or web address]      CT Head Without Contrast  Result Date: 6/14/2025  CT HEAD WO CONTRAST-  Date of exam: 6/14/2025 9:22 PM.  Indications: AMS (altered mental status); + leukocytosis.  Comparisons: 9/16/2022; 9/15/2022; 4/14/2021; 2/9/2021; 10/23/2003; 10/13/2003.  TECHNIQUE: Axial CT images were obtained of the head without contrast administration. Reconstructed 2D coronal and sagittal images were also obtained. Automated exposure control and iterative construction methods were used. Additionally, the radiation dose reduction device was turned on for each scan per the ALARA (As Low as Reasonably Achievable) protocol. Please see the electronic medical record, EMR (i.e., Epic), for the documented radiation dose.  FINDINGS: A routine nonenhanced head CT was performed. No acute " brain abnormality is seen. No acute infarct. No acute intracranial hemorrhage. No midline shift or acute intracranial mass effect is seen. The ventricular system and, to a lesser degree, the extra-axial spaces are prominent, suggesting global atrophy. The callosal angle is 86 degrees. The Engel index is 0.5. There is relative effacement of the extra-axial spaces along the parasagittal regions of the bilateral frontal-parietal lobes. These findings suggest the possibility of normal pressure hydrocephalus (NPH). Please correlate clinically. There is suspected mild chronic small vessel ischemic disease. Arterial calcifications are seen. No acute skull fracture is identified. 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 There may be some improvement in the probably chronic sinus disease. Benign external auditory canal debris is suspected bilaterally. Please see prior exam reports for further detail regarding additional incidental/chronic findings.       No acute brain abnormality is appreciated. No acute intracranial hemorrhage. No acute infarction. No midline shift or acute intracranial herniation syndrome. Please see above comments for further detail.    Portions of this note were completed with a voice recognition program.  6/14/2025 10:05 PM by Cortez Montes De Oca MD on Workstation: [email or web address]          Differential Diagnosis and Discussion:    Altered Mental Status: Based on the patient's signs and symptoms, differential diagnosis includes but is not limited to meningitis, stroke, sepsis, subarachnoid hemorrhage, intracranial bleeding, encephalitis, and metabolic encephalopathy.  Metabolic: Differential diagnosis includes but is not limited to hypertension, hyperglycemia, hyperkalemia, hypocalcemia, metabolic acidosis, hypokalemia, hypoglycemia, malnutrition, hypothyroidism,  hyperthyroidism, and adrenal insufficiency.     PROCEDURES:    Labs were collected in the emergency department and all labs were reviewed and interpreted by me.  An EKG was performed and the EKG was interpreted by me.    ECG 12 Lead Tachycardia   Preliminary Result   HEART CLLY=740  bpm   RR Egodmqtp=014  ms   MD Interval=  ms   P Horizontal Axis=  deg   P Front Axis=  deg   QRSD Interval=77  ms   QT Uzdzdzjr=625  ms   RVtU=827  ms   QRS Axis=-7  deg   T Wave Axis=36  deg   - ABNORMAL ECG -   Atrial fibrillation   Abnormal R-wave progression, early transition   Date and Time of Study:2025-06-14 20:27:48          Procedures    MDM     Amount and/or Complexity of Data Reviewed  Clinical lab tests: reviewed  Tests in the medicine section of CPT®: reviewed  Decide to obtain previous medical records or to obtain history from someone other than the patient: yes    In summary, patient is a 79-year-old male presenting today secondary to altered mental status x 2 days    Patient's vital signs today revealed evidence of tachypnea tachycardia no evidence of pyrexia or hypotension.  Patient's initial white count was elevated at 19,000.  Patient met simple sepsis criteria and was started on empiric antibiotics including Vanco and Zosyn.  Patient's physical exam revealed evidence of generalized edema including 2+ pitting edema of the lower extremities and evidence of a acute CHF exacerbation with a proBNP of 4820.  Patient has no prior medical history of CHF but does have a history of paroxysmal A-fib that the patient was in during the ED course.  Patient demonstrated no evidence of RVR and patient appeared in no acute distress at bedside.  Chronotropic limiting agents were held based on these findings.  Patient's ABG today revealed evidence of low oxygen saturations without evidence of acidosis or alkalosis.  Patient was subsequently placed on 2 L nasal cannula with mild improvement in respiratory effort.  Patient's initial and  repeat troponins revealed no evidence of acute MI.  Patient exhibited no chest pain or reported shortness of breath at bedside however the patient was altered.  No evidence of ACS.  Patient's chemistry today did reveal evidence of a severe hyponatremia with a corrected value of 123.  Based on these findings, I consulted with the on-call hospitalist who is agreeable to hospital admission secondary to the patient's severe hyponatremia, simple sepsis.              Sepsis criteria was met in the emergency department and the Sepsis protocol (including antibiotic administration) was initiated.      SIRS criteria considered:   1.  Temperature > 100.4 or <96.8    2.  Heart Rate > 90    3.  Respiratory Rate > 22    4.  WBC > 12K or <4K.             Severe Sepsis:     Respiratory: Mechanical Ventilation or Bipap  Hypotension: SBP > 90 or MAP < 65  Renal: Creatinine > 2  Metabolic: Lactic Acid > 2  Hematologic: Platelets < 100K or INR > 1.5  Hepatic: BILI  >  2  CNS: Sudden AMS     Septic Shock:     Severe Sepsis + Persistent hypotension or Lactic Acid > 4     Normal saline bolus, Antibiotics, and final disposition was based on these definitions.        Sepsis was recognized at 2045    Antibiotics were ordered.     30 cc/kg bolus was not indicated.       Patient did not receive the recommended 30ml/kg fluid bolus for sepsis because it would be harmful or detrimental to the patient.    The patient has Concern for Fluid Overload and Heart Failure.   The patient was ordered 0 of fluids.    The patient presents with 2 out of the 4 SIRS criteria and a suspected source for sepsis.  Patient was evaluated and placed on a cardiac monitor for fear of worsening tachycardia and life-threatening hypotension.  Patient was monitored for shock and signs of end-organ damage.  Mental status was repeatedly checked throughout the ED stay.  Medications were ordered by me which includes vancomycin and Zosyn.  The case was discussed at length with  admitting physician.    Total Critical Care time of 30 minutes. Total critical care time documented does not include time spent on separately billed procedures for services of nurses or physician assistants. I personally saw and examined the patient. I have reviewed all diagnostic interpretations and treatment plans as written. I was present for the key portions of any procedures performed and the inclusive time noted in any critical care statement. Critical care time includes patient management by me, time spent at the patients bedside,  time to review lab and imaging results, discussing patient care, documentation in the medical record, and time spent with family or caregiver.    Patient Care Considerations:    SEPSIS FLUIDS: 30 cc/kg bolus was not indicated in this patient due to concern for fluid overload      Consultants/Shared Management Plan:    Hospitalist: I have discussed the case with Dr. Mathews who agrees to accept the patient for admission.  SHARED VISIT: I have discussed the case with my supervising physician, Dr. Avalos who states agrees with treatment plan. The substantive portion of the medical decision was made by the attesting physician who made or approve the management plan and will take responsibility for the patient.  Clinical findings were discussed and ultimate disposition was made in consult with supervising physician.    Social Determinants of Health:    Patient is independent, reliable, and has access to care.       Disposition and Care Coordination:    Admit:   Through independent evaluation of the patient's history, physical, and imperical data, the patient meets criteria for inpatient admission to the hospital.        Final diagnoses:   Hyponatremia   Acute congestive heart failure, unspecified heart failure type   Sepsis with encephalopathy without septic shock, due to unspecified organism   Acute UTI   Paroxysmal atrial fibrillation        ED Disposition       ED Disposition    Decision to Admit    Condition   --    Comment   Level of Care: Telemetry [5]   Diagnosis: AMS (altered mental status) [[id number]]   Certification: I Certify That Inpatient Hospital Services Are Medically Necessary For Greater Than 2 Midnights                 This medical record created using voice recognition software.             Derick Segal, GERDA  06/15/25 1736

## 2025-06-15 NOTE — PLAN OF CARE
Goal Outcome Evaluation:  Plan of Care Reviewed With: spouse, patient        Progress: no change  Outcome Evaluation: Patient was admitted to floor early this morning. Not responding to alert and oriented questions. Can sometimes state his name and . No complaints of pain or discomfort. Bed ridden at baseline. On 2L NC with audible wheezes. 20G placed in left wrist. Continue with plan of care for remainder of shift.

## 2025-06-16 ENCOUNTER — APPOINTMENT (OUTPATIENT)
Dept: CARDIOLOGY | Facility: HOSPITAL | Age: 80
DRG: 871 | End: 2025-06-16
Payer: MEDICARE

## 2025-06-16 PROBLEM — I50.9 CONGESTIVE HEART FAILURE: Status: ACTIVE | Noted: 2025-06-16

## 2025-06-16 LAB
ANION GAP SERPL CALCULATED.3IONS-SCNC: 9.9 MMOL/L (ref 5–15)
AORTIC DIMENSIONLESS INDEX: 0.62 (DI)
ASCENDING AORTA: 3.3 CM
AV MEAN PRESS GRAD SYS DOP V1V2: 3 MMHG
AV VMAX SYS DOP: 132.2 CM/SEC
BH CV ECHO LEFT VENTRICLE GLOBAL LONGITUDINAL STRAIN: -12.7 %
BH CV ECHO MEAS - 2D AUTO EF SIEMENS: 51.4 %
BH CV ECHO MEAS - ACS: 1.69 CM
BH CV ECHO MEAS - AO MAX PG: 7 MMHG
BH CV ECHO MEAS - AO ROOT DIAM: 3.3 CM
BH CV ECHO MEAS - AO V2 VTI: 20.1 CM
BH CV ECHO MEAS - AVA(I,D): 2.37 CM2
BH CV ECHO MEAS - IVS/LVPW: 1.18 CM
BH CV ECHO MEAS - IVSD: 1.3 CM
BH CV ECHO MEAS - LA DIMENSION: 4.4 CM
BH CV ECHO MEAS - LAT PEAK E' VEL: 14.9 CM/SEC
BH CV ECHO MEAS - LV MAX PG: 2.6 MMHG
BH CV ECHO MEAS - LV MEAN PG: 1.5 MMHG
BH CV ECHO MEAS - LV V1 MAX: 80 CM/SEC
BH CV ECHO MEAS - LV V1 VTI: 12.6 CM
BH CV ECHO MEAS - LVIDD: 4.2 CM
BH CV ECHO MEAS - LVIDS: 3.1 CM
BH CV ECHO MEAS - LVOT AREA: 3.8 CM2
BH CV ECHO MEAS - LVOT DIAM: 2.2 CM
BH CV ECHO MEAS - LVPWD: 1.1 CM
BH CV ECHO MEAS - MED PEAK E' VEL: 7.5 CM/SEC
BH CV ECHO MEAS - MR MAX PG: 154.6 MMHG
BH CV ECHO MEAS - MR MAX VEL: 621.7 CM/SEC
BH CV ECHO MEAS - MV E MAX VEL: 79.3 CM/SEC
BH CV ECHO MEAS - MV MAX PG: 5.4 MMHG
BH CV ECHO MEAS - MV MEAN PG: 1.7 MMHG
BH CV ECHO MEAS - MV V2 VTI: 20.3 CM
BH CV ECHO MEAS - MVA(VTI): 2.35 CM2
BH CV ECHO MEAS - RAP SYSTOLE: 5 MMHG
BH CV ECHO MEAS - RVDD: 4.3 CM
BH CV ECHO MEAS - RVSP: 36.1 MMHG
BH CV ECHO MEAS - SV(LVOT): 47.7 ML
BH CV ECHO MEAS - SVI(LVOT): 21.3 ML/M2
BH CV ECHO MEAS - TAPSE (>1.6): 2.15 CM
BH CV ECHO MEAS - TR MAX PG: 31.1 MMHG
BH CV ECHO MEAS - TR MAX VEL: 278.7 CM/SEC
BH CV ECHO MEASUREMENTS AVERAGE E/E' RATIO: 7.08
BUN SERPL-MCNC: 29.7 MG/DL (ref 8–23)
BUN/CREAT SERPL: 22.3 (ref 7–25)
CALCIUM SPEC-SCNC: 9.2 MG/DL (ref 8.6–10.5)
CHLORIDE SERPL-SCNC: 95 MMOL/L (ref 98–107)
CO2 SERPL-SCNC: 22.1 MMOL/L (ref 22–29)
CREAT SERPL-MCNC: 1.33 MG/DL (ref 0.76–1.27)
DEPRECATED RDW RBC AUTO: 42.4 FL (ref 37–54)
EGFRCR SERPLBLD CKD-EPI 2021: 55.1 ML/MIN/1.73
ERYTHROCYTE [DISTWIDTH] IN BLOOD BY AUTOMATED COUNT: 14.3 % (ref 12.3–15.4)
GLUCOSE SERPL-MCNC: 154 MG/DL (ref 65–99)
HCT VFR BLD AUTO: 38.6 % (ref 37.5–51)
HGB BLD-MCNC: 12.5 G/DL (ref 13–17.7)
IVRT: 61 MS
LEFT ATRIUM VOLUME INDEX: 27.5 ML/M2
LV EF BIPLANE MOD: 51.4 %
MAGNESIUM SERPL-MCNC: 2.3 MG/DL (ref 1.6–2.4)
MCH RBC QN AUTO: 26.4 PG (ref 26.6–33)
MCHC RBC AUTO-ENTMCNC: 32.4 G/DL (ref 31.5–35.7)
MCV RBC AUTO: 81.6 FL (ref 79–97)
PLATELET # BLD AUTO: 277 10*3/MM3 (ref 140–450)
PMV BLD AUTO: 10.7 FL (ref 6–12)
POTASSIUM SERPL-SCNC: 4.9 MMOL/L (ref 3.5–5.2)
RBC # BLD AUTO: 4.73 10*6/MM3 (ref 4.14–5.8)
SODIUM SERPL-SCNC: 127 MMOL/L (ref 136–145)
VANCOMYCIN TROUGH SERPL-MCNC: 16.37 MCG/ML (ref 5–20)
WBC NRBC COR # BLD AUTO: 17.84 10*3/MM3 (ref 3.4–10.8)

## 2025-06-16 PROCEDURE — 80202 ASSAY OF VANCOMYCIN: CPT | Performed by: STUDENT IN AN ORGANIZED HEALTH CARE EDUCATION/TRAINING PROGRAM

## 2025-06-16 PROCEDURE — 25010000002 HEPARIN (PORCINE) PER 1000 UNITS: Performed by: STUDENT IN AN ORGANIZED HEALTH CARE EDUCATION/TRAINING PROGRAM

## 2025-06-16 PROCEDURE — 25010000002 CEFEPIME PER 500 MG: Performed by: STUDENT IN AN ORGANIZED HEALTH CARE EDUCATION/TRAINING PROGRAM

## 2025-06-16 PROCEDURE — 25010000002 CEFEPIME PER 500 MG: Performed by: INTERNAL MEDICINE

## 2025-06-16 PROCEDURE — 25810000003 SODIUM CHLORIDE 0.9 % SOLUTION 250 ML FLEX CONT: Performed by: STUDENT IN AN ORGANIZED HEALTH CARE EDUCATION/TRAINING PROGRAM

## 2025-06-16 PROCEDURE — 92610 EVALUATE SWALLOWING FUNCTION: CPT

## 2025-06-16 PROCEDURE — 85027 COMPLETE CBC AUTOMATED: CPT | Performed by: INTERNAL MEDICINE

## 2025-06-16 PROCEDURE — 99232 SBSQ HOSP IP/OBS MODERATE 35: CPT | Performed by: INTERNAL MEDICINE

## 2025-06-16 PROCEDURE — 93306 TTE W/DOPPLER COMPLETE: CPT | Performed by: INTERNAL MEDICINE

## 2025-06-16 PROCEDURE — 83735 ASSAY OF MAGNESIUM: CPT | Performed by: INTERNAL MEDICINE

## 2025-06-16 PROCEDURE — 94799 UNLISTED PULMONARY SVC/PX: CPT

## 2025-06-16 PROCEDURE — 25010000002 VANCOMYCIN 750 MG RECONSTITUTED SOLUTION 1 EACH VIAL: Performed by: STUDENT IN AN ORGANIZED HEALTH CARE EDUCATION/TRAINING PROGRAM

## 2025-06-16 PROCEDURE — 93306 TTE W/DOPPLER COMPLETE: CPT

## 2025-06-16 PROCEDURE — 80048 BASIC METABOLIC PNL TOTAL CA: CPT | Performed by: STUDENT IN AN ORGANIZED HEALTH CARE EDUCATION/TRAINING PROGRAM

## 2025-06-16 RX ORDER — METOPROLOL SUCCINATE 25 MG/1
25 TABLET, EXTENDED RELEASE ORAL
Status: DISCONTINUED | OUTPATIENT
Start: 2025-06-17 | End: 2025-06-20 | Stop reason: HOSPADM

## 2025-06-16 RX ADMIN — CARVEDILOL 6.25 MG: 6.25 TABLET, FILM COATED ORAL at 09:25

## 2025-06-16 RX ADMIN — NYSTATIN 1 APPLICATION: 100000 OINTMENT TOPICAL at 20:47

## 2025-06-16 RX ADMIN — VANCOMYCIN HYDROCHLORIDE 750 MG: 750 INJECTION, POWDER, LYOPHILIZED, FOR SOLUTION INTRAVENOUS at 11:09

## 2025-06-16 RX ADMIN — Medication 10 ML: at 20:47

## 2025-06-16 RX ADMIN — CEFEPIME 2000 MG: 2 INJECTION, POWDER, FOR SOLUTION INTRAVENOUS at 04:55

## 2025-06-16 RX ADMIN — HEPARIN SODIUM 5000 UNITS: 5000 INJECTION INTRAVENOUS; SUBCUTANEOUS at 05:00

## 2025-06-16 RX ADMIN — Medication 10 ML: at 09:26

## 2025-06-16 RX ADMIN — HEPARIN SODIUM 5000 UNITS: 5000 INJECTION INTRAVENOUS; SUBCUTANEOUS at 20:46

## 2025-06-16 RX ADMIN — VANCOMYCIN HYDROCHLORIDE 750 MG: 750 INJECTION, POWDER, LYOPHILIZED, FOR SOLUTION INTRAVENOUS at 23:32

## 2025-06-16 RX ADMIN — CEFEPIME 2000 MG: 2 INJECTION, POWDER, FOR SOLUTION INTRAVENOUS at 16:23

## 2025-06-16 RX ADMIN — ATORVASTATIN CALCIUM 40 MG: 40 TABLET, FILM COATED ORAL at 20:46

## 2025-06-16 RX ADMIN — AMITRIPTYLINE HYDROCHLORIDE 25 MG: 50 TABLET, FILM COATED ORAL at 20:46

## 2025-06-16 RX ADMIN — NYSTATIN 1 APPLICATION: 100000 OINTMENT TOPICAL at 09:26

## 2025-06-16 RX ADMIN — ASPIRIN 81 MG: 81 TABLET, COATED ORAL at 09:25

## 2025-06-16 NOTE — PROGRESS NOTES
[facility]   Hospitalist Progress Note  Date: 2025  Patient Name: Curtis Richmond  : 1945  MRN: [id number]  Date of admission: 2025  Room/Bed: [facility]      Subjective   Subjective     Chief Complaint: confusion and weakness     Summary:Curtis Richmond is a 79 y.o. male  who presented to the ED for evaluation of altered mental status since last night.  History provided by patient wife at the bedside.  Patient was in usual state of health until last night where he appeared to be confused.  Denied any fevers.  Throughout the day today patient continued to get more confused and withdrawn and brought him to the ED for further evaluation.  Upon arrival, vital signs temperature 98.4, pulse 117, respiratory 26, blood pressure 117/67 on room air saturating at 93%.  ABG 7.4 , troponin 42, repeat 35, proBNP 4820 sodium 122, chloride 88, rest of the CMP with no significant findings, normal lactic acid, osmolality 268, WBC 19.97, hemoglobin 12.4, platelets 257.  Urinalysis showed 1+ bacteria, 6-10 WBC.  Blood cultures negative so far.  CT head without contrast showed no acute abnormality.  CTA chest showed no PE, no infiltrate.  CT abdomen pelvis with contrast showed no significant findings.  Received vancomycin, Zosyn, Solu-Medrol, nebulizer treatments in the ED.  Patient admitted for further evaluation and management of sepsis, possible UTI, concern for new CHF exacerbation, hyponatremia        Interval Followup: 2025  Patient's wife remains at the bedside and states that patient is back to his baseline  Patient denies any nausea vomiting abdominal pain  Patient is bedbound at baseline  Patient's sodium has improved  Blood cultures remain negative urine culture is in process  Patient remains on antibiotics  Vital signs remained stable      Review of Systems    All systems reviewed and negative except for what is outlined above.      Objective   Objective     Vitals:   Temp:  [97.3 °F (36.3  °C)-98.1 °F (36.7 °C)] 98.1 °F (36.7 °C)  Heart Rate:  [106-126] 111  Resp:  [18-23] 18  BP: ()/(76-88) 99/86  Flow (L/min) (Oxygen Therapy):  [2] 2    Physical Exam   General: Awake, alert, NAD resting in bed with his wife at the bedside  HENT: NCAT, MMM  Eyes: pupils equal, no scleral icterus  Cardiovascular: RRR, no murmurs   Pulmonary: CTA bilaterally; no wheezes; no conversational dyspnea  Gastrointestinal: S/ND/NT, +BS  Musculoskeletal: No gross deformities  Skin: No jaundice, no rash on exposed skin appreciated  Neuro: No focal deficits noted  Psych: Mood and affect appropriate      Result Review    Result Review:  I have personally reviewed these results:  [x]  Laboratory      Lab 06/16/25  0510 06/15/25  0523 06/14/25  2156 06/14/25  2144 06/14/25  2032   WBC 17.84* 19.74*  --   --  19.97*   HEMOGLOBIN 12.5* 12.6*  --   --  12.4*   HEMATOCRIT 38.6 39.6  --   --  39.3   PLATELETS 277 205  --   --  257   NEUTROS ABS  --  17.67*  --   --  17.03*   IMMATURE GRANS (ABS)  --  0.14*  --   --  0.10*   LYMPHS ABS  --  1.30  --   --  1.18   MONOS ABS  --  0.56  --   --  1.57*   EOS ABS  --  0.04  --   --  0.05   MCV 81.6 82.8  --   --  82.6   PROCALCITONIN  --   --  0.31*  --   --    LACTATE  --   --   --  1.0 1.7         Lab 06/16/25  0510 06/15/25  0523 06/14/25  2144 06/14/25  2032   SODIUM 127* 126*  --  122*   POTASSIUM 4.9 4.7  --  4.9   CHLORIDE 95* 92*  --  88*   CO2 22.1 23.6  --  24.5   ANION GAP 9.9 10.4  --  9.5   BUN 29.7* 16.9  --  16.2   CREATININE 1.33* 1.16 1.12 1.24   EGFR 55.1* 64.9  --  59.9*   GLUCOSE 154* 179*  --  172*   CALCIUM 9.2 8.9  --  9.1   MAGNESIUM 2.3 2.1  --   --    PHOSPHORUS  --  3.8  --   --          Lab 06/15/25  0523 06/14/25 2032   TOTAL PROTEIN 7.0 7.1   ALBUMIN 3.3* 3.5   GLOBULIN 3.7 3.6   ALT (SGPT) 13 15   AST (SGOT) 13 23   BILIRUBIN 0.4 0.5   ALK PHOS 78 86         Lab 06/14/25  2156 06/14/25 2032   PROBNP  --  4,820.0*   HSTROP T 35* 42*                 Lab  06/14/25 2144   PH, ARTERIAL 7.423   PCO2, ARTERIAL 38.5   PO2 ART 67.3*   O2 SATURATION ART 93.5*   HCO3 ART 25.1   BASE EXCESS ART 0.8     Brief Urine Lab Results  (Last result in the past 365 days)        Color   Clarity   Blood   Leuk Est   Nitrite   Protein   CREAT   Urine HCG        06/14/25 2201             120.8         06/14/25 2201 Yellow   Clear   Negative   Trace   Negative   30 mg/dL (1+)                 [x]  Microbiology   Microbiology Results (last 10 days)       Procedure Component Value - Date/Time    Blood Culture - Blood, Arm, Left [[id number]]  (Normal) Collected: 06/14/25 2156    Lab Status: Preliminary result Specimen: Blood from Arm, Left Updated: 06/15/25 2201     Blood Culture No growth at 24 hours    Blood Culture - Blood, Arm, Right [[id number]]  (Normal) Collected: 06/14/25 2156    Lab Status: Preliminary result Specimen: Blood from Arm, Right Updated: 06/15/25 2201     Blood Culture No growth at 24 hours          [x]  Radiology  CT Abdomen Pelvis With Contrast  Result Date: 6/14/2025  No definite significant acute finding is appreciated.   Portions of this note were completed with a voice recognition program.  6/14/2025 10:19 PM by Cortez Montes De Oca MD on Workstation: [email or web address]      CT Angiogram Chest Pulmonary Embolism  Result Date: 6/14/2025  The study is motion-limited. No pulmonary embolism. Grossly, no thoracic aortic aneurysm or dissection. Grossly, no acute infiltrate.   Portions of this note were completed with a voice recognition program.  6/14/2025 10:09 PM by Cortez Montes De Oca MD on Workstation: [email or web address]      CT Head Without Contrast  Result Date: 6/14/2025  No acute brain abnormality is appreciated. No acute intracranial hemorrhage. No acute infarction. No midline shift or acute intracranial herniation syndrome. Please see above comments for further detail.    Portions of this note were completed with a voice recognition program.  6/14/2025 10:05 PM by Cortez Montes De Oca MD on Workstation:  [id number]      []  EKG/Telemetry   []  Cardiology/Vascular   []  Pathology  []  Old records  []  Other:    Assessment & Plan   Assessment / Plan     Assessment:  Concern for possible UTI contributing to metabolic encephalopathy  Acute CHF exacerbation, new onset?  Hyponatremia, clinically significant  Hypertension  Hyperlipidemia  History of atrial fibrillation with history of Watchman device  Urinary Retention, new     Plan:  Patient remains admitted to the medicine service  Patient remains on cefepime and vancomycin.  Blood cultures are negative urine culture remains in process  Echocardiogram pending  Nephrology following for hyponatremia.  This is improving  Continue patient on fluid restriction  Resume patient's home medication  Patient is bedbound at baseline  Sequeira placed for urinary retention which is new   Discussed plan with nurse patient and his wife  Repeat CBC and BMP in the morning to monitor electrolytes and hemoglobin as well as white blood cell count as this remains elevated         Discussed with RN.    VTE Prophylaxis:  Pharmacologic VTE prophylaxis orders are present.        CODE STATUS:   Code Status (Patient has no pulse and is not breathing): CPR (Attempt to Resuscitate)  Medical Interventions (Patient has pulse or is breathing): Full Support  Level Of Support Discussed With: Patient      Electronically signed by Spencer Vera DO, 6/16/2025, 09:54 EDT.

## 2025-06-16 NOTE — PROGRESS NOTES
"[facility] Clinical Pharmacy Services: Vancomycin Pharmacokinetic Monitor Note    Curtis Richmond is a 79 y.o. male who is on day  of pharmacy to dose vancomycin for Empiric.    Consult Information  Consulting Provider: Dande  Loading Dose  Given: 2250 mg on  614   @2240  PK/PD Target: Dose AUC  Other Antimicrobials: Cefepime    Imaging Reviewed?: Yes    Microbiology Data   blood cx: NGTD    Vitals/Labs  Ht: 172.7 cm (67.99\"); Wt: 112 kg (246 lb 14.6 oz)  Temp (24hrs), Av.8 °F (36.6 °C), Min:97.3 °F (36.3 °C), Max:98.1 °F (36.7 °C)   Estimated Creatinine Clearance: 56.4 mL/min (A) (by C-G formula based on SCr of 1.33 mg/dL (H)).       Results from last 7 days   Lab Units 25  0911 25  0510 06/15/25  0523 254 25   VANCOMYCIN RM mcg/mL  --   --  19.13  --   --    VANCOMYCIN TR mcg/mL 16.37  --   --   --   --    CREATININE mg/dL  --  1.33* 1.16 1.12 1.24   WBC 10*3/mm3  --  17.84* 19.74*  --  19.97*     Assessment/Plan:    Continue current Vancomycin Dose: 750 mg IV every 12 hours; which provides the following updated predicted parameters after assessing vancomycin trough today:  AUC24,ss: 500 mg/L.hr  Probability of AUC24 > 400: 91 %  Ctrough,ss: 17.1 mg/L  Probability of Ctrough,ss > 20: 23 %  Probability of nephrotoxicity (Lodise CRYSTAL ): 13 %    No new levels at this time.  Will follow renal function, BMP ordered for tomorrow AM.      Pharmacy will follow patient's kidney function and will adjust doses and obtain levels as necessary. Thank you for involving pharmacy in this patient's care. Please contact pharmacy with any questions or concerns.                           Reanna Maldonado  Clinical Pharmacist      "

## 2025-06-16 NOTE — PLAN OF CARE
Goal Outcome Evaluation:  Plan of Care Reviewed With: spouse, patient        Progress: improving  Outcome Evaluation: Patient is alert and oriented x2 to self and place. No complaints of pain or discomfort during shift. On 2L NC, sats dropping into high 80's when NC slips out of patients nares, no other signs of respiratory distress. Excoriation under neck treated per MAR. Plan of care discussed with patient and wife at bedside.

## 2025-06-16 NOTE — PROGRESS NOTES
Nutrition Services    Patient Name: Curtis Richmond  YOB: 1945  MRN: [id number]  Admission date: 6/14/2025      CLINICAL NUTRITION ASSESSMENT      Reason for Assessment  Identified at Risk by Screening Criteria      H&P:  Past Medical History:   Diagnosis Date    Arthritis     BACK.    At risk for central sleep apnea     STOP BANG 5    Bladder cancer     DR DAGMAR MENA. HISTORY OF. BLADDER WASH, TURBP    Chronic back pain     Congestive heart failure 6/16/2025    COPD (chronic obstructive pulmonary disease)     EMPHYSEMA- NO INHALERS    History of loop recorder     CURRENTLY HAS [employer] LOOP RECORDER S/P STROKE WIFE REPORTS CURRENTLY NOT FUNCTIONING BUT PT HAD WATCHMAN DEVICE IMPLANTED. 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 male with a past medical history of hypertension, hyperlipidemia, A-fib status post Watchman device, not on anticoagulation due to genitourinary bleeding, CVA with residual left-sided weakness, bedbound status presented to the ED for evaluation of altered mental status since last night.     Patient is Aa0x3 upon RD visit NAD. Patient is undergoing a bedside exam. RD spoke with  "patient's wife to obtain primary assessment information. Wife= Alix.  Alix reports pt has had increased appetite, 75 % intake of morning meal tray.   Denies any n/v/d/c. Denies any food allergies.  UBW to be 240 pounds        Anthropometrics        Current Height, Weight Height: 172.7 cm (67.99\")  Weight: 112 kg (246 lb 14.6 oz)   Current BMI Body mass index is 37.55 kg/m².   BMI Classification Obese Class II   % IBW    Adjusted Body Weight (ABW)    Weight Hx  Wt Readings from Last 30 Encounters:   06/15/25 0300 112 kg (246 lb 14.6 oz)   06/14/25 2020 113 kg (249 lb 12.5 oz)   03/21/25 1954 110 kg (242 lb 8.1 oz)   11/21/24 0826 107 kg (235 lb 10.8 oz)   11/11/24 1256 109 kg (240 lb)   10/29/24 1029 109 kg (240 lb 1.3 oz)   09/18/22 0422 109 kg (239 lb 10.2 oz)   09/16/22 0600 113 kg (249 lb 5.4 oz)   09/14/22 1946 112 kg (246 lb 11.1 oz)   08/23/22 1028 109 kg (240 lb)   07/01/22 0924 109 kg (240 lb)   04/15/22 0953 111 kg (245 lb)   03/18/22 0926 111 kg (245 lb 12.8 oz)   02/22/22 1227 109 kg (240 lb)   12/22/21 1604 108 kg (238 lb)   12/03/21 0932 108 kg (238 lb)   11/09/21 1657 108 kg (238 lb 1.6 oz)   11/09/21 1016 108 kg (238 lb 3.2 oz)   10/26/21 1007 109 kg (240 lb)   09/29/21 1112 101 kg (223 lb)   09/01/21 0917 108 kg (238 lb)   08/24/21 0659 108 kg (237 lb 10.5 oz)   08/18/21 0931 107 kg (235 lb)   08/13/21 1156 107 kg (235 lb 12.8 oz)   04/12/21 1311 99.8 kg (220 lb)   03/30/21 0920 109 kg (240 lb)   03/01/21 0000 106 kg (234 lb)   02/26/21 0000 107 kg (235 lb)   08/19/20 0000 107 kg (235 lb)   08/12/20 0000 106 kg (234 lb)   08/05/20 0000 106 kg (234 lb)   07/29/20 0000 106 kg (234 lb 2 oz)   07/22/20 0000 110 kg (242 lb)   07/15/20 0000 110 kg (242 lb)   06/17/20 0000 110 kg (242 lb)          Wt Change Observation WNL     Estimated/Assessed Needs  Estimated Needs based on: Adjusted Body Weight 85.8 kg       Energy Requirements 25 kcal/kg    EST Needs (kcal/day) 2145 kcals/d       Protein Requirements " "1.0-1.2 g/kg   EST Daily Needs (g/day)  g/d       Fluid Requirements 1 ml/kcal    Estimated Needs (mL/day) 2145     Labs/Medications         Pertinent Labs Reviewed.   Results from last 7 days   Lab Units 06/16/25  0510 06/15/25  0523 06/14/25 2144 06/14/25 2032   SODIUM mmol/L 127* 126*  --  122*   POTASSIUM mmol/L 4.9 4.7  --  4.9   CHLORIDE mmol/L 95* 92*  --  88*   CO2 mmol/L 22.1 23.6  --  24.5   BUN mg/dL 29.7* 16.9  --  16.2   CREATININE mg/dL 1.33* 1.16 1.12 1.24   CALCIUM mg/dL 9.2 8.9  --  9.1   BILIRUBIN mg/dL  --  0.4  --  0.5   ALK PHOS U/L  --  78  --  86   ALT (SGPT) U/L  --  13  --  15   AST (SGOT) U/L  --  13  --  23   GLUCOSE mg/dL 154* 179*  --  172*     Results from last 7 days   Lab Units 06/16/25  0510 06/15/25  0523   MAGNESIUM mg/dL 2.3 2.1   PHOSPHORUS mg/dL  --  3.8   HEMOGLOBIN g/dL 12.5* 12.6*   HEMATOCRIT % 38.6 39.6     COVID19   Date Value Ref Range Status   03/21/2025 Not Detected Not Detected - Ref. Range Final     No results found for: \"HGBA1C\"      Pertinent Medications Reviewed.     Malnutrition Severity Assessment              Nutrition Diagnosis         Nutrition Dx Problem 1 Overweight/Obesity related to excessive energy intake as evidenced by BMI 37.55kg/m2, sedentary lifestyle, bedbound per pt wife.     Nutrition Intervention           Current Nutrition Orders & Evaluation of Intake       Current PO Diet Diet: Regular/House, Fluid Restriction (240 mL/tray); 1500 mL/day; Fluid Consistency: Thin (IDDSI 0)   Supplement No active supplement orders           Nutrition Intervention/Prescription        Continue Diet Rx as prescribed    Weight management education upon follow up          Medical Nutrition Therapy/Nutrition Education          Learner     Readiness N/A  N/A     Method     Response N/A  N/A     Monitor/Evaluation        Monitor PO intake, Pertinent labs     Nutrition Discharge Plan         To be determined     Electronically signed by:  Raciel West " RD  06/16/25 14:02 EDT

## 2025-06-16 NOTE — THERAPY EVALUATION
Acute Care - Speech Language Pathology   Swallow Initial Evaluation JOSHUA [facility]     Patient Name: Curtis Richmond  : 1945  MRN: [id number]  Today's Date: 2025               Admit Date: 2025    Visit Dx:     ICD-10-CM ICD-9-CM   1. Hyponatremia  E87.1 276.1   2. Acute congestive heart failure, unspecified heart failure type  I50.9 428.0   3. Sepsis with encephalopathy without septic shock, due to unspecified organism  A41.9 038.9    R65.20 995.92    G93.41 348.31   4. Acute UTI  N39.0 599.0   5. Paroxysmal atrial fibrillation  I48.0 427.31   6. Dysphagia, unspecified type  R13.10 787.20     Patient Active Problem List   Diagnosis    Stroke    Hypertension    Cerebral aneurysm, nonruptured    History of renal pelvis cancer    Bladder cancer    History of loop recorder    COPD (chronic obstructive pulmonary disease)    Arthritis    At risk for central sleep apnea    PAF (paroxysmal atrial fibrillation)    Presence of Watchman left atrial appendage closure device    Wheelchair bound    Hyperlipidemia    Hyponatremia    CAP (community acquired pneumonia)    Acute respiratory failure with hypoxia    Morbid obesity    Simple chronic bronchitis    Metabolic encephalopathy    Hematuria    AMS (altered mental status)     Past Medical History:   Diagnosis Date    Arthritis     BACK.    At risk for central sleep apnea     STOP BANG 5    Bladder cancer     DR DAGMAR MENA. HISTORY OF. BLADDER WASH, TURBP    Chronic back pain     COPD (chronic obstructive pulmonary disease)     EMPHYSEMA- NO INHALERS    History of loop recorder     CURRENTLY HAS [employer] LOOP RECORDER S/P STROKE WIFE REPORTS CURRENTLY NOT FUNCTIONING BUT PT HAD WATCHMAN DEVICE IMPLANTED. 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    Wears glasses     Wheelchair bound     SEPT 2022     Past Surgical History:   Procedure Laterality Date    ATRIAL APPENDAGE EXCLUSION LEFT WITH TRANSESOPHAGEAL ECHOCARDIOGRAM N/A 11/09/2021    Procedure: 11/9/21 Atrial Appendage Occlusion on eliquis hold 2 doses prior;  Surgeon: Primo Tobias DO;  Location: [facility] INVASIVE LOC[facility];  Service: Cardiology;  Laterality: N/A;    BACK SURGERY  06/1980    CARDIAC CATHETERIZATION  02/2001    CARDIOVERSION      AT [facility] YEARS AGO (40 YEARS AGO)    CATARACT EXTRACTION WITH INTRAOCULAR LENS IMPLANT Bilateral 2019    CHOLECYSTECTOMY  06/2002    COLONOSCOPY      CYSTOSCOPY      MULTIPLE    CYSTOSCOPY BLADDER BIOPSY N/A 08/24/2021    Procedure: CYSTOSCOPY, TRANSURETHRAL RESECTION OF BLADDER TUMOR;  Surgeon: Kaelyn Alvarado MD;  Location: [facility];  Service: Urology;  Laterality: N/A;    CYSTOSCOPY RETROGRADE PYELOGRAM Bilateral 11/21/2024    Procedure: CYSTOSCOPY RETROGRADE PYELOGRAM  Scope of the bladder with x-rays of the ureters using dye;  Surgeon: Kaelyn Alvarado MD;  Location: [facility];  Service: Urology;  Laterality: Bilateral;    EMBOLIZATION CEREBRAL N/A 04/14/2021    Procedure: CEREBRAL ANGIOGRAM;  Surgeon: Noah Bruno MD;  Location: [facility] 18/19;  Service: Interventional Radiology;  Laterality: N/A;    EPIDURAL      LUMBAR    JOINT REPLACEMENT Right     Knee    KNEE ARTHROPLASTY Right 2013    NEPHROURETERECTOMY Left 04/2019    TRANSURETHRAL RESECTION OF BLADDER TUMOR         SLP Recommendation and Plan          Inpatient Speech Pathology Dysphagia Evaluation        PAIN SCALE: none indicated    PRECAUTIONS/CONTRAINDICATIONS:  standard    SUSPECTED ABUSE/NEGLECT/EXPLOITATION:  none noted    SOCIAL/PSYCHOLOGICAL NEEDS/BARRIERS:  none noted    PAST SOCIAL HISTORY:  79 year  old male, lives at home, requires 24 hours care ,bedbound    PRIOR FUNCTION:  Wife reports patient on a soft to chew diet at home    PATIENT GOALS/EXPECTATIONS:  to continue eating and drinking    HISTORY:  79 year old male admitted on 6/14/25 due to AMS. Patient's wife reports patient with decreased mental status and difficulty chewing and swallowing. Patient does have history of CVA with residual left sided weakness/bedbound. Patient being treated for UTI/sepsis, concern for new CHF exacerbation. Wife reports patient's swallowing has improved from admission.     CURRENT DIET LEVEL:  fluid restriction, regular consistency    OBJECTIVE:    TEST ADMINISTERED:  Clinical dysphagia evaluation    COGNITION/SAFETY AWARENESS:  not thoroughly evaluated    BEHAVIORAL OBSERVATIONS:  awake, cooperative    ORAL MOTOR EXAM:  without dentition. Generalized decreased oral motor strength/rom, 4/5    VOICE QUALITY:  clear    REFLEX EXAM:  deferred    POSTURE:  sitting upright, total assist for feeding    FEEDING/SWALLOWING FUNCTION:  assessed with ice chips, nectar thick liquids, thin liquids, pureed solids, soft to chew solids    CLINICAL OBSERVATIONS:  Ice chips with swallow completed. Nectar thick and thin liquids by spoon and straw. Swallows completed with mild delay. Vocal quality clear and laryngeal sounds clear to auscultation. Pureed by spoon. Swallow completed. Soft to chew solids, small bite size. Extended chewing. Swallow completed with oral residue. Clears with cues for lingual sweep and liquid wash assist. Laryngeal elevation noted to palpation. Mild swallow delay with all consistencies. No overt clinical s/s of aspiration observed at bedside however cannot rule out silent aspiration.     DYSPHAGIA CRITERIA:  n/a    FUNCTIONAL ASSESSMENT INSTRUMENT: Patient currently scored a level 7 of 7 on Functional Communication Measures for swallowing indicating a 0% limitation in function.    ASSESSMENT/ PLAN OF CARE:  At bedside  patient demonstrates swallow function appearing adequate for tolerance of soft to chew solids and thin liquids. At this time patient does not require further speech pathology services for dysphagia. If patient demonstrates a decline in status, please re-consult speech pathology.   PROBLEMS:  1.  na   LTG 1: na   STG 1a: na     RECOMMENDATIONS:   1.   DIET: soft to chew solids, thin liquids    2.  POSITION: fully upright for all po, 30 minutes following    3.  COMPENSATORY STRATEGIES: assist for feeding, small bites and sips    Pt/responsible party agrees with plan of care and has been informed of all alternatives, risks and benefits.                    Anticipated Discharge Disposition (SLP): No further SLP services warranted (06/16/25 0949)                                                               EDUCATION  The patient has been educated in the following areas:   Dysphagia (Swallowing Impairment).                Time Calculation:    Time Calculation- SLP       Row Name 06/16/25 0949             Time Calculation- SLP    SLP Start Time 0700  -SN      SLP Stop Time 0800  -SN      SLP Time Calculation (min) 60 min  -SN      SLP Received On 06/16/25  -SN         Untimed Charges    23873-IF Eval Oral Pharyng Swallow Minutes 60  -SN         Total Minutes    Untimed Charges Total Minutes 60  -SN       Total Minutes 60  -SN                User Key  (r) = Recorded By, (t) = Taken By, (c) = Cosigned By      Initials Name Provider Type    SN Terri Crawford MS-CCC/SLP, TRUDY Speech and Language Pathologist                    Therapy Charges for Today       Code Description Service Date Service Provider Modifiers Qty    [id number] HC ST EVAL ORAL PHARYNG SWALLOW 4 6/16/2025 Terri Crawford MS-CCC/SLP, TRUDY GN 1                 MINA Mccabe/SLP, TRUDY  6/16/2025

## 2025-06-16 NOTE — PROGRESS NOTES
[facility]     Progress Note    Patient Name: Curtis Richmond  : 1945  MRN: [id number]  Primary Care Physician:  Kat Eubanks APRN  Date of admission: 2025    Subjective patient feels weak tired lack of energy short of breath.  Had a decent urine output.  Blood pressure is very soft    Review of Systems  All review of systems are negative except as mentioned in subjective complaints.    Objective   Objective     Vitals:   Temp:  [97.7 °F (36.5 °C)-98.1 °F (36.7 °C)] 98.1 °F (36.7 °C)  Heart Rate:  [106-126] 111  Resp:  [18-23] 18  BP: ()/(76-88) 99/86  Flow (L/min) (Oxygen Therapy):  [2] 2  Physical Exam    Constitutional: Awake, alert responsive, conversant, no obvious distress              Psychiatric:  Appropriate affect, cooperative   Neurologic:  Awake alert ,oriented x 3, strength symmetric in all extremities, Cranial Nerves grossly intact to confrontation, speech clear   Eyes:   PERRLA, sclerae anicteric, no conjunctival injection   HEENT:  Moist mucous membranes, no nasal or eye discharge, no throat congestion   Neck:   Supple, no thyromegaly, no lymphadenopathy, trachea midline, no elevated JVD   Respiratory:  Clear to auscultation bilaterally, nonlabored respirations    Cardiovascular: RRR, no murmurs, rubs, or gallops, palpable pedal pulses bilaterally, edema positive bilateral ankle edema   Gastrointestinal: Positive bowel sounds, soft, nontender, nondistended, no organomegaly   Musculoskeletal:  No clubbing or cyanosis to extremities,muscle wasting, joint swelling, muscle weakness             Skin:                      No rashes, bruising, skin ulcers, petechiae or ecchymosis    Result Review    Result Review:  I have personally reviewed the results from the time of this admission to 2025 10:56 EDT and agree with these findings:  []  Laboratory  []  Microbiology  []  Radiology  []  EKG/Telemetry   []  Cardiology/Vascular   []  Pathology  []  Old records  []   Other:    Results from last 7 days   Lab Units 06/16/25  0510 06/15/25  0523 06/14/25 2032   WBC 10*3/mm3 17.84* 19.74* 19.97*   HEMOGLOBIN g/dL 12.5* 12.6* 12.4*   PLATELETS 10*3/mm3 277 205 257     Results from last 7 days   Lab Units 06/16/25  0510 06/15/25  0523 06/14/25 2144 06/14/25 2032 06/14/25 2032   SODIUM mmol/L 127* 126*  --   --  122*   POTASSIUM mmol/L 4.9 4.7  --   --  4.9   CHLORIDE mmol/L 95* 92*  --   --  88*   CO2 mmol/L 22.1 23.6  --   --  24.5   ANION GAP mmol/L 9.9 10.4  --   --  9.5   BUN mg/dL 29.7* 16.9  --   --  16.2   CREATININE mg/dL 1.33* 1.16 1.12  --  1.24   GLUCOSE mg/dL 154* 179*  --   --  172*   EGFR mL/min/1.73 55.1* 64.9  --   --  59.9*   CALCIUM mg/dL 9.2 8.9  --   --  9.1   MAGNESIUM mg/dL 2.3 2.1  --    < >  --    ALBUMIN g/dL  --  3.3*  --   --  3.5   BILIRUBIN mg/dL  --  0.4  --   --  0.5   ALK PHOS U/L  --  78  --   --  86   ALT (SGPT) U/L  --  13  --   --  15   AST (SGOT) U/L  --  13  --   --  23    < > = values in this interval not displayed.       Scheduled Meds:amitriptyline, 25 mg, Oral, Nightly  aspirin, 81 mg, Oral, Daily  atorvastatin, 40 mg, Oral, Nightly  cefepime, 2,000 mg, Intravenous, Q8H  heparin (porcine), 5,000 Units, Subcutaneous, Q8H  [START ON 6/17/2025] metoprolol succinate XL, 25 mg, Oral, Q24H  nystatin, 1 Application, Topical, Q12H  sodium chloride, 10 mL, Intravenous, Q12H  vancomycin, 750 mg, Intravenous, Q12H      Continuous Infusions:Pharmacy to dose vancomycin,       PRN Meds:.  dextrose    dextrose    glucagon (human recombinant)    Pharmacy to dose vancomycin    sodium chloride    sodium chloride    sodium chloride    Assessment & Plan   Assessment / Plan       Active Hospital Problems:    Active Hospital Problems    Diagnosis  POA    **AMS (altered mental status) [R41.82]  Yes    Congestive heart failure [I50.9]  Unknown    Hyponatremia [E87.1]  Yes    Morbid obesity [E66.01]  Yes    Wheelchair bound [Z99.3]  Not Applicable     2022 10/01  REGULATORY IMPORT REPLACEMENT      Presence of Watchman left atrial appendage closure device [Z95.818]  Yes     KIMI 12-22-21: There is a 20 mm watchman FLX left atrial appendage occlusion device in place. The device appears well-seated. There is no significant peridevice flow.      COPD (chronic obstructive pulmonary disease) [J44.9]  Yes     EMPYSEMA- NO INHALERS      Stroke [I63.9]  Yes       Plan:   Patient is volume overloaded and I will continue diuretics  Chest x-ray today  Keep fluid restriction  Echo    Electronically signed by Houston Darnell MD, 06/16/25, 10:53 AM EDT.

## 2025-06-17 LAB
ANION GAP SERPL CALCULATED.3IONS-SCNC: 7.6 MMOL/L (ref 5–15)
BACTERIA SPEC AEROBE CULT: ABNORMAL
BUN SERPL-MCNC: 22.8 MG/DL (ref 8–23)
BUN/CREAT SERPL: 24 (ref 7–25)
CALCIUM SPEC-SCNC: 8.6 MG/DL (ref 8.6–10.5)
CHLORIDE SERPL-SCNC: 100 MMOL/L (ref 98–107)
CO2 SERPL-SCNC: 24.4 MMOL/L (ref 22–29)
CREAT SERPL-MCNC: 0.95 MG/DL (ref 0.76–1.27)
DEPRECATED RDW RBC AUTO: 44.2 FL (ref 37–54)
EGFRCR SERPLBLD CKD-EPI 2021: 82.4 ML/MIN/1.73
ERYTHROCYTE [DISTWIDTH] IN BLOOD BY AUTOMATED COUNT: 14.5 % (ref 12.3–15.4)
GLUCOSE SERPL-MCNC: 93 MG/DL (ref 65–99)
HCT VFR BLD AUTO: 36.4 % (ref 37.5–51)
HGB BLD-MCNC: 11.5 G/DL (ref 13–17.7)
MCH RBC QN AUTO: 26.2 PG (ref 26.6–33)
MCHC RBC AUTO-ENTMCNC: 31.6 G/DL (ref 31.5–35.7)
MCV RBC AUTO: 82.9 FL (ref 79–97)
PLATELET # BLD AUTO: 239 10*3/MM3 (ref 140–450)
PMV BLD AUTO: 10.3 FL (ref 6–12)
POTASSIUM SERPL-SCNC: 4.5 MMOL/L (ref 3.5–5.2)
RBC # BLD AUTO: 4.39 10*6/MM3 (ref 4.14–5.8)
SODIUM SERPL-SCNC: 132 MMOL/L (ref 136–145)
SODIUM UR-SCNC: 39 MMOL/L
WBC NRBC COR # BLD AUTO: 8.38 10*3/MM3 (ref 3.4–10.8)

## 2025-06-17 PROCEDURE — 80048 BASIC METABOLIC PNL TOTAL CA: CPT | Performed by: INTERNAL MEDICINE

## 2025-06-17 PROCEDURE — 25010000002 CEFEPIME PER 500 MG: Performed by: INTERNAL MEDICINE

## 2025-06-17 PROCEDURE — 99232 SBSQ HOSP IP/OBS MODERATE 35: CPT | Performed by: INTERNAL MEDICINE

## 2025-06-17 PROCEDURE — 25010000002 FUROSEMIDE PER 20 MG: Performed by: INTERNAL MEDICINE

## 2025-06-17 PROCEDURE — 25010000002 AMPICILLIN PER 500 MG: Performed by: INTERNAL MEDICINE

## 2025-06-17 PROCEDURE — 25010000002 HEPARIN (PORCINE) PER 1000 UNITS: Performed by: STUDENT IN AN ORGANIZED HEALTH CARE EDUCATION/TRAINING PROGRAM

## 2025-06-17 PROCEDURE — 84300 ASSAY OF URINE SODIUM: CPT | Performed by: STUDENT IN AN ORGANIZED HEALTH CARE EDUCATION/TRAINING PROGRAM

## 2025-06-17 PROCEDURE — 85027 COMPLETE CBC AUTOMATED: CPT | Performed by: INTERNAL MEDICINE

## 2025-06-17 RX ORDER — FUROSEMIDE 10 MG/ML
60 INJECTION INTRAMUSCULAR; INTRAVENOUS ONCE
Status: COMPLETED | OUTPATIENT
Start: 2025-06-17 | End: 2025-06-17

## 2025-06-17 RX ORDER — AMOXICILLIN 250 MG
1 CAPSULE ORAL 2 TIMES DAILY
Status: DISCONTINUED | OUTPATIENT
Start: 2025-06-17 | End: 2025-06-20 | Stop reason: HOSPADM

## 2025-06-17 RX ADMIN — CEFEPIME 2000 MG: 2 INJECTION, POWDER, FOR SOLUTION INTRAVENOUS at 03:13

## 2025-06-17 RX ADMIN — AMITRIPTYLINE HYDROCHLORIDE 25 MG: 50 TABLET, FILM COATED ORAL at 21:02

## 2025-06-17 RX ADMIN — METOPROLOL SUCCINATE ER TABLETS 25 MG: 25 TABLET, FILM COATED, EXTENDED RELEASE ORAL at 08:54

## 2025-06-17 RX ADMIN — HEPARIN SODIUM 5000 UNITS: 5000 INJECTION INTRAVENOUS; SUBCUTANEOUS at 21:02

## 2025-06-17 RX ADMIN — ATORVASTATIN CALCIUM 40 MG: 40 TABLET, FILM COATED ORAL at 21:02

## 2025-06-17 RX ADMIN — Medication 10 ML: at 08:54

## 2025-06-17 RX ADMIN — FUROSEMIDE 60 MG: 10 INJECTION, SOLUTION INTRAMUSCULAR; INTRAVENOUS at 09:18

## 2025-06-17 RX ADMIN — NYSTATIN 1 APPLICATION: 100000 OINTMENT TOPICAL at 08:55

## 2025-06-17 RX ADMIN — Medication 10 ML: at 21:03

## 2025-06-17 RX ADMIN — AMPICILLIN SODIUM 1 G: 1 INJECTION, POWDER, FOR SOLUTION INTRAMUSCULAR; INTRAVENOUS at 13:24

## 2025-06-17 RX ADMIN — ASPIRIN 81 MG: 81 TABLET, COATED ORAL at 08:54

## 2025-06-17 RX ADMIN — HEPARIN SODIUM 5000 UNITS: 5000 INJECTION INTRAVENOUS; SUBCUTANEOUS at 05:25

## 2025-06-17 RX ADMIN — AMPICILLIN SODIUM 1 G: 1 INJECTION, POWDER, FOR SOLUTION INTRAMUSCULAR; INTRAVENOUS at 18:10

## 2025-06-17 RX ADMIN — DOCUSATE SODIUM 50MG AND SENNOSIDES 8.6MG 1 TABLET: 8.6; 5 TABLET, FILM COATED ORAL at 21:08

## 2025-06-17 RX ADMIN — HEPARIN SODIUM 5000 UNITS: 5000 INJECTION INTRAVENOUS; SUBCUTANEOUS at 13:23

## 2025-06-17 RX ADMIN — DOCUSATE SODIUM 50MG AND SENNOSIDES 8.6MG 1 TABLET: 8.6; 5 TABLET, FILM COATED ORAL at 14:57

## 2025-06-17 RX ADMIN — NYSTATIN 1 APPLICATION: 100000 OINTMENT TOPICAL at 21:02

## 2025-06-17 NOTE — PROGRESS NOTES
"[facility] Clinical Pharmacy Services: Vancomycin Pharmacokinetic Monitor Note    Curtis Richmond is a 79 y.o. male who is on day 3/7 of pharmacy to dose vancomycin for Empiric.    Consult Information  Consulting Provider: Dande  Loading Dose  Given: 2250 mg on  614   @2240  Previous Vancomycin Dose: 750 mg every 12 hours  PK/PD Target: Dose AUC  Other Antimicrobials: Cefepime    Imaging Reviewed?: Yes    Microbiology Data   blood cx: NGTD  : urine cx: gram positive cocci    Vitals/Labs  Ht: 172.7 cm (67.99\"); Wt: 112 kg (246 lb 14.6 oz)  Temp (24hrs), Av.9 °F (36.6 °C), Min:97.5 °F (36.4 °C), Max:98.1 °F (36.7 °C)   Estimated Creatinine Clearance: 79 mL/min (by C-G formula based on SCr of 0.95 mg/dL).       Results from last 7 days   Lab Units 25  0545 25  0911 25  0510 06/15/25  0523   VANCOMYCIN RM mcg/mL  --   --   --  19.13   VANCOMYCIN TR mcg/mL  --  16.37  --   --    CREATININE mg/dL 0.95  --  1.33* 1.16   WBC 10*3/mm3 8.38  --  17.84* 19.74*     Assessment/Plan:    Patient has improved renal function and 750mg dosing now predicted to be subtherapeutic. Will adjust to new dosing and recheck a level.    Current Vancomycin Dose: 1000 mg IV every 12 hours; which provides the following updated predicted parameters:  AUC24,ss: 514 mg/L.hr  Probability of AUC24 > 400: 92 %  Ctrough,ss: 16.6 mg/L  Probability of Ctrough,ss > 20: 21 %  Probability of nephrotoxicity (Lodise CRYSTAL ): 12 %    Vancomycin random level ordered for  AM  Will follow renal function, BMP ordered for tomorrow AM.      Pharmacy will follow patient's kidney function and will adjust doses and obtain levels as necessary. Thank you for involving pharmacy in this patient's care. Please contact pharmacy with any questions or concerns.                           Reanna Maldonado  Clinical Pharmacist        "

## 2025-06-17 NOTE — SIGNIFICANT NOTE
Wound Eval / Progress Noted    JOSHUA Colunga     Patient Name: Curtis Richmond  : 1945  MRN: [id number]  Today's Date: 2025                 Admit Date: 2025    Visit Dx:    ICD-10-CM ICD-9-CM   1. Hyponatremia  E87.1 276.1   2. Acute congestive heart failure, unspecified heart failure type  I50.9 428.0   3. Sepsis with encephalopathy without septic shock, due to unspecified organism  A41.9 038.9    R65.20 995.92    G93.41 348.31   4. Acute UTI  N39.0 599.0   5. Paroxysmal atrial fibrillation  I48.0 427.31   6. Dysphagia, unspecified type  R13.10 787.20         AMS (altered mental status)    Stroke    COPD (chronic obstructive pulmonary disease)    Presence of Watchman left atrial appendage closure device    Wheelchair bound    Hyponatremia    Morbid obesity    Congestive heart failure        Past Medical History:   Diagnosis Date    Arthritis     BACK.    At risk for central sleep apnea     STOP BANG 5    Bladder cancer     DR DAGMAR MENA. HISTORY OF. BLADDER WASH, TURBP    Chronic back pain     Congestive heart failure 2025    COPD (chronic obstructive pulmonary disease)     EMPHYSEMA- NO INHALERS    History of loop recorder     CURRENTLY HAS [employer] LOOP RECORDER S/P STROKE WIFE REPORTS CURRENTLY NOT FUNCTIONING BUT PT HAD WATCHMAN DEVICE IMPLANTED. MONITORED BY DR GNOG    Hydrocephalus     NO CURRENT PROBLEMS     Hyperlipidemia     Hypertension     Hyponatremia     PAF (paroxysmal atrial fibrillation)     Stroke     TIA (transient ischemic attack) 2021    FOLLOWED BY DR YATES, WIFE DENIES PT WITH CP OR SOA, DECREASED ACTIVITY IS WHEELCHAIR BOUND    Tubular adenoma of colon 2015    Urothelial carcinoma of kidney, left 2019    Wears glasses     Wheelchair bound     2022        Past Surgical History:   Procedure Laterality Date    ATRIAL APPENDAGE EXCLUSION LEFT WITH TRANSESOPHAGEAL ECHOCARDIOGRAM N/A 2021    Procedure: 21 Atrial Appendage  Occlusion on eliquis hold 2 doses prior;  Surgeon: Primo Tobias DO;  Location: [facility] EP INVASIVE LOCATION;  Service: Cardiology;  Laterality: N/A;    BACK SURGERY  06/1980    CARDIAC CATHETERIZATION  02/2001    CARDIOVERSION      AT [facility] YEARS AGO (40 YEARS AGO)    CATARACT EXTRACTION WITH INTRAOCULAR LENS IMPLANT Bilateral 2019    CHOLECYSTECTOMY  06/2002    COLONOSCOPY      CYSTOSCOPY      MULTIPLE    CYSTOSCOPY BLADDER BIOPSY N/A 08/24/2021    Procedure: CYSTOSCOPY, TRANSURETHRAL RESECTION OF BLADDER TUMOR;  Surgeon: Kaelyn Alvarado MD;  Location: [facility] OR;  Service: Urology;  Laterality: N/A;    CYSTOSCOPY RETROGRADE PYELOGRAM Bilateral 11/21/2024    Procedure: CYSTOSCOPY RETROGRADE PYELOGRAM  Scope of the bladder with x-rays of the ureters using dye;  Surgeon: Kaelyn Alvarado MD;  Location: [facility] MAIN OR;  Service: Urology;  Laterality: Bilateral;    EMBOLIZATION CEREBRAL N/A 04/14/2021    Procedure: CEREBRAL ANGIOGRAM;  Surgeon: Noah Bruno MD;  Location: [facility] OR 18/19;  Service: Interventional Radiology;  Laterality: N/A;    EPIDURAL      LUMBAR    JOINT REPLACEMENT Right     Knee    KNEE ARTHROPLASTY Right 2013    NEPHROURETERECTOMY Left 04/2019    TRANSURETHRAL RESECTION OF BLADDER TUMOR           Physical Assessment:      Wound 06/15/25 0320 Bilateral medial coccyx Pressure Injury (Active)   Dressing Appearance moist drainage 06/16/25 1255   Dressing Removed Type gauze, [id number];silicone border foam 06/16/25 1255   Confirmed Empty Wound Bed Yes, visual inspection of wound bed 06/16/25 1255   Base moist;pink 06/16/25 1255   Periwound moist;pink 06/16/25 1255   Periwound Temperature warm 06/16/25 1255   Periwound Skin Turgor soft 06/16/25 1255   Edges open 06/16/25 1255   Drainage Characteristics/Odor serosanguineous 06/16/25 1255   Drainage Amount small 06/16/25 1255   Care, Wound cleansed with;irrigated with;sterile normal saline 06/16/25 1255   Dressing Care  silver impregnated;hydrofiber;silicone border foam 06/16/25 7323        Wound Check / Follow-up:  wound nurse consult for buttocks and neck fold. Patient alert laying in bed on 4MTU agreeable to assessment, did not eat much of meal despite encouragement and attempting to feed him.     Right side of buttocks with scattered linear red moist open areas, pink skin and maceration noted around the area suspect MASD, Sequeira in use at present, but incontinence of stool present, care provided, linen change, dressing applied as above.     Neck fold with redness noted, no open areas  present, patient chin does rest on chest likely MASD to neck, pillowcase applied to assist with skin to skin contact. O2 tubing wrapped in pillow case edges to also assist.     Redness and edema to right hip area present, blue top barrier cream applied , gel mattress in use, turned to left side post eval.     Heels floated   Dry brown scaly skin noted feet/ankles/lower legs. Pinpoint area of dry crusting noted to left lateral ankle area. Suggesting hygiene and moisturizer.       Impression: MASD to buttocks and neck fold.     Short term goals:  moisture management, skin care, wound care toward regaining skin integrity.     Kathleen No RN    6/16/2025    21:01 EDT

## 2025-06-17 NOTE — PROGRESS NOTES
[facility]     Progress Note    Patient Name: Curtis Richmond  : 1945  MRN: [id number]  Primary Care Physician:  Kat Eubanks APRN  Date of admission: 2025    Subjective patient is fully awake alert responsive with great urine output sodium level is improving  Continue to hold carvedilol as we need better blood pressure for diuresis  Review of Systems  All review of systems are negative except as mentioned in subjective complaints.    Objective   Objective     Vitals:   Temp:  [97.5 °F (36.4 °C)-98.1 °F (36.7 °C)] 98.1 °F (36.7 °C)  Heart Rate:  [104-123] 108  Resp:  [18] 18  BP: (104-134)/() 104/60  Flow (L/min) (Oxygen Therapy):  [2] 2  Physical Exam    Constitutional: Awake, alert responsive, conversant, no obvious distress              Psychiatric:  Appropriate affect, cooperative   Neurologic:  Awake alert ,oriented x 3, strength symmetric in all extremities, Cranial Nerves grossly intact to confrontation, speech clear   Eyes:   PERRLA, sclerae anicteric, no conjunctival injection   HEENT:  Moist mucous membranes, no nasal or eye discharge, no throat congestion   Neck:   Supple, no thyromegaly, no lymphadenopathy, trachea midline, no elevated JVD   Respiratory:  Clear to auscultation bilaterally, nonlabored respirations    Cardiovascular: RRR, no murmurs, rubs, or gallops, palpable pedal pulses bilaterally, positive bilateral ankle edema   Gastrointestinal: Positive bowel sounds, soft, nontender, nondistended, no organomegaly   Musculoskeletal:  No clubbing or cyanosis to extremities,muscle wasting, joint swelling, muscle weakness             Skin:                      No rashes, bruising, skin ulcers, petechiae or ecchymosis    Result Review    Result Review:  I have personally reviewed the results from the time of this admission to 2025 09:08 EDT and agree with these findings:  []  Laboratory  []  Microbiology  []  Radiology  []  EKG/Telemetry   []  Cardiology/Vascular   []   Pathology  []  Old records  []  Other:    Results from last 7 days   Lab Units 06/17/25  0545 06/16/25  0510 06/15/25  0523 06/14/25  2032   WBC 10*3/mm3 8.38 17.84* 19.74* 19.97*   HEMOGLOBIN g/dL 11.5* 12.5* 12.6* 12.4*   PLATELETS 10*3/mm3 239 277 205 257     Results from last 7 days   Lab Units 06/17/25  0545 06/16/25  0510 06/15/25  0523 06/14/25  2144 06/14/25  2032 06/14/25  2032   SODIUM mmol/L 132* 127* 126*  --   --  122*   POTASSIUM mmol/L 4.5 4.9 4.7  --   --  4.9   CHLORIDE mmol/L 100 95* 92*  --   --  88*   CO2 mmol/L 24.4 22.1 23.6  --   --  24.5   ANION GAP mmol/L 7.6 9.9 10.4  --   --  9.5   BUN mg/dL 22.8 29.7* 16.9  --   --  16.2   CREATININE mg/dL 0.95 1.33* 1.16 1.12  --  1.24   GLUCOSE mg/dL 93 154* 179*  --   --  172*   EGFR mL/min/1.73 82.4 55.1* 64.9  --   --  59.9*   CALCIUM mg/dL 8.6 9.2 8.9  --   --  9.1   MAGNESIUM mg/dL  --  2.3 2.1  --    < >  --    ALBUMIN g/dL  --   --  3.3*  --   --  3.5   BILIRUBIN mg/dL  --   --  0.4  --   --  0.5   ALK PHOS U/L  --   --  78  --   --  86   ALT (SGPT) U/L  --   --  13  --   --  15   AST (SGOT) U/L  --   --  13  --   --  23    < > = values in this interval not displayed.       Scheduled Meds:amitriptyline, 25 mg, Oral, Nightly  aspirin, 81 mg, Oral, Daily  atorvastatin, 40 mg, Oral, Nightly  cefepime, 2,000 mg, Intravenous, Q12H  heparin (porcine), 5,000 Units, Subcutaneous, Q8H  metoprolol succinate XL, 25 mg, Oral, Q24H  nystatin, 1 Application, Topical, Q12H  sodium chloride, 10 mL, Intravenous, Q12H  vancomycin, 1,000 mg, Intravenous, Q12H      Continuous Infusions:Pharmacy to dose vancomycin,       PRN Meds:.  dextrose    dextrose    glucagon (human recombinant)    Pharmacy to dose vancomycin    sodium chloride    sodium chloride    sodium chloride    Assessment & Plan   Assessment / Plan       Active Hospital Problems:    Active Hospital Problems    Diagnosis  POA    **AMS (altered mental status) [R41.82]  Yes    Congestive heart failure  [I50.9]  Unknown    Hyponatremia [E87.1]  Yes    Morbid obesity [E66.01]  Yes    Wheelchair bound [Z99.3]  Not Applicable     2022 10/01 REGULATORY IMPORT REPLACEMENT      Presence of Watchman left atrial appendage closure device [Z95.818]  Yes     KIMI 12-22-21: There is a 20 mm watchman FLX left atrial appendage occlusion device in place. The device appears well-seated. There is no significant peridevice flow.      COPD (chronic obstructive pulmonary disease) [J44.9]  Yes     EMPYSEMA- NO INHALERS      Stroke [I63.9]  Yes       Plan:   Lasix today as patient is still volume overloaded       Electronically signed by Houston Darnell MD, 06/17/25, 9:08 AM EDT.

## 2025-06-17 NOTE — PLAN OF CARE
Goal Outcome Evaluation:              Outcome Evaluation: Patient alert and oriented, on room air, atrial fibrillation on monitor, wound care completed today, eisenberg catheter patent, no other changes

## 2025-06-18 PROCEDURE — 25010000002 HEPARIN (PORCINE) PER 1000 UNITS: Performed by: STUDENT IN AN ORGANIZED HEALTH CARE EDUCATION/TRAINING PROGRAM

## 2025-06-18 PROCEDURE — 99233 SBSQ HOSP IP/OBS HIGH 50: CPT | Performed by: INTERNAL MEDICINE

## 2025-06-18 PROCEDURE — 25010000002 AMPICILLIN PER 500 MG: Performed by: INTERNAL MEDICINE

## 2025-06-18 PROCEDURE — 97161 PT EVAL LOW COMPLEX 20 MIN: CPT

## 2025-06-18 RX ORDER — FINASTERIDE 5 MG/1
5 TABLET, FILM COATED ORAL DAILY
Status: DISCONTINUED | OUTPATIENT
Start: 2025-06-18 | End: 2025-06-20 | Stop reason: HOSPADM

## 2025-06-18 RX ORDER — FUROSEMIDE 40 MG/1
40 TABLET ORAL
Status: DISCONTINUED | OUTPATIENT
Start: 2025-06-18 | End: 2025-06-20 | Stop reason: HOSPADM

## 2025-06-18 RX ORDER — TAMSULOSIN HYDROCHLORIDE 0.4 MG/1
0.4 CAPSULE ORAL DAILY
Status: DISCONTINUED | OUTPATIENT
Start: 2025-06-18 | End: 2025-06-20 | Stop reason: HOSPADM

## 2025-06-18 RX ADMIN — TAMSULOSIN HYDROCHLORIDE 0.4 MG: 0.4 CAPSULE ORAL at 17:06

## 2025-06-18 RX ADMIN — AMPICILLIN SODIUM 1 G: 1 INJECTION, POWDER, FOR SOLUTION INTRAMUSCULAR; INTRAVENOUS at 06:23

## 2025-06-18 RX ADMIN — NYSTATIN 1 APPLICATION: 100000 OINTMENT TOPICAL at 21:16

## 2025-06-18 RX ADMIN — AMPICILLIN SODIUM 1 G: 1 INJECTION, POWDER, FOR SOLUTION INTRAMUSCULAR; INTRAVENOUS at 00:44

## 2025-06-18 RX ADMIN — Medication 10 ML: at 08:46

## 2025-06-18 RX ADMIN — DOCUSATE SODIUM 50MG AND SENNOSIDES 8.6MG 1 TABLET: 8.6; 5 TABLET, FILM COATED ORAL at 21:16

## 2025-06-18 RX ADMIN — FINASTERIDE 5 MG: 5 TABLET, FILM COATED ORAL at 17:06

## 2025-06-18 RX ADMIN — Medication 10 ML: at 21:17

## 2025-06-18 RX ADMIN — ATORVASTATIN CALCIUM 40 MG: 40 TABLET, FILM COATED ORAL at 21:16

## 2025-06-18 RX ADMIN — NYSTATIN 1 APPLICATION: 100000 OINTMENT TOPICAL at 08:48

## 2025-06-18 RX ADMIN — HEPARIN SODIUM 5000 UNITS: 5000 INJECTION INTRAVENOUS; SUBCUTANEOUS at 21:16

## 2025-06-18 RX ADMIN — AMPICILLIN SODIUM 1 G: 1 INJECTION, POWDER, FOR SOLUTION INTRAMUSCULAR; INTRAVENOUS at 13:45

## 2025-06-18 RX ADMIN — METOPROLOL SUCCINATE ER TABLETS 25 MG: 25 TABLET, FILM COATED, EXTENDED RELEASE ORAL at 08:46

## 2025-06-18 RX ADMIN — AMITRIPTYLINE HYDROCHLORIDE 25 MG: 50 TABLET, FILM COATED ORAL at 21:19

## 2025-06-18 RX ADMIN — AMPICILLIN SODIUM 1 G: 1 INJECTION, POWDER, FOR SOLUTION INTRAMUSCULAR; INTRAVENOUS at 18:01

## 2025-06-18 RX ADMIN — ASPIRIN 81 MG: 81 TABLET, COATED ORAL at 08:46

## 2025-06-18 RX ADMIN — HEPARIN SODIUM 5000 UNITS: 5000 INJECTION INTRAVENOUS; SUBCUTANEOUS at 06:23

## 2025-06-18 RX ADMIN — DOCUSATE SODIUM 50MG AND SENNOSIDES 8.6MG 1 TABLET: 8.6; 5 TABLET, FILM COATED ORAL at 08:46

## 2025-06-18 RX ADMIN — FUROSEMIDE 40 MG: 40 TABLET ORAL at 17:06

## 2025-06-18 RX ADMIN — HEPARIN SODIUM 5000 UNITS: 5000 INJECTION INTRAVENOUS; SUBCUTANEOUS at 13:45

## 2025-06-18 RX ADMIN — FUROSEMIDE 40 MG: 40 TABLET ORAL at 10:34

## 2025-06-18 NOTE — PROGRESS NOTES
[facility]   Hospitalist Progress Note  Date: 2025  Patient Name: Curtis Richmond  : 1945  MRN: [id number]  Date of admission: 2025  Room/Bed: [facility]      Subjective   Subjective     Chief Complaint: confusion and weakness     Summary:Curtis Richmond is a 79 y.o. male  who presented to the ED for evaluation of altered mental status since last night.  History provided by patient wife at the bedside.  Patient was in usual state of health until last night where he appeared to be confused.  Denied any fevers.  Throughout the day today patient continued to get more confused and withdrawn and brought him to the ED for further evaluation.  Upon arrival, vital signs temperature 98.4, pulse 117, respiratory 26, blood pressure 117/67 on room air saturating at 93%.  ABG 7.4 , troponin 42, repeat 35, proBNP 4820 sodium 122, chloride 88, rest of the CMP with no significant findings, normal lactic acid, osmolality 268, WBC 19.97, hemoglobin 12.4, platelets 257.  Urinalysis showed 1+ bacteria, 6-10 WBC.  Blood cultures negative so far.  CT head without contrast showed no acute abnormality.  CTA chest showed no PE, no infiltrate.  CT abdomen pelvis with contrast showed no significant findings.  Received vancomycin, Zosyn, Solu-Medrol, nebulizer treatments in the ED.  Patient admitted for further evaluation and management of sepsis, possible UTI, concern for new CHF exacerbation, hyponatremia        Interval Followup: Seen and examined resting comfortably respiratory status improved/stable volume status remains generous continue with diuresis Sequeira catheter in place added Flomax and Proscar today possible voiding trial soon versus discharging with catheter in place in order to keep wound clean and dry with plans to DC at a later date.        Objective   Objective     Vitals:   Temp:  [97.5 °F (36.4 °C)-98.1 °F (36.7 °C)] 98.1 °F (36.7 °C)  Heart Rate:  [100-111] 110  Resp:  [18] 18  BP:  (106-132)/(61-82) 132/82  Flow (L/min) (Oxygen Therapy):  [2] 2    Physical Exam   General: Awake, alert, NAD in bed  HENT: NCAT, MMM  Eyes: pupils equal, no scleral icterus  Cardiovascular: RRR, no murmurs   Pulmonary: CTA bilaterally; no wheezes; no conversational dyspnea  Gastrointestinal: S/ND/NT, +BS  Musculoskeletal: No gross deformities  Skin: No jaundice, no rash on exposed skin appreciated  Neuro: No focal deficits noted  Psych: Mood and affect appropriate      Result Review    Result Review:  I have personally reviewed these results:  [x]  Laboratory      Lab 06/17/25  0545 06/16/25  0510 06/15/25  0523 06/14/25 2156 06/14/25 2144 06/14/25 2032   WBC 8.38 17.84* 19.74*  --   --  19.97*   HEMOGLOBIN 11.5* 12.5* 12.6*  --   --  12.4*   HEMATOCRIT 36.4* 38.6 39.6  --   --  39.3   PLATELETS 239 277 205  --   --  257   NEUTROS ABS  --   --  17.67*  --   --  17.03*   IMMATURE GRANS (ABS)  --   --  0.14*  --   --  0.10*   LYMPHS ABS  --   --  1.30  --   --  1.18   MONOS ABS  --   --  0.56  --   --  1.57*   EOS ABS  --   --  0.04  --   --  0.05   MCV 82.9 81.6 82.8  --   --  82.6   PROCALCITONIN  --   --   --  0.31*  --   --    LACTATE  --   --   --   --  1.0 1.7         Lab 06/17/25  0545 06/16/25  0510 06/15/25  0523   SODIUM 132* 127* 126*   POTASSIUM 4.5 4.9 4.7   CHLORIDE 100 95* 92*   CO2 24.4 22.1 23.6   ANION GAP 7.6 9.9 10.4   BUN 22.8 29.7* 16.9   CREATININE 0.95 1.33* 1.16   EGFR 82.4 55.1* 64.9   GLUCOSE 93 154* 179*   CALCIUM 8.6 9.2 8.9   MAGNESIUM  --  2.3 2.1   PHOSPHORUS  --   --  3.8         Lab 06/15/25  0523 06/14/25 2032   TOTAL PROTEIN 7.0 7.1   ALBUMIN 3.3* 3.5   GLOBULIN 3.7 3.6   ALT (SGPT) 13 15   AST (SGOT) 13 23   BILIRUBIN 0.4 0.5   ALK PHOS 78 86         Lab 06/14/25 2156 06/14/25 2032   PROBNP  --  4,820.0*   HSTROP T 35* 42*                 Lab 06/14/25 2144   PH, ARTERIAL 7.423   PCO2, ARTERIAL 38.5   PO2 ART 67.3*   O2 SATURATION ART 93.5*   HCO3 ART 25.1   BASE EXCESS ART  0.8     Brief Urine Lab Results  (Last result in the past 365 days)        Color   Clarity   Blood   Leuk Est   Nitrite   Protein   CREAT   Urine HCG        06/14/25 2201             120.8         06/14/25 2201 Yellow   Clear   Negative   Trace   Negative   30 mg/dL (1+)                 [x]  Microbiology   Microbiology Results (last 10 days)       Procedure Component Value - Date/Time    Urine Culture - Urine, Straight Cath [[id number]]  (Abnormal)  (Susceptibility) Collected: 06/14/25 2201    Lab Status: Final result Specimen: Urine from Straight Cath Updated: 06/17/25 1122     Urine Culture >100,000 CFU/mL Enterococcus faecalis    Narrative:      Colonization of the urinary tract without infection is common. Treatment is discouraged unless the patient is symptomatic, pregnant, or undergoing an invasive urologic procedure.    Susceptibility        Enterococcus faecalis      ADRY      Ampicillin Susceptible      Levofloxacin Susceptible      Nitrofurantoin Susceptible      Vancomycin Susceptible                           Blood Culture - Blood, Arm, Left [[id number]]  (Normal) Collected: 06/14/25 2156    Lab Status: Preliminary result Specimen: Blood from Arm, Left Updated: 06/17/25 2201     Blood Culture No growth at 3 days    Blood Culture - Blood, Arm, Right [[id number]]  (Normal) Collected: 06/14/25 2156    Lab Status: Preliminary result Specimen: Blood from Arm, Right Updated: 06/17/25 2201     Blood Culture No growth at 3 days          [x]  Radiology  CT Abdomen Pelvis With Contrast  Result Date: 6/14/2025  No definite significant acute finding is appreciated.   Portions of this note were completed with a voice recognition program.  6/14/2025 10:19 PM by Cortez Montes De Oca MD on Workstation: [id number]      CT Angiogram Chest Pulmonary Embolism  Result Date: 6/14/2025  The study is motion-limited. No pulmonary embolism. Grossly, no thoracic aortic aneurysm or dissection. Grossly, no acute infiltrate.   Portions of this  note were completed with a voice recognition program.  6/14/2025 10:09 PM by Cortez Montes De Oca MD on Workstation: [id number]      CT Head Without Contrast  Result Date: 6/14/2025  No acute brain abnormality is appreciated. No acute intracranial hemorrhage. No acute infarction. No midline shift or acute intracranial herniation syndrome. Please see above comments for further detail.    Portions of this note were completed with a voice recognition program.  6/14/2025 10:05 PM by Cortez Montes De Oca MD on Workstation: [id number]      []  EKG/Telemetry   []  Cardiology/Vascular   []  Pathology  []  Old records  []  Other:    Assessment & Plan   Assessment / Plan     Assessment:  Acute metabolic encephalopathy, resolved   E faecalis UTI   Hyponatremia, clinically significant   Acute CHF exacerbation, new onset?  Hyponatremia, clinically significant  Hypertension  Hyperlipidemia  History of atrial fibrillation with history of Watchman device  Urinary Retention, new     Plan:  Patient remains admitted to the medicine service  De-escalate antibiotics to ampicillin  Echocardiogram pending  Nephrology following for hyponatremia.  This is improving  Continue patient on fluid restriction  Resume patient's home medication  Patient is bedbound at baseline  Sequeira placed for urinary retention which is new added flomax and proscar   Possible voiding trial in a.m. versus home with Sequeira catheter in place to keep wound clean and dry will discuss with wound care  Discussed with RN and wife at bedside        VTE Prophylaxis:  Pharmacologic VTE prophylaxis orders are present.        CODE STATUS:   Code Status (Patient has no pulse and is not breathing): CPR (Attempt to Resuscitate)  Medical Interventions (Patient has pulse or is breathing): Full Support  Level Of Support Discussed With: Patient      Electronically signed by MARIA ANTONIA Childress, 6/18/2025, 15:19 EDT.          Attending Documentation:  Patient independently seen and evaluated, above  documentation reflects plan put forth during bedside rounds.  More than 51% of the time of this patient encounter was performed by me. I discussed the care plan with SHAHZAD Luke PA-C, I agree with his findings and plan as documented, what I have added to the care plan and modified is as follows in my documentation and my medical decision making; 79-year-old male presented with altered mental status, he is basely bedbound, had UTI, hyponatremia.  Clinically improving.  Diuresing very well with IV Lasix.  I will recheck a BMP tomorrow.  Has a Sequeira catheter placed, might need to consider keeping this upon discharge in order to keep his wound clear and clean.  Anticipate discharge in the next 48 hours.  Electronically signed by Vladislav Minor MD, 06/18/25, 5:01 PM EDT.

## 2025-06-18 NOTE — PROGRESS NOTES
[facility]     Progress Note    Patient Name: Curtis Richmond  : 1945  MRN: [id number]  Primary Care Physician:  Kat Eubanks APRN  Date of admission: 2025    Subjective patient is feeling okay he did not express any new concerns today.  No nausea vomiting    Review of Systems  All review of systems are negative except as mentioned in subjective complaints.    Objective   Objective     Vitals:   Temp:  [97.5 °F (36.4 °C)-98.2 °F (36.8 °C)] 98.1 °F (36.7 °C)  Heart Rate:  [100-111] 104  Resp:  [18] 18  BP: (106-123)/(61-78) 120/78  Flow (L/min) (Oxygen Therapy):  [2] 2  Physical Exam    Constitutional: Awake, alert responsive, conversant, no obvious distress              Psychiatric:  Appropriate affect, cooperative   Neurologic:  Awake alert ,oriented x 3, strength symmetric in all extremities, Cranial Nerves grossly intact to confrontation, speech clear   Eyes:   PERRLA, sclerae anicteric, no conjunctival injection   HEENT:  Moist mucous membranes, no nasal or eye discharge, no throat congestion   Neck:   Supple, no thyromegaly, no lymphadenopathy, trachea midline, no elevated JVD   Respiratory:  Clear to auscultation bilaterally, nonlabored respirations    Cardiovascular: RRR, no murmurs, rubs, or gallops, palpable pedal pulses bilaterally, No bilateral ankle edema   Gastrointestinal: Positive bowel sounds, soft, nontender, nondistended, no organomegaly   Musculoskeletal:  No clubbing or cyanosis to extremities,muscle wasting, joint swelling, muscle weakness             Skin:                      No rashes, bruising, skin ulcers, petechiae or ecchymosis    Result Review    Result Review:  I have personally reviewed the results from the time of this admission to 2025 09:35 EDT and agree with these findings:  []  Laboratory  []  Microbiology  []  Radiology  []  EKG/Telemetry   []  Cardiology/Vascular   []  Pathology  []  Old records  []  Other:    Results from last 7 days   Lab Units  06/17/25  0545 06/16/25  0510 06/15/25  0523 06/14/25  2032   WBC 10*3/mm3 8.38 17.84* 19.74* 19.97*   HEMOGLOBIN g/dL 11.5* 12.5* 12.6* 12.4*   PLATELETS 10*3/mm3 239 277 205 257     Results from last 7 days   Lab Units 06/17/25  0545 06/16/25  0510 06/15/25  0523 06/14/25  2144 06/14/25  2032 06/14/25  2032   SODIUM mmol/L 132* 127* 126*  --   --  122*   POTASSIUM mmol/L 4.5 4.9 4.7  --   --  4.9   CHLORIDE mmol/L 100 95* 92*  --   --  88*   CO2 mmol/L 24.4 22.1 23.6  --   --  24.5   ANION GAP mmol/L 7.6 9.9 10.4  --   --  9.5   BUN mg/dL 22.8 29.7* 16.9  --   --  16.2   CREATININE mg/dL 0.95 1.33* 1.16 1.12  --  1.24   GLUCOSE mg/dL 93 154* 179*  --   --  172*   EGFR mL/min/1.73 82.4 55.1* 64.9  --   --  59.9*   CALCIUM mg/dL 8.6 9.2 8.9  --   --  9.1   MAGNESIUM mg/dL  --  2.3 2.1  --    < >  --    ALBUMIN g/dL  --   --  3.3*  --   --  3.5   BILIRUBIN mg/dL  --   --  0.4  --   --  0.5   ALK PHOS U/L  --   --  78  --   --  86   ALT (SGPT) U/L  --   --  13  --   --  15   AST (SGOT) U/L  --   --  13  --   --  23    < > = values in this interval not displayed.       Scheduled Meds:amitriptyline, 25 mg, Oral, Nightly  ampicillin, 1 g, Intravenous, Q6H  aspirin, 81 mg, Oral, Daily  atorvastatin, 40 mg, Oral, Nightly  heparin (porcine), 5,000 Units, Subcutaneous, Q8H  metoprolol succinate XL, 25 mg, Oral, Q24H  nystatin, 1 Application, Topical, Q12H  senna-docusate sodium, 1 tablet, Oral, BID  sodium chloride, 10 mL, Intravenous, Q12H      Continuous Infusions:Pharmacy To Dose:,       PRN Meds:.  dextrose    dextrose    glucagon (human recombinant)    Pharmacy To Dose:    sodium chloride    sodium chloride    sodium chloride    Assessment & Plan   Assessment / Plan       Active Hospital Problems:    Active Hospital Problems    Diagnosis  POA    **AMS (altered mental status) [R41.82]  Yes    Congestive heart failure [I50.9]  Unknown    Hyponatremia [E87.1]  Yes    Morbid obesity [E66.01]  Yes    Wheelchair bound [Z99.3]   Not Applicable     2022 10/01 REGULATORY IMPORT REPLACEMENT      Presence of Watchman left atrial appendage closure device [Z95.818]  Yes     KIMI 12-22-21: There is a 20 mm watchman FLX left atrial appendage occlusion device in place. The device appears well-seated. There is no significant peridevice flow.      COPD (chronic obstructive pulmonary disease) [J44.9]  Yes     EMPYSEMA- NO INHALERS      Stroke [I63.9]  Yes       Plan:   Continue diuresis as patient is grossly volume overloaded       Electronically signed by Houston Darnell MD, 06/18/25, 9:35 AM EDT.

## 2025-06-18 NOTE — PLAN OF CARE
Goal Outcome Evaluation:  Plan of Care Reviewed With: patient        Progress: no change  Outcome Evaluation: Patient does not present with any significant decline in functional mobility requiring inpatient PT services. At baseline, patient requires assist with all ADLs and use of a fatou lift for out of bed transfers. Recommend return home with 24/7 care or extended care facility if family finds they are no longer able to safely care for patient. Patient will be discharged from PT caseload.    Anticipated Discharge Disposition (PT): home with 24/7 care, extended care facility

## 2025-06-18 NOTE — PLAN OF CARE
Goal Outcome Evaluation:              Outcome Evaluation: Patient alert and oriented, on room air, atrial fibrillation on monitor, eisenberg patent, wound care completed today, no other changes

## 2025-06-18 NOTE — PLAN OF CARE
Goal Outcome Evaluation:  Plan of Care Reviewed With: patient, spouse        Progress: improving  Outcome Evaluation: Pt has been A&O w/ intermittent confusion. Rm Air, AFIB on monitor. Wound care completed. No c/o pain.

## 2025-06-18 NOTE — THERAPY EVALUATION
Acute Care - Physical Therapy Initial Evaluation   [place]     Patient Name: Curtis Richmond  : 1945  MRN: [id number]  Today's Date: 2025      Visit Dx:     ICD-10-CM ICD-9-CM   1. Hyponatremia  E87.1 276.1   2. Acute congestive heart failure, unspecified heart failure type  I50.9 428.0   3. Sepsis with encephalopathy without septic shock, due to unspecified organism  A41.9 038.9    R65.20 995.92    G93.41 348.31   4. Acute UTI  N39.0 599.0   5. Paroxysmal atrial fibrillation  I48.0 427.31   6. Dysphagia, unspecified type  R13.10 787.20   7. Difficulty walking  R26.2 719.7     Patient Active Problem List   Diagnosis    Stroke    Hypertension    Cerebral aneurysm, nonruptured    History of renal pelvis cancer    Bladder cancer    History of loop recorder    COPD (chronic obstructive pulmonary disease)    Arthritis    At risk for central sleep apnea    PAF (paroxysmal atrial fibrillation)    Presence of Watchman left atrial appendage closure device    Wheelchair bound    Hyperlipidemia    Hyponatremia    CAP (community acquired pneumonia)    Acute respiratory failure with hypoxia    Morbid obesity    Simple chronic bronchitis    Metabolic encephalopathy    Hematuria    AMS (altered mental status)    Congestive heart failure     Past Medical History:   Diagnosis Date    Arthritis     BACK.    At risk for central sleep apnea     STOP BANG 5    Bladder cancer     DR DAGMAR MENA. HISTORY OF. BLADDER WASH, TURBP    Chronic back pain     Congestive heart failure 2025    COPD (chronic obstructive pulmonary disease)     EMPHYSEMA- NO INHALERS    History of loop recorder     CURRENTLY HAS [employer] LOOP RECORDER S/P STROKE WIFE REPORTS CURRENTLY NOT FUNCTIONING BUT PT HAD WATCHMAN DEVICE IMPLANTED. 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    Wears glasses     Wheelchair bound     SEPT 2022     Past Surgical History:   Procedure Laterality Date    ATRIAL APPENDAGE EXCLUSION LEFT WITH TRANSESOPHAGEAL ECHOCARDIOGRAM N/A 11/09/2021    Procedure: 11/9/21 Atrial Appendage Occlusion on eliquis hold 2 doses prior;  Surgeon: Primo Tobias DO;  Location: [facility] INVASIVE LOCATION;  Service: Cardiology;  Laterality: N/A;    BACK SURGERY  06/1980    CARDIAC CATHETERIZATION  02/2001    CARDIOVERSION      AT [facility] YEARS AGO (40 YEARS AGO)    CATARACT EXTRACTION WITH INTRAOCULAR LENS IMPLANT Bilateral 2019    CHOLECYSTECTOMY  06/2002    COLONOSCOPY      CYSTOSCOPY      MULTIPLE    CYSTOSCOPY BLADDER BIOPSY N/A 08/24/2021    Procedure: CYSTOSCOPY, TRANSURETHRAL RESECTION OF BLADDER TUMOR;  Surgeon: Kaelyn Alvarado MD;  Location: [facility] OR;  Service: Urology;  Laterality: N/A;    CYSTOSCOPY RETROGRADE PYELOGRAM Bilateral 11/21/2024    Procedure: CYSTOSCOPY RETROGRADE PYELOGRAM  Scope of the bladder with x-rays of the ureters using dye;  Surgeon: Kaelyn Alvarado MD;  Location: [facility] OR;  Service: Urology;  Laterality: Bilateral;    EMBOLIZATION CEREBRAL N/A 04/14/2021    Procedure: CEREBRAL ANGIOGRAM;  Surgeon: Noah Bruno MD;  Location: [facility] OR 18/19;  Service: Interventional Radiology;  Laterality: N/A;    EPIDURAL      LUMBAR    JOINT REPLACEMENT Right     Knee    KNEE ARTHROPLASTY Right 2013    NEPHROURETERECTOMY Left 04/2019    TRANSURETHRAL RESECTION OF BLADDER TUMOR       PT Assessment (Last 12 Hours)       PT Evaluation and Treatment       Row Name 06/18/25 0900          Physical Therapy Time and Intention    Subjective Information no complaints  -AV     Document Type evaluation  -AV     Mode of Treatment individual  therapy;physical therapy  -AV       Row Name 06/18/25 0900          General Information    Patient Profile Reviewed yes  -AV     Patient Observations alert;cooperative  -AV     Prior Level of Function --  Spouse at bedside assisting with prior level. Assist with ADLs and mobility. Use of a fatou lift for transfers to a manual w/c, which spouse propels. No home O2.  -AV     Equipment Currently Used at Home lift device;wheelchair;ramp  -AV     Existing Precautions/Restrictions fall  -AV     Barriers to Rehab previous functional deficit  -AV       Row Name 06/18/25 0900          Living Environment    Current Living Arrangements home  -AV     Home Accessibility stairs to enter home  -AV     People in Home spouse  -AV       Row Name 06/18/25 0900          Home Main Entrance    Number of Stairs, Main Entrance other (see comments)  Ramp  -AV       Row Name 06/18/25 0900          Pain    Pretreatment Pain Rating 0/10 - no pain  -AV     Posttreatment Pain Rating 0/10 - no pain  -AV       Row Name 06/18/25 0900          Cognition    Orientation Status (Cognition) oriented to;person  -AV       Row Name 06/18/25 0900          Range of Motion (ROM)    Range of Motion --  Plantar flexion contractures bilaterally, R greater than L. Increased resistance to knee flexion starting at ~50°  -AV       Row Name 06/18/25 0900          Strength (Manual Muscle Testing)    Strength (Manual Muscle Testing) right lower extremity strength detail;left lower extremity strength detail  -AV     Left Lower Extremity Strength --  1/5  -AV     Right Lower Extremity Strength --  2-/5  -AV       Row Name 06/18/25 0900          Bed Mobility    Bed Mobility rolling left;rolling right  -AV     Rolling Left [place] (Bed Mobility) maximum assist (25% patient effort);dependent (less than 25% patient effort)  -AV     Rolling Right [place] (Bed Mobility) maximum assist (25% patient effort);dependent (less than 25% patient effort)  -AV       Row Name  06/18/25 0900          Transfers    Comment, (Transfers) Further transfers deferred, fatou lift is used at baseline  -AV       Row Name             Wound 06/15/25 0320 Bilateral medial coccyx Pressure Injury    Wound - Properties Group Placement Date: 06/15/25  -TT Placement Time: 0320  -TT Present on Original Admission: Y  -TT Side: Bilateral  -TT Orientation: medial  -TT Location: coccyx  -TT Primary Wound Type: Pressure Inj  -TT    Retired Wound - Properties Group Placement Date: 06/15/25  -TT Placement Time: 0320  -TT Present on Original Admission: Y  -TT Side: Bilateral  -TT Orientation: medial  -TT Location: coccyx  -TT    Retired Wound - Properties Group Placement Date: 06/15/25  -TT Placement Time: 0320  -TT Present on Original Admission: Y  -TT Side: Bilateral  -TT Orientation: medial  -TT Location: coccyx  -TT    Retired Wound - Properties Group Date first assessed: 06/15/25  -TT Time first assessed: 0320  -TT Present on Original Admission: Y  -TT Side: Bilateral  -TT Location: coccyx  -TT      Row Name 06/18/25 0900          Plan of Care Review    Plan of Care Reviewed With patient  -AV     Progress no change  -AV     Outcome Evaluation Patient does not present with any significant decline in functional mobility requiring inpatient PT services. At baseline, patient requires assist with all ADLs and use of a fatou lift for out of bed transfers. Recommend return home with 24/7 care or extended care facility if family finds they are no longer able to safely care for patient. Patient will be discharged from PT caseload.  -AV       Row Name 06/18/25 0900          Therapy Assessment/Plan (PT)    Criteria for Skilled Interventions Met (PT) does not meet criteria for skilled intervention  -AV     Therapy Frequency (PT) evaluation only  -AV       Row Name 06/18/25 0900          PT Evaluation Complexity    History, PT Evaluation Complexity 3 or more personal factors and/or comorbidities  -AV     Examination of Body  Systems (PT Eval Complexity) total of 4 or more elements  -AV     Clinical Presentation (PT Evaluation Complexity) stable  -AV     Clinical Decision Making (PT Evaluation Complexity) low complexity  -AV     Overall Complexity (PT Evaluation Complexity) low complexity  -AV       Row Name 06/18/25 0900          Therapy Plan Review/Discharge Plan (PT)    Therapy Plan Review (PT) evaluation/treatment results reviewed;patient  -AV       Row Name 06/18/25 0900          Physical Therapy Goals    Problem Specific Goal Selection (PT) problem specific goal 1, PT  -AV       Row Name 06/18/25 0900          Problem Specific Goal 1 (PT)    Problem Specific Goal 1 (PT) Complete PT evaluation  -AV     Time Frame (Problem Specific Goal 1, PT) 1 day  -AV     Progress/Outcome (Problem Specific Goal 1, PT) goal met  -AV               User Key  (r) = Recorded By, (t) = Taken By, (c) = Cosigned By      Initials Name Provider Type    AV Tommie Cohen, PT Physical Therapist    TT Carmen Miles, RN Registered Nurse                    Physical Therapy Education       Title: PT OT SLP Therapies (In Progress)       Topic: Physical Therapy (In Progress)       Point: Mobility training (Done)       Learning Progress Summary            Patient Acceptance, E,TB, VU by AV at 6/18/2025 0958                      Point: Home exercise program (Not Started)       Learner Progress:  Not documented in this visit.              Point: Body mechanics (Not Started)       Learner Progress:  Not documented in this visit.              Point: Precautions (Not Started)       Learner Progress:  Not documented in this visit.                              User Key       Initials Effective Dates Name Provider Type Discipline    AV 06/11/21 -  Tommie Cohen, PT Physical Therapist PT                  PT Recommendation and Plan  Anticipated Discharge Disposition (PT): home with 24/7 care, extended care facility  Therapy Frequency (PT): evaluation only  Plan of  Care Reviewed With: patient  Progress: no change  Outcome Evaluation: Patient does not present with any significant decline in functional mobility requiring inpatient PT services. At baseline, patient requires assist with all ADLs and use of a fatou lift for out of bed transfers. Recommend return home with 24/7 care or extended care facility if family finds they are no longer able to safely care for patient. Patient will be discharged from PT caseload.   Outcome Measures       Row Name 06/18/25 0900             How much help from another person do you currently need...    Turning from your back to your side while in flat bed without using bedrails? 2  -AV      Moving from lying on back to sitting on the side of a flat bed without bedrails? 1  -AV      Moving to and from a bed to a chair (including a wheelchair)? 1  -AV      Standing up from a chair using your arms (e.g., wheelchair, bedside chair)? 1  -AV      Climbing 3-5 steps with a railing? 1  -AV      To walk in hospital room? 1  -AV      AM-PAC 6 Clicks Score (PT) 7  -AV         Functional Assessment    Outcome Measure Options AM-PAC 6 Clicks Basic Mobility (PT)  -AV                User Key  (r) = Recorded By, (t) = Taken By, (c) = Cosigned By      Initials Name Provider Type    AV Tommie Cohen, JULEE Physical Therapist                     Time Calculation:    PT Charges       Row Name 06/18/25 0957             Time Calculation    PT Received On 06/18/25  -AV         Untimed Charges    PT Eval/Re-eval Minutes 25  -AV         Total Minutes    Untimed Charges Total Minutes 25  -AV       Total Minutes 25  -AV                User Key  (r) = Recorded By, (t) = Taken By, (c) = Cosigned By      Initials Name Provider Type    Tommie Alvarez, JULEE Physical Therapist                  Therapy Charges for Today       Code Description Service Date Service Provider Modifiers Qty    62750871395 HC PT EVAL LOW COMPLEXITY 2 6/18/2025 Tommie Cohen, JULEE GP 1             PT G-Codes  Outcome Measure Options: AM-PAC 6 Clicks Basic Mobility (PT)  AM-PAC 6 Clicks Score (PT): 7    Tommie Cohen, PT  6/18/2025

## 2025-06-19 LAB
ALBUMIN SERPL-MCNC: 3.3 G/DL (ref 3.5–5.2)
ALBUMIN/GLOB SERPL: 1 G/DL
ALP SERPL-CCNC: 67 U/L (ref 39–117)
ALT SERPL W P-5'-P-CCNC: 17 U/L (ref 1–41)
ANION GAP SERPL CALCULATED.3IONS-SCNC: 10 MMOL/L (ref 5–15)
AST SERPL-CCNC: 14 U/L (ref 1–40)
BACTERIA SPEC AEROBE CULT: NORMAL
BACTERIA SPEC AEROBE CULT: NORMAL
BASOPHILS # BLD AUTO: 0.04 10*3/MM3 (ref 0–0.2)
BASOPHILS NFR BLD AUTO: 0.3 % (ref 0–1.5)
BILIRUB SERPL-MCNC: 0.3 MG/DL (ref 0–1.2)
BUN SERPL-MCNC: 15.5 MG/DL (ref 8–23)
BUN/CREAT SERPL: 15.8 (ref 7–25)
CALCIUM SPEC-SCNC: 8.6 MG/DL (ref 8.6–10.5)
CHLORIDE SERPL-SCNC: 93 MMOL/L (ref 98–107)
CO2 SERPL-SCNC: 29 MMOL/L (ref 22–29)
CREAT SERPL-MCNC: 0.98 MG/DL (ref 0.76–1.27)
DEPRECATED RDW RBC AUTO: 42.5 FL (ref 37–54)
EGFRCR SERPLBLD CKD-EPI 2021: 79.4 ML/MIN/1.73
EOSINOPHIL # BLD AUTO: 0.4 10*3/MM3 (ref 0–0.4)
EOSINOPHIL NFR BLD AUTO: 3 % (ref 0.3–6.2)
ERYTHROCYTE [DISTWIDTH] IN BLOOD BY AUTOMATED COUNT: 14.4 % (ref 12.3–15.4)
GLOBULIN UR ELPH-MCNC: 3.4 GM/DL
GLUCOSE SERPL-MCNC: 117 MG/DL (ref 65–99)
HCT VFR BLD AUTO: 37.4 % (ref 37.5–51)
HGB BLD-MCNC: 11.9 G/DL (ref 13–17.7)
IMM GRANULOCYTES # BLD AUTO: 0.11 10*3/MM3 (ref 0–0.05)
IMM GRANULOCYTES NFR BLD AUTO: 0.8 % (ref 0–0.5)
LYMPHOCYTES # BLD AUTO: 2 10*3/MM3 (ref 0.7–3.1)
LYMPHOCYTES NFR BLD AUTO: 15.1 % (ref 19.6–45.3)
MAGNESIUM SERPL-MCNC: 1.8 MG/DL (ref 1.6–2.4)
MCH RBC QN AUTO: 26.2 PG (ref 26.6–33)
MCHC RBC AUTO-ENTMCNC: 31.8 G/DL (ref 31.5–35.7)
MCV RBC AUTO: 82.2 FL (ref 79–97)
MONOCYTES # BLD AUTO: 1.03 10*3/MM3 (ref 0.1–0.9)
MONOCYTES NFR BLD AUTO: 7.8 % (ref 5–12)
NEUTROPHILS NFR BLD AUTO: 73 % (ref 42.7–76)
NEUTROPHILS NFR BLD AUTO: 9.64 10*3/MM3 (ref 1.7–7)
NRBC BLD AUTO-RTO: 0 /100 WBC (ref 0–0.2)
PHOSPHATE SERPL-MCNC: 2.5 MG/DL (ref 2.5–4.5)
PLATELET # BLD AUTO: 300 10*3/MM3 (ref 140–450)
PMV BLD AUTO: 10.2 FL (ref 6–12)
POTASSIUM SERPL-SCNC: 3.4 MMOL/L (ref 3.5–5.2)
PROT SERPL-MCNC: 6.7 G/DL (ref 6–8.5)
RBC # BLD AUTO: 4.55 10*6/MM3 (ref 4.14–5.8)
SODIUM SERPL-SCNC: 132 MMOL/L (ref 136–145)
WBC NRBC COR # BLD AUTO: 13.22 10*3/MM3 (ref 3.4–10.8)

## 2025-06-19 PROCEDURE — 99232 SBSQ HOSP IP/OBS MODERATE 35: CPT | Performed by: INTERNAL MEDICINE

## 2025-06-19 PROCEDURE — 25010000002 AMPICILLIN PER 500 MG: Performed by: INTERNAL MEDICINE

## 2025-06-19 PROCEDURE — 25010000002 MAGNESIUM SULFATE IN D5W 1G/100ML (PREMIX) 1-5 GM/100ML-% SOLUTION: Performed by: PHYSICIAN ASSISTANT

## 2025-06-19 PROCEDURE — 83735 ASSAY OF MAGNESIUM: CPT | Performed by: PHYSICIAN ASSISTANT

## 2025-06-19 PROCEDURE — 25010000002 HEPARIN (PORCINE) PER 1000 UNITS: Performed by: STUDENT IN AN ORGANIZED HEALTH CARE EDUCATION/TRAINING PROGRAM

## 2025-06-19 PROCEDURE — 84100 ASSAY OF PHOSPHORUS: CPT | Performed by: PHYSICIAN ASSISTANT

## 2025-06-19 PROCEDURE — 80053 COMPREHEN METABOLIC PANEL: CPT | Performed by: INTERNAL MEDICINE

## 2025-06-19 PROCEDURE — 85025 COMPLETE CBC W/AUTO DIFF WBC: CPT | Performed by: PHYSICIAN ASSISTANT

## 2025-06-19 RX ORDER — MAGNESIUM SULFATE 1 G/100ML
1 INJECTION INTRAVENOUS ONCE
Status: COMPLETED | OUTPATIENT
Start: 2025-06-19 | End: 2025-06-19

## 2025-06-19 RX ORDER — POTASSIUM CHLORIDE 750 MG/1
40 CAPSULE, EXTENDED RELEASE ORAL ONCE
Status: COMPLETED | OUTPATIENT
Start: 2025-06-19 | End: 2025-06-19

## 2025-06-19 RX ADMIN — MAGNESIUM SULFATE 1 G: 1 INJECTION INTRAVENOUS at 08:46

## 2025-06-19 RX ADMIN — FINASTERIDE 5 MG: 5 TABLET, FILM COATED ORAL at 08:47

## 2025-06-19 RX ADMIN — AMPICILLIN SODIUM 1 G: 1 INJECTION, POWDER, FOR SOLUTION INTRAMUSCULAR; INTRAVENOUS at 01:53

## 2025-06-19 RX ADMIN — POTASSIUM CHLORIDE 40 MEQ: 750 CAPSULE, EXTENDED RELEASE ORAL at 08:47

## 2025-06-19 RX ADMIN — HEPARIN SODIUM 5000 UNITS: 5000 INJECTION INTRAVENOUS; SUBCUTANEOUS at 21:51

## 2025-06-19 RX ADMIN — ASPIRIN 81 MG: 81 TABLET, COATED ORAL at 08:47

## 2025-06-19 RX ADMIN — HEPARIN SODIUM 5000 UNITS: 5000 INJECTION INTRAVENOUS; SUBCUTANEOUS at 13:24

## 2025-06-19 RX ADMIN — AMITRIPTYLINE HYDROCHLORIDE 25 MG: 50 TABLET, FILM COATED ORAL at 21:52

## 2025-06-19 RX ADMIN — ATORVASTATIN CALCIUM 40 MG: 40 TABLET, FILM COATED ORAL at 21:52

## 2025-06-19 RX ADMIN — AMPICILLIN SODIUM 1 G: 1 INJECTION, POWDER, FOR SOLUTION INTRAMUSCULAR; INTRAVENOUS at 06:27

## 2025-06-19 RX ADMIN — AMPICILLIN SODIUM 1 G: 1 INJECTION, POWDER, FOR SOLUTION INTRAMUSCULAR; INTRAVENOUS at 21:51

## 2025-06-19 RX ADMIN — HEPARIN SODIUM 5000 UNITS: 5000 INJECTION INTRAVENOUS; SUBCUTANEOUS at 06:25

## 2025-06-19 RX ADMIN — NYSTATIN 1 APPLICATION: 100000 OINTMENT TOPICAL at 22:42

## 2025-06-19 RX ADMIN — METOPROLOL SUCCINATE ER TABLETS 25 MG: 25 TABLET, FILM COATED, EXTENDED RELEASE ORAL at 08:47

## 2025-06-19 RX ADMIN — AMPICILLIN SODIUM 1 G: 1 INJECTION, POWDER, FOR SOLUTION INTRAMUSCULAR; INTRAVENOUS at 13:21

## 2025-06-19 RX ADMIN — FUROSEMIDE 40 MG: 40 TABLET ORAL at 17:36

## 2025-06-19 RX ADMIN — TAMSULOSIN HYDROCHLORIDE 0.4 MG: 0.4 CAPSULE ORAL at 08:47

## 2025-06-19 RX ADMIN — AMPICILLIN SODIUM 1 G: 1 INJECTION, POWDER, FOR SOLUTION INTRAMUSCULAR; INTRAVENOUS at 07:53

## 2025-06-19 RX ADMIN — POTASSIUM CHLORIDE 40 MEQ: 750 CAPSULE, EXTENDED RELEASE ORAL at 11:54

## 2025-06-19 RX ADMIN — FUROSEMIDE 40 MG: 40 TABLET ORAL at 08:47

## 2025-06-19 RX ADMIN — DOCUSATE SODIUM 50MG AND SENNOSIDES 8.6MG 1 TABLET: 8.6; 5 TABLET, FILM COATED ORAL at 21:51

## 2025-06-19 RX ADMIN — DOCUSATE SODIUM 50MG AND SENNOSIDES 8.6MG 1 TABLET: 8.6; 5 TABLET, FILM COATED ORAL at 08:47

## 2025-06-19 RX ADMIN — Medication 10 ML: at 07:54

## 2025-06-19 RX ADMIN — NYSTATIN 1 APPLICATION: 100000 OINTMENT TOPICAL at 09:13

## 2025-06-19 NOTE — SIGNIFICANT NOTE
Wound Eval / Progress Noted    JOSH[facility]     Patient Name: Curtis Richmond  : 1945  MRN: [id number]  Today's Date: 2025                 Admit Date: 2025    Visit Dx:    ICD-10-CM ICD-9-CM   1. Hyponatremia  E87.1 276.1   2. Acute congestive heart failure, unspecified heart failure type  I50.9 428.0   3. Sepsis with encephalopathy without septic shock, due to unspecified organism  A41.9 038.9    R65.20 995.92    G93.41 348.31   4. Acute UTI  N39.0 599.0   5. Paroxysmal atrial fibrillation  I48.0 427.31   6. Dysphagia, unspecified type  R13.10 787.20   7. Difficulty walking  R26.2 719.7         AMS (altered mental status)    Stroke    COPD (chronic obstructive pulmonary disease)    Presence of Watchman left atrial appendage closure device    Wheelchair bound    Hyponatremia    Morbid obesity    Congestive heart failure        Past Medical History:   Diagnosis Date    Arthritis     BACK.    At risk for central sleep apnea     STOP BANG 5    Bladder cancer     DR DAGMAR MENA. HISTORY OF. BLADDER WASH, TURBP    Chronic back pain     Congestive heart failure 2025    COPD (chronic obstructive pulmonary disease)     EMPHYSEMA- NO INHALERS    History of loop recorder     CURRENTLY HAS [employer] LOOP RECORDER S/P STROKE WIFE REPORTS CURRENTLY NOT FUNCTIONING BUT PT HAD WATCHMAN DEVICE IMPLANTED. MONITORED BY DR GONG    Hydrocephalus     NO CURRENT PROBLEMS     Hyperlipidemia     Hypertension     Hyponatremia     PAF (paroxysmal atrial fibrillation)     Stroke     TIA (transient ischemic attack) 2021    FOLLOWED BY DR YATES, WIFE DENIES PT WITH CP OR SOA, DECREASED ACTIVITY IS WHEELCHAIR BOUND    Tubular adenoma of colon 2015    Urothelial carcinoma of kidney, left 2019    Wears glasses     Wheelchair bound     2022        Past Surgical History:   Procedure Laterality Date    ATRIAL APPENDAGE EXCLUSION LEFT WITH TRANSESOPHAGEAL ECHOCARDIOGRAM N/A 2021     Procedure: 11/9/21 Atrial Appendage Occlusion on eliquis hold 2 doses prior;  Surgeon: Primo Tobias DO;  Location: [facility] EP INVASIVE LOCATION;  Service: Cardiology;  Laterality: N/A;    BACK SURGERY  06/1980    CARDIAC CATHETERIZATION  02/2001    CARDIOVERSION      AT [facility] YEARS AGO (40 YEARS AGO)    CATARACT EXTRACTION WITH INTRAOCULAR LENS IMPLANT Bilateral 2019    CHOLECYSTECTOMY  06/2002    COLONOSCOPY      CYSTOSCOPY      MULTIPLE    CYSTOSCOPY BLADDER BIOPSY N/A 08/24/2021    Procedure: CYSTOSCOPY, TRANSURETHRAL RESECTION OF BLADDER TUMOR;  Surgeon: Kaelyn Alvarado MD;  Location: [facility] OR;  Service: Urology;  Laterality: N/A;    CYSTOSCOPY RETROGRADE PYELOGRAM Bilateral 11/21/2024    Procedure: CYSTOSCOPY RETROGRADE PYELOGRAM  Scope of the bladder with x-rays of the ureters using dye;  Surgeon: Kaelyn Alvarado MD;  Location: [facility] MAIN OR;  Service: Urology;  Laterality: Bilateral;    EMBOLIZATION CEREBRAL N/A 04/14/2021    Procedure: CEREBRAL ANGIOGRAM;  Surgeon: Noah Bruno MD;  Location: [facility] OR 18/19;  Service: Interventional Radiology;  Laterality: N/A;    EPIDURAL      LUMBAR    JOINT REPLACEMENT Right     Knee    KNEE ARTHROPLASTY Right 2013    NEPHROURETERECTOMY Left 04/2019    TRANSURETHRAL RESECTION OF BLADDER TUMOR           Physical Assessment:  Rash 06/15/25 1501 throat (Active)   Color pink 06/19/25 0815   Configuration/Shape linear 06/19/25 0800   Characteristics dry 06/19/25 0815   Care, Rash cleansed with;sterile normal saline 06/19/25 0815       Wound 06/15/25 0320 Bilateral medial coccyx Pressure Injury (Active)   Wound Image   06/19/25 0815   Pressure Injury Stage DTPI 06/19/25 0815   Dressing Appearance dressing loose 06/19/25 0815   Dressing Removed Type silver impregnated;silicone border foam 06/19/25 0815   Confirmed Empty Wound Bed Yes, visual inspection of wound bed 06/19/25 0815   Closure None 06/19/25 0815   Base  nonblanchable;maroon/purple;red;moist;dry 06/19/25 0815   Periwound intact;dry;redness 06/19/25 0815   Periwound Temperature warm 06/19/25 0815   Periwound Skin Turgor soft 06/19/25 0815   Edges open;rolled/closed 06/19/25 0815   Drainage Characteristics/Odor serosanguineous 06/19/25 0815   Drainage Amount scant 06/19/25 0815   Care, Wound cleansed with;sterile normal saline 06/19/25 0815   Dressing Care dressing applied;silver impregnated;hydrofiber;silicone border foam 06/19/25 0815   Periwound Care absorptive dressing applied 06/19/25 0815       Wound 06/19/25 1219 Bilateral medial gluteal (Active)   Wound Image   06/19/25 0815   Pressure Injury Stage 2 06/19/25 0815   Dressing Appearance dressing loose 06/19/25 0815   Dressing Removed Type silicone border foam;hydrofiber 06/19/25 0815   Confirmed Empty Wound Bed Yes, visual inspection of wound bed 06/19/25 0815   Closure None 06/19/25 0815   Base nonblanchable;red;moist 06/19/25 0815   Periwound intact;moist;pink;redness 06/19/25 0815   Periwound Temperature warm 06/19/25 0815   Periwound Skin Turgor soft 06/19/25 0815   Edges open 06/19/25 0815   Drainage Characteristics/Odor serosanguineous 06/19/25 0815   Drainage Amount small 06/19/25 0815   Care, Wound cleansed with;sterile normal saline 06/19/25 0815   Dressing Care dressing applied;silver impregnated;hydrofiber;silicone border foam 06/19/25 0815   Periwound Care absorptive dressing applied 06/19/25 0815        Wound Check / Follow-up:  Patient was seen today for a wound check and dressing change.  Patient is awake and alert at the time of assessment; patient was agreeable to the visit.  Wife present at bedside and assisted with care.  Patient was incontinent of stool prior to arrival to the room; provided incontinence care and changed incontinence pad.    Worsening of wounds to the gluteal crease and bilateral gluteal aspects.  Wounds initially presented as moisture associated skin damage but now  presenting as a deep tissue injury to the gluteal crease extending into the upper aspect of the right glut with maroon / purple non- blanchable moist tissue; as well as stage 2 pressure injuries to the bilateral gluteal aspects with red moist non-blanchable tissue. Friction and shearing is present with coarse irregular open borders to the gluteal aspects. Recommending to cover all the open tissue to the gluteal crease and bilateral gluteal aspects with a dime thick layer of Triad and then secure with silicone border dressings, BID.  Implement Q2H turns and offload at all times. Utilize wedges at all times, specialty mattress ordered.    Impression: stage 2 pressure injuries to the bilateral gluteal aspects and DTPI to the gluteal crease    Short term goals: Regain skin integrity, skin protection, wound revision, improve strength, and topical treatment, quality skin care hygiene.    Liliam Cruz RN    6/19/2025    12:27 EDT

## 2025-06-19 NOTE — PLAN OF CARE
Goal Outcome Evaluation:   VSS. Patient able to make needs and wants known. Left sided paralysis 2/2 CVA. [place] catheter to bedside drainage patent with yellow urine noted. Wound care to coccyx done by  wound Care Nurse this morning. Specialty mattress delivered late this afternoon, patient transferred to bed. Patient wife is primary caregiver, plan for possible discharge home tomorrow with wife.

## 2025-06-19 NOTE — PROGRESS NOTES
[facility]   Hospitalist Progress Note  Date: 2025  Patient Name: Curtis Richmond  : 1945  MRN: [id number]  Date of admission: 2025  Room/Bed: [facility]      Subjective   Subjective     Chief Complaint: confusion and weakness     Summary:Curtis Richmond is a 79 y.o. male  who presented to the ED for evaluation of altered mental status since last night.  History provided by patient wife at the bedside.  Patient was in usual state of health until last night where he appeared to be confused.  Denied any fevers.  Throughout the day today patient continued to get more confused and withdrawn and brought him to the ED for further evaluation.  Upon arrival, vital signs temperature 98.4, pulse 117, respiratory 26, blood pressure 117/67 on room air saturating at 93%.  ABG 7.4 , troponin 42, repeat 35, proBNP 4820 sodium 122, chloride 88, rest of the CMP with no significant findings, normal lactic acid, osmolality 268, WBC 19.97, hemoglobin 12.4, platelets 257.  Urinalysis showed 1+ bacteria, 6-10 WBC.  Blood cultures negative so far.  CT head without contrast showed no acute abnormality.  CTA chest showed no PE, no infiltrate.  CT abdomen pelvis with contrast showed no significant findings.  Received vancomycin, Zosyn, Solu-Medrol, nebulizer treatments in the ED.  Patient admitted for further evaluation and management of sepsis, possible UTI, concern for new CHF exacerbation, hyponatremia        Interval Followup: Seen and examined resting comfortably continuing to diurese with improvement in serum sodium and volume status continuing with wound care discussed with wound care team today given poor appearance of wound and need to keep wound clean and dry will continue and discharge patient home with Sequeira catheter this can be discontinued at a later date once patient follows up with primary care provider and/or urology      Objective   Objective     Vitals:   Temp:  [97.3 °F (36.3 °C)-98.3 °F (36.8  °C)] 98.1 °F (36.7 °C)  Heart Rate:  [] 83  Resp:  [16-20] 20  BP: (128-143)/(67-90) 139/72    Physical Exam   Constitutional: Awake no acute distress  Respiratory: Clear  Cardiovascular RRR  GI: Abdomen soft nontender   Sequeira catheter in place      Result Review    Result Review:  I have personally reviewed these results:  [x]  Laboratory      Lab 06/19/25  0509 06/17/25  0545 06/16/25  0510 06/15/25  0523 06/14/25 2156 06/14/25 2144 06/14/25 2032   WBC 13.22* 8.38 17.84* 19.74*  --   --  19.97*   HEMOGLOBIN 11.9* 11.5* 12.5* 12.6*  --   --  12.4*   HEMATOCRIT 37.4* 36.4* 38.6 39.6  --   --  39.3   PLATELETS 300 239 277 205  --   --  257   NEUTROS ABS 9.64*  --   --  17.67*  --   --  17.03*   IMMATURE GRANS (ABS) 0.11*  --   --  0.14*  --   --  0.10*   LYMPHS ABS 2.00  --   --  1.30  --   --  1.18   MONOS ABS 1.03*  --   --  0.56  --   --  1.57*   EOS ABS 0.40  --   --  0.04  --   --  0.05   MCV 82.2 82.9 81.6 82.8  --   --  82.6   PROCALCITONIN  --   --   --   --  0.31*  --   --    LACTATE  --   --   --   --   --  1.0 1.7         Lab 06/19/25  0509 06/17/25  0545 06/16/25  0510 06/15/25  0523   SODIUM 132* 132* 127* 126*   POTASSIUM 3.4* 4.5 4.9 4.7   CHLORIDE 93* 100 95* 92*   CO2 29.0 24.4 22.1 23.6   ANION GAP 10.0 7.6 9.9 10.4   BUN 15.5 22.8 29.7* 16.9   CREATININE 0.98 0.95 1.33* 1.16   EGFR 79.4 82.4 55.1* 64.9   GLUCOSE 117* 93 154* 179*   CALCIUM 8.6 8.6 9.2 8.9   MAGNESIUM 1.8  --  2.3 2.1   PHOSPHORUS 2.5  --   --  3.8         Lab 06/19/25  0509 06/15/25  0523 06/14/25 2032   TOTAL PROTEIN 6.7 7.0 7.1   ALBUMIN 3.3* 3.3* 3.5   GLOBULIN 3.4 3.7 3.6   ALT (SGPT) 17 13 15   AST (SGOT) 14 13 23   BILIRUBIN 0.3 0.4 0.5   ALK PHOS 67 78 86         Lab 06/14/25  2156 06/14/25 2032   PROBNP  --  4,820.0*   HSTROP T 35* 42*                 Lab 06/14/25 2144   PH, ARTERIAL 7.423   PCO2, ARTERIAL 38.5   PO2 ART 67.3*   O2 SATURATION ART 93.5*   HCO3 ART 25.1   BASE EXCESS ART 0.8     Brief Urine Lab  Results  (Last result in the past 365 days)        Color   Clarity   Blood   Leuk Est   Nitrite   Protein   CREAT   Urine HCG        06/14/25 2201             120.8         06/14/25 2201 Yellow   Clear   Negative   Trace   Negative   30 mg/dL (1+)                 [x]  Microbiology   Microbiology Results (last 10 days)       Procedure Component Value - Date/Time    Urine Culture - Urine, Straight Cath [[id number]]  (Abnormal)  (Susceptibility) Collected: 06/14/25 2201    Lab Status: Final result Specimen: Urine from Straight Cath Updated: 06/17/25 1122     Urine Culture >100,000 CFU/mL Enterococcus faecalis    Narrative:      Colonization of the urinary tract without infection is common. Treatment is discouraged unless the patient is symptomatic, pregnant, or undergoing an invasive urologic procedure.    Susceptibility        Enterococcus faecalis      ADRY      Ampicillin Susceptible      Levofloxacin Susceptible      Nitrofurantoin Susceptible      Vancomycin Susceptible                           Blood Culture - Blood, Arm, Left [[id number]]  (Normal) Collected: 06/14/25 2156    Lab Status: Preliminary result Specimen: Blood from Arm, Left Updated: 06/18/25 2215     Blood Culture No growth at 4 days    Blood Culture - Blood, Arm, Right [[id number]]  (Normal) Collected: 06/14/25 2156    Lab Status: Preliminary result Specimen: Blood from Arm, Right Updated: 06/18/25 2215     Blood Culture No growth at 4 days          [x]  Radiology  CT Abdomen Pelvis With Contrast  Result Date: 6/14/2025  No definite significant acute finding is appreciated.   Portions of this note were completed with a voice recognition program.  6/14/2025 10:19 PM by Cortez Montes De Oca MD on Workstation: [email or web address]      CT Angiogram Chest Pulmonary Embolism  Result Date: 6/14/2025  The study is motion-limited. No pulmonary embolism. Grossly, no thoracic aortic aneurysm or dissection. Grossly, no acute infiltrate.   Portions of this note were completed with a  voice recognition program.  6/14/2025 10:09 PM by Cortez Montes De Oca MD on Workstation: [id number]      CT Head Without Contrast  Result Date: 6/14/2025  No acute brain abnormality is appreciated. No acute intracranial hemorrhage. No acute infarction. No midline shift or acute intracranial herniation syndrome. Please see above comments for further detail.    Portions of this note were completed with a voice recognition program.  6/14/2025 10:05 PM by Cortez Montes De Oca MD on Workstation: [id number]      []  EKG/Telemetry   []  Cardiology/Vascular   []  Pathology  []  Old records  []  Other:    Assessment & Plan   Assessment / Plan     Assessment:  Acute metabolic encephalopathy, resolved   E faecalis UTI   Hyponatremia, clinically significant   Acute CHF exacerbation, new onset?  Hyponatremia, clinically significant  Hypertension  Hyperlipidemia  History of atrial fibrillation with history of Watchman device  Urinary Retention, new     Plan:  Patient remains admitted to the medicine service  De-escalate antibiotics to ampicillin  Echocardiogram reviewed  Continue with diuresis  Nephrology following recommendations appreciated  Continue patient on fluid restriction  Resume patient's home medication  Patient is bedbound at baseline  Sequeira placed for urinary retention which is new added flomax and proscar will keep Sequeira catheter in place until improvement in wound is noted can follow-up outpatient with urology for voiding trial once wound appearance has improved   discussed with RN and wife at bedside discussed with wound care team        VTE Prophylaxis:  Pharmacologic VTE prophylaxis orders are present.        CODE STATUS:   Code Status (Patient has no pulse and is not breathing): CPR (Attempt to Resuscitate)  Medical Interventions (Patient has pulse or is breathing): Full Support  Level Of Support Discussed With: Patient      Electronically signed by MARIA ANTONIA Childress, 6/19/2025, 13:12 EDT.          Attending  Documentation:  Patient independently seen and evaluated, above documentation reflects plan put forth during bedside rounds.  More than 51% of the time of this patient encounter was performed by me. I discussed the care plan with SHAHZAD Luke PA-C, I agree with his findings and plan as documented, what I have added to the care plan and modified is as follows in my documentation and my medical decision making; 79-year-old male presented with altered mental status, he is basely bedbound, had UTI, hyponatremia.  Clinically improving.  Diuresing very well with IV Lasix.  Keep Sequeira catheter for now due to wound and bedbound status.  Possibly home in 24 to 48 hours.  Electronically signed by Vladislav Minor MD, 06/19/25, 7:32 PM EDT.

## 2025-06-19 NOTE — PROGRESS NOTES
[facility]     Progress Note    Patient Name: Curtis Richmond  : 1945  MRN: [id number]  Primary Care Physician:  Kat Eubanks APRN  Date of admission: 2025    Subjective patient got confused disoriented last night    Review of Systems  All review of systems are negative except as mentioned in subjective complaints.    Objective   Objective     Vitals:   Temp:  [97.3 °F (36.3 °C)-98.3 °F (36.8 °C)] 97.7 °F (36.5 °C)  Heart Rate:  [] 88  Resp:  [16-18] 16  BP: (128-143)/(67-90) 137/90  Physical Exam    Constitutional: Awake, alert responsive, conversant, no obvious distress              Psychiatric:  Appropriate affect, cooperative   Neurologic:  Awake alert ,oriented x 3, strength symmetric in all extremities, Cranial Nerves grossly intact to confrontation, speech clear   Eyes:   PERRLA, sclerae anicteric, no conjunctival injection   HEENT:  Moist mucous membranes, no nasal or eye discharge, no throat congestion   Neck:   Supple, no thyromegaly, no lymphadenopathy, trachea midline, no elevated JVD   Respiratory:  Clear to auscultation bilaterally, nonlabored respirations    Cardiovascular: RRR, no murmurs, rubs, or gallops, palpable pedal pulses bilaterally, No bilateral ankle edema   Gastrointestinal: Positive bowel sounds, soft, nontender, nondistended, no organomegaly   Musculoskeletal:  No clubbing or cyanosis to extremities,muscle wasting, joint swelling, muscle weakness             Skin:                      No rashes, bruising, skin ulcers, petechiae or ecchymosis    Result Review    Result Review:  I have personally reviewed the results from the time of this admission to 2025 10:22 EDT and agree with these findings:  []  Laboratory  []  Microbiology  []  Radiology  []  EKG/Telemetry   []  Cardiology/Vascular   []  Pathology  []  Old records  []  Other:    Results from last 7 days   Lab Units 25  0509 25  0545 25  0510 06/15/25  0523 25   WBC  10*3/mm3 13.22* 8.38 17.84* 19.74* 19.97*   HEMOGLOBIN g/dL 11.9* 11.5* 12.5* 12.6* 12.4*   PLATELETS 10*3/mm3 300 239 277 205 257     Results from last 7 days   Lab Units 06/19/25  0509 06/17/25  0545 06/16/25  0510 06/15/25  0523 06/14/25  2144 06/14/25 2032 06/14/25 2032   SODIUM mmol/L 132* 132* 127* 126*  --   --  122*   POTASSIUM mmol/L 3.4* 4.5 4.9 4.7  --   --  4.9   CHLORIDE mmol/L 93* 100 95* 92*  --   --  88*   CO2 mmol/L 29.0 24.4 22.1 23.6  --   --  24.5   ANION GAP mmol/L 10.0 7.6 9.9 10.4  --   --  9.5   BUN mg/dL 15.5 22.8 29.7* 16.9  --   --  16.2   CREATININE mg/dL 0.98 0.95 1.33* 1.16 1.12  --  1.24   GLUCOSE mg/dL 117* 93 154* 179*  --   --  172*   EGFR mL/min/1.73 79.4 82.4 55.1* 64.9  --   --  59.9*   CALCIUM mg/dL 8.6 8.6 9.2 8.9  --   --  9.1   MAGNESIUM mg/dL 1.8  --  2.3 2.1  --    < >  --    ALBUMIN g/dL 3.3*  --   --  3.3*  --   --  3.5   BILIRUBIN mg/dL 0.3  --   --  0.4  --   --  0.5   ALK PHOS U/L 67  --   --  78  --   --  86   ALT (SGPT) U/L 17  --   --  13  --   --  15   AST (SGOT) U/L 14  --   --  13  --   --  23    < > = values in this interval not displayed.       Scheduled Meds:amitriptyline, 25 mg, Oral, Nightly  ampicillin, 1 g, Intravenous, Q6H  aspirin, 81 mg, Oral, Daily  atorvastatin, 40 mg, Oral, Nightly  finasteride, 5 mg, Oral, Daily  furosemide, 40 mg, Oral, BID Diuretics  heparin (porcine), 5,000 Units, Subcutaneous, Q8H  metoprolol succinate XL, 25 mg, Oral, Q24H  nystatin, 1 Application, Topical, Q12H  senna-docusate sodium, 1 tablet, Oral, BID  sodium chloride, 10 mL, Intravenous, Q12H  tamsulosin, 0.4 mg, Oral, Daily      Continuous Infusions:   PRN Meds:.  dextrose    dextrose    glucagon (human recombinant)    sodium chloride    sodium chloride    sodium chloride    Assessment & Plan   Assessment / Plan       Active Hospital Problems:    Active Hospital Problems    Diagnosis  POA    **AMS (altered mental status) [R41.82]  Yes    Congestive heart failure [I50.9]   Unknown    Hyponatremia [E87.1]  Yes    Morbid obesity [E66.01]  Yes    Wheelchair bound [Z99.3]  Not Applicable     2022 10/01 REGULATORY IMPORT REPLACEMENT      Presence of Watchman left atrial appendage closure device [Z95.818]  Yes     KIMI 12-22-21: There is a 20 mm watchman FLX left atrial appendage occlusion device in place. The device appears well-seated. There is no significant peridevice flow.      COPD (chronic obstructive pulmonary disease) [J44.9]  Yes     EMPYSEMA- NO INHALERS      Stroke [I63.9]  Yes       Plan:   Continue present care.  Hyponatremia improved  Replace potassium       Electronically signed by Houston Darnell MD, 06/19/25, 10:22 AM EDT.

## 2025-06-19 NOTE — PLAN OF CARE
Goal Outcome Evaluation:           Progress: no change  Outcome Evaluation: Pt A&Ox3 on room air, afib converted to NSR. Sequeira patent, pt removed own IV and had to have a new one put in via ultrasound. No other changes will continue POC

## 2025-06-20 ENCOUNTER — READMISSION MANAGEMENT (OUTPATIENT)
Dept: CALL CENTER | Facility: HOSPITAL | Age: 80
End: 2025-06-20
Payer: MEDICARE

## 2025-06-20 VITALS
HEIGHT: 68 IN | RESPIRATION RATE: 22 BRPM | BODY MASS INDEX: 37.42 KG/M2 | TEMPERATURE: 97.5 F | OXYGEN SATURATION: 87 % | HEART RATE: 83 BPM | DIASTOLIC BLOOD PRESSURE: 68 MMHG | WEIGHT: 246.91 LBS | SYSTOLIC BLOOD PRESSURE: 115 MMHG

## 2025-06-20 LAB
ALBUMIN SERPL-MCNC: 3.5 G/DL (ref 3.5–5.2)
ANION GAP SERPL CALCULATED.3IONS-SCNC: 7.7 MMOL/L (ref 5–15)
BUN SERPL-MCNC: 10.5 MG/DL (ref 8–23)
BUN/CREAT SERPL: 11.8 (ref 7–25)
CALCIUM SPEC-SCNC: 9.1 MG/DL (ref 8.6–10.5)
CHLORIDE SERPL-SCNC: 99 MMOL/L (ref 98–107)
CO2 SERPL-SCNC: 29.3 MMOL/L (ref 22–29)
CREAT SERPL-MCNC: 0.89 MG/DL (ref 0.76–1.27)
DEPRECATED RDW RBC AUTO: 44.1 FL (ref 37–54)
EGFRCR SERPLBLD CKD-EPI 2021: 88.3 ML/MIN/1.73
ERYTHROCYTE [DISTWIDTH] IN BLOOD BY AUTOMATED COUNT: 14.6 % (ref 12.3–15.4)
GLUCOSE SERPL-MCNC: 96 MG/DL (ref 65–99)
HCT VFR BLD AUTO: 37.6 % (ref 37.5–51)
HGB BLD-MCNC: 11.7 G/DL (ref 13–17.7)
MAGNESIUM SERPL-MCNC: 2.1 MG/DL (ref 1.6–2.4)
MCH RBC QN AUTO: 26 PG (ref 26.6–33)
MCHC RBC AUTO-ENTMCNC: 31.1 G/DL (ref 31.5–35.7)
MCV RBC AUTO: 83.6 FL (ref 79–97)
PHOSPHATE SERPL-MCNC: 2.6 MG/DL (ref 2.5–4.5)
PLATELET # BLD AUTO: 290 10*3/MM3 (ref 140–450)
PMV BLD AUTO: 10 FL (ref 6–12)
POTASSIUM SERPL-SCNC: 3.9 MMOL/L (ref 3.5–5.2)
RBC # BLD AUTO: 4.5 10*6/MM3 (ref 4.14–5.8)
SODIUM SERPL-SCNC: 136 MMOL/L (ref 136–145)
WBC NRBC COR # BLD AUTO: 13.27 10*3/MM3 (ref 3.4–10.8)

## 2025-06-20 PROCEDURE — 25010000002 HEPARIN (PORCINE) PER 1000 UNITS: Performed by: STUDENT IN AN ORGANIZED HEALTH CARE EDUCATION/TRAINING PROGRAM

## 2025-06-20 PROCEDURE — 83735 ASSAY OF MAGNESIUM: CPT | Performed by: PHYSICIAN ASSISTANT

## 2025-06-20 PROCEDURE — 85027 COMPLETE CBC AUTOMATED: CPT | Performed by: PHYSICIAN ASSISTANT

## 2025-06-20 PROCEDURE — 80069 RENAL FUNCTION PANEL: CPT | Performed by: PHYSICIAN ASSISTANT

## 2025-06-20 PROCEDURE — 25010000002 AMPICILLIN PER 500 MG: Performed by: INTERNAL MEDICINE

## 2025-06-20 RX ORDER — TAMSULOSIN HYDROCHLORIDE 0.4 MG/1
0.4 CAPSULE ORAL DAILY
Qty: 30 CAPSULE | Refills: 0 | Status: SHIPPED | OUTPATIENT
Start: 2025-06-20 | End: 2025-07-20

## 2025-06-20 RX ORDER — DOXYCYCLINE 100 MG/1
100 CAPSULE ORAL 2 TIMES DAILY
Qty: 20 CAPSULE | Refills: 0 | Status: SHIPPED | OUTPATIENT
Start: 2025-06-20 | End: 2025-06-30

## 2025-06-20 RX ORDER — METOPROLOL SUCCINATE 25 MG/1
25 TABLET, EXTENDED RELEASE ORAL
Qty: 30 TABLET | Refills: 0 | Status: SHIPPED | OUTPATIENT
Start: 2025-06-20 | End: 2025-07-20

## 2025-06-20 RX ORDER — FINASTERIDE 5 MG/1
5 TABLET, FILM COATED ORAL DAILY
Qty: 30 TABLET | Refills: 0 | Status: SHIPPED | OUTPATIENT
Start: 2025-06-20 | End: 2025-07-20

## 2025-06-20 RX ORDER — POTASSIUM CHLORIDE 750 MG/1
10 CAPSULE, EXTENDED RELEASE ORAL DAILY
Qty: 30 CAPSULE | Refills: 0 | Status: SHIPPED | OUTPATIENT
Start: 2025-06-20 | End: 2025-07-20

## 2025-06-20 RX ORDER — FUROSEMIDE 40 MG/1
40 TABLET ORAL DAILY
Qty: 30 TABLET | Refills: 0 | Status: SHIPPED | OUTPATIENT
Start: 2025-06-20 | End: 2025-07-20

## 2025-06-20 RX ORDER — LOSARTAN POTASSIUM 100 MG/1
50 TABLET ORAL DAILY
Qty: 30 TABLET | Refills: 0 | Status: CANCELLED | OUTPATIENT
Start: 2025-06-20

## 2025-06-20 RX ADMIN — AMPICILLIN SODIUM 1 G: 1 INJECTION, POWDER, FOR SOLUTION INTRAMUSCULAR; INTRAVENOUS at 08:07

## 2025-06-20 RX ADMIN — AMPICILLIN SODIUM 1 G: 1 INJECTION, POWDER, FOR SOLUTION INTRAMUSCULAR; INTRAVENOUS at 02:22

## 2025-06-20 RX ADMIN — TAMSULOSIN HYDROCHLORIDE 0.4 MG: 0.4 CAPSULE ORAL at 09:52

## 2025-06-20 RX ADMIN — Medication 10 ML: at 08:08

## 2025-06-20 RX ADMIN — DOCUSATE SODIUM 50MG AND SENNOSIDES 8.6MG 1 TABLET: 8.6; 5 TABLET, FILM COATED ORAL at 09:52

## 2025-06-20 RX ADMIN — METOPROLOL SUCCINATE ER TABLETS 25 MG: 25 TABLET, FILM COATED, EXTENDED RELEASE ORAL at 09:52

## 2025-06-20 RX ADMIN — HEPARIN SODIUM 5000 UNITS: 5000 INJECTION INTRAVENOUS; SUBCUTANEOUS at 07:02

## 2025-06-20 RX ADMIN — FUROSEMIDE 40 MG: 40 TABLET ORAL at 09:52

## 2025-06-20 RX ADMIN — ASPIRIN 81 MG: 81 TABLET, COATED ORAL at 09:52

## 2025-06-20 RX ADMIN — NYSTATIN 1 APPLICATION: 100000 OINTMENT TOPICAL at 09:52

## 2025-06-20 NOTE — NURSING NOTE
Discharge instructions reviewed with patient wife and [facility] notified of need for transportation home.

## 2025-06-20 NOTE — PLAN OF CARE
Goal Outcome Evaluation:   Patient being discharged home per EMS under the care of his wife. Patient has a specialty bed ordered that will be delivered by DME tomorrow. Sequeira catheter left in per MD order.

## 2025-06-20 NOTE — OUTREACH NOTE
Prep Survey      Flowsheet Row Responses   [facility] discharged from? [facility]   Is LACE score < 7 ? No   Eligibility Readm Mgmt   Discharge diagnosis AMS (altered mental status)   Does the patient have one of the following disease processes/diagnoses(primary or secondary)? Other   Does the patient have Home health ordered? Yes   What is the Home health agency?  [facility]   Prep survey completed? Yes            Theresa CARRERA - Registered Nurse

## 2025-06-20 NOTE — PROGRESS NOTES
[facility]     Progress Note    Patient Name: Curtis Richmond  : 1945  MRN: [id number]  Primary Care Physician:  Kat Eubanks APRN  Date of admission: 2025    Subjective patient had a big BM today    Review of Systems  All review of systems are negative except as mentioned in subjective complaints.    Objective   Objective     Vitals:   Temp:  [97.7 °F (36.5 °C)-98.2 °F (36.8 °C)] 97.9 °F (36.6 °C)  Heart Rate:  [76-87] 76  Resp:  [18-20] 18  BP: (122-149)/(71-82) 149/82  Physical Exam    Constitutional: Awake, alert responsive, conversant, no obvious distress              Psychiatric:  Appropriate affect, cooperative   Neurologic:  Awake alert ,oriented x 3, strength symmetric in all extremities, Cranial Nerves grossly intact to confrontation, speech clear   Eyes:   PERRLA, sclerae anicteric, no conjunctival injection   HEENT:  Moist mucous membranes, no nasal or eye discharge, no throat congestion   Neck:   Supple, no thyromegaly, no lymphadenopathy, trachea midline, no elevated JVD   Respiratory:  Clear to auscultation bilaterally, nonlabored respirations    Cardiovascular: RRR, no murmurs, rubs, or gallops, palpable pedal pulses bilaterally, No bilateral ankle edema   Gastrointestinal: Positive bowel sounds, soft, nontender, nondistended, no organomegaly   Musculoskeletal:  No clubbing or cyanosis to extremities,muscle wasting, joint swelling, muscle weakness             Skin:                      No rashes, bruising, skin ulcers, petechiae or ecchymosis    Result Review    Result Review:  I have personally reviewed the results from the time of this admission to 2025 10:24 EDT and agree with these findings:  []  Laboratory  []  Microbiology  []  Radiology  []  EKG/Telemetry   []  Cardiology/Vascular   []  Pathology  []  Old records  []  Other:    Results from last 7 days   Lab Units 25  0510 25  0509 25  0545 25  0510 06/15/25  0523 25   WBC  10*3/mm3 13.27* 13.22* 8.38 17.84* 19.74* 19.97*   HEMOGLOBIN g/dL 11.7* 11.9* 11.5* 12.5* 12.6* 12.4*   PLATELETS 10*3/mm3 290 300 239 277 205 257     Results from last 7 days   Lab Units 06/20/25  0510 06/19/25  0509 06/17/25  0545 06/16/25  0510 06/15/25  0523 06/14/25 2144 06/14/25 2032 06/14/25 2032   SODIUM mmol/L 136 132* 132* 127* 126*  --   --  122*   POTASSIUM mmol/L 3.9 3.4* 4.5 4.9 4.7  --   --  4.9   CHLORIDE mmol/L 99 93* 100 95* 92*  --   --  88*   CO2 mmol/L 29.3* 29.0 24.4 22.1 23.6  --   --  24.5   ANION GAP mmol/L 7.7 10.0 7.6 9.9 10.4  --   --  9.5   BUN mg/dL 10.5 15.5 22.8 29.7* 16.9  --   --  16.2   CREATININE mg/dL 0.89 0.98 0.95 1.33* 1.16 1.12  --  1.24   GLUCOSE mg/dL 96 117* 93 154* 179*  --   --  172*   EGFR mL/min/1.73 88.3 79.4 82.4 55.1* 64.9  --   --  59.9*   CALCIUM mg/dL 9.1 8.6 8.6 9.2 8.9  --   --  9.1   MAGNESIUM mg/dL 2.1 1.8  --  2.3 2.1  --    < >  --    ALBUMIN g/dL 3.5 3.3*  --   --  3.3*  --   --  3.5   BILIRUBIN mg/dL  --  0.3  --   --  0.4  --   --  0.5   ALK PHOS U/L  --  67  --   --  78  --   --  86   ALT (SGPT) U/L  --  17  --   --  13  --   --  15   AST (SGOT) U/L  --  14  --   --  13  --   --  23    < > = values in this interval not displayed.       Scheduled Meds:amitriptyline, 25 mg, Oral, Nightly  ampicillin, 1 g, Intravenous, Q6H  aspirin, 81 mg, Oral, Daily  atorvastatin, 40 mg, Oral, Nightly  finasteride, 5 mg, Oral, Daily  furosemide, 40 mg, Oral, BID Diuretics  heparin (porcine), 5,000 Units, Subcutaneous, Q8H  metoprolol succinate XL, 25 mg, Oral, Q24H  nystatin, 1 Application, Topical, Q12H  senna-docusate sodium, 1 tablet, Oral, BID  sodium chloride, 10 mL, Intravenous, Q12H  tamsulosin, 0.4 mg, Oral, Daily      Continuous Infusions:   PRN Meds:.  dextrose    dextrose    glucagon (human recombinant)    sodium chloride    sodium chloride    sodium chloride    Assessment & Plan   Assessment / Plan       Active Hospital Problems:    Active Hospital  Problems    Diagnosis  POA    **AMS (altered mental status) [R41.82]  Yes    Congestive heart failure [I50.9]  Unknown    Hyponatremia [E87.1]  Yes    Morbid obesity [E66.01]  Yes    Wheelchair bound [Z99.3]  Not Applicable     2022 10/01 REGULATORY IMPORT REPLACEMENT      Presence of Watchman left atrial appendage closure device [Z95.818]  Yes     KIMI 12-22-21: There is a 20 mm watchman FLX left atrial appendage occlusion device in place. The device appears well-seated. There is no significant peridevice flow.      COPD (chronic obstructive pulmonary disease) [J44.9]  Yes     EMPYSEMA- NO INHALERS      Stroke [I63.9]  Yes       Plan:   Patient is clinically stable and can be discharged from renal perspective       Electronically signed by Houston Darnell MD, 06/20/25, 10:24 AM EDT.

## 2025-06-20 NOTE — DISCHARGE SUMMARY
[facility]         HOSPITALIST  DISCHARGE SUMMARY    Patient Name: Curtis Richmond  : 1945  MRN: [id number]    Date of Admission: 2025  Date of Discharge: 2025  Primary Care Physician: Kat Eubanks APRN  Reason for admission:  Confusion and weakness    Final diagnosis:  Acute metabolic encephalopathy, resolved   E faecalis UTI   Hyponatremia, clinically significant   Acute diastolic congestive heart failure patient be discharged home on Lasix  Hyponatremia, clinically significant  Hypertension  Hyperlipidemia  History of atrial fibrillation with history of Watchman device  Urinary Retention, new   Bilateral medial gluteal wound  Bilateral medial coccyx pressure injury-wound      Consults       Date and Time Order Name Status Description    6/15/2025  2:52 AM Inpatient Nephrology Consult Completed     2025 10:35 PM Inpatient Hospitalist Consult              Active and Resolved Hospital Problems:  Active Hospital Problems    Diagnosis POA    **AMS (altered mental status) [R41.82] Yes    Congestive heart failure [I50.9] Unknown    Hyponatremia [E87.1] Yes    Morbid obesity [E66.01] Yes    Wheelchair bound [Z99.3] Not Applicable    Presence of Watchman left atrial appendage closure device [Z95.818] Yes    COPD (chronic obstructive pulmonary disease) [J44.9] Yes    Stroke [I63.9] Yes      Resolved Hospital Problems   No resolved problems to display.       Hospital Course     Hospital Course:  Curtis Richmond is a 79 y.o. male multiple medical problems as documented above presents emergency department with weakness confusion noted to have urinary tract infection noted to have pressure wounds gluteal region was hyponatremic concern for CHF admitted to the hospitalist service nephrology consult started on diuretics broad-spectrum antibiotics wound care initiated Sequeira catheter placed for retention but also to keep wound clean and dry mental status improved volume status  improved seen and examined on day of discharge 6/20/2025 hemodynamically clinically stable patient will be discharged with Sequeira catheter in place to keep wound clean and dry will continue with wound care per wound care team additionally will arrange for a low-air-loss mattress at home due to his stage II decubitus ulcers of his coccyx in order to help with healing.  Patient will need to follow-up closely with his primary care provider and follow-up with nephrology patient should return to ED for worsening symptoms        DISCHARGE Follow Up Recommendations for labs and diagnostics: As above      Day of Discharge     Vital Signs:  Temp:  [97.5 °F (36.4 °C)-98.2 °F (36.8 °C)] 97.5 °F (36.4 °C)  Heart Rate:  [76-87] 83  Resp:  [18-22] 22  BP: (115-149)/(68-82) 115/68  Physical Exam:   Constitutional: Awake alert no acute distress  Respiratory: Clear  Cardiovascular RRR  GI: Abdomen soft nontender  Sequeira catheter in place      Discharge Details        Discharge Medications        PAUSE taking these medications        Instructions Start Date   losartan 100 MG tablet  Wait to take this until your doctor or other care provider tells you to start again.  Commonly known as: Cozaar   100 mg, Oral, Daily             New Medications        Instructions Start Date   doxycycline 100 MG capsule  Commonly known as: VIBRAMYCIN   100 mg, Oral, 2 Times Daily      finasteride 5 MG tablet  Commonly known as: PROSCAR   5 mg, Oral, Daily      furosemide 40 MG tablet  Commonly known as: Lasix   40 mg, Oral, Daily      metoprolol succinate XL 25 MG 24 hr tablet  Commonly known as: TOPROL-XL   25 mg, Oral, Every 24 Hours Scheduled      potassium chloride 10 MEQ CR capsule  Commonly known as: MICRO-K   10 mEq, Oral, Daily      tamsulosin 0.4 MG capsule 24 hr capsule  Commonly known as: FLOMAX   0.4 mg, Oral, Daily             Continue These Medications        Instructions Start Date   amitriptyline 25 MG tablet  Commonly known as: ELAVIL    25 mg, Nightly      amLODIPine 2.5 MG tablet  Commonly known as: NORVASC   2.5 mg, Every Evening      aspirin 81 MG EC tablet   81 mg, Oral, Daily      atorvastatin 40 MG tablet  Commonly known as: LIPITOR   40 mg, Nightly      cetirizine 10 MG tablet  Commonly known as: zyrTEC   1 tablet, Daily             Stop These Medications      carvedilol 6.25 MG tablet  Commonly known as: COREG              No Known Allergies    Discharge Disposition:  Home-Health Care [facility]    Diet:  Hospital:  Diet Order   Procedures    Diet: Regular/House, Fluid Restriction (240 mL/tray); 1500 mL/day; Fluid Consistency: Thin (IDDSI 0)       Discharge Activity:   Activity Instructions    ACTIVITY AS TOLERATED           CODE STATUS:  Code Status and Medical Interventions: CPR (Attempt to Resuscitate); Full Support   Ordered at: 06/15/25 0059     Code Status (Patient has no pulse and is not breathing):    CPR (Attempt to Resuscitate)     Medical Interventions (Patient has pulse or is breathing):    Full Support     Level Of Support Discussed With:    Patient         Future Appointments   Date Time Provider Department Center   11/12/2025 10:45 AM STEFANO Carlos [facility]       Additional Instructions for the Follow-ups that You Need to Schedule       Discharge Follow-up with PCP   As directed       Currently Documented PCP:    Kat Eubanks APRN    PCP Phone Number:    [phone]     Follow Up Details: one week for bmp        Discharge Follow-up with Specified Provider: Nephrology Dr Solomon; 1 Week   As directed      To: Nephharsh Solomon   Follow Up: 1 Week                Pertinent  and/or Most Recent Results       LAB RESULTS:      Lab 06/20/25  0510 06/19/25  0509 06/17/25  0545 06/16/25  0510 06/15/25  0523 06/14/25  2156 06/14/25 2144 06/14/25 2032   WBC 13.27* 13.22* 8.38 17.84* 19.74*  --   --  19.97*   HEMOGLOBIN 11.7* 11.9* 11.5* 12.5* 12.6*  --   --  12.4*   HEMATOCRIT 37.6 37.4* 36.4* 38.6 39.6  --   --  39.3    PLATELETS 290 300 239 277 205  --   --  257   NEUTROS ABS  --  9.64*  --   --  17.67*  --   --  17.03*   IMMATURE GRANS (ABS)  --  0.11*  --   --  0.14*  --   --  0.10*   LYMPHS ABS  --  2.00  --   --  1.30  --   --  1.18   MONOS ABS  --  1.03*  --   --  0.56  --   --  1.57*   EOS ABS  --  0.40  --   --  0.04  --   --  0.05   MCV 83.6 82.2 82.9 81.6 82.8  --   --  82.6   PROCALCITONIN  --   --   --   --   --  0.31*  --   --    LACTATE  --   --   --   --   --   --  1.0 1.7         Lab 06/20/25  0510 06/19/25  0509 06/17/25  0545 06/16/25  0510 06/15/25  0523   SODIUM 136 132* 132* 127* 126*   POTASSIUM 3.9 3.4* 4.5 4.9 4.7   CHLORIDE 99 93* 100 95* 92*   CO2 29.3* 29.0 24.4 22.1 23.6   ANION GAP 7.7 10.0 7.6 9.9 10.4   BUN 10.5 15.5 22.8 29.7* 16.9   CREATININE 0.89 0.98 0.95 1.33* 1.16   EGFR 88.3 79.4 82.4 55.1* 64.9   GLUCOSE 96 117* 93 154* 179*   CALCIUM 9.1 8.6 8.6 9.2 8.9   MAGNESIUM 2.1 1.8  --  2.3 2.1   PHOSPHORUS 2.6 2.5  --   --  3.8         Lab 06/20/25  0510 06/19/25  0509 06/15/25  0523 06/14/25 2032   TOTAL PROTEIN  --  6.7 7.0 7.1   ALBUMIN 3.5 3.3* 3.3* 3.5   GLOBULIN  --  3.4 3.7 3.6   ALT (SGPT)  --  17 13 15   AST (SGOT)  --  14 13 23   BILIRUBIN  --  0.3 0.4 0.5   ALK PHOS  --  67 78 86         Lab 06/14/25 2156 06/14/25 2032   PROBNP  --  4,820.0*   HSTROP T 35* 42*                 Lab 06/14/25 2144   PH, ARTERIAL 7.423   PCO2, ARTERIAL 38.5   PO2 ART 67.3*   O2 SATURATION ART 93.5*   HCO3 ART 25.1   BASE EXCESS ART 0.8     Brief Urine Lab Results  (Last result in the past 365 days)        Color   Clarity   Blood   Leuk Est   Nitrite   Protein   CREAT   Urine HCG        06/14/25 2201             120.8         06/14/25 2201 Yellow   Clear   Negative   Trace   Negative   30 mg/dL (1+)                 Microbiology Results (last 10 days)       Procedure Component Value - Date/Time    Urine Culture - Urine, Straight Cath [[id number]]  (Abnormal)  (Susceptibility) Collected: 06/14/25 2201    Lab  Status: Final result Specimen: Urine from Straight Cath Updated: 06/17/25 1122     Urine Culture >100,000 CFU/mL Enterococcus faecalis    Narrative:      Colonization of the urinary tract without infection is common. Treatment is discouraged unless the patient is symptomatic, pregnant, or undergoing an invasive urologic procedure.    Susceptibility        Enterococcus faecalis      ADRY      Ampicillin Susceptible      Levofloxacin Susceptible      Nitrofurantoin Susceptible      Vancomycin Susceptible                           Blood Culture - Blood, Arm, Left [[id number]]  (Normal) Collected: 06/14/25 2156    Lab Status: Final result Specimen: Blood from Arm, Left Updated: 06/19/25 2201     Blood Culture No growth at 5 days    Blood Culture - Blood, Arm, Right [[id number]]  (Normal) Collected: 06/14/25 2156    Lab Status: Final result Specimen: Blood from Arm, Right Updated: 06/19/25 2201     Blood Culture No growth at 5 days            CT Abdomen Pelvis With Contrast  Result Date: 6/14/2025  Impression: No definite significant acute finding is appreciated.   Portions of this note were completed with a voice recognition program.  6/14/2025 10:19 PM by Cortez Montes De Oca MD on Workstation: [employer]      CT Angiogram Chest Pulmonary Embolism  Result Date: 6/14/2025  Impression: The study is motion-limited. No pulmonary embolism. Grossly, no thoracic aortic aneurysm or dissection. Grossly, no acute infiltrate.   Portions of this note were completed with a voice recognition program.  6/14/2025 10:09 PM by Cortez Montes De Oca MD on Workstation: [employer]      CT Head Without Contrast  Result Date: 6/14/2025  Impression: No acute brain abnormality is appreciated. No acute intracranial hemorrhage. No acute infarction. No midline shift or acute intracranial herniation syndrome. Please see above comments for further detail.    Portions of this note were completed with a voice recognition program.  6/14/2025 10:05 PM by Cortez Montes De Oca MD on  Workstation: [id number]                Results for orders placed during the hospital encounter of 06/14/25    Adult Transthoracic Echo Complete W/ Cont if Necessary Per Protocol    Interpretation Summary    Left ventricular ejection fraction appears to be 51 - 55%.    Left ventricular wall thickness is consistent with mild concentric hypertrophy.    Left ventricular diastolic function is consistent with (grade I) impaired relaxation.    The right ventricular cavity is mildly dilated.    The left atrial cavity is mildly dilated.    The right atrial cavity is mildly dilated.    Estimated right ventricular systolic pressure from tricuspid regurgitation is mildly elevated (35-45 mmHg).      Labs Pending at Discharge:        Time spent on Discharge including face to face service: 35 minutes    Electronically signed by MARIA ANTONIA Childress, 06/20/25, 1:17 PM EDT.

## 2025-06-25 ENCOUNTER — READMISSION MANAGEMENT (OUTPATIENT)
Dept: CALL CENTER | Facility: HOSPITAL | Age: 80
End: 2025-06-25
Payer: MEDICARE

## 2025-06-25 NOTE — OUTREACH NOTE
Medical Week 1 Survey      Flowsheet Row Responses   [facility] patient discharged from? [facility]   Does the patient have one of the following disease processes/diagnoses(primary or secondary)? Other   Week 1 attempt successful? Yes   Call start time 1635   Call end time 1638   Discharge diagnosis AMS (altered mental status)   Person spoke with today (if not patient) and relationship Alix, spouse   Meds reviewed with patient/caregiver? Yes   Is the patient having any side effects they believe may be caused by any medication additions or changes? No   Does the patient have all medications ordered at discharge? Yes   Is the patient taking all medications as directed (includes completed medication regime)? Yes   What is the Home health agency?  [facility]   Has home health visited the patient within 72 hours of discharge? Yes   What DME was ordered? decubitus mattress from [employer]   Has all DME been delivered? No   DME comments mattress was brought but too small, larger one is on order   Psychosocial issues? No   Did the patient receive a copy of their discharge instructions? Yes   Nursing interventions Reviewed instructions with patient   What is the patient's perception of their health status since discharge? Improving   Is the patient/caregiver able to teach back signs and symptoms related to disease process for when to call PCP? Yes   Is the patient/caregiver able to teach back signs and symptoms related to disease process for when to call 911? Yes   Is the patient/caregiver able to teach back the hierarchy of who to call/visit for symptoms/problems? PCP, Specialist, Home health nurse, Urgent Care, ED, 911 Yes   If the patient is a current smoker, are they able to teach back resources for cessation? Not a smoker   Additional teach back comments pt has good appetite,   Week 1 call completed? Yes   Call end time 1638            Florinda POOLE - Registered Nurse

## 2025-07-03 LAB
QT INTERVAL: 311 MS
QTC INTERVAL: 416 MS

## 2025-07-08 ENCOUNTER — READMISSION MANAGEMENT (OUTPATIENT)
Dept: CALL CENTER | Facility: HOSPITAL | Age: 80
End: 2025-07-08
Payer: MEDICARE

## 2025-07-08 NOTE — OUTREACH NOTE
Medical Week 2 Survey      Flowsheet Row Responses   [facility] patient discharged from? [facility]   Does the patient have one of the following disease processes/diagnoses(primary or secondary)? Other   Week 2 attempt successful? No   Unsuccessful attempts Attempt 1   Revoke Martha Morris H - Registered Nurse

## 2025-07-17 ENCOUNTER — HOSPITAL ENCOUNTER (INPATIENT)
Facility: HOSPITAL | Age: 80
LOS: 3 days | Discharge: HOME HEALTH | DRG: 641 | End: 2025-07-20
Attending: EMERGENCY MEDICINE | Admitting: FAMILY MEDICINE
Payer: MEDICARE

## 2025-07-17 DIAGNOSIS — R26.2 DIFFICULTY WALKING: ICD-10-CM

## 2025-07-17 DIAGNOSIS — I69.30 HISTORY OF STROKE WITH RESIDUAL DEFICIT: ICD-10-CM

## 2025-07-17 DIAGNOSIS — E87.1 ACUTE HYPONATREMIA: Primary | ICD-10-CM

## 2025-07-17 LAB
ALBUMIN SERPL-MCNC: 3.6 G/DL (ref 3.5–5.2)
ALBUMIN/GLOB SERPL: 1.1 G/DL
ALP SERPL-CCNC: 90 U/L (ref 39–117)
ALT SERPL W P-5'-P-CCNC: 11 U/L (ref 1–41)
ANION GAP SERPL CALCULATED.3IONS-SCNC: 9 MMOL/L (ref 5–15)
AST SERPL-CCNC: 12 U/L (ref 1–40)
BACTERIA UR QL AUTO: ABNORMAL /HPF
BASOPHILS # BLD AUTO: 0.04 10*3/MM3 (ref 0–0.2)
BASOPHILS NFR BLD AUTO: 0.4 % (ref 0–1.5)
BILIRUB SERPL-MCNC: 0.4 MG/DL (ref 0–1.2)
BILIRUB UR QL STRIP: NEGATIVE
BUN SERPL-MCNC: 10 MG/DL (ref 8–23)
BUN/CREAT SERPL: 12.3 (ref 7–25)
CALCIUM SPEC-SCNC: 8.8 MG/DL (ref 8.6–10.5)
CHLORIDE SERPL-SCNC: 87 MMOL/L (ref 98–107)
CLARITY UR: CLEAR
CO2 SERPL-SCNC: 26 MMOL/L (ref 22–29)
COLOR UR: YELLOW
CREAT SERPL-MCNC: 0.81 MG/DL (ref 0.76–1.27)
DEPRECATED RDW RBC AUTO: 41.1 FL (ref 37–54)
EGFRCR SERPLBLD CKD-EPI 2021: 90.8 ML/MIN/1.73
EOSINOPHIL # BLD AUTO: 0.3 10*3/MM3 (ref 0–0.4)
EOSINOPHIL NFR BLD AUTO: 3.1 % (ref 0.3–6.2)
ERYTHROCYTE [DISTWIDTH] IN BLOOD BY AUTOMATED COUNT: 13.9 % (ref 12.3–15.4)
GLOBULIN UR ELPH-MCNC: 3.3 GM/DL
GLUCOSE BLDC GLUCOMTR-MCNC: 117 MG/DL (ref 70–99)
GLUCOSE SERPL-MCNC: 120 MG/DL (ref 65–99)
GLUCOSE UR STRIP-MCNC: NEGATIVE MG/DL
HCT VFR BLD AUTO: 37.2 % (ref 37.5–51)
HGB BLD-MCNC: 12.1 G/DL (ref 13–17.7)
HGB UR QL STRIP.AUTO: ABNORMAL
HOLD SPECIMEN: NORMAL
HOLD SPECIMEN: NORMAL
HYALINE CASTS UR QL AUTO: ABNORMAL /LPF
IMM GRANULOCYTES # BLD AUTO: 0.03 10*3/MM3 (ref 0–0.05)
IMM GRANULOCYTES NFR BLD AUTO: 0.3 % (ref 0–0.5)
KETONES UR QL STRIP: NEGATIVE
LEUKOCYTE ESTERASE UR QL STRIP.AUTO: ABNORMAL
LYMPHOCYTES # BLD AUTO: 1.62 10*3/MM3 (ref 0.7–3.1)
LYMPHOCYTES NFR BLD AUTO: 17 % (ref 19.6–45.3)
MAGNESIUM SERPL-MCNC: 1.9 MG/DL (ref 1.6–2.4)
MCH RBC QN AUTO: 26.3 PG (ref 26.6–33)
MCHC RBC AUTO-ENTMCNC: 32.5 G/DL (ref 31.5–35.7)
MCV RBC AUTO: 80.9 FL (ref 79–97)
MONOCYTES # BLD AUTO: 1.01 10*3/MM3 (ref 0.1–0.9)
MONOCYTES NFR BLD AUTO: 10.6 % (ref 5–12)
NEUTROPHILS NFR BLD AUTO: 6.55 10*3/MM3 (ref 1.7–7)
NEUTROPHILS NFR BLD AUTO: 68.6 % (ref 42.7–76)
NITRITE UR QL STRIP: NEGATIVE
NRBC BLD AUTO-RTO: 0 /100 WBC (ref 0–0.2)
OSMOLALITY UR: 241 MOSM/KG (ref 50–1400)
PH UR STRIP.AUTO: 7 [PH] (ref 5–8)
PHOSPHATE SERPL-MCNC: 2.4 MG/DL (ref 2.5–4.5)
PLATELET # BLD AUTO: 267 10*3/MM3 (ref 140–450)
PMV BLD AUTO: 10.5 FL (ref 6–12)
POTASSIUM SERPL-SCNC: 4.2 MMOL/L (ref 3.5–5.2)
PROT SERPL-MCNC: 6.9 G/DL (ref 6–8.5)
PROT UR QL STRIP: NEGATIVE
RBC # BLD AUTO: 4.6 10*6/MM3 (ref 4.14–5.8)
RBC # UR STRIP: ABNORMAL /HPF
REF LAB TEST METHOD: ABNORMAL
SODIUM SERPL-SCNC: 122 MMOL/L (ref 136–145)
SODIUM SERPL-SCNC: 124 MMOL/L (ref 136–145)
SODIUM SERPL-SCNC: 125 MMOL/L (ref 136–145)
SODIUM UR-SCNC: <20 MMOL/L
SP GR UR STRIP: 1.01 (ref 1–1.03)
SQUAMOUS #/AREA URNS HPF: ABNORMAL /HPF
UROBILINOGEN UR QL STRIP: ABNORMAL
WBC # UR STRIP: ABNORMAL /HPF
WBC NRBC COR # BLD AUTO: 9.55 10*3/MM3 (ref 3.4–10.8)
WHOLE BLOOD HOLD COAG: NORMAL
WHOLE BLOOD HOLD SPECIMEN: NORMAL
WHOLE BLOOD HOLD SPECIMEN: NORMAL

## 2025-07-17 PROCEDURE — 85025 COMPLETE CBC W/AUTO DIFF WBC: CPT | Performed by: EMERGENCY MEDICINE

## 2025-07-17 PROCEDURE — 83735 ASSAY OF MAGNESIUM: CPT | Performed by: HOSPITALIST

## 2025-07-17 PROCEDURE — P9612 CATHETERIZE FOR URINE SPEC: HCPCS

## 2025-07-17 PROCEDURE — 82948 REAGENT STRIP/BLOOD GLUCOSE: CPT

## 2025-07-17 PROCEDURE — 83935 ASSAY OF URINE OSMOLALITY: CPT | Performed by: INTERNAL MEDICINE

## 2025-07-17 PROCEDURE — 25010000003 DEXTROSE 5 % SOLUTION: Performed by: HOSPITALIST

## 2025-07-17 PROCEDURE — 25010000002 ENOXAPARIN PER 10 MG: Performed by: HOSPITALIST

## 2025-07-17 PROCEDURE — 99222 1ST HOSP IP/OBS MODERATE 55: CPT | Performed by: HOSPITALIST

## 2025-07-17 PROCEDURE — 36415 COLL VENOUS BLD VENIPUNCTURE: CPT | Performed by: HOSPITALIST

## 2025-07-17 PROCEDURE — 80053 COMPREHEN METABOLIC PANEL: CPT | Performed by: EMERGENCY MEDICINE

## 2025-07-17 PROCEDURE — 84300 ASSAY OF URINE SODIUM: CPT | Performed by: INTERNAL MEDICINE

## 2025-07-17 PROCEDURE — 25810000003 SODIUM CHLORIDE 0.9 % SOLUTION: Performed by: EMERGENCY MEDICINE

## 2025-07-17 PROCEDURE — 99285 EMERGENCY DEPT VISIT HI MDM: CPT

## 2025-07-17 PROCEDURE — 84100 ASSAY OF PHOSPHORUS: CPT | Performed by: HOSPITALIST

## 2025-07-17 PROCEDURE — 84295 ASSAY OF SERUM SODIUM: CPT | Performed by: HOSPITALIST

## 2025-07-17 PROCEDURE — 81001 URINALYSIS AUTO W/SCOPE: CPT | Performed by: EMERGENCY MEDICINE

## 2025-07-17 RX ORDER — ACETAMINOPHEN 160 MG/5ML
650 SOLUTION ORAL EVERY 4 HOURS PRN
Status: DISCONTINUED | OUTPATIENT
Start: 2025-07-17 | End: 2025-07-20 | Stop reason: HOSPADM

## 2025-07-17 RX ORDER — TAMSULOSIN HYDROCHLORIDE 0.4 MG/1
0.4 CAPSULE ORAL DAILY
Status: DISCONTINUED | OUTPATIENT
Start: 2025-07-17 | End: 2025-07-20 | Stop reason: HOSPADM

## 2025-07-17 RX ORDER — ENOXAPARIN SODIUM 100 MG/ML
40 INJECTION SUBCUTANEOUS DAILY
Status: DISCONTINUED | OUTPATIENT
Start: 2025-07-17 | End: 2025-07-20 | Stop reason: HOSPADM

## 2025-07-17 RX ORDER — SODIUM CHLORIDE 0.9 % (FLUSH) 0.9 %
10 SYRINGE (ML) INJECTION EVERY 12 HOURS SCHEDULED
Status: DISCONTINUED | OUTPATIENT
Start: 2025-07-17 | End: 2025-07-20 | Stop reason: HOSPADM

## 2025-07-17 RX ORDER — BISACODYL 5 MG/1
5 TABLET, DELAYED RELEASE ORAL DAILY PRN
Status: DISCONTINUED | OUTPATIENT
Start: 2025-07-17 | End: 2025-07-20 | Stop reason: HOSPADM

## 2025-07-17 RX ORDER — ACETAMINOPHEN 650 MG/1
650 SUPPOSITORY RECTAL EVERY 4 HOURS PRN
Status: DISCONTINUED | OUTPATIENT
Start: 2025-07-17 | End: 2025-07-20 | Stop reason: HOSPADM

## 2025-07-17 RX ORDER — ATORVASTATIN CALCIUM 40 MG/1
40 TABLET, FILM COATED ORAL NIGHTLY
Status: DISCONTINUED | OUTPATIENT
Start: 2025-07-17 | End: 2025-07-20 | Stop reason: HOSPADM

## 2025-07-17 RX ORDER — FUROSEMIDE 40 MG/1
40 TABLET ORAL DAILY
Status: DISCONTINUED | OUTPATIENT
Start: 2025-07-17 | End: 2025-07-20 | Stop reason: HOSPADM

## 2025-07-17 RX ORDER — POTASSIUM CHLORIDE 750 MG/1
10 CAPSULE, EXTENDED RELEASE ORAL DAILY
Status: ACTIVE | OUTPATIENT
Start: 2025-07-17 | End: 2025-07-20

## 2025-07-17 RX ORDER — SODIUM CHLORIDE 9 MG/ML
40 INJECTION, SOLUTION INTRAVENOUS AS NEEDED
Status: DISCONTINUED | OUTPATIENT
Start: 2025-07-17 | End: 2025-07-20 | Stop reason: HOSPADM

## 2025-07-17 RX ORDER — BISACODYL 10 MG
10 SUPPOSITORY, RECTAL RECTAL DAILY PRN
Status: DISCONTINUED | OUTPATIENT
Start: 2025-07-17 | End: 2025-07-20 | Stop reason: HOSPADM

## 2025-07-17 RX ORDER — AMOXICILLIN 250 MG
2 CAPSULE ORAL 2 TIMES DAILY PRN
Status: DISCONTINUED | OUTPATIENT
Start: 2025-07-17 | End: 2025-07-20 | Stop reason: HOSPADM

## 2025-07-17 RX ORDER — ONDANSETRON 2 MG/ML
4 INJECTION INTRAMUSCULAR; INTRAVENOUS EVERY 6 HOURS PRN
Status: DISCONTINUED | OUTPATIENT
Start: 2025-07-17 | End: 2025-07-20 | Stop reason: HOSPADM

## 2025-07-17 RX ORDER — FINASTERIDE 5 MG/1
5 TABLET, FILM COATED ORAL DAILY
Status: DISCONTINUED | OUTPATIENT
Start: 2025-07-17 | End: 2025-07-20 | Stop reason: HOSPADM

## 2025-07-17 RX ORDER — CETIRIZINE HYDROCHLORIDE 10 MG/1
10 TABLET ORAL DAILY
Status: DISCONTINUED | OUTPATIENT
Start: 2025-07-17 | End: 2025-07-20 | Stop reason: HOSPADM

## 2025-07-17 RX ORDER — SODIUM CHLORIDE 0.9 % (FLUSH) 0.9 %
10 SYRINGE (ML) INJECTION AS NEEDED
Status: DISCONTINUED | OUTPATIENT
Start: 2025-07-17 | End: 2025-07-20 | Stop reason: HOSPADM

## 2025-07-17 RX ORDER — METOPROLOL SUCCINATE 25 MG/1
25 TABLET, EXTENDED RELEASE ORAL
Status: DISCONTINUED | OUTPATIENT
Start: 2025-07-17 | End: 2025-07-20 | Stop reason: HOSPADM

## 2025-07-17 RX ORDER — SODIUM CHLORIDE 1 G/1
2 TABLET ORAL
Status: DISCONTINUED | OUTPATIENT
Start: 2025-07-17 | End: 2025-07-20 | Stop reason: HOSPADM

## 2025-07-17 RX ORDER — POLYETHYLENE GLYCOL 3350 17 G/17G
17 POWDER, FOR SOLUTION ORAL DAILY PRN
Status: DISCONTINUED | OUTPATIENT
Start: 2025-07-17 | End: 2025-07-20 | Stop reason: HOSPADM

## 2025-07-17 RX ORDER — ACETAMINOPHEN 325 MG/1
650 TABLET ORAL EVERY 4 HOURS PRN
Status: DISCONTINUED | OUTPATIENT
Start: 2025-07-17 | End: 2025-07-20 | Stop reason: HOSPADM

## 2025-07-17 RX ORDER — AMLODIPINE BESYLATE 5 MG/1
2.5 TABLET ORAL EVERY EVENING
Status: DISCONTINUED | OUTPATIENT
Start: 2025-07-17 | End: 2025-07-20 | Stop reason: HOSPADM

## 2025-07-17 RX ORDER — ASPIRIN 81 MG/1
81 TABLET ORAL DAILY
Status: DISCONTINUED | OUTPATIENT
Start: 2025-07-17 | End: 2025-07-20 | Stop reason: HOSPADM

## 2025-07-17 RX ORDER — ONDANSETRON 4 MG/1
4 TABLET, ORALLY DISINTEGRATING ORAL EVERY 6 HOURS PRN
Status: DISCONTINUED | OUTPATIENT
Start: 2025-07-17 | End: 2025-07-20 | Stop reason: HOSPADM

## 2025-07-17 RX ADMIN — FINASTERIDE 5 MG: 5 TABLET, FILM COATED ORAL at 16:22

## 2025-07-17 RX ADMIN — CETIRIZINE HYDROCHLORIDE 10 MG: 10 TABLET ORAL at 16:22

## 2025-07-17 RX ADMIN — DEXTROSE MONOHYDRATE 15 MMOL: 50 INJECTION, SOLUTION INTRAVENOUS at 16:22

## 2025-07-17 RX ADMIN — Medication 2 G: at 17:45

## 2025-07-17 RX ADMIN — ENOXAPARIN SODIUM 40 MG: 100 INJECTION SUBCUTANEOUS at 16:22

## 2025-07-17 RX ADMIN — METOPROLOL SUCCINATE 25 MG: 25 TABLET, EXTENDED RELEASE ORAL at 16:22

## 2025-07-17 RX ADMIN — Medication 10 ML: at 22:14

## 2025-07-17 RX ADMIN — TAMSULOSIN HYDROCHLORIDE 0.4 MG: 0.4 CAPSULE ORAL at 16:22

## 2025-07-17 RX ADMIN — ATORVASTATIN CALCIUM 40 MG: 40 TABLET, FILM COATED ORAL at 22:13

## 2025-07-17 RX ADMIN — AMLODIPINE BESYLATE 2.5 MG: 5 TABLET ORAL at 17:45

## 2025-07-17 RX ADMIN — Medication 5 MG: at 22:13

## 2025-07-17 RX ADMIN — ASPIRIN 81 MG: 81 TABLET, COATED ORAL at 16:22

## 2025-07-17 RX ADMIN — SODIUM CHLORIDE 1000 ML: 9 INJECTION, SOLUTION INTRAVENOUS at 10:57

## 2025-07-17 RX ADMIN — AMITRIPTYLINE HYDROCHLORIDE 25 MG: 25 TABLET ORAL at 22:13

## 2025-07-17 NOTE — ED PROVIDER NOTES
Time: 12:48 PM EDT  Date of encounter:  7/17/2025  Independent Historian/Clinical History and Information was obtained by:   Patient and Family    History is limited by: N/A    Chief Complaint: Notably low sodium, weakness and mild confusion      History of Present Illness:  Patient is a 79 y.o. year old male with previous history of COPD and CHF on Lasix, history of stroke with residual left-sided deficits now bedbound, and has indwelling Sequeira catheter who presents to the emergency department for evaluation of some generalized weakness and mild confusion lately per his wife and also low sodium again.    It looks like he got admitted to the hospital a month or so ago for low sodium of 122, and today it is back down to that now.    No fevers or head injury or vomiting or diarrhea reported.  He has indwelling Sequeira catheter with good output.      Patient Care Team  Primary Care Provider: Kat Eubanks APRN    Past Medical History:     No Known Allergies  Past Medical History:   Diagnosis Date    Arthritis     BACK.    At risk for central sleep apnea     STOP BANG 5    Bladder cancer     DR DAGMAR MENA. HISTORY OF. BLADDER WASH, TURBP    Chronic back pain     Congestive heart failure 6/16/2025    COPD (chronic obstructive pulmonary disease)     EMPHYSEMA- NO INHALERS    History of loop recorder     CURRENTLY HAS "[employer] LOOP RECORDER S/P STROKE WIFE REPORTS CURRENTLY NOT FUNCTIONING BUT PT HAD WATCHMAN DEVICE IMPLANTED. 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    Wears glasses     Wheelchair bound     SEPT 2022     Past Surgical History:   Procedure  Laterality Date    ATRIAL APPENDAGE EXCLUSION LEFT WITH TRANSESOPHAGEAL ECHOCARDIOGRAM N/A 11/09/2021    Procedure: 11/9/21 Atrial Appendage Occlusion on eliquis hold 2 doses prior;  Surgeon: Primo Tobias DO;  Location: [facility] EP INVASIVE LOCATION;  Service: Cardiology;  Laterality: N/A;    BACK SURGERY  06/1980    CARDIAC CATHETERIZATION  02/2001    CARDIOVERSION      AT [facility] YEARS AGO (40 YEARS AGO)    CATARACT EXTRACTION WITH INTRAOCULAR LENS IMPLANT Bilateral 2019    CHOLECYSTECTOMY  06/2002    COLONOSCOPY      CYSTOSCOPY      MULTIPLE    CYSTOSCOPY BLADDER BIOPSY N/A 08/24/2021    Procedure: CYSTOSCOPY, TRANSURETHRAL RESECTION OF BLADDER TUMOR;  Surgeon: Kaelyn Alvarado MD;  Location: [facility] MAIN OR;  Service: Urology;  Laterality: N/A;    CYSTOSCOPY RETROGRADE PYELOGRAM Bilateral 11/21/2024    Procedure: CYSTOSCOPY RETROGRADE PYELOGRAM  Scope of the bladder with x-rays of the ureters using dye;  Surgeon: Kaelyn Alvarado MD;  Location: [facility] MAIN OR;  Service: Urology;  Laterality: Bilateral;    EMBOLIZATION CEREBRAL N/A 04/14/2021    Procedure: CEREBRAL ANGIOGRAM;  Surgeon: Noah Bruno MD;  Location: [facility] HYBRID OR 18/19;  Service: Interventional Radiology;  Laterality: N/A;    EPIDURAL      LUMBAR    JOINT REPLACEMENT Right     Knee    KNEE ARTHROPLASTY Right 2013    NEPHROURETERECTOMY Left 04/2019    TRANSURETHRAL RESECTION OF BLADDER TUMOR       Family History   Problem Relation Age of Onset    Heart attack Father     No Known Problems Other     Malig Hyperthermia Neg Hx        Home Medications:  Prior to Admission medications    Medication Sig Start Date End Date Taking? Authorizing Provider   amitriptyline (ELAVIL) 25 MG tablet Take 1 tablet by mouth Every Night.    Provider, MD Anu   amLODIPine (NORVASC) 2.5 MG tablet Take 1 tablet by mouth Every Evening.    Provider, MD Anu   aspirin 81 MG EC tablet Take 1 tablet by mouth Daily. 8/23/22   Ciro Patterson,  "PA   atorvastatin (LIPITOR) 40 MG tablet Take 1 tablet by mouth Every Night. 21   ProviderAnu MD   cetirizine (zyrTEC) 10 MG tablet Take 1 tablet by mouth Daily. 25   Anu Gaviria MD   finasteride (PROSCAR) 5 MG tablet Take 1 tablet by mouth Daily for 30 days. 25  Casimiro Luke PA   furosemide (Lasix) 40 MG tablet Take 1 tablet by mouth Daily for 30 days. 25  Casimiro Luke PA   losartan (Cozaar) 100 MG tablet Take 1 tablet by mouth Daily. 22   Iveth Vieira DO   metoprolol succinate XL (TOPROL-XL) 25 MG 24 hr tablet Take 1 tablet by mouth Daily for 30 days. 25  Casimiro Luke PA   potassium chloride (MICRO-K) 10 MEQ CR capsule Take 1 capsule by mouth Daily for 30 days. 25  Casimiro Luke PA   tamsulosin (FLOMAX) 0.4 MG capsule 24 hr capsule Take 1 capsule by mouth Daily for 30 days. 25  Casimiro Luke PA        Social History:   Social History     Tobacco Use    Smoking status: Former     Current packs/day: 0.00     Types: Cigarettes     Quit date: 1972     Years since quittin.5    Smokeless tobacco: Never    Tobacco comments:     QUIT 50 YRS AGO. SMOKED 3 YEARS IN [employer]   Vaping Use    Vaping status: Never Used   Substance Use Topics    Alcohol use: Not Currently    Drug use: Never         Review of Systems:  Review of Systems   I performed a 10 point review of systems which was all negative, except for the positives found in the HPI above.  Physical Exam:  /92   Pulse 99   Temp 98.5 °F (36.9 °C) (Oral)   Resp 18   Ht 172.7 cm (68\")   Wt 114 kg (251 lb 1.7 oz)   SpO2 95%   BMI 38.18 kg/m²     Physical Exam   General: Awake alert and in no obvious distress    HEENT: Head normocephalic atraumatic, eyes PERRLA EOMI, nose normal, oropharynx normal.    Neck: Supple full range of motion, no meningismus, no lymphadenopathy    Heart: Regular rate and rhythm, no murmurs or rubs, 2+ radial " pulses bilaterally    Lungs: Clear to auscultation bilaterally without wheezes or crackles, no respiratory distress    Abdomen: Soft, nontender, nondistended, no rebound or guarding    Skin: Warm, dry, no rash    Musculoskeletal: Normal range of motion, mild 1+ pitting bilateral lower extremity edema    Neurologic: Oriented to self and can follow commands, appears mildly confused with questioning, left-sided weakness at baseline from old stroke    Psychiatric: Mood appears stable, no psychosis            Medical Decision Making:      Comorbidities that affect care:    Previous stroke, previous hyponatremia, Congestive Heart Failure, COPD    External Notes reviewed:    Hospital Discharge Summary: Reviewed his most recent hospitalization discharge summary from a month ago when he was also admitted with low sodium level of 122 and was diuresed and had fluid restriction.      The following orders were placed and all results were independently analyzed by me:  Orders Placed This Encounter   Procedures    [place] Draw    Comprehensive Metabolic Panel    CBC Auto Differential    Urinalysis With Microscopic If Indicated (No Culture) - Urine, Catheter    Nephrology  (on-call MD unless specified)    Hospitalist (on-call MD unless specified)    Insert Peripheral IV    Initiate Observation Status    CBC & Differential    Green Top (Gel)    Lavender Top    Gold Top - SST    Light Blue Top       Medications Given in the Emergency Department:  Medications   sodium chloride 0.9 % flush 10 mL (has no administration in time range)   sodium chloride 0.9 % bolus 1,000 mL (0 mL Intravenous Stopped 7/17/25 1217)        ED Course:         Labs:    Lab Results (last 24 hours)       Procedure Component Value Units Date/Time    CBC & Differential [[id number]]  (Abnormal) Collected: 07/17/25 0947    Specimen: Blood Updated: 07/17/25 1010    Narrative:      The following orders were created for panel order CBC & Differential.  Procedure                                Abnormality         Status                     ---------                               -----------         ------                     CBC Auto Differential[[id number]]        Abnormal            Final result                 Please view results for these tests on the individual orders.    Comprehensive Metabolic Panel [[id number]]  (Abnormal) Collected: 07/17/25 0947    Specimen: Blood Updated: 07/17/25 1028     Glucose 120 mg/dL      BUN 10.0 mg/dL      Creatinine 0.81 mg/dL      Sodium 122 mmol/L      Potassium 4.2 mmol/L      Chloride 87 mmol/L      CO2 26.0 mmol/L      Calcium 8.8 mg/dL      Total Protein 6.9 g/dL      Albumin 3.6 g/dL      ALT (SGPT) 11 U/L      AST (SGOT) 12 U/L      Alkaline Phosphatase 90 U/L      Total Bilirubin 0.4 mg/dL      Globulin 3.3 gm/dL      A/G Ratio 1.1 g/dL      BUN/Creatinine Ratio 12.3     Anion Gap 9.0 mmol/L      eGFR 90.8 mL/min/1.73     Narrative:      GFR Categories in Chronic Kidney Disease (CKD)              GFR Category          GFR (mL/min/1.73)    Interpretation  G1                    90 or greater        Normal or high (1)  G2                    60-89                Mild decrease (1)  G3a                   45-59                Mild to moderate decrease  G3b                   30-44                Moderate to severe decrease  G4                    15-29                Severe decrease  G5                    14 or less           Kidney failure    (1)In the absence of evidence of kidney disease, neither GFR category G1 or G2 fulfill the criteria for CKD.    eGFR calculation 2021 CKD-EPI creatinine equation, which does not include race as a factor    CBC Auto Differential [[id number]]  (Abnormal) Collected: 07/17/25 0947    Specimen: Blood Updated: 07/17/25 1010     WBC 9.55 10*3/mm3      RBC 4.60 10*6/mm3      Hemoglobin 12.1 g/dL      Hematocrit 37.2 %      MCV 80.9 fL      MCH 26.3 pg      MCHC 32.5 g/dL      RDW 13.9 %      RDW-SD 41.1 fl      MPV  10.5 fL      Platelets 267 10*3/mm3      Neutrophil % 68.6 %      Lymphocyte % 17.0 %      Monocyte % 10.6 %      Eosinophil % 3.1 %      Basophil % 0.4 %      Immature Grans % 0.3 %      Neutrophils, Absolute 6.55 10*3/mm3      Lymphocytes, Absolute 1.62 10*3/mm3      Monocytes, Absolute 1.01 10*3/mm3      Eosinophils, Absolute 0.30 10*3/mm3      Basophils, Absolute 0.04 10*3/mm3      Immature Grans, Absolute 0.03 10*3/mm3      nRBC 0.0 /100 WBC              Imaging:    No Radiology Exams Resulted Within Past 24 Hours      Differential Diagnosis and Discussion:    Altered Mental Status: Based on the patient's signs and symptoms, differential diagnosis includes but is not limited to meningitis, stroke, sepsis, subarachnoid hemorrhage, intracranial bleeding, encephalitis, and metabolic encephalopathy.  Weakness: Based on the patient's history, signs, and symptoms, the diffential diagnosis includes but is not limited to meningitis, stroke, sepsis, subarachnoid hemorrhage, intracranial bleeding, encephalitis, acute uti, dehydration, MS, myasthenia gravis, Guillan Loomis, migraine variant, neuromuscular disorders vertigo, electrolyte imbalance, and metabolic disorders.    PROCEDURES:    Labs were collected in the emergency department and all labs were reviewed and interpreted by me.    No orders to display       Procedures    MDM     Amount and/or Complexity of Data Reviewed  Clinical lab tests: reviewed  Decide to obtain previous medical records or to obtain history from someone other than the patient: yes             This patient is a pleasant 79-year-old male with previous stroke and residual left-sided deficits and BIP and with indwelling Sequeira catheter, presents for some weakness and altered mental status and found to have low sodium of 122 again.    Last time he was admitted to the hospital for this so I consulted his nephrologist to see him.    We will admit him for further workup and management of his recurrent  hyponatremia since I think it is causing him some confusion and weakness.                  Patient Care Considerations:          Consultants/Shared Management Plan:    Hospitalist: I have discussed the case with the admitting hospitalist who agrees to accept the patient for admission.  Consultant: I have discussed the case with patient's nephrologist who agrees to consult on the patient.    Social Determinants of Health:    Patient has presented with family members who are responsible, reliable and will ensure follow up care.      Disposition and Care Coordination:    Admit:   Through independent evaluation of the patient's history, physical, and imperical data, the patient meets criteria for inpatient admission to the hospital.        Final diagnoses:   Acute hyponatremia   History of stroke with residual deficit        ED Disposition       ED Disposition   Decision to Admit    Condition   --    Comment   Level of Care: Med/Surg [1]   Diagnosis: Hyponatremia [771483]   Admitting Physician: CHARIS DYKES [[id number]]   Attending Physician: CHARIS DYKES [[id number]]                 This medical record created using voice recognition software.             Kyle Cabral MD  07/17/25 3247

## 2025-07-17 NOTE — H&P
[facility]   Consult Note    Patient Name: Curtis Richmond  : 1945  MRN: [id number]  Primary Care Physician:  Kat Eubanks APRN  Referring Physician: No ref. provider found  Date of admission: 2025    Subjective   Subjective     Reason for Consult/ Chief Complaint:  hyponatremia    HPI:  Curtis Richmond is a 79 y.o. male with past medical history of hypertension, hyperlipidemia, CVA with residual left-sided weakness bedbound, atrial fibrillation status post watchman not on anticoagulation  was brought to the emergency room for the evaluation of worsening confusion and also generalized weakness and patient sodium was down to 122 again  Patient was supposed to see us as outpatient and his appointment is next week.  Last time patient was admitted with the same situation but his sodium was normal when he was discharged    Review of Systems  All review of systems negative except as given below.    Personal History     Past Medical History:   Diagnosis Date    Arthritis     BACK.    At risk for central sleep apnea     STOP BANG 5    Bladder cancer     DR DAGMAR MENA. HISTORY OF. BLADDER WASH, TURBP    Chronic back pain     Congestive heart failure 2025    COPD (chronic obstructive pulmonary disease)     EMPHYSEMA- NO INHALERS    History of loop recorder     CURRENTLY HAS [employer] LOOP RECORDER S/P STROKE WIFE REPORTS CURRENTLY NOT FUNCTIONING BUT PT HAD WATCHMAN DEVICE IMPLANTED. MONITORED BY DR GONG    Hydrocephalus     NO CURRENT PROBLEMS     Hyperlipidemia     Hypertension     Hyponatremia     PAF (paroxysmal atrial fibrillation)     Stroke     TIA (transient ischemic attack) 2021    FOLLOWED BY DR YATES, WIFE DENIES PT WITH CP OR SOA, DECREASED ACTIVITY IS WHEELCHAIR BOUND    Tubular adenoma of colon 2015    Urothelial carcinoma of kidney, left 2019    Wears glasses     Wheelchair bound     2022       Past Surgical History:   Procedure Laterality Date     ATRIAL APPENDAGE EXCLUSION LEFT WITH TRANSESOPHAGEAL ECHOCARDIOGRAM N/A 11/09/2021    Procedure: 11/9/21 Atrial Appendage Occlusion on eliquis hold 2 doses prior;  Surgeon: Primo Tobias DO;  Location: [facility] EP INVASIVE LOCATION;  Service: Cardiology;  Laterality: N/A;    BACK SURGERY  06/1980    CARDIAC CATHETERIZATION  02/2001    CARDIOVERSION      AT [facility] YEARS AGO (40 YEARS AGO)    CATARACT EXTRACTION WITH INTRAOCULAR LENS IMPLANT Bilateral 2019    CHOLECYSTECTOMY  06/2002    COLONOSCOPY      CYSTOSCOPY      MULTIPLE    CYSTOSCOPY BLADDER BIOPSY N/A 08/24/2021    Procedure: CYSTOSCOPY, TRANSURETHRAL RESECTION OF BLADDER TUMOR;  Surgeon: Kaelyn Alvarado MD;  Location: [facility] OR;  Service: Urology;  Laterality: N/A;    CYSTOSCOPY RETROGRADE PYELOGRAM Bilateral 11/21/2024    Procedure: CYSTOSCOPY RETROGRADE PYELOGRAM  Scope of the bladder with x-rays of the ureters using dye;  Surgeon: Kaelyn Alvarado MD;  Location: [facility] MAIN OR;  Service: Urology;  Laterality: Bilateral;    EMBOLIZATION CEREBRAL N/A 04/14/2021    Procedure: CEREBRAL ANGIOGRAM;  Surgeon: Noah Bruno MD;  Location: [facility] HYBRID OR 18/19;  Service: Interventional Radiology;  Laterality: N/A;    EPIDURAL      LUMBAR    JOINT REPLACEMENT Right     Knee    KNEE ARTHROPLASTY Right 2013    NEPHROURETERECTOMY Left 04/2019    TRANSURETHRAL RESECTION OF BLADDER TUMOR         Family History: family history includes Heart attack in his father; No Known Problems in an other family member. Otherwise pertinent FHx was reviewed and not pertinent to current issue.    Social History:  reports that he quit smoking about 53 years ago. His smoking use included cigarettes. He has never used smokeless tobacco. He reports that he does not currently use alcohol. He reports that he does not use drugs.    Home Medications:  amLODIPine, amitriptyline, aspirin, atorvastatin, cetirizine, finasteride, furosemide, losartan, metoprolol  succinate XL, potassium chloride, and tamsulosin    Allergies:  No Known Allergies    Objective    Objective     Vitals:   Temp:  [97.3 °F (36.3 °C)-98.5 °F (36.9 °C)] 97.3 °F (36.3 °C)  Heart Rate:  [] 97  Resp:  [18] 18  BP: (118-134)/() 131/75    Physical Exam:             Constitutional:         Awake, alert responsive, conversant, no obvious distress   Eyes:                       PERRLA, sclerae anicteric, no conjunctival injection   HEENT:                   Moist mucous membranes, no nasal or eye discharge, no throat congestion   Neck:                      Supple, no thyromegaly, no lymphadenopathy, trachea midline, no elevated JVD   Respiratory:           Clear to auscultation bilaterally, nonlabored respirations    Cardiovascular:     RRR, no murmurs, rubs, or gallops, palpable pedal pulses bilaterally, No bilateral ankle edema   Gastrointestinal:   Positive bowel sounds, soft, nontender, non-distended, no organomegaly   Musculoskeletal:  No clubbing or cyanosis to extremities, muscle wasting, joint swelling, muscle weakness   Psychiatric:              Appropriate affect, cooperative   Neurologic:            Awake alert, oriented x 3, strength symmetric in all extremities, Cranial Nerves grossly intact to confrontation, speech clear   Skin:                      No rashes, bruising, skin ulcers, petechiae or ecchymosis    Result Review    Result Review:  I have personally reviewed the results from the time of this admission to 7/17/2025 16:08 EDT and agree with these findings:  []  Laboratory  []  Microbiology  []  Radiology  []  EKG/Telemetry   []  Cardiology/Vascular   []  Pathology  []  Old records  []  Other:    Results from last 7 days   Lab Units 07/17/25  0947   WBC 10*3/mm3 9.55   HEMOGLOBIN g/dL 12.1*   PLATELETS 10*3/mm3 267     Results from last 7 days   Lab Units 07/17/25  1449 07/17/25  0947   SODIUM mmol/L  --  122*   POTASSIUM mmol/L  --  4.2   CHLORIDE mmol/L  --  87*   CO2 mmol/L   --  26.0   ANION GAP mmol/L  --  9.0   BUN mg/dL  --  10.0   CREATININE mg/dL  --  0.81   GLUCOSE mg/dL  --  120*   EGFR mL/min/1.73  --  90.8   CALCIUM mg/dL  --  8.8   MAGNESIUM mg/dL 1.9  --    ALBUMIN g/dL  --  3.6   BILIRUBIN mg/dL  --  0.4   ALK PHOS U/L  --  90   ALT (SGPT) U/L  --  11   AST (SGOT) U/L  --  12       Assessment & Plan   Assessment / Plan     Active Hospital Problems:  Active Hospital Problems    Diagnosis     **Hyponatremia        Plan:    Urine sodium and urine osmolality   Start salt tablets  P.o. fluid restriction    Electronically signed by Houston Darnell MD, 07/17/25, 12:33 PM EDT.

## 2025-07-17 NOTE — PLAN OF CARE
Goal Outcome Evaluation:              Outcome Evaluation: Patient admittted from Er for hyponatremia, wife at bedside, total care due to history of stroke, pressure ulcer to buttocks, wound consult ordered.

## 2025-07-17 NOTE — NURSING NOTE
4 eyes on admission skin assessment completed with Roberta BURT. Rash to bilateral groin and right armpit. Pressure ulcer to coccyx. Reached out to Dr. Vieira and will continue with orders from home.

## 2025-07-17 NOTE — H&P
[facility]   HOSPITALIST HISTORY AND PHYSICAL  Date: 2025   Patient Name: Curtis Richmond  : 1945  MRN: [id number]  Primary Care Physician:  Kat Eubanks APRN  Date of admission: 2025    Subjective  low sodium, weakness, mild confusion   Subjective   Chief Complaint: low sodium, weakness, mild confusion     HPI: Patient is a 79-year-old male with a past medical history of COPD, congestive heart failure, history of prior stroke with residual left-sided deficits, AF with watchman device, bedbound/wheelchair, indwelling Sequeira catheter who presents to the ER with generalized weakness and mild confusion and his wife is concerned that his sodium may be low again.    On arrival to the ED, patient temperature 98.5, pulse of 96, respiratory rate of 18, blood pressure 118/77, saturating 92% on room air.  Patient was admitted about a month ago.  At that time, his sodium was 122.  Today his sodium is 122 again, chloride is 87, glucose is 120, hemoglobin is 12.1.    Patient has a large amount of leukocytes in his urine.  Personal History     Past Medical History:  Past Medical History:   Diagnosis Date    Arthritis     BACK.    At risk for central sleep apnea     STOP BANG 5    Bladder cancer     DR DAGMAR MENA. HISTORY OF. BLADDER WASH, TURBP    Chronic back pain     Congestive heart failure 2025    COPD (chronic obstructive pulmonary disease)     EMPHYSEMA- NO INHALERS    History of loop recorder     CURRENTLY HAS [employer] LOOP RECORDER S/P STROKE WIFE REPORTS CURRENTLY NOT FUNCTIONING BUT PT HAD WATCHMAN DEVICE IMPLANTED. MONITORED BY DR GONG    Hydrocephalus     NO CURRENT PROBLEMS     Hyperlipidemia     Hypertension     Hyponatremia     PAF (paroxysmal atrial fibrillation)     Stroke     TIA (transient ischemic attack) 2021    FOLLOWED BY DR YATES, WIFE DENIES PT WITH CP OR SOA, DECREASED ACTIVITY IS WHEELCHAIR BOUND    Tubular adenoma of colon 2015    Urothelial  carcinoma of kidney, left 2019    Wears glasses     Wheelchair bound     2022       Past Surgical History:  Past Surgical History:   Procedure Laterality Date    ATRIAL APPENDAGE EXCLUSION LEFT WITH TRANSESOPHAGEAL ECHOCARDIOGRAM N/A 2021    Procedure: 21 Atrial Appendage Occlusion on eliquis hold 2 doses prior;  Surgeon: Primo Tobias DO;  Location: [facility] INVASIVE LOCATION;  Service: Cardiology;  Laterality: N/A;    BACK SURGERY  1980    CARDIAC CATHETERIZATION  2001    CARDIOVERSION      AT [facility] YEARS AGO (40 YEARS AGO)    CATARACT EXTRACTION WITH INTRAOCULAR LENS IMPLANT Bilateral     CHOLECYSTECTOMY  2002    COLONOSCOPY      CYSTOSCOPY      MULTIPLE    CYSTOSCOPY BLADDER BIOPSY N/A 2021    Procedure: CYSTOSCOPY, TRANSURETHRAL RESECTION OF BLADDER TUMOR;  Surgeon: Kaelyn Alvarado MD;  Location: [facility] OR;  Service: Urology;  Laterality: N/A;    CYSTOSCOPY RETROGRADE PYELOGRAM Bilateral 2024    Procedure: CYSTOSCOPY RETROGRADE PYELOGRAM  Scope of the bladder with x-rays of the ureters using dye;  Surgeon: Kaelyn Alvarado MD;  Location: [facility] OR;  Service: Urology;  Laterality: Bilateral;    EMBOLIZATION CEREBRAL N/A 2021    Procedure: CEREBRAL ANGIOGRAM;  Surgeon: Noah Bruno MD;  Location: [facility] ;  Service: Interventional Radiology;  Laterality: N/A;    EPIDURAL      LUMBAR    JOINT REPLACEMENT Right     Knee    KNEE ARTHROPLASTY Right     NEPHROURETERECTOMY Left 2019    TRANSURETHRAL RESECTION OF BLADDER TUMOR         Family History:   Family History   Problem Relation Age of Onset    Heart attack Father     No Known Problems Other     Malig Hyperthermia Neg Hx        Social History:   Social History     Socioeconomic History    Marital status:    Tobacco Use    Smoking status: Former     Current packs/day: 0.00     Types: Cigarettes     Quit date:      Years since quittin.5     Smokeless tobacco: Never    Tobacco comments:     QUIT 50 YRS AGO. SMOKED 3 YEARS IN [place]   Vaping Use    Vaping status: Never Used   Substance and Sexual Activity    Alcohol use: Not Currently    Drug use: Never    Sexual activity: Defer       Home Medications:  amLODIPine, amitriptyline, aspirin, atorvastatin, cetirizine, finasteride, furosemide, losartan, metoprolol succinate XL, potassium chloride, and tamsulosin    Allergies:  No Known Allergies    Review of Systems   All systems were reviewed and negative except for: AMS     Objective   Objective     Vitals:   Temp:  [98.5 °F (36.9 °C)] 98.5 °F (36.9 °C)  Heart Rate:  [] 105  Resp:  [18] 18  BP: (118-134)/() 134/87    Physical Exam    Constitutional: Mild altered status   Eyes: Pupils equal, sclerae anicteric, no conjunctival injection   HENT: NCAT, mucous membranes moist   Neck: Supple, no thyromegaly, no lymphadenopathy, trachea midline   Respiratory: Clear to auscultation bilaterally, nonlabored respirations    Cardiovascular: RRR, no murmurs, rubs, or gallops, palpable pedal pulses bilaterally   Gastrointestinal: Positive bowel sounds, soft, nontender, nondistended   Musculoskeletal: +1 pitting edema   Psychiatric: Appropriate affect, cooperative   Neurologic: Oriented x 3, strength symmetric in all extremities, Cranial Nerves grossly intact to confrontation, speech clear   Skin: No rashes     Result Review    Result Review:  I have personally reviewed the results from the time of this admission to 7/17/2025 13:23 EDT and agree with these findings:  [x]  Laboratory  [x]  Microbiology  [x]  Radiology  [x]  EKG/Telemetry   [x]  Cardiology/Vascular   [x]  Pathology  [x]  Old records  []  Other:      Assessment & Plan   Assessment / Plan   #1 Hyponatremia with altered mental status  - Nephrology consulted.  Serial sodium checks.    #2 hypertension continue amlodipine and metoprolol.  Patient no longer on losartan.    #3 BPH continue Flomax and  Proscar.    #4 preserved function CHF  - Echo from 6/16/2025 shows an EF of 55%.  - Holding Lasix.  -Patient not on GDMT.      VTE Prophylaxis:  Mechanical & pharmacologic VTE prophylaxis orders are signed & held.         CODE STATUS:    Code Status (Patient has no pulse and is not breathing): CPR (Attempt to Resuscitate)  Medical Interventions (Patient has pulse or is breathing): Full Support  Level Of Support Discussed With: Patient      Admission Status:  I believe this patient meets inpatient status.    Electronically signed by Iveth Vieira DO, 07/17/25, 1:23 PM EDT.

## 2025-07-17 NOTE — Clinical Note
Level of Care: Med/Surg [1]   Diagnosis: Hyponatremia [198519]   Admitting Physician: CHARIS DYKES [[id number]]   Attending Physician: CHARIS DYKES [[id number]]

## 2025-07-18 LAB
ALBUMIN SERPL-MCNC: 3.4 G/DL (ref 3.5–5.2)
ALBUMIN/GLOB SERPL: 1.2 G/DL
ALP SERPL-CCNC: 81 U/L (ref 39–117)
ALT SERPL W P-5'-P-CCNC: 10 U/L (ref 1–41)
ANION GAP SERPL CALCULATED.3IONS-SCNC: 7.8 MMOL/L (ref 5–15)
AST SERPL-CCNC: 10 U/L (ref 1–40)
BASOPHILS # BLD AUTO: 0.04 10*3/MM3 (ref 0–0.2)
BASOPHILS NFR BLD AUTO: 0.5 % (ref 0–1.5)
BILIRUB SERPL-MCNC: 0.3 MG/DL (ref 0–1.2)
BUN SERPL-MCNC: 6.5 MG/DL (ref 8–23)
BUN/CREAT SERPL: 8.1 (ref 7–25)
CALCIUM SPEC-SCNC: 9 MG/DL (ref 8.6–10.5)
CHLORIDE SERPL-SCNC: 94 MMOL/L (ref 98–107)
CO2 SERPL-SCNC: 25.2 MMOL/L (ref 22–29)
CREAT SERPL-MCNC: 0.8 MG/DL (ref 0.76–1.27)
DEPRECATED RDW RBC AUTO: 42.7 FL (ref 37–54)
EGFRCR SERPLBLD CKD-EPI 2021: 91.2 ML/MIN/1.73
EOSINOPHIL # BLD AUTO: 0.32 10*3/MM3 (ref 0–0.4)
EOSINOPHIL NFR BLD AUTO: 3.7 % (ref 0.3–6.2)
ERYTHROCYTE [DISTWIDTH] IN BLOOD BY AUTOMATED COUNT: 14.2 % (ref 12.3–15.4)
GLOBULIN UR ELPH-MCNC: 2.9 GM/DL
GLUCOSE SERPL-MCNC: 98 MG/DL (ref 65–99)
HCT VFR BLD AUTO: 37.2 % (ref 37.5–51)
HGB BLD-MCNC: 11.7 G/DL (ref 13–17.7)
IMM GRANULOCYTES # BLD AUTO: 0.02 10*3/MM3 (ref 0–0.05)
IMM GRANULOCYTES NFR BLD AUTO: 0.2 % (ref 0–0.5)
LYMPHOCYTES # BLD AUTO: 1.68 10*3/MM3 (ref 0.7–3.1)
LYMPHOCYTES NFR BLD AUTO: 19.6 % (ref 19.6–45.3)
MAGNESIUM SERPL-MCNC: 2 MG/DL (ref 1.6–2.4)
MCH RBC QN AUTO: 26.1 PG (ref 26.6–33)
MCHC RBC AUTO-ENTMCNC: 31.5 G/DL (ref 31.5–35.7)
MCV RBC AUTO: 83 FL (ref 79–97)
MONOCYTES # BLD AUTO: 0.75 10*3/MM3 (ref 0.1–0.9)
MONOCYTES NFR BLD AUTO: 8.7 % (ref 5–12)
NEUTROPHILS NFR BLD AUTO: 5.78 10*3/MM3 (ref 1.7–7)
NEUTROPHILS NFR BLD AUTO: 67.3 % (ref 42.7–76)
NRBC BLD AUTO-RTO: 0 /100 WBC (ref 0–0.2)
PHOSPHATE SERPL-MCNC: 2.9 MG/DL (ref 2.5–4.5)
PLATELET # BLD AUTO: 249 10*3/MM3 (ref 140–450)
PMV BLD AUTO: 10.6 FL (ref 6–12)
POTASSIUM SERPL-SCNC: 4.2 MMOL/L (ref 3.5–5.2)
PROT SERPL-MCNC: 6.3 G/DL (ref 6–8.5)
RBC # BLD AUTO: 4.48 10*6/MM3 (ref 4.14–5.8)
SODIUM SERPL-SCNC: 127 MMOL/L (ref 136–145)
SODIUM SERPL-SCNC: 127 MMOL/L (ref 136–145)
URATE SERPL-MCNC: 3.8 MG/DL (ref 3.4–7)
WBC NRBC COR # BLD AUTO: 8.59 10*3/MM3 (ref 3.4–10.8)

## 2025-07-18 PROCEDURE — 84550 ASSAY OF BLOOD/URIC ACID: CPT | Performed by: INTERNAL MEDICINE

## 2025-07-18 PROCEDURE — 85025 COMPLETE CBC W/AUTO DIFF WBC: CPT | Performed by: HOSPITALIST

## 2025-07-18 PROCEDURE — 83735 ASSAY OF MAGNESIUM: CPT | Performed by: HOSPITALIST

## 2025-07-18 PROCEDURE — 80053 COMPREHEN METABOLIC PANEL: CPT | Performed by: INTERNAL MEDICINE

## 2025-07-18 PROCEDURE — 84100 ASSAY OF PHOSPHORUS: CPT | Performed by: HOSPITALIST

## 2025-07-18 PROCEDURE — 99232 SBSQ HOSP IP/OBS MODERATE 35: CPT | Performed by: FAMILY MEDICINE

## 2025-07-18 PROCEDURE — 84295 ASSAY OF SERUM SODIUM: CPT | Performed by: HOSPITALIST

## 2025-07-18 PROCEDURE — 25010000002 ENOXAPARIN PER 10 MG: Performed by: HOSPITALIST

## 2025-07-18 PROCEDURE — 97161 PT EVAL LOW COMPLEX 20 MIN: CPT

## 2025-07-18 RX ADMIN — FINASTERIDE 5 MG: 5 TABLET, FILM COATED ORAL at 08:39

## 2025-07-18 RX ADMIN — Medication 2 G: at 07:40

## 2025-07-18 RX ADMIN — ATORVASTATIN CALCIUM 40 MG: 40 TABLET, FILM COATED ORAL at 21:59

## 2025-07-18 RX ADMIN — Medication 10 ML: at 21:59

## 2025-07-18 RX ADMIN — Medication 2 G: at 17:32

## 2025-07-18 RX ADMIN — ASPIRIN 81 MG: 81 TABLET, COATED ORAL at 08:39

## 2025-07-18 RX ADMIN — ENOXAPARIN SODIUM 40 MG: 100 INJECTION SUBCUTANEOUS at 08:39

## 2025-07-18 RX ADMIN — Medication 10 ML: at 08:40

## 2025-07-18 RX ADMIN — AMITRIPTYLINE HYDROCHLORIDE 25 MG: 25 TABLET ORAL at 21:59

## 2025-07-18 RX ADMIN — METOPROLOL SUCCINATE 25 MG: 25 TABLET, EXTENDED RELEASE ORAL at 08:39

## 2025-07-18 RX ADMIN — Medication 2 G: at 12:45

## 2025-07-18 RX ADMIN — Medication 5 MG: at 22:00

## 2025-07-18 RX ADMIN — TAMSULOSIN HYDROCHLORIDE 0.4 MG: 0.4 CAPSULE ORAL at 08:39

## 2025-07-18 RX ADMIN — AMLODIPINE BESYLATE 2.5 MG: 5 TABLET ORAL at 17:32

## 2025-07-18 RX ADMIN — CETIRIZINE HYDROCHLORIDE 10 MG: 10 TABLET ORAL at 08:39

## 2025-07-18 NOTE — PROGRESS NOTES
[facility]   Hospitalist Progress Note  Date: 2025  Patient Name: Curtis Richmond  : 1945  MRN: [id number]  Date of admission: 2025      Subjective   Subjective     Chief complaint: Hyponatremia, weakness, confusion    Summary: 79-year-old male with history of COPD chronic diastolic CHF, history of stroke, TIA, history of tubular adenoma of the colon, residual left-sided deficits, paroxysmal A-fib with history of Watchman device, bedbound, wheelchair, chronic indwelling Sequeira catheter, hospitalized on 2025 with chief complaint of confusion, weakness, low sodium, sodium 122 on admission, hospitalized for further monitoring management, nephrology consulted, serum sodium improving    Interval follow-up: Seen and examined, no distress, no events overnight, much more alert and awake today, serum sodium improving, still not normal range but up to 127, creatinine 0.8, potassium 4.2, bicarb 25, blood count 8000, hemoglobin 9.7.  Tolerating oral intake better.  Remains weak and fatigued.    Review of systems:  All systems reviewed and negative except for weakness, fatigue , Intermittent confusion      Objective   Objective     Vitals:   Temp:  [97.3 °F (36.3 °C)-98.5 °F (36.9 °C)] 97.9 °F (36.6 °C)  Heart Rate:  [] 98  Resp:  [18-20] 20  BP: (113-134)/(68-92) 134/84  Physical Exam               Constitutional: Alert and oriented to self, no acute distress              Eyes: Pupils equal, sclerae anicteric, no conjunctival injection              HENT: NCAT, mucous membranes moist              Neck: Supple, no thyromegaly, no lymphadenopathy, trachea midline              Respiratory: Clear to auscultation bilaterally, nonlabored respirations               Cardiovascular: RRR, no murmurs, rubs, or gallops, palpable pedal pulses bilaterally              Gastrointestinal: Positive bowel sounds, soft, nontender, nondistended              Musculoskeletal: +1 pitting edema               Psychiatric: Appropriate affect, cooperative              Neurologic: Alert and oriented to self, strength symmetric in all extremities, Cranial Nerves grossly intact to confrontation, speech soft              Skin: No rashes     Result Review    Result Review:  I have personally reviewed the pertinent results from the past 24 hours to 7/18/2025 11:13 EDT and agree with these findings:  [x]  Laboratory   CBC          6/20/2025    05:10 7/17/2025    09:47 7/18/2025    06:44   CBC   WBC 13.27  9.55  8.59    RBC 4.50  4.60  4.48    Hemoglobin 11.7  12.1  11.7    Hematocrit 37.6  37.2  37.2    MCV 83.6  80.9  83.0    MCH 26.0  26.3  26.1    MCHC 31.1  32.5  31.5    RDW 14.6  13.9  14.2    Platelets 290  267  249      BMP          6/20/2025    05:10 7/17/2025    09:47 7/17/2025    18:20 7/17/2025    23:33 7/18/2025    06:44   BMP   BUN 10.5  10.0    6.5    Creatinine 0.89  0.81    0.80    Sodium 136  122  124  125  127     127    Potassium 3.9  4.2    4.2    Chloride 99  87    94    CO2 29.3  26.0    25.2    Calcium 9.1  8.8    9.0      LIVER FUNCTION TESTS:      Lab 07/18/25  0644 07/17/25  0947   TOTAL PROTEIN 6.3 6.9   ALBUMIN 3.4* 3.6   GLOBULIN 2.9 3.3   ALT (SGPT) 10 11   AST (SGOT) 10 12   BILIRUBIN 0.3 0.4   ALK PHOS 81 90       [x]  Microbiology   Microbiology Results (last 10 days)       ** No results found for the last 240 hours. **              [x]  Radiology No radiology results for the last 7 days      []  EKG/Telemetry   No orders to display       []  Cardiology/Vascular   []  Pathology  [x]  Old records  []  Other:    Assessment & Plan   Assessment / Plan     Assessment/Plan:  Assessment:  Symptomatic hyponatremia  Essential hypertension  BPH with LUTS  Chronic diastolic heart failure  COPD  Chronic indwelling Sequeira catheter  History of CVA with residual left-sided deficits  Paroxysmal atrial fibrillation with history of Watchman device  Bedbound status  Dyslipidemia    Plan:  Labs and imaging  reviewed  Resume Proscar and tamsulosin  Trending serum sodium  Nephrology consulted discussed with Dr. Darnell, recommendations appreciated  Continue metoprolol XL 25 mg daily  Salt tabs per nephrology  Continue Zyrtec, Lipitor, aspirin, Norvasc, Elavil  PT/OT  A.m. labs  Full code  DVT prophylaxis with Lovenox  Clinical course dictate further management  Discussed with nurse at the bedside    VTE Prophylaxis:  Pharmacologic & mechanical VTE prophylaxis orders are present.        CODE STATUS:   Code Status (Patient has no pulse and is not breathing): CPR (Attempt to Resuscitate)  Medical Interventions (Patient has pulse or is breathing): Full Support  Level Of Support Discussed With: Patient        Electronically signed by Tristan Villanueva MD, 7/18/2025, 11:13 EDT.    Portions of this documentation were transcribed electronically from a voice recognition software.  I confirm all data accurately represents the service(s) I performed at today's visit.

## 2025-07-18 NOTE — PLAN OF CARE
Goal Outcome Evaluation:  Plan of Care Reviewed With: patient        Progress: no change  Outcome Evaluation: Pt at or near baseline for functional abilities as he is dependent for care at home, non-ambulatory, and a fatou lift is utilized for transfers. He will be discharged from PT caseload.    Anticipated Discharge Disposition (PT): home with 24/7 care, home with home health (home health for wound care management)

## 2025-07-18 NOTE — THERAPY EVALUATION
Acute Care - Physical Therapy Initial Evaluation  JOSHUA Colunga     Patient Name: Curtis Richmond  : 1945  MRN: [id number]  Today's Date: 2025      Visit Dx:     ICD-10-CM ICD-9-CM   1. Acute hyponatremia  E87.1 276.1   2. History of stroke with residual deficit  I69.30 438.9   3. Difficulty walking  R26.2 719.7     Patient Active Problem List   Diagnosis    Stroke    Hypertension    Cerebral aneurysm, nonruptured    History of renal pelvis cancer    Bladder cancer    History of loop recorder    COPD (chronic obstructive pulmonary disease)    Arthritis    At risk for central sleep apnea    PAF (paroxysmal atrial fibrillation)    Presence of Watchman left atrial appendage closure device    Wheelchair bound    Hyperlipidemia    Hyponatremia    CAP (community acquired pneumonia)    Acute respiratory failure with hypoxia    Morbid obesity    Simple chronic bronchitis    Metabolic encephalopathy    Hematuria    AMS (altered mental status)    Congestive heart failure     Past Medical History:   Diagnosis Date    Arthritis     BACK.    At risk for central sleep apnea     STOP BANG 5    Bladder cancer     DR DAGMAR MENA. HISTORY OF. BLADDER WASH, TURBP    Chronic back pain     Congestive heart failure 2025    COPD (chronic obstructive pulmonary disease)     EMPHYSEMA- NO INHALERS    History of loop recorder     CURRENTLY HAS [employer] LOOP RECORDER S/P STROKE WIFE REPORTS CURRENTLY NOT FUNCTIONING BUT PT HAD WATCHMAN DEVICE IMPLANTED. MONITORED BY DR GONG    Hydrocephalus     NO CURRENT PROBLEMS     Hyperlipidemia     Hypertension     Hyponatremia     PAF (paroxysmal atrial fibrillation)     Stroke     TIA (transient ischemic attack) 2021    FOLLOWED BY DR YATES, WIFE DENIES PT WITH CP OR SOA, DECREASED ACTIVITY IS WHEELCHAIR BOUND    Tubular adenoma of colon 2015    Urothelial carcinoma of kidney, left 2019    Wears glasses     Wheelchair bound     2022     Past Surgical  History:   Procedure Laterality Date    ATRIAL APPENDAGE EXCLUSION LEFT WITH TRANSESOPHAGEAL ECHOCARDIOGRAM N/A 11/09/2021    Procedure: 11/9/21 Atrial Appendage Occlusion on eliquis hold 2 doses prior;  Surgeon: Primo Tobias DO;  Location: [facility] INVASIVE LOCATION;  Service: Cardiology;  Laterality: N/A;    BACK SURGERY  06/1980    CARDIAC CATHETERIZATION  02/2001    CARDIOVERSION      AT [facility] YEARS AGO (40 YEARS AGO)    CATARACT EXTRACTION WITH INTRAOCULAR LENS IMPLANT Bilateral 2019    CHOLECYSTECTOMY  06/2002    COLONOSCOPY      CYSTOSCOPY      MULTIPLE    CYSTOSCOPY BLADDER BIOPSY N/A 08/24/2021    Procedure: CYSTOSCOPY, TRANSURETHRAL RESECTION OF BLADDER TUMOR;  Surgeon: Kaelyn Alvarado MD;  Location: [facility] MAIN OR;  Service: Urology;  Laterality: N/A;    CYSTOSCOPY RETROGRADE PYELOGRAM Bilateral 11/21/2024    Procedure: CYSTOSCOPY RETROGRADE PYELOGRAM  Scope of the bladder with x-rays of the ureters using dye;  Surgeon: Kaelyn Alvarado MD;  Location: [facility] MAIN OR;  Service: Urology;  Laterality: Bilateral;    EMBOLIZATION CEREBRAL N/A 04/14/2021    Procedure: CEREBRAL ANGIOGRAM;  Surgeon: Noah Bruno MD;  Location: [facility] HYBRID OR 18/19;  Service: Interventional Radiology;  Laterality: N/A;    EPIDURAL      LUMBAR    JOINT REPLACEMENT Right     Knee    KNEE ARTHROPLASTY Right 2013    NEPHROURETERECTOMY Left 04/2019    TRANSURETHRAL RESECTION OF BLADDER TUMOR       PT Assessment (Last 12 Hours)       PT Evaluation and Treatment       Row Name 07/18/25 1300          Physical Therapy Time and Intention    Subjective Information no complaints  -CS     Document Type evaluation  -CS     Mode of Treatment individual therapy;physical therapy  -CS       Row Name 07/18/25 1300          General Information    Patient Profile Reviewed yes  -CS     Prior Level of Function dependent:  -CS     Equipment Currently Used at Home lift device;wheelchair;ramp;power chair, (recliner lift)  pouse  at bedside assisting with prior level. Assist with ADLs and mobility. Use of a fatou lift for transfers to a manual w/c, which spouse propels. No home O2. Has a lift chair in the home  -     Existing Precautions/Restrictions fall  -CS     Barriers to Rehab previous functional deficit  -       Row Name 07/18/25 1300          Living Environment    Current Living Arrangements home  -     Home Accessibility wheelchair accessible  ramp to enter  -CS     People in Home spouse  -CS     Primary Care Provided by spouse/significant other;homecare agency  -       Row Name 07/18/25 1300          Pain    Additional Documentation Pain Scale: FACES Pre/Post-Treatment (Group)  -[facility] Name 07/18/25 1300          Pain Scale: FACES Pre/Post-Treatment    Pain: FACES Scale, Pretreatment 0-->no hurt  -CS     Posttreatment Pain Rating 0-->no hurt  -       Row Name 07/18/25 1300          Cognition    Orientation Status (Cognition) oriented to;person  -       Row Name 07/18/25 1300          Range of Motion Comprehensive    General Range of Motion lower extremity range of motion deficits identified  Plantar flexion contractures bilaterally, R greater than L. Increased resistance to knee flexion starting at ~50°  -       Row Name 07/18/25 1300          Strength (Manual Muscle Testing)    Strength (Manual Muscle Testing) left lower extremity strength detail;right lower extremity strength detail  -     Left Lower Extremity Strength --  1/5  -     Right Lower Extremity Strength --  1/5  -       Row Name 07/18/25 1300          Bed Mobility    Bed Mobility bed mobility (all) activities  -     All Activities, [place] (Bed Mobility) dependent (less than 25% patient effort)  -CS     Comment, (Bed Mobility) Pt requires a fatou lift at baseline to move into/out of his bed at home. No further transfers completed  -       Row Name 07/18/25 1300          Gait/Stairs (Locomotion)    Patient was able to Ambulate no, other  medical factors prevent ambulation  -CS     Reason Patient was unable to Ambulate Non-Ambulatory at Baseline  -CS       Row Name 07/18/25 1300          Safety Issues/Impairments Affecting Functional Mobility    Impairments Affecting Function (Mobility) endurance/activity tolerance;range of motion (ROM);strength;motor control;muscle tone abnormal  all present at baseline  -CS       Row Name             Wound 06/15/25 0320 Bilateral medial coccyx Pressure Injury    Wound - Properties Group Placement Date: 06/15/25  -TT Placement Time: 0320  -TT Present on Original Admission: Y  -TT Side: Bilateral  -TT Orientation: medial  -TT Location: coccyx  -TT Primary Wound Type: Pressure Inj  -TT    Retired Wound - Properties Group Placement Date: 06/15/25  -TT Placement Time: 0320  -TT Present on Original Admission: Y  -TT Side: Bilateral  -TT Orientation: medial  -TT Location: coccyx  -TT    Retired Wound - Properties Group Placement Date: 06/15/25  -TT Placement Time: 0320  -TT Present on Original Admission: Y  -TT Side: Bilateral  -TT Orientation: medial  -TT Location: coccyx  -TT    Retired Wound - Properties Group Date first assessed: 06/15/25  -TT Time first assessed: 0320  -TT Present on Original Admission: Y  -TT Side: Bilateral  -TT Location: coccyx  -TT      Row Name 07/18/25 1300          Plan of Care Review    Plan of Care Reviewed With patient  -CS     Progress no change  -CS     Outcome Evaluation Pt at or near baseline for functional abilities as he is dependent for care at home, non-ambulatory, and a fatou lift is utilized for transfers. He will be discharged from PT caseload.  -CS       Row Name 07/18/25 1300          Positioning and Restraints    Pre-Treatment Position in bed  -CS     Post Treatment Position bed  -CS     In Bed supine;call light within reach;encouraged to call for assist;with family/caregiver  -CS       Row Name 07/18/25 1300          Therapy Assessment/Plan (PT)    Criteria for Skilled  Interventions Met (PT) does not meet criteria for skilled intervention  -CS     Therapy Frequency (PT) evaluation only  -CS       Row Name 07/18/25 1300          PT Evaluation Complexity    History, PT Evaluation Complexity 3 or more personal factors and/or comorbidities  -CS     Examination of Body Systems (PT Eval Complexity) total of 3 or more elements  -CS     Clinical Presentation (PT Evaluation Complexity) stable  -CS     Clinical Decision Making (PT Evaluation Complexity) low complexity  -CS     Overall Complexity (PT Evaluation Complexity) low complexity  -CS       Row Name 07/18/25 1300          Therapy Plan Review/Discharge Plan (PT)    Therapy Plan Review (PT) evaluation/treatment results reviewed;patient;spouse/significant other  -CS       Row Name 07/18/25 1300          Physical Therapy Goals    Problem Specific Goal Selection (PT) problem specific goal 1, PT  -CS       Row Name 07/18/25 1300          Problem Specific Goal 1 (PT)    Problem Specific Goal 1 (PT) Complete PT evaluation  -CS     Time Frame (Problem Specific Goal 1, PT) by discharge  -CS     Progress/Outcome (Problem Specific Goal 1, PT) goal met  -CS               User Key  (r) = Recorded By, (t) = Taken By, (c) = Cosigned By      Initials Name Provider Type    CS Ema Bentley, PT Physical Therapist    TT Carmen Miles, RN Registered Nurse                      PT Recommendation and Plan  Anticipated Discharge Disposition (PT): home with 24/7 care, home with home health (home health for wound care management)  Therapy Frequency (PT): evaluation only  Plan of Care Reviewed With: patient  Progress: no change  Outcome Evaluation: Pt at or near baseline for functional abilities as he is dependent for care at home, non-ambulatory, and a fatou lift is utilized for transfers. He will be discharged from PT caseload.   Outcome Measures       Row Name 07/18/25 1300             How much help from another person do you currently need...    Turning  from your back to your side while in flat bed without using bedrails? 1  -CS      Moving from lying on back to sitting on the side of a flat bed without bedrails? 1  -CS      Moving to and from a bed to a chair (including a wheelchair)? 1  -CS      Standing up from a chair using your arms (e.g., wheelchair, bedside chair)? 1  -CS      Climbing 3-5 steps with a railing? 1  -CS      To walk in hospital room? 1  -CS      AM-PAC 6 Clicks Score (PT) 6  -CS         Functional Assessment    Outcome Measure Options AM-PAC 6 Clicks Basic Mobility (PT)  -CS                User Key  (r) = Recorded By, (t) = Taken By, (c) = Cosigned By      Initials Name Provider Type    Ema Pabon PT Physical Therapist                     Time Calculation:    PT Charges       Row Name 07/18/25 1345             Time Calculation    PT Received On 07/18/25  -CS         Untimed Charges    PT Eval/Re-eval Minutes 25  -CS         Total Minutes    Untimed Charges Total Minutes 25  -CS       Total Minutes 25  -CS                User Key  (r) = Recorded By, (t) = Taken By, (c) = Cosigned By      Initials Name Provider Type    Ema Pabon PT Physical Therapist                  Therapy Charges for Today       Code Description Service Date Service Provider Modifiers Qty    [id number] HC PT EVAL LOW COMPLEXITY 2 7/18/2025 Ema Bentley, PT GP 1            PT G-Codes  Outcome Measure Options: AM-PAC 6 Clicks Basic Mobility (PT)  AM-PAC 6 Clicks Score (PT): 6    Ema Bentley PT  7/18/2025

## 2025-07-18 NOTE — PROGRESS NOTES
[facility]     Progress Note    Patient Name: Curtis Richmond  : 1945  MRN: [id number]  Primary Care Physician:  Kat Eubanks APRN  Date of admission: 2025    Subjective patient is more awake alert responsive today he has no new issues  Blood pressure is more stable  Review of Systems  All review of systems are negative except as mentioned in subjective complaints.    Objective   Objective     Vitals:   Temp:  [97.3 °F (36.3 °C)-98.5 °F (36.9 °C)] 97.7 °F (36.5 °C)  Heart Rate:  [] 98  Resp:  [18-20] 20  BP: (113-134)/() 134/84  Physical Exam    Constitutional: Awake, alert responsive, conversant, no obvious distress              Psychiatric:  Appropriate affect, cooperative   Neurologic:  Awake alert ,oriented x 3, strength symmetric in all extremities, Cranial Nerves grossly intact to confrontation, speech clear   Eyes:   PERRLA, sclerae anicteric, no conjunctival injection   HEENT:  Moist mucous membranes, no nasal or eye discharge, no throat congestion   Neck:   Supple, no thyromegaly, no lymphadenopathy, trachea midline, no elevated JVD   Respiratory:  Clear to auscultation bilaterally, nonlabored respirations    Cardiovascular: RRR, no murmurs, rubs, or gallops, palpable pedal pulses bilaterally, No bilateral ankle edema   Gastrointestinal: Positive bowel sounds, soft, nontender, nondistended, no organomegaly   Musculoskeletal:  No clubbing or cyanosis to extremities,muscle wasting, joint swelling, muscle weakness             Skin:                      No rashes, bruising, skin ulcers, petechiae or ecchymosis    Result Review    Result Review:  I have personally reviewed the results from the time of this admission to 2025 09:21 EDT and agree with these findings:  []  Laboratory  []  Microbiology  []  Radiology  []  EKG/Telemetry   []  Cardiology/Vascular   []  Pathology  []  Old records  []  Other:    Results from last 7 days   Lab Units 25  0644 25  1964    WBC 10*3/mm3 8.59 9.55   HEMOGLOBIN g/dL 11.7* 12.1*   PLATELETS 10*3/mm3 249 267     Results from last 7 days   Lab Units 07/18/25  0644 07/17/25  2333 07/17/25  1820 07/17/25  1449 07/17/25  0947   SODIUM mmol/L 127*  127* 125* 124*  --  122*   POTASSIUM mmol/L 4.2  --   --   --  4.2   CHLORIDE mmol/L 94*  --   --   --  87*   CO2 mmol/L 25.2  --   --   --  26.0   ANION GAP mmol/L 7.8  --   --   --  9.0   BUN mg/dL 6.5*  --   --   --  10.0   CREATININE mg/dL 0.80  --   --   --  0.81   GLUCOSE mg/dL 98  --   --   --  120*   EGFR mL/min/1.73 91.2  --   --   --  90.8   CALCIUM mg/dL 9.0  --   --   --  8.8   MAGNESIUM mg/dL 2.0  --   --    < >  --    ALBUMIN g/dL 3.4*  --   --   --  3.6   BILIRUBIN mg/dL 0.3  --   --   --  0.4   ALK PHOS U/L 81  --   --   --  90   ALT (SGPT) U/L 10  --   --   --  11   AST (SGOT) U/L 10  --   --   --  12    < > = values in this interval not displayed.       Scheduled Meds:amitriptyline, 25 mg, Oral, Nightly  amLODIPine, 2.5 mg, Oral, Q PM  aspirin, 81 mg, Oral, Daily  atorvastatin, 40 mg, Oral, Nightly  cetirizine, 10 mg, Oral, Daily  enoxaparin sodium, 40 mg, Subcutaneous, Daily  finasteride, 5 mg, Oral, Daily  [Held by provider] furosemide, 40 mg, Oral, Daily  melatonin, 5 mg, Oral, Nightly  metoprolol succinate XL, 25 mg, Oral, Q24H  [Held by provider] potassium chloride, 10 mEq, Oral, Daily  sodium chloride, 10 mL, Intravenous, Q12H  sodium chloride, 2 g, Oral, TID With Meals  tamsulosin, 0.4 mg, Oral, Daily      Continuous Infusions:   PRN Meds:.  acetaminophen **OR** acetaminophen **OR** acetaminophen    senna-docusate sodium **AND** polyethylene glycol **AND** bisacodyl **AND** bisacodyl    ondansetron ODT **OR** ondansetron    [COMPLETED] Insert Peripheral IV **AND** sodium chloride    sodium chloride    sodium chloride    Assessment & Plan   Assessment / Plan       Active Hospital Problems:    Active Hospital Problems    Diagnosis  POA    **Hyponatremia [E87.1]  Yes      Urine osmolality 241 and urine sodium less than 20  Last time urine osmolality was over 600      AMS (altered mental status) [R41.82]  Yes     Patient's p.o. intake was relatively on the poor side  Plan:   Continue salt tablets at this time and sodium is improving  Labs tomorrow morning       Electronically signed by Houston Darnell MD, 07/18/25, 9:21 AM EDT.

## 2025-07-18 NOTE — PLAN OF CARE
Goal Outcome Evaluation:           Patient total care, wife remains at bedside, able to tolerate sodium tablets with pudding. Wound care provided by wound care team

## 2025-07-19 LAB
ALBUMIN SERPL-MCNC: 3.3 G/DL (ref 3.5–5.2)
ALP SERPL-CCNC: 80 U/L (ref 39–117)
ALT SERPL W P-5'-P-CCNC: 11 U/L (ref 1–41)
ANION GAP SERPL CALCULATED.3IONS-SCNC: 8.4 MMOL/L (ref 5–15)
AST SERPL-CCNC: 10 U/L (ref 1–40)
BASOPHILS # BLD AUTO: 0.03 10*3/MM3 (ref 0–0.2)
BASOPHILS NFR BLD AUTO: 0.4 % (ref 0–1.5)
BILIRUB CONJ SERPL-MCNC: 0.1 MG/DL (ref 0–0.3)
BILIRUB INDIRECT SERPL-MCNC: 0.2 MG/DL
BILIRUB SERPL-MCNC: 0.3 MG/DL (ref 0–1.2)
BUN SERPL-MCNC: 6.4 MG/DL (ref 8–23)
BUN/CREAT SERPL: 7.4 (ref 7–25)
CALCIUM SPEC-SCNC: 9 MG/DL (ref 8.6–10.5)
CHLORIDE SERPL-SCNC: 97 MMOL/L (ref 98–107)
CO2 SERPL-SCNC: 24.6 MMOL/L (ref 22–29)
CREAT SERPL-MCNC: 0.86 MG/DL (ref 0.76–1.27)
DEPRECATED RDW RBC AUTO: 43.7 FL (ref 37–54)
EGFRCR SERPLBLD CKD-EPI 2021: 89.2 ML/MIN/1.73
EOSINOPHIL # BLD AUTO: 0.27 10*3/MM3 (ref 0–0.4)
EOSINOPHIL NFR BLD AUTO: 3.2 % (ref 0.3–6.2)
ERYTHROCYTE [DISTWIDTH] IN BLOOD BY AUTOMATED COUNT: 14.4 % (ref 12.3–15.4)
GLUCOSE SERPL-MCNC: 89 MG/DL (ref 65–99)
HCT VFR BLD AUTO: 36.5 % (ref 37.5–51)
HGB BLD-MCNC: 11.5 G/DL (ref 13–17.7)
IMM GRANULOCYTES # BLD AUTO: 0.02 10*3/MM3 (ref 0–0.05)
IMM GRANULOCYTES NFR BLD AUTO: 0.2 % (ref 0–0.5)
LYMPHOCYTES # BLD AUTO: 1.84 10*3/MM3 (ref 0.7–3.1)
LYMPHOCYTES NFR BLD AUTO: 21.5 % (ref 19.6–45.3)
MAGNESIUM SERPL-MCNC: 2 MG/DL (ref 1.6–2.4)
MCH RBC QN AUTO: 26.3 PG (ref 26.6–33)
MCHC RBC AUTO-ENTMCNC: 31.5 G/DL (ref 31.5–35.7)
MCV RBC AUTO: 83.3 FL (ref 79–97)
MONOCYTES # BLD AUTO: 0.8 10*3/MM3 (ref 0.1–0.9)
MONOCYTES NFR BLD AUTO: 9.4 % (ref 5–12)
NEUTROPHILS NFR BLD AUTO: 5.58 10*3/MM3 (ref 1.7–7)
NEUTROPHILS NFR BLD AUTO: 65.3 % (ref 42.7–76)
NRBC BLD AUTO-RTO: 0 /100 WBC (ref 0–0.2)
PHOSPHATE SERPL-MCNC: 2.7 MG/DL (ref 2.5–4.5)
PLATELET # BLD AUTO: 252 10*3/MM3 (ref 140–450)
PMV BLD AUTO: 10.6 FL (ref 6–12)
POTASSIUM SERPL-SCNC: 4.2 MMOL/L (ref 3.5–5.2)
PROT SERPL-MCNC: 6.1 G/DL (ref 6–8.5)
RBC # BLD AUTO: 4.38 10*6/MM3 (ref 4.14–5.8)
SODIUM SERPL-SCNC: 130 MMOL/L (ref 136–145)
WBC NRBC COR # BLD AUTO: 8.54 10*3/MM3 (ref 3.4–10.8)

## 2025-07-19 PROCEDURE — 25010000002 ENOXAPARIN PER 10 MG: Performed by: HOSPITALIST

## 2025-07-19 PROCEDURE — 85025 COMPLETE CBC W/AUTO DIFF WBC: CPT | Performed by: FAMILY MEDICINE

## 2025-07-19 PROCEDURE — 83735 ASSAY OF MAGNESIUM: CPT | Performed by: FAMILY MEDICINE

## 2025-07-19 PROCEDURE — 99232 SBSQ HOSP IP/OBS MODERATE 35: CPT | Performed by: FAMILY MEDICINE

## 2025-07-19 PROCEDURE — 80076 HEPATIC FUNCTION PANEL: CPT | Performed by: FAMILY MEDICINE

## 2025-07-19 PROCEDURE — 84100 ASSAY OF PHOSPHORUS: CPT | Performed by: FAMILY MEDICINE

## 2025-07-19 PROCEDURE — 80048 BASIC METABOLIC PNL TOTAL CA: CPT | Performed by: FAMILY MEDICINE

## 2025-07-19 RX ADMIN — METOPROLOL SUCCINATE 25 MG: 25 TABLET, EXTENDED RELEASE ORAL at 08:48

## 2025-07-19 RX ADMIN — ASPIRIN 81 MG: 81 TABLET, COATED ORAL at 08:48

## 2025-07-19 RX ADMIN — AMLODIPINE BESYLATE 2.5 MG: 5 TABLET ORAL at 18:12

## 2025-07-19 RX ADMIN — MICONAZOLE NITRATE 1 APPLICATION: 2 POWDER TOPICAL at 21:17

## 2025-07-19 RX ADMIN — ENOXAPARIN SODIUM 40 MG: 100 INJECTION SUBCUTANEOUS at 08:48

## 2025-07-19 RX ADMIN — Medication 10 ML: at 21:16

## 2025-07-19 RX ADMIN — AMITRIPTYLINE HYDROCHLORIDE 25 MG: 25 TABLET ORAL at 21:16

## 2025-07-19 RX ADMIN — MICONAZOLE NITRATE 1 APPLICATION: 2 POWDER TOPICAL at 09:00

## 2025-07-19 RX ADMIN — Medication 2 G: at 08:48

## 2025-07-19 RX ADMIN — Medication 2 G: at 18:12

## 2025-07-19 RX ADMIN — Medication 2 G: at 11:46

## 2025-07-19 RX ADMIN — Medication 5 MG: at 21:16

## 2025-07-19 RX ADMIN — CETIRIZINE HYDROCHLORIDE 10 MG: 10 TABLET ORAL at 08:48

## 2025-07-19 RX ADMIN — FINASTERIDE 5 MG: 5 TABLET, FILM COATED ORAL at 08:49

## 2025-07-19 RX ADMIN — Medication 10 ML: at 08:49

## 2025-07-19 RX ADMIN — ATORVASTATIN CALCIUM 40 MG: 40 TABLET, FILM COATED ORAL at 21:16

## 2025-07-19 RX ADMIN — TAMSULOSIN HYDROCHLORIDE 0.4 MG: 0.4 CAPSULE ORAL at 08:48

## 2025-07-19 NOTE — PROGRESS NOTES
[facility]   Hospitalist Progress Note  Date: 2025  Patient Name: Curtis Richmond  : 1945  MRN: [id number]  Date of admission: 2025      Subjective   Subjective     Chief complaint: Hyponatremia, weakness, confusion    Summary: 79-year-old male with history of COPD chronic diastolic CHF, history of stroke, TIA, history of tubular adenoma of the colon, residual left-sided deficits, paroxysmal A-fib with history of Watchman device, bedbound, wheelchair, chronic indwelling Sequeira catheter, hospitalized on 2025 with chief complaint of confusion, weakness, low sodium, sodium 122 on admission, hospitalized for further monitoring management, nephrology consulted, serum sodium improving    Interval follow-up: Seen and examined, no distress, no events overnight, remains alert and awake today, neurologically at baseline, serum sodium improving, still not normal range but up to 130, creatinine 0.8, potassium 4.2, bicarb 24, blood count 8000, hemoglobin 11.5.  Tolerating oral intake.  Remains weak and fatigued.  Patient wife wants to try and see stability in the patient's sodium before entertaining disposition planning    Review of systems:  All systems reviewed and negative except for weakness, fatigue , Intermittent confusion, left arm weakness      Objective   Objective     Vitals:   Temp:  [97.3 °F (36.3 °C)-98.7 °F (37.1 °C)] 97.3 °F (36.3 °C)  Heart Rate:  [] 92  Resp:  [18-20] 20  BP: (105-137)/(55-78) 106/78  Physical Exam               Constitutional: Alert and oriented to self, no acute distress              Eyes: Pupils equal, sclerae anicteric, no conjunctival injection              HENT: NCAT, mucous membranes moist              Neck: Supple, no thyromegaly, no lymphadenopathy, trachea midline              Respiratory: Clear to auscultation bilaterally, nonlabored respirations               Cardiovascular: RRR, no murmurs, rubs, or gallops, palpable pedal pulses bilaterally               Gastrointestinal: Positive bowel sounds, soft, nontender, nondistended              Musculoskeletal: +1 pitting edema              Psychiatric: Appropriate affect, cooperative              Neurologic: Alert and oriented to self, strength symmetric in all extremities weakness left arm, Cranial Nerves grossly intact to confrontation, speech soft              Skin: No rashes     Result Review    Result Review:  I have personally reviewed the pertinent results from the past 24 hours to 7/19/2025 09:24 EDT and agree with these findings:  [x]  Laboratory   CBC          7/17/2025    09:47 7/18/2025    06:44 7/19/2025    04:34   CBC   WBC 9.55  8.59  8.54    RBC 4.60  4.48  4.38    Hemoglobin 12.1  11.7  11.5    Hematocrit 37.2  37.2  36.5    MCV 80.9  83.0  83.3    MCH 26.3  26.1  26.3    MCHC 32.5  31.5  31.5    RDW 13.9  14.2  14.4    Platelets 267  249  252      BMP          7/17/2025    09:47 7/17/2025    18:20 7/17/2025    23:33 7/18/2025    06:44 7/19/2025    04:34   BMP   BUN 10.0    6.5  6.4    Creatinine 0.81    0.80  0.86    Sodium 122  124  125  127     127  130    Potassium 4.2    4.2  4.2    Chloride 87    94  97    CO2 26.0    25.2  24.6    Calcium 8.8    9.0  9.0      LIVER FUNCTION TESTS:      Lab 07/19/25  0434 07/18/25  0644 07/17/25  0947   TOTAL PROTEIN 6.1 6.3 6.9   ALBUMIN 3.3* 3.4* 3.6   GLOBULIN  --  2.9 3.3   ALT (SGPT) 11 10 11   AST (SGOT) 10 10 12   BILIRUBIN 0.3 0.3 0.4   INDIRECT BILIRUBIN 0.2  --   --    BILIRUBIN DIRECT 0.1  --   --    ALK PHOS 80 81 90       [x]  Microbiology   Microbiology Results (last 10 days)       ** No results found for the last 240 hours. **              [x]  Radiology No radiology results for the last 7 days      []  EKG/Telemetry   No orders to display       []  Cardiology/Vascular   []  Pathology  [x]  Old records  []  Other:    Assessment & Plan   Assessment / Plan     Assessment/Plan:  Assessment:  Symptomatic hyponatremia  Essential hypertension  BPH with  LUTS  Chronic diastolic heart failure  COPD  Chronic indwelling Sequeira catheter  History of CVA with residual left-sided deficits  Paroxysmal atrial fibrillation with history of Watchman device  Bedbound status  Dyslipidemia    Plan:  Labs and imaging reviewed  Continue Proscar and tamsulosin  Trending serum sodium  Hold Lasix  Nephrology consulted discussed with Dr. Darnell, recommendations appreciated  Continue metoprolol XL 25 mg daily  Salt tabs per nephrology  Continue Zyrtec, Lipitor, aspirin, Norvasc, Elavil  PT/OT  A.m. labs  Full code  DVT prophylaxis with Lovenox  Clinical course dictate further management  Discussed with nurse at the bedside  Possible DC home tomorrow if sodium is stable    VTE Prophylaxis:  Pharmacologic & mechanical VTE prophylaxis orders are present.        CODE STATUS:   Code Status (Patient has no pulse and is not breathing): CPR (Attempt to Resuscitate)  Medical Interventions (Patient has pulse or is breathing): Full Support  Level Of Support Discussed With: Patient        Electronically signed by Tristan Villanueva MD, 7/19/2025, 09:24 EDT.    Portions of this documentation were transcribed electronically from a voice recognition software.  I confirm all data accurately represents the service(s) I performed at today's visit.

## 2025-07-19 NOTE — PLAN OF CARE
Goal Outcome Evaluation:      Wound care completed as ordered. No complaints of pain. Wife to remain at bedside.

## 2025-07-19 NOTE — PLAN OF CARE
Goal Outcome Evaluation:  Plan of Care Reviewed With: patient, spouse        Progress: no change  Outcome Evaluation: Patient alert and oriented x2-3, intermittently confused, on room air. Wound care performed as ordered. Frequently repositioned and turned throughout the shift. No new issues or concerns at this time.

## 2025-07-19 NOTE — PROGRESS NOTES
[facility]     Progress Note    Patient Name: Curtis Richmond  : 1945  MRN: [id number]  Primary Care Physician:  Kat Eubanks APRN  Date of admission: 2025    Subjective patient was resting very comfortably his family member on the bedside did not raise any new questions.  His mental status according to his wife is improving    Review of Systems  All review of systems are negative except as mentioned in subjective complaints.    Objective   Objective     Vitals:   Temp:  [97.3 °F (36.3 °C)-98.7 °F (37.1 °C)] 97.3 °F (36.3 °C)  Heart Rate:  [] 92  Resp:  [18-20] 20  BP: (105-137)/(55-78) 106/78  Physical Exam    Constitutional: Awake, alert responsive, conversant, no obvious distress              Psychiatric:  Appropriate affect, cooperative   Neurologic:  Awake alert ,oriented x 3, strength symmetric in all extremities, Cranial Nerves grossly intact to confrontation, speech clear   Eyes:   PERRLA, sclerae anicteric, no conjunctival injection   HEENT:  Moist mucous membranes, no nasal or eye discharge, no throat congestion   Neck:   Supple, no thyromegaly, no lymphadenopathy, trachea midline, no elevated JVD   Respiratory:  Clear to auscultation bilaterally, nonlabored respirations    Cardiovascular: RRR, no murmurs, rubs, or gallops, palpable pedal pulses bilaterally, No bilateral ankle edema   Gastrointestinal: Positive bowel sounds, soft, nontender, nondistended, no organomegaly   Musculoskeletal:  No clubbing or cyanosis to extremities,muscle wasting, joint swelling, muscle weakness             Skin:                      No rashes, bruising, skin ulcers, petechiae or ecchymosis    Result Review    Result Review:  I have personally reviewed the results from the time of this admission to 2025 08:25 EDT and agree with these findings:  []  Laboratory  []  Microbiology  []  Radiology  []  EKG/Telemetry   []  Cardiology/Vascular   []  Pathology  []  Old records  []  Other:    Results  from last 7 days   Lab Units 07/19/25  0434 07/18/25  0644 07/17/25  0947   WBC 10*3/mm3 8.54 8.59 9.55   HEMOGLOBIN g/dL 11.5* 11.7* 12.1*   PLATELETS 10*3/mm3 252 361 462     Results from last 7 days   Lab Units 07/19/25  0434 07/18/25  0644 07/17/25  2333 07/17/25  1820 07/17/25  1449 07/17/25  0947   SODIUM mmol/L 130* 127*  127* 125* 124*  --  122*   POTASSIUM mmol/L 4.2 4.2  --   --   --  4.2   CHLORIDE mmol/L 97* 94*  --   --   --  87*   CO2 mmol/L 24.6 25.2  --   --   --  26.0   ANION GAP mmol/L 8.4 7.8  --   --   --  9.0   BUN mg/dL 6.4* 6.5*  --   --   --  10.0   CREATININE mg/dL 0.86 0.80  --   --   --  0.81   GLUCOSE mg/dL 89 98  --   --   --  120*   EGFR mL/min/1.73 89.2 91.2  --   --   --  90.8   CALCIUM mg/dL 9.0 9.0  --   --   --  8.8   MAGNESIUM mg/dL 2.0 2.0  --   --    < >  --    ALBUMIN g/dL 3.3* 3.4*  --   --   --  3.6   BILIRUBIN mg/dL 0.3 0.3  --   --   --  0.4   ALK PHOS U/L 80 81  --   --   --  90   ALT (SGPT) U/L 11 10  --   --   --  11   AST (SGOT) U/L 10 10  --   --   --  12    < > = values in this interval not displayed.       Scheduled Meds:amitriptyline, 25 mg, Oral, Nightly  amLODIPine, 2.5 mg, Oral, Q PM  aspirin, 81 mg, Oral, Daily  atorvastatin, 40 mg, Oral, Nightly  cetirizine, 10 mg, Oral, Daily  enoxaparin sodium, 40 mg, Subcutaneous, Daily  finasteride, 5 mg, Oral, Daily  [Held by provider] furosemide, 40 mg, Oral, Daily  melatonin, 5 mg, Oral, Nightly  metoprolol succinate XL, 25 mg, Oral, Q24H  miconazole, 1 Application, Topical, Q12H  [Held by provider] potassium chloride, 10 mEq, Oral, Daily  sodium chloride, 10 mL, Intravenous, Q12H  sodium chloride, 2 g, Oral, TID With Meals  tamsulosin, 0.4 mg, Oral, Daily      Continuous Infusions:   PRN Meds:.  acetaminophen **OR** acetaminophen **OR** acetaminophen    senna-docusate sodium **AND** polyethylene glycol **AND** bisacodyl **AND** bisacodyl    ondansetron ODT **OR** ondansetron    [COMPLETED] Insert Peripheral IV **AND**  sodium chloride    sodium chloride    sodium chloride    Assessment & Plan   Assessment / Plan       Active Hospital Problems:    Active Hospital Problems    Diagnosis  POA    **Hyponatremia [E87.1]  Yes     Urine osmolality 241 and urine sodium less than 20  Last time urine osmolality was over 600      AMS (altered mental status) [R41.82]  Yes     Sodium level up to 130 from 124  Plan:   Continue present care       Electronically signed by Houston Darnell MD, 07/19/25, 8:25 AM EDT.

## 2025-07-20 VITALS
DIASTOLIC BLOOD PRESSURE: 79 MMHG | SYSTOLIC BLOOD PRESSURE: 129 MMHG | HEIGHT: 68 IN | WEIGHT: 244.93 LBS | TEMPERATURE: 97.7 F | HEART RATE: 94 BPM | BODY MASS INDEX: 37.12 KG/M2 | RESPIRATION RATE: 18 BRPM | OXYGEN SATURATION: 94 %

## 2025-07-20 LAB
ALBUMIN SERPL-MCNC: 3.1 G/DL (ref 3.5–5.2)
ALP SERPL-CCNC: 76 U/L (ref 39–117)
ALT SERPL W P-5'-P-CCNC: 13 U/L (ref 1–41)
ANION GAP SERPL CALCULATED.3IONS-SCNC: 8.9 MMOL/L (ref 5–15)
AST SERPL-CCNC: 11 U/L (ref 1–40)
BASOPHILS # BLD AUTO: 0.04 10*3/MM3 (ref 0–0.2)
BASOPHILS NFR BLD AUTO: 0.4 % (ref 0–1.5)
BILIRUB CONJ SERPL-MCNC: 0.1 MG/DL (ref 0–0.3)
BILIRUB INDIRECT SERPL-MCNC: 0.2 MG/DL
BILIRUB SERPL-MCNC: 0.3 MG/DL (ref 0–1.2)
BUN SERPL-MCNC: 7.7 MG/DL (ref 8–23)
BUN/CREAT SERPL: 9 (ref 7–25)
CALCIUM SPEC-SCNC: 9 MG/DL (ref 8.6–10.5)
CHLORIDE SERPL-SCNC: 99 MMOL/L (ref 98–107)
CO2 SERPL-SCNC: 24.1 MMOL/L (ref 22–29)
CREAT SERPL-MCNC: 0.86 MG/DL (ref 0.76–1.27)
DEPRECATED RDW RBC AUTO: 45.2 FL (ref 37–54)
EGFRCR SERPLBLD CKD-EPI 2021: 89.2 ML/MIN/1.73
EOSINOPHIL # BLD AUTO: 0.26 10*3/MM3 (ref 0–0.4)
EOSINOPHIL NFR BLD AUTO: 2.5 % (ref 0.3–6.2)
ERYTHROCYTE [DISTWIDTH] IN BLOOD BY AUTOMATED COUNT: 14.8 % (ref 12.3–15.4)
GLUCOSE SERPL-MCNC: 93 MG/DL (ref 65–99)
HCT VFR BLD AUTO: 36 % (ref 37.5–51)
HGB BLD-MCNC: 11.2 G/DL (ref 13–17.7)
IMM GRANULOCYTES # BLD AUTO: 0.02 10*3/MM3 (ref 0–0.05)
IMM GRANULOCYTES NFR BLD AUTO: 0.2 % (ref 0–0.5)
LYMPHOCYTES # BLD AUTO: 2 10*3/MM3 (ref 0.7–3.1)
LYMPHOCYTES NFR BLD AUTO: 19.3 % (ref 19.6–45.3)
MAGNESIUM SERPL-MCNC: 1.9 MG/DL (ref 1.6–2.4)
MCH RBC QN AUTO: 26.2 PG (ref 26.6–33)
MCHC RBC AUTO-ENTMCNC: 31.1 G/DL (ref 31.5–35.7)
MCV RBC AUTO: 84.1 FL (ref 79–97)
MONOCYTES # BLD AUTO: 0.9 10*3/MM3 (ref 0.1–0.9)
MONOCYTES NFR BLD AUTO: 8.7 % (ref 5–12)
NEUTROPHILS NFR BLD AUTO: 68.9 % (ref 42.7–76)
NEUTROPHILS NFR BLD AUTO: 7.13 10*3/MM3 (ref 1.7–7)
NRBC BLD AUTO-RTO: 0 /100 WBC (ref 0–0.2)
PHOSPHATE SERPL-MCNC: 2.8 MG/DL (ref 2.5–4.5)
PLATELET # BLD AUTO: 261 10*3/MM3 (ref 140–450)
PMV BLD AUTO: 10.3 FL (ref 6–12)
POTASSIUM SERPL-SCNC: 4.1 MMOL/L (ref 3.5–5.2)
PROT SERPL-MCNC: 6.1 G/DL (ref 6–8.5)
RBC # BLD AUTO: 4.28 10*6/MM3 (ref 4.14–5.8)
SODIUM SERPL-SCNC: 132 MMOL/L (ref 136–145)
WBC NRBC COR # BLD AUTO: 10.35 10*3/MM3 (ref 3.4–10.8)

## 2025-07-20 PROCEDURE — 84100 ASSAY OF PHOSPHORUS: CPT | Performed by: FAMILY MEDICINE

## 2025-07-20 PROCEDURE — 80048 BASIC METABOLIC PNL TOTAL CA: CPT | Performed by: FAMILY MEDICINE

## 2025-07-20 PROCEDURE — 80076 HEPATIC FUNCTION PANEL: CPT | Performed by: FAMILY MEDICINE

## 2025-07-20 PROCEDURE — 83735 ASSAY OF MAGNESIUM: CPT | Performed by: FAMILY MEDICINE

## 2025-07-20 PROCEDURE — 85025 COMPLETE CBC W/AUTO DIFF WBC: CPT | Performed by: FAMILY MEDICINE

## 2025-07-20 PROCEDURE — 25010000002 ENOXAPARIN PER 10 MG: Performed by: HOSPITALIST

## 2025-07-20 PROCEDURE — 99239 HOSP IP/OBS DSCHRG MGMT >30: CPT | Performed by: FAMILY MEDICINE

## 2025-07-20 RX ORDER — SODIUM CHLORIDE 1 G/1
2 TABLET ORAL
Qty: 30 TABLET | Refills: 0 | Status: SHIPPED | OUTPATIENT
Start: 2025-07-20 | End: 2025-07-25

## 2025-07-20 RX ADMIN — METOPROLOL SUCCINATE 25 MG: 25 TABLET, EXTENDED RELEASE ORAL at 09:13

## 2025-07-20 RX ADMIN — CETIRIZINE HYDROCHLORIDE 10 MG: 10 TABLET ORAL at 09:13

## 2025-07-20 RX ADMIN — Medication 2 G: at 09:13

## 2025-07-20 RX ADMIN — FINASTERIDE 5 MG: 5 TABLET, FILM COATED ORAL at 09:13

## 2025-07-20 RX ADMIN — Medication 10 ML: at 09:14

## 2025-07-20 RX ADMIN — ASPIRIN 81 MG: 81 TABLET, COATED ORAL at 09:13

## 2025-07-20 RX ADMIN — ENOXAPARIN SODIUM 40 MG: 100 INJECTION SUBCUTANEOUS at 09:13

## 2025-07-20 RX ADMIN — TAMSULOSIN HYDROCHLORIDE 0.4 MG: 0.4 CAPSULE ORAL at 09:13

## 2025-07-20 RX ADMIN — MICONAZOLE NITRATE 1 APPLICATION: 2 POWDER TOPICAL at 09:14

## 2025-07-20 NOTE — PLAN OF CARE
Goal Outcome Evaluation:  Plan of Care Reviewed With: patient, spouse        Progress: improving  Outcome Evaluation: Pt A&Ox4 with intermittent forgetfulness/confusion. VSS, on room air. Dressing on buttocks remains CDI. Q2T. States no pain or discomfort this shift. Wife at bedside participating in care. Bed alert in use, call light in reach. Hoping to d/c this morning. No further issues/needs at this time.

## 2025-07-20 NOTE — PROGRESS NOTES
[facility]     Progress Note    Patient Name: Curtis Richmond  : 1945  MRN: [id number]  Primary Care Physician:  Kat Eubanks APRN  Date of admission: 2025    Subjective     There were no acute events overnight.    Review of Systems  All review of systems are negative except as mentioned in subjective complaints.    Objective   Objective     Vitals:   Temp:  [97.5 °F (36.4 °C)-98.4 °F (36.9 °C)] 97.5 °F (36.4 °C)  Heart Rate:  [] 95  Resp:  [18-20] 18  BP: (112-144)/(60-86) 112/82  Physical Exam    Constitutional: Awake, alert responsive, conversant, no obvious distress              Psychiatric:  Appropriate affect, cooperative   Neurologic:  Awake alert ,oriented x 3, strength symmetric in all extremities, Cranial Nerves grossly intact to confrontation, speech clear   Eyes:   PERRLA, sclerae anicteric, no conjunctival injection   HEENT:  Moist mucous membranes, no nasal or eye discharge, no throat congestion   Neck:   Supple, no thyromegaly, no lymphadenopathy, trachea midline, no elevated JVD   Respiratory:  Clear to auscultation bilaterally, nonlabored respirations    Cardiovascular: RRR, no murmurs, rubs, or gallops, palpable pedal pulses bilaterally, No bilateral ankle edema   Gastrointestinal: Positive bowel sounds, soft, nontender, nondistended, no organomegaly   Musculoskeletal:  No clubbing or cyanosis to extremities,muscle wasting, joint swelling, muscle weakness             Skin:                      No rashes, bruising, skin ulcers, petechiae or ecchymosis    Result Review    Result Review:  I have personally reviewed the results from the time of this admission to 2025 08:20 EDT and agree with these findings:  []  Laboratory  []  Microbiology  []  Radiology  []  EKG/Telemetry   []  Cardiology/Vascular   []  Pathology  []  Old records  []  Other:    Results from last 7 days   Lab Units 25  0432 25  0434 25  0644 25  0947   WBC 10*3/mm3 10.35 8.54  8.59 9.55   HEMOGLOBIN g/dL 11.2* 11.5* 11.7* 12.1*   PLATELETS 10*3/mm3 261 252 249 267     Results from last 7 days   Lab Units 07/20/25  0432 07/19/25  0434 07/18/25  0644 07/17/25  2333 07/17/25  1820 07/17/25  1449 07/17/25  0947   SODIUM mmol/L 132* 130* 127*  127* 125* 124*  --  122*   POTASSIUM mmol/L 4.1 4.2 4.2  --   --   --  4.2   CHLORIDE mmol/L 99 97* 94*  --   --   --  87*   CO2 mmol/L 24.1 24.6 25.2  --   --   --  26.0   ANION GAP mmol/L 8.9 8.4 7.8  --   --   --  9.0   BUN mg/dL 7.7* 6.4* 6.5*  --   --   --  10.0   CREATININE mg/dL 0.86 0.86 0.80  --   --   --  0.81   GLUCOSE mg/dL 93 89 98  --   --   --  120*   EGFR mL/min/1.73 89.2 89.2 91.2  --   --   --  90.8   CALCIUM mg/dL 9.0 9.0 9.0  --   --   --  8.8   MAGNESIUM mg/dL 1.9 2.0 2.0  --   --    < >  --    ALBUMIN g/dL 3.1* 3.3* 3.4*  --   --   --  3.6   BILIRUBIN mg/dL 0.3 0.3 0.3  --   --   --  0.4   ALK PHOS U/L 76 80 81  --   --   --  90   ALT (SGPT) U/L 13 11 10  --   --   --  11   AST (SGOT) U/L 11 10 10  --   --   --  12    < > = values in this interval not displayed.       Scheduled Meds:amitriptyline, 25 mg, Oral, Nightly  amLODIPine, 2.5 mg, Oral, Q PM  aspirin, 81 mg, Oral, Daily  atorvastatin, 40 mg, Oral, Nightly  cetirizine, 10 mg, Oral, Daily  enoxaparin sodium, 40 mg, Subcutaneous, Daily  finasteride, 5 mg, Oral, Daily  [Held by provider] furosemide, 40 mg, Oral, Daily  melatonin, 5 mg, Oral, Nightly  metoprolol succinate XL, 25 mg, Oral, Q24H  miconazole, 1 Application, Topical, Q12H  [Held by provider] potassium chloride, 10 mEq, Oral, Daily  sodium chloride, 10 mL, Intravenous, Q12H  sodium chloride, 2 g, Oral, TID With Meals  tamsulosin, 0.4 mg, Oral, Daily      Continuous Infusions:   PRN Meds:.  acetaminophen **OR** acetaminophen **OR** acetaminophen    senna-docusate sodium **AND** polyethylene glycol **AND** bisacodyl **AND** bisacodyl    ondansetron ODT **OR** ondansetron    [COMPLETED] Insert Peripheral IV **AND**  sodium chloride    sodium chloride    sodium chloride    Assessment & Plan   Assessment / Plan       Active Hospital Problems:    Active Hospital Problems    Diagnosis  POA    **Hyponatremia [E87.1]  Yes     Urine osmolality 241 and urine sodium less than 20  Last time urine osmolality was over 600      AMS (altered mental status) [R41.82]  Yes       Plan:   Continue salt tablets  Sodium has improved to 132  No fluid restriction as patient most likely has hypovolemic hyponatremia.  Patient is safe for discharge from renal standpoint.

## 2025-07-20 NOTE — PLAN OF CARE
Goal Outcome Evaluation:    Patient is alert and oriented x4 with forgetfulness, on room air. Wound care performed as ordered. No complaints of pain throughout the shift. Discharge paperwork gone over with wife and patient at bedside. Patient discharged home with home health via EMS.

## 2025-07-20 NOTE — DISCHARGE SUMMARY
[facility]         HOSPITALIST  DISCHARGE SUMMARY    Patient Name: Curtis Richmond  : 1945  MRN: [id number]    Date of Admission: 2025  Date of Discharge:  2025    Primary Care Physician: Kat Eubanks APRN    Consults       Date and Time Order Name Status Description    2025 12:33 PM Hospitalist (on-call MD unless specified)      2025 12:25 PM Nephrology  (on-call MD unless specified)      6/15/2025  2:52 AM Inpatient Nephrology Consult Completed             Active and Resolved Hospital Problems:  Symptomatic hyponatremia  Essential hypertension  BPH with LUTS  Chronic diastolic heart failure  COPD  Chronic indwelling Sequeira catheter  History of CVA with residual left-sided deficits  Paroxysmal atrial fibrillation with history of Watchman device  Bedbound status  Dyslipidemia  Active Hospital Problems    Diagnosis POA   • **Hyponatremia [E87.1] Yes   • AMS (altered mental status) [R41.82] Yes      Resolved Hospital Problems   No resolved problems to display.       Hospital Course     Hospital Course:  79-year-old male with history of COPD chronic diastolic CHF, history of stroke, TIA, history of tubular adenoma of the colon, residual left-sided deficits, paroxysmal A-fib with history of Watchman device, bedbound, wheelchair, chronic indwelling Sequeira catheter, hospitalized on 2025 with chief complaint of confusion, weakness, low sodium, sodium 122 on admission, hospitalized for further monitoring management, nephrology consulted, serum sodium improved with salt tabs and volume replacement.  Likely hypovolemic hyponatremia.  Patient stable and cleared for discharge by nephrology as of 2025, discharged in hemodynamically stable addition, to follow-up with nephrology within 1 week.         Day of Discharge     Vital Signs:  Temp:  [97.5 °F (36.4 °C)-98.4 °F (36.9 °C)] 97.5 °F (36.4 °C)  Heart Rate:  [] 95  Resp:  [18-20] 18  BP: (112-144)/(60-86)  112/82  Review of systems:  All systems reviewed and negative except for weakness, fatigue    Physical Exam                         Constitutional: Alert and oriented to self, no acute distress              Eyes: Pupils equal, sclerae anicteric, no conjunctival injection              HENT: NCAT, mucous membranes moist              Neck: Supple, no thyromegaly, no lymphadenopathy, trachea midline              Respiratory: Clear to auscultation bilaterally, nonlabored respirations               Cardiovascular: RRR, no murmurs, rubs, or gallops, palpable pedal pulses bilaterally              Gastrointestinal: Positive bowel sounds, soft, nontender, nondistended              Musculoskeletal: +1 pitting edema              Psychiatric: Appropriate affect, cooperative              Neurologic: Alert and oriented to self, strength symmetric in all extremities weakness left arm, Cranial Nerves grossly intact to confrontation, speech soft              Skin: No rashes         Discharge Details        Discharge Medications        New Medications        Instructions Start Date   sodium chloride 1 g tablet   2 g, Oral, 3 Times Daily With Meals             Continue These Medications        Instructions Start Date   amitriptyline 25 MG tablet  Commonly known as: ELAVIL   25 mg, Nightly      amLODIPine 2.5 MG tablet  Commonly known as: NORVASC   2.5 mg, Every Evening      aspirin 81 MG EC tablet   81 mg, Oral, Daily      atorvastatin 40 MG tablet  Commonly known as: LIPITOR   40 mg, Nightly      cetirizine 10 MG tablet  Commonly known as: zyrTEC   1 tablet, Daily      finasteride 5 MG tablet  Commonly known as: PROSCAR   5 mg, Oral, Daily      metoprolol succinate XL 25 MG 24 hr tablet  Commonly known as: TOPROL-XL   25 mg, Oral, Every 24 Hours Scheduled      tamsulosin 0.4 MG capsule 24 hr capsule  Commonly known as: FLOMAX   0.4 mg, Oral, Daily             Stop These Medications      furosemide 40 MG tablet  Commonly known as:  Lasix     losartan 100 MG tablet  Commonly known as: Cozaar     potassium chloride 10 MEQ CR capsule  Commonly known as: MICRO-K              No Known Allergies    Discharge Disposition:  Home-Health Care Svc    Diet:  Hospital:  Diet Order   Procedures   • Diet: Regular/House; Fluid Consistency: Thin (IDDSI 0)       Discharge Activity: as tolerates      CODE STATUS:  Code Status and Medical Interventions: CPR (Attempt to Resuscitate); Full Support   Ordered at: 07/17/25 1322     Code Status (Patient has no pulse and is not breathing):    CPR (Attempt to Resuscitate)     Medical Interventions (Patient has pulse or is breathing):    Full Support     Level Of Support Discussed With:    Patient         Future Appointments   Date Time Provider Department Center   11/12/2025 10:45 AM [facility]       Additional Instructions for the Follow-ups that You Need to Schedule       Discharge Follow-up with PCP   As directed       Currently Documented PCP:    Kat Eubakns APRN    PCP Phone Number:    [phone]     Follow Up Details: 3 to 7 days                Pertinent  and/or Most Recent Results     PROCEDURES:   No results found.      LAB RESULTS:      Lab 07/20/25 0432 07/19/25 0434 07/18/25  0644 07/17/25  0947   WBC 10.35 8.54 8.59 9.55   HEMOGLOBIN 11.2* 11.5* 11.7* 12.1*   HEMATOCRIT 36.0* 36.5* 37.2* 37.2*   PLATELETS 261 252 249 267   NEUTROS ABS 7.13* 5.58 5.78 6.55   IMMATURE GRANS (ABS) 0.02 0.02 0.02 0.03   LYMPHS ABS 2.00 1.84 1.68 1.62   MONOS ABS 0.90 0.80 0.75 1.01*   EOS ABS 0.26 0.27 0.32 0.30   MCV 84.1 83.3 83.0 80.9         Lab 07/20/25 0432 07/19/25 0434 07/18/25  0644 07/17/25  2333 07/17/25  1820 07/17/25  1449 07/17/25  0947   SODIUM 132* 130* 127*  127* 125* 124*  --  122*   POTASSIUM 4.1 4.2 4.2  --   --   --  4.2   CHLORIDE 99 97* 94*  --   --   --  87*   CO2 24.1 24.6 25.2  --   --   --  26.0   ANION GAP 8.9 8.4 7.8  --   --   --  9.0   BUN 7.7* 6.4* 6.5*  --   --    --  10.0   CREATININE 0.86 0.86 0.80  --   --   --  0.81   EGFR 89.2 89.2 91.2  --   --   --  90.8   GLUCOSE 93 89 98  --   --   --  120*   CALCIUM 9.0 9.0 9.0  --   --   --  8.8   MAGNESIUM 1.9 2.0 2.0  --   --  1.9  --    PHOSPHORUS 2.8 2.7 2.9  --   --  2.4*  --          Lab 07/20/25  0432 07/19/25  0434 07/18/25  0644 07/17/25  0947   TOTAL PROTEIN 6.1 6.1 6.3 6.9   ALBUMIN 3.1* 3.3* 3.4* 3.6   GLOBULIN  --   --  2.9 3.3   ALT (SGPT) 13 11 10 11   AST (SGOT) 11 10 10 12   BILIRUBIN 0.3 0.3 0.3 0.4   INDIRECT BILIRUBIN 0.2 0.2  --   --    BILIRUBIN DIRECT 0.1 0.1  --   --    ALK PHOS 76 80 81 90                     Brief Urine Lab Results  (Last result in the past 365 days)        Color   Clarity   Blood   Leuk Est   Nitrite   Protein   CREAT   Urine HCG        07/17/25 1314 Yellow   Clear   Small (1+)   Large (3+)   Negative   Negative                 Microbiology Results (last 10 days)       ** No results found for the last 240 hours. **                         Results for orders placed during the hospital encounter of 06/14/25    Adult Transthoracic Echo Complete W/ Cont if Necessary Per Protocol 06/16/2025  8:14 PM    Interpretation Summary  •  Left ventricular ejection fraction appears to be 51 - 55%.  •  Left ventricular wall thickness is consistent with mild concentric hypertrophy.  •  Left ventricular diastolic function is consistent with (grade I) impaired relaxation.  •  The right ventricular cavity is mildly dilated.  •  The left atrial cavity is mildly dilated.  •  The right atrial cavity is mildly dilated.  •  Estimated right ventricular systolic pressure from tricuspid regurgitation is mildly elevated (35-45 mmHg).      Labs Pending at Discharge:        Time spent on Discharge including face to face service:  35 minutes    Electronically signed by Tristan Villanueva MD, 07/20/25, 9:25 AM EDT.    Portions of this documentation were transcribed electronically from a voice recognition software.  I  confirm all data accurately represents the service(s) I performed at today's visit.

## 2025-07-21 ENCOUNTER — READMISSION MANAGEMENT (OUTPATIENT)
Dept: CALL CENTER | Facility: HOSPITAL | Age: 80
End: 2025-07-21
Payer: MEDICARE

## 2025-07-21 NOTE — OUTREACH NOTE
Prep Survey      Flowsheet Row Responses   [facility] facility patient discharged from? [facility]   Is LACE score < 7 ? No   Eligibility Readm Mgmt   Discharge diagnosis Hyponatremia   Does the patient have one of the following disease processes/diagnoses(primary or secondary)? Other   Does the patient have Home health ordered? Yes   What is the Home health agency?  [facility]   Is there a DME ordered? No   Prep survey completed? Yes            YUE HALE - Registered Nurse

## 2025-07-29 ENCOUNTER — LAB REQUISITION (OUTPATIENT)
Dept: LAB | Facility: HOSPITAL | Age: 80
End: 2025-07-29
Payer: MEDICARE

## 2025-07-29 DIAGNOSIS — E87.1 HYPO-OSMOLALITY AND HYPONATREMIA: ICD-10-CM

## 2025-07-29 DIAGNOSIS — I10 ESSENTIAL (PRIMARY) HYPERTENSION: ICD-10-CM

## 2025-07-29 LAB
ALBUMIN SERPL-MCNC: 3.7 G/DL (ref 3.5–5.2)
ALBUMIN/GLOB SERPL: 1.2 G/DL
ALP SERPL-CCNC: 90 U/L (ref 39–117)
ALT SERPL W P-5'-P-CCNC: 9 U/L (ref 1–41)
ANION GAP SERPL CALCULATED.3IONS-SCNC: 11 MMOL/L (ref 5–15)
AST SERPL-CCNC: 11 U/L (ref 1–40)
BASOPHILS # BLD AUTO: 0.03 10*3/MM3 (ref 0–0.2)
BASOPHILS NFR BLD AUTO: 0.3 % (ref 0–1.5)
BILIRUB SERPL-MCNC: 0.4 MG/DL (ref 0–1.2)
BUN SERPL-MCNC: 12.2 MG/DL (ref 8–23)
BUN/CREAT SERPL: 13.4 (ref 7–25)
CALCIUM SPEC-SCNC: 9.5 MG/DL (ref 8.6–10.5)
CHLORIDE SERPL-SCNC: 98 MMOL/L (ref 98–107)
CO2 SERPL-SCNC: 25 MMOL/L (ref 22–29)
CREAT SERPL-MCNC: 0.91 MG/DL (ref 0.76–1.27)
DEPRECATED RDW RBC AUTO: 44.1 FL (ref 37–54)
EGFRCR SERPLBLD CKD-EPI 2021: 86.8 ML/MIN/1.73
EOSINOPHIL # BLD AUTO: 0.34 10*3/MM3 (ref 0–0.4)
EOSINOPHIL NFR BLD AUTO: 3.6 % (ref 0.3–6.2)
ERYTHROCYTE [DISTWIDTH] IN BLOOD BY AUTOMATED COUNT: 14.5 % (ref 12.3–15.4)
GLOBULIN UR ELPH-MCNC: 3.2 GM/DL
GLUCOSE SERPL-MCNC: 117 MG/DL (ref 65–99)
HCT VFR BLD AUTO: 39.1 % (ref 37.5–51)
HGB BLD-MCNC: 12.2 G/DL (ref 13–17.7)
IMM GRANULOCYTES # BLD AUTO: 0.03 10*3/MM3 (ref 0–0.05)
IMM GRANULOCYTES NFR BLD AUTO: 0.3 % (ref 0–0.5)
LYMPHOCYTES # BLD AUTO: 1.39 10*3/MM3 (ref 0.7–3.1)
LYMPHOCYTES NFR BLD AUTO: 14.8 % (ref 19.6–45.3)
MCH RBC QN AUTO: 26 PG (ref 26.6–33)
MCHC RBC AUTO-ENTMCNC: 31.2 G/DL (ref 31.5–35.7)
MCV RBC AUTO: 83.4 FL (ref 79–97)
MONOCYTES # BLD AUTO: 0.7 10*3/MM3 (ref 0.1–0.9)
MONOCYTES NFR BLD AUTO: 7.4 % (ref 5–12)
NEUTROPHILS NFR BLD AUTO: 6.93 10*3/MM3 (ref 1.7–7)
NEUTROPHILS NFR BLD AUTO: 73.6 % (ref 42.7–76)
NRBC BLD AUTO-RTO: 0 /100 WBC (ref 0–0.2)
PLATELET # BLD AUTO: 295 10*3/MM3 (ref 140–450)
PMV BLD AUTO: 10.6 FL (ref 6–12)
POTASSIUM SERPL-SCNC: 4.5 MMOL/L (ref 3.5–5.2)
PROT SERPL-MCNC: 6.9 G/DL (ref 6–8.5)
RBC # BLD AUTO: 4.69 10*6/MM3 (ref 4.14–5.8)
SODIUM SERPL-SCNC: 134 MMOL/L (ref 136–145)
WBC NRBC COR # BLD AUTO: 9.42 10*3/MM3 (ref 3.4–10.8)

## 2025-07-29 PROCEDURE — 80053 COMPREHEN METABOLIC PANEL: CPT | Performed by: INTERNAL MEDICINE

## 2025-07-29 PROCEDURE — 85025 COMPLETE CBC W/AUTO DIFF WBC: CPT | Performed by: INTERNAL MEDICINE

## (undated) DEVICE — SKIN PREP TRAY W/CHG: Brand: MEDLINE INDUSTRIES, INC.

## (undated) DEVICE — MEDI-VAC YANKAUER SUCTION HANDLE W/BULBOUS TIP: Brand: CARDINAL HEALTH

## (undated) DEVICE — ACCESS SHEATH WITH DILATOR: Brand: WATCHMAN™ TRUSEAL™ ACCESS SYSTEM

## (undated) DEVICE — GLV SURG SENSICARE SLT PF LF 6.5 STRL

## (undated) DEVICE — GW TRNSEP SAFESEPT LT/ATRIAL RO 135CM .014IN

## (undated) DEVICE — GLV SURG SENSICARE PI MIC PF SZ7.5 LF STRL

## (undated) DEVICE — BG WAST DISPOSABLE DEPOT W/TBG48IN S/COCK SPK1400

## (undated) DEVICE — TOWEL,OR,DSP,ST,BLUE,STD,4/PK,20PK/CS: Brand: MEDLINE

## (undated) DEVICE — CVR PROB 96IN LF STRL

## (undated) DEVICE — TTL1LYR 16FR10ML 100%SIL TMPST TR: Brand: MEDLINE

## (undated) DEVICE — DOME MONITORING W BONDED STPCK BIOTRANS2

## (undated) DEVICE — DEV ANGIO FLOSWITCH HP BX24

## (undated) DEVICE — 0.2 MICRON INTRAVENOUS FILTER FOR 96 HOUR USE WITH MICRO-BORE EXTENSION TUBING AN LUER-LOCK ADAPTER: Brand: PALL POSIDYNE® ELD FILTER

## (undated) DEVICE — GW DIAG .032

## (undated) DEVICE — CATH URETH FLEXIMA CONE TP 5F 70CM

## (undated) DEVICE — CONTRL CONTRST CHMBRD W/TBG72IN REUS

## (undated) DEVICE — WIPE INST MEROCEL

## (undated) DEVICE — CYSTO PACK: Brand: MEDLINE INDUSTRIES, INC.

## (undated) DEVICE — CYSTO/BLADDER IRRIGATION SET, REGULATING CLAMP

## (undated) DEVICE — CATH ANGIO TRCN NB BCN .035 5F 100CM SM1

## (undated) DEVICE — Device

## (undated) DEVICE — PK ANGIO CERBRL RAD 40

## (undated) DEVICE — SOL IRR NACL 0.9PCT 3000ML

## (undated) DEVICE — GLOVE,SURG,SENSICARE SLT,LF,PF,6.5: Brand: MEDLINE

## (undated) DEVICE — MEDICINE CUP, GRADUATED, STER: Brand: MEDLINE

## (undated) DEVICE — MSK ENDO PORT O2 POM ELITE CURAPLEX A/

## (undated) DEVICE — CATH TEMPO 5F VER 135 Â° 100CM: Brand: TEMPO

## (undated) DEVICE — ST INF PRI SMRTSTE 20DRP 2VLV 24ML 117

## (undated) DEVICE — ANGIO-SEAL VIP VASCULAR CLOSURE DEVICE: Brand: ANGIO-SEAL

## (undated) DEVICE — RADIFOCUS TORQUE DEVICE MULTI-TORQUE VISE: Brand: RADIFOCUS TORQUE DEVICE

## (undated) DEVICE — DECANT BG O JET

## (undated) DEVICE — BASN GW RINGMASTER

## (undated) DEVICE — CATH DIAG EXPO .052 PIG145 6F 110CM

## (undated) DEVICE — SYS CLS VASC/VENI VASCADE MVP 6TO12F

## (undated) DEVICE — ST EXT IV LG BORE NDLESS FLTR LL 17IN

## (undated) DEVICE — SUT SILK 2/0 FS BLK 18IN 685G

## (undated) DEVICE — APPL CHLORAPREP HI/LITE 26ML ORNG

## (undated) DEVICE — STPCK 3WY HP ROT

## (undated) DEVICE — PINNACLE INTRODUCER SHEATH: Brand: PINNACLE

## (undated) DEVICE — INTRO SHEATH TRNSEP .032 SL0 8.5F 63CM

## (undated) DEVICE — TBG PRESS EXCITE INJ HPF1200 CLR 100IN

## (undated) DEVICE — TRY PREP SCRB VAG PVP

## (undated) DEVICE — RADIFOCUS GLIDEWIRE: Brand: GLIDEWIRE

## (undated) DEVICE — MANIFLD BLCK 200PSI 2PORT OFF RT

## (undated) DEVICE — ADAPT Y ROT GATEWAY PLS

## (undated) DEVICE — LEX ELECTRO PHYSIOLOGY: Brand: MEDLINE INDUSTRIES, INC.

## (undated) DEVICE — SET PRIMARY GRVTY 10DP MALE LL 104IN

## (undated) DEVICE — ST EXT IV SMARTSITE 2VLV SP M LL 5ML IV1

## (undated) DEVICE — NON-ADHERENT PADS PREPACK: Brand: TELFA

## (undated) DEVICE — LIMB HOLDER, WRIST/ANKLE: Brand: DEROYAL

## (undated) DEVICE — SOL NACL 0.9PCT 1000ML

## (undated) DEVICE — GOWN,REINFORCE,POLY,SIRUS,BREATH SLV,XLG: Brand: MEDLINE

## (undated) DEVICE — ST ACC MICROPUNCTURE STFF .018 ECHO/PLDM/TP 4F/10CM 21G/7CM

## (undated) DEVICE — SYS TRNSEP ACC ACROSS ADLT BRK1 71CM

## (undated) DEVICE — CANN NASL CO2 DIVIDED A/

## (undated) DEVICE — GW SAFARI2 PRESH XSM CRV .035IN 3.2X2.9X275CM

## (undated) DEVICE — Y-TYPE TUR/BLADDER IRRIGATION SET, REGULATING CLAMP

## (undated) DEVICE — KT MANIFLD EP

## (undated) DEVICE — DRSNG SURESITE123 4X4.8IN

## (undated) DEVICE — CATH URETRL PA CONE TP 8F

## (undated) DEVICE — HF-RESECTION ELECTRODE PLASMALOOP LOOP, MEDIUM, 24 FR., 12°/16°, PK TURIS: Brand: OLYMPUS